# Patient Record
Sex: MALE | Race: WHITE | NOT HISPANIC OR LATINO
[De-identification: names, ages, dates, MRNs, and addresses within clinical notes are randomized per-mention and may not be internally consistent; named-entity substitution may affect disease eponyms.]

---

## 2020-05-04 PROBLEM — Z00.00 ENCOUNTER FOR PREVENTIVE HEALTH EXAMINATION: Status: ACTIVE | Noted: 2020-05-04

## 2020-05-07 ENCOUNTER — APPOINTMENT (OUTPATIENT)
Dept: PULMONOLOGY | Facility: CLINIC | Age: 60
End: 2020-05-07
Payer: MEDICAID

## 2020-05-07 VITALS — HEIGHT: 70 IN | BODY MASS INDEX: 30.06 KG/M2 | WEIGHT: 210 LBS

## 2020-05-07 VITALS — DIASTOLIC BLOOD PRESSURE: 60 MMHG | SYSTOLIC BLOOD PRESSURE: 130 MMHG | HEART RATE: 95 BPM | OXYGEN SATURATION: 94 %

## 2020-05-07 DIAGNOSIS — Z87.828 PERSONAL HISTORY OF OTHER (HEALED) PHYSICAL INJURY AND TRAUMA: ICD-10-CM

## 2020-05-07 DIAGNOSIS — Z03.89 ENCOUNTER FOR OBSERVATION FOR OTHER SUSPECTED DISEASES AND CONDITIONS RULED OUT: ICD-10-CM

## 2020-05-07 DIAGNOSIS — M51.26 OTHER INTERVERTEBRAL DISC DISPLACEMENT, LUMBAR REGION: ICD-10-CM

## 2020-05-07 DIAGNOSIS — R63.4 ABNORMAL WEIGHT LOSS: ICD-10-CM

## 2020-05-07 PROCEDURE — 99204 OFFICE O/P NEW MOD 45 MIN: CPT

## 2020-05-07 PROCEDURE — 99406 BEHAV CHNG SMOKING 3-10 MIN: CPT

## 2020-07-13 ENCOUNTER — APPOINTMENT (OUTPATIENT)
Dept: THORACIC SURGERY | Facility: CLINIC | Age: 60
End: 2020-07-13
Payer: MEDICAID

## 2020-07-13 VITALS — HEIGHT: 72 IN | BODY MASS INDEX: 27.63 KG/M2 | WEIGHT: 204 LBS

## 2020-07-13 PROCEDURE — 99443: CPT

## 2020-08-03 ENCOUNTER — APPOINTMENT (OUTPATIENT)
Dept: PULMONOLOGY | Facility: CLINIC | Age: 60
End: 2020-08-03
Payer: MEDICAID

## 2020-08-03 VITALS — OXYGEN SATURATION: 96 % | DIASTOLIC BLOOD PRESSURE: 82 MMHG | HEART RATE: 133 BPM | SYSTOLIC BLOOD PRESSURE: 124 MMHG

## 2020-08-03 VITALS — BODY MASS INDEX: 27.13 KG/M2 | WEIGHT: 200 LBS

## 2020-08-03 DIAGNOSIS — R00.0 TACHYCARDIA, UNSPECIFIED: ICD-10-CM

## 2020-08-03 PROCEDURE — 99214 OFFICE O/P EST MOD 30 MIN: CPT

## 2020-08-10 ENCOUNTER — EMERGENCY (EMERGENCY)
Facility: HOSPITAL | Age: 60
LOS: 1 days | Discharge: HOME | End: 2020-08-10
Attending: EMERGENCY MEDICINE
Payer: MEDICAID

## 2020-08-10 VITALS
TEMPERATURE: 98 F | RESPIRATION RATE: 20 BRPM | WEIGHT: 199.96 LBS | OXYGEN SATURATION: 97 % | SYSTOLIC BLOOD PRESSURE: 112 MMHG | HEART RATE: 100 BPM | HEIGHT: 72 IN | DIASTOLIC BLOOD PRESSURE: 80 MMHG

## 2020-08-10 LAB
ALBUMIN SERPL ELPH-MCNC: 4.1 G/DL — SIGNIFICANT CHANGE UP (ref 3.3–5.2)
ALP SERPL-CCNC: 92 U/L — SIGNIFICANT CHANGE UP (ref 40–120)
ALT FLD-CCNC: 7 U/L — SIGNIFICANT CHANGE UP
ANION GAP SERPL CALC-SCNC: 12 MMOL/L — SIGNIFICANT CHANGE UP (ref 5–17)
APTT BLD: 34.1 SEC — SIGNIFICANT CHANGE UP (ref 27.5–35.5)
AST SERPL-CCNC: 16 U/L — SIGNIFICANT CHANGE UP
BASE EXCESS BLDA CALC-SCNC: 4.9 MMOL/L — HIGH (ref -2–2)
BASOPHILS # BLD AUTO: 0.03 K/UL — SIGNIFICANT CHANGE UP (ref 0–0.2)
BASOPHILS NFR BLD AUTO: 0.6 % — SIGNIFICANT CHANGE UP (ref 0–2)
BILIRUB SERPL-MCNC: 0.9 MG/DL — SIGNIFICANT CHANGE UP (ref 0.4–2)
BLOOD GAS COMMENTS ARTERIAL: SIGNIFICANT CHANGE UP
BUN SERPL-MCNC: 7 MG/DL — LOW (ref 8–20)
CALCIUM SERPL-MCNC: 9 MG/DL — SIGNIFICANT CHANGE UP (ref 8.6–10.2)
CHLORIDE SERPL-SCNC: 95 MMOL/L — LOW (ref 98–107)
CO2 SERPL-SCNC: 30 MMOL/L — HIGH (ref 22–29)
CREAT SERPL-MCNC: 0.87 MG/DL — SIGNIFICANT CHANGE UP (ref 0.5–1.3)
EOSINOPHIL # BLD AUTO: 0.09 K/UL — SIGNIFICANT CHANGE UP (ref 0–0.5)
EOSINOPHIL NFR BLD AUTO: 1.7 % — SIGNIFICANT CHANGE UP (ref 0–6)
GAS PNL BLDA: SIGNIFICANT CHANGE UP
GLUCOSE SERPL-MCNC: 78 MG/DL — SIGNIFICANT CHANGE UP (ref 70–99)
HCO3 BLDA-SCNC: 29 MMOL/L — HIGH (ref 20–26)
HCT VFR BLD CALC: 46.6 % — SIGNIFICANT CHANGE UP (ref 39–50)
HGB BLD-MCNC: 15.4 G/DL — SIGNIFICANT CHANGE UP (ref 13–17)
HOROWITZ INDEX BLDA+IHG-RTO: SIGNIFICANT CHANGE UP
IMM GRANULOCYTES NFR BLD AUTO: 0.2 % — SIGNIFICANT CHANGE UP (ref 0–1.5)
INR BLD: 1.28 RATIO — HIGH (ref 0.88–1.16)
LYMPHOCYTES # BLD AUTO: 2.03 K/UL — SIGNIFICANT CHANGE UP (ref 1–3.3)
LYMPHOCYTES # BLD AUTO: 39.1 % — SIGNIFICANT CHANGE UP (ref 13–44)
MCHC RBC-ENTMCNC: 32.5 PG — SIGNIFICANT CHANGE UP (ref 27–34)
MCHC RBC-ENTMCNC: 33 GM/DL — SIGNIFICANT CHANGE UP (ref 32–36)
MCV RBC AUTO: 98.3 FL — SIGNIFICANT CHANGE UP (ref 80–100)
MONOCYTES # BLD AUTO: 0.31 K/UL — SIGNIFICANT CHANGE UP (ref 0–0.9)
MONOCYTES NFR BLD AUTO: 6 % — SIGNIFICANT CHANGE UP (ref 2–14)
NEUTROPHILS # BLD AUTO: 2.72 K/UL — SIGNIFICANT CHANGE UP (ref 1.8–7.4)
NEUTROPHILS NFR BLD AUTO: 52.4 % — SIGNIFICANT CHANGE UP (ref 43–77)
NT-PROBNP SERPL-SCNC: 122 PG/ML — SIGNIFICANT CHANGE UP (ref 0–300)
PCO2 BLDA: 51 MMHG — HIGH (ref 35–45)
PH BLDA: 7.39 — SIGNIFICANT CHANGE UP (ref 7.35–7.45)
PLATELET # BLD AUTO: 160 K/UL — SIGNIFICANT CHANGE UP (ref 150–400)
PO2 BLDA: 65 MMHG — LOW (ref 83–108)
POTASSIUM SERPL-MCNC: 4.7 MMOL/L — SIGNIFICANT CHANGE UP (ref 3.5–5.3)
POTASSIUM SERPL-SCNC: 4.7 MMOL/L — SIGNIFICANT CHANGE UP (ref 3.5–5.3)
PROT SERPL-MCNC: 6.9 G/DL — SIGNIFICANT CHANGE UP (ref 6.6–8.7)
PROTHROM AB SERPL-ACNC: 14.7 SEC — HIGH (ref 10.6–13.6)
RBC # BLD: 4.74 M/UL — SIGNIFICANT CHANGE UP (ref 4.2–5.8)
RBC # FLD: 13.2 % — SIGNIFICANT CHANGE UP (ref 10.3–14.5)
SAO2 % BLDA: 95 % — SIGNIFICANT CHANGE UP (ref 95–99)
SODIUM SERPL-SCNC: 137 MMOL/L — SIGNIFICANT CHANGE UP (ref 135–145)
TROPONIN T SERPL-MCNC: <0.01 NG/ML — SIGNIFICANT CHANGE UP (ref 0–0.06)
WBC # BLD: 5.19 K/UL — SIGNIFICANT CHANGE UP (ref 3.8–10.5)
WBC # FLD AUTO: 5.19 K/UL — SIGNIFICANT CHANGE UP (ref 3.8–10.5)

## 2020-08-10 PROCEDURE — 93970 EXTREMITY STUDY: CPT | Mod: 26

## 2020-08-10 PROCEDURE — 36415 COLL VENOUS BLD VENIPUNCTURE: CPT

## 2020-08-10 PROCEDURE — 85610 PROTHROMBIN TIME: CPT

## 2020-08-10 PROCEDURE — 93005 ELECTROCARDIOGRAM TRACING: CPT

## 2020-08-10 PROCEDURE — 99284 EMERGENCY DEPT VISIT MOD MDM: CPT | Mod: 25

## 2020-08-10 PROCEDURE — 82803 BLOOD GASES ANY COMBINATION: CPT

## 2020-08-10 PROCEDURE — 93970 EXTREMITY STUDY: CPT

## 2020-08-10 PROCEDURE — 99218: CPT

## 2020-08-10 PROCEDURE — 85027 COMPLETE CBC AUTOMATED: CPT

## 2020-08-10 PROCEDURE — 93010 ELECTROCARDIOGRAM REPORT: CPT | Mod: 76

## 2020-08-10 PROCEDURE — 83880 ASSAY OF NATRIURETIC PEPTIDE: CPT

## 2020-08-10 PROCEDURE — G0378: CPT

## 2020-08-10 PROCEDURE — 99221 1ST HOSP IP/OBS SF/LOW 40: CPT

## 2020-08-10 PROCEDURE — 80053 COMPREHEN METABOLIC PANEL: CPT

## 2020-08-10 PROCEDURE — 84484 ASSAY OF TROPONIN QUANT: CPT

## 2020-08-10 PROCEDURE — 85730 THROMBOPLASTIN TIME PARTIAL: CPT

## 2020-08-10 RX ORDER — FONDAPARINUX SODIUM 2.5 MG/.5ML
1 INJECTION, SOLUTION SUBCUTANEOUS
Qty: 42 | Refills: 0
Start: 2020-08-10 | End: 2020-08-30

## 2020-08-10 RX ORDER — ACETAMINOPHEN 500 MG
650 TABLET ORAL EVERY 6 HOURS
Refills: 0 | Status: DISCONTINUED | OUTPATIENT
Start: 2020-08-10 | End: 2020-08-15

## 2020-08-10 RX ORDER — APIXABAN 2.5 MG/1
10 TABLET, FILM COATED ORAL ONCE
Refills: 0 | Status: DISCONTINUED | OUTPATIENT
Start: 2020-08-10 | End: 2020-08-10

## 2020-08-10 RX ORDER — RIVAROXABAN 15 MG-20MG
15 KIT ORAL ONCE
Refills: 0 | Status: COMPLETED | OUTPATIENT
Start: 2020-08-10 | End: 2020-08-10

## 2020-08-10 RX ORDER — SODIUM CHLORIDE 9 MG/ML
1000 INJECTION, SOLUTION INTRAVENOUS ONCE
Refills: 0 | Status: COMPLETED | OUTPATIENT
Start: 2020-08-10 | End: 2020-08-10

## 2020-08-10 RX ADMIN — RIVAROXABAN 15 MILLIGRAM(S): KIT at 19:39

## 2020-08-10 RX ADMIN — SODIUM CHLORIDE 3000 MILLILITER(S): 9 INJECTION, SOLUTION INTRAVENOUS at 17:53

## 2020-08-10 RX ADMIN — SODIUM CHLORIDE 1000 MILLILITER(S): 9 INJECTION, SOLUTION INTRAVENOUS at 17:53

## 2020-08-10 NOTE — ED ADULT NURSE NOTE - CHPI ED NUR SYMPTOMS NEG
no congestion/no diaphoresis/no nausea/no vomiting/no dizziness/no shortness of breath/no fever/no syncope/no chest pain/no chills/no back pain

## 2020-08-10 NOTE — ED CDU PROVIDER DISPOSITION NOTE - CLINICAL COURSE
PT with no SPMHx presents to the ED with complaint of abnormal Ct scan, found to have possible new arrythmia seen by pulm placed in obs for further work up. Prior to full obs orders being placed Pt called PA to bed side requesting to AMA, PA informed pt that he has new onset of Arrythmia, possible PE, abnormal EKG, and discussed the importance of staying for further work up and the risk of death or missed Dx from leaving. Pt was persistent that he wanted to go home and differed obs admission, educated about when to return to the ED if needed. PT verbalizes that he understands all instructions and results. Pt informed that ED is open and available 24/7 365 days a yr, encouraged to return to the ED if they have any change in condition, or feel the need for revaluation

## 2020-08-10 NOTE — ED CDU PROVIDER DISPOSITION NOTE - NSFOLLOWUPCLINICS_GEN_ALL_ED_FT
[facility]  Cardiology  [address] [place] [address]  Phone: [phone]  Fax:   Follow Up Time:

## 2020-08-10 NOTE — ED CDU PROVIDER INITIAL DAY NOTE - ATTENDING CONTRIBUTION TO CARE
HPI: 59yo male presenting with chest pain. Seen by pulmonologist and had outpatient CT scan which showed small left PE. Patient also noted to be in atrial flutter in ED.     PE:  Gen: NAD  Head: NCAT  HEENT: Oral mucosa moist, normal conjunctiva  CV: RRR no murmurs, normal perfusion  Resp: CTA b/l, no wheezing, rales, rhonchi, no respiratory distress  GI: Abd Soft NTND  Neuro: No focal neuro deficits  MSK: FROM all 4 extremities, no deformity  Skin: No rash, no bruising  Psych: Normal affect    MDM: Patient with new atrial flutter, PE, placed in obs for TTE, cardiac enzymes, and telemetry monitoring. However, patient decided to leave ED AMA. The patient has the capacity to refuse further medical evaluation and care. I had a lengthy discussion with the patient in which appropriate further evaluation and treatment was offered to the patient, and they declined. The patient understands that as a consequence of this decision they may be at risk for clinical deterioration including permanent disability and death, and the patient verbalized this understanding. Clear return precautions were discussed. The patient was urged to seek follow-up care, with appropriate referrals made as needed. Radha Farias DO     I performed a history and physical exam of the patient and discussed their management with the advanced care provider. I reviewed the advanced care provider's note and agree with the documented findings and plan of care. My medical decision making and objective findings are found above.

## 2020-08-10 NOTE — ED CDU PROVIDER INITIAL DAY NOTE - MEDICAL DECISION MAKING DETAILS
PT with new arrythmia and possible PE  placed in obs for Diagnostic uncertainty. PT seen BY OBS PA found to be appropriate CDU PT care transferred to PA.

## 2020-08-10 NOTE — ED CDU PROVIDER INITIAL DAY NOTE - PROGRESS NOTE DETAILS
Prior to full obs orders being placed Pt called PA to bed side requesting to AMA, PA informed pt that he has new onset of Arrythmia, possible PE, abnormal EKG, and discussed the importance of staying for further work up and the risk of death or missed Dx from leaving. Pt was persistent that he wanted to go home and differed obs admission, educated about when to return to the ED if needed. PT verbalizes that he understands all instructions and results. Pt informed that ED is open and available 24/7 365 days a yr, encouraged to return to the ED if they have any change in condition, or feel the need for revaluation. Myself and Dr. Farias had a lengthy discussion with patient, and the patient wishes to leave at this time. The patient understands that he/she is leaving against medical advice despite the risk of missing a potential serious diagnosis which may lead to injury, disability and/or death. I discussed with the patient which tests would need to be performed and what type of monitoring would be necessary for the patient as well. I was unable to convince the patient to stay for further work-up. The patient is alert and oriented and demonstrates competence in making medical decisions.

## 2020-08-10 NOTE — ED PROVIDER NOTE - CLINICAL SUMMARY MEDICAL DECISION MAKING FREE TEXT BOX
59 yo male presents after outpatient CT three days ago revealed small left pulmonary emboli. Will order labs, venous doppler to assess for possible DVT. Plan to admit the patient to obs pending lab work results.

## 2020-08-10 NOTE — CONSULT NOTE ADULT - SUBJECTIVE AND OBJECTIVE BOX
PULMONARY CONSULT NOTE      CHIP LOPEZ  MRN-[id number]    Patient is a 60y old  Male who presents with a chief complaint of PE    HISTORY OF PRESENT ILLNESS:  60M PMH smoker (1.5ppd x 15 years) who was found with small PE in LLL. Pt reports feeling asymptomatic so did not want to come to the ED, and after speaking with his pulmonologist Dr. Prescott, decided to come to the hospital. He was not started on anticoagulation. He denies any acute chest pain or SOB. Denies recent travel, denies any sick contacts. Denies coagulopathies running in the family. Reports having a colonoscopy 2 years ago that was "fine." No N/V/D, no fevers, no chills.     In the ED he was found with aflutter HR 120s, and was started on a DOAC (Xarelto).     MEDICATIONS  (STANDING):  rivaroxaban 15 milliGRAM(s) Oral Once    MEDICATIONS  (PRN):  acetaminophen   Tablet .. 650 milliGRAM(s) Oral every 6 hours PRN Temp greater or equal to 38C (100.4F), Mild Pain (1 - 3)    Allergies    No Known Allergies    Intolerances      PAST MEDICAL & SURGICAL HISTORY:  Poor historian  No significant past surgical history    FAMILY HISTORY:  Wife passed 06/2020 from ovarian CA      SOCIAL HISTORY  Smoking History: 1.5ppd x 15 years, quit 2 weeks PTA      REVIEW OF SYSTEMS:  CONSTITUTIONAL:  No fevers, chills  HEENT:  No headache, blurry vision, epistaxis, rhinorrhea  CARDIOVASCULAR:  No chest pain, no palpitations  RESPIRATORY:  As per HPI  GASTROINTESTINAL:  No abdominal pain, N/V/D  GENITOURINARY:  No dysuria, frequency or urgency  NEUROLOGIC:  No seizures or headaches  PSYCHIATRIC:  No disorder of thought or mood      Vital Signs Last 24 Hrs  T(C): 36.8 (10 Aug 2020 14:51), Max: 36.8 (10 Aug 2020 14:51)  T(F): 98.3 (10 Aug 2020 14:51), Max: 98.3 (10 Aug 2020 14:51)  HR: 100 (10 Aug 2020 14:51) (100 - 100)  BP: 112/80 (10 Aug 2020 14:51) (112/80 - 112/80)  BP(mean): --  RR: 20 (10 Aug 2020 14:51) (20 - 20)  SpO2: 97% (10 Aug 2020 14:51) (97% - 97%)      PHYSICAL EXAMINATION:  GENERAL: In no apparent distress  HEENT: NC/AT  NECK: Supple, non-tender   LUNGS: CTA B/L, good inspiratory effort, non-labored breathing  CV: +S1, S2, RRR  ABDOMEN: Soft, NT/ND  EXTREMITIES: Chronic LE skin changes, with 1+ pedal edema B/L  NEUROLOGIC: No focal deficits, grossly intact  PSYCH: Normal affect      LABS:                        15.4   5.19  )-----------( 160      ( 10 Aug 2020 17:35 )             46.6     08-10    137  |  95<L>  |  7.0<L>  ----------------------------<  78  4.7   |  30.0<H>  |  0.87    Ca    9.0      10 Aug 2020 17:35    TPro  6.9  /  Alb  4.1  /  TBili  0.9  /  DBili  x   /  AST  16  /  ALT  7   /  AlkPhos  92  08-10    PT/INR - ( 10 Aug 2020 17:40 )   PT: 14.7 sec;   INR: 1.28 ratio         PTT - ( 10 Aug 2020 17:40 )  PTT:34.1 sec    ABG - ( 10 Aug 2020 18:38 )  pH, Arterial: 7.39  pH, Blood: x     /  pCO2: 51    /  pO2: 65    / HCO3: 29    / Base Excess: 4.9   /  SaO2: 95                CARDIAC MARKERS ( 10 Aug 2020 17:35 )  x     / <0.01 ng/mL / x     / x     / x            Serum Pro-Brain Natriuretic Peptide: 122 pg/mL (08-10-20 @ 17:35)      RADIOLOGY & ADDITIONAL STUDIES:  CT at [facility] (8/7/2020) showed small LLL PE (see Alpha tab)    ECHO:  Pending

## 2020-08-10 NOTE — CONSULT NOTE ADULT - ASSESSMENT
60M PMH smoker (1.5ppd x 15 years) who was found with small PE in LLL    Impression:  - LLL small PE  - Smoker    Plan:  - May c/w Xarelto  - F/u TTE, LE dopplers  - Of note BNP and troponin were negative  - OOBTC, ambulate if able  - Monitor heart rhythm on telemetry - likely AFlutter is 2/2 PE  - Advise to avoid straining; start stool softeners if constipated  - Monitor O2 requirements  - Continue to advise smoking cessation  - Recommend monitoring on telemetry  - DVT PPx - systemic A/C as above  - Thank you for this consult, will continue to follow    Han Garcia M.D.  Pulmonary & Critical Care Attending  [facility]  Pulmonary Medicine at [place] 60M PMH smoker (1.5ppd x 15 years) who was found with small PE in LLL    Impression:  - LLL small PE  - Smoker  - Hypoxemia    Plan:  - May c/w Xarelto  - F/u TTE, LE dopplers  - Of note BNP and troponin were negative  - OOBTC, ambulate if able  - Monitor heart rhythm on telemetry - likely AFlutter is 2/2 PE  - Advise to avoid straining; start stool softeners if constipated  - Monitor O2 requirements   - Continue to advise smoking cessation  - Recommend monitoring on telemetry  - DVT PPx - systemic A/C as above  - Thank you for this consult, will continue to follow    Han Garcia M.D.  Pulmonary & Critical Care Attending  [facility]  Pulmonary Medicine at [place]

## 2020-08-10 NOTE — ED ADULT NURSE NOTE - OBJECTIVE STATEMENT
"the doctor said my blood counts are high and I may have clots somewhere." pt offers no complaints. a and o x3. sitting calm in bed. breathing even and unlabored. sinus tach on cm. will continue to monitor.

## 2020-08-10 NOTE — ED PROVIDER NOTE - ATTENDING CONTRIBUTION TO CARE
Dr. Perkins : I have personally seen and examined this patient at the bedside. I have fully participated in the care of this patient. I have reviewed all pertinent clinical information, including history, physical exam, plan and the Resident's note and agree except as noted.     59yo M pw with PE ON OUTPATIENT CT. denies cp/sob however notes that has had worsenign sob over past  year so his pmd sent him to a pulmonalogist dr. Prescott who sent him for a ct. . CT revealed small left pulmonary emboli. The patient did not want to go to the hospital three days ago as he was asymptomatic. Today, his Pulmonologist, Dr. Prescott, convinced him to go to the ED. He was not started on any anticoagulation before coming to the ED. Currently, the patient states that he feels "fine".   Denies f/c/n/v/cp/sob/palpitations/cough/abd.pain/d/c/dysuria/hematuria. no sick contacts/recent travel. no hx of dvt/pe    PE:  head; atraumatic normocephalic  eyes: perrla  Heart: rrr s1s2  lungs: ctab  abd: soft, nt nd + bs no rebound/guarding no cva ttp  le: no swelling no calf ttp  back: no midline cervical/thoracic/lumbar ttp      -->+PE on outpatient ct; will fu labs bnp trop bl le sono; place in obs; aflutter on ekg most likely secondary to PE keep on monitor; consult pulm--obs

## 2020-08-10 NOTE — ED PROVIDER NOTE - OBJECTIVE STATEMENT
59 yo male presents after undergoing CT scan three days ago. He was advised to go for CT scan after his Pulmonologist found abnormalities in his blood work four days ago. CT revealed small left pulmonary emboli. The patient did not want to go to the hospital three days ago as he was asymptomatic. Today, his Pulmonologist, Dr. Prescott, convinced him to go to the ED. He was not started on any anticoagulation before coming to the ED. Currently, the patient states that he feels "fine". He denies chest pain, SOB, abdominal pain, n/v/d, LOC, fever, chills.

## 2020-08-10 NOTE — CONSULT NOTE ADULT - ATTENDING COMMENTS
Greater than 50% of time was spent reviewing labs, notes, orders, radiographs and coordinating care.  35 minutes spent on total encounter; more than 50% of the visit was spent counseling and/or coordinating care by the attending physician.

## 2020-08-10 NOTE — ED PROVIDER NOTE - PHYSICAL EXAMINATION
Const: Awake, alert and oriented. In no acute distress. Well appearing.  HEENT: NC/AT. Moist mucous membranes.  Eyes: No scleral icterus. EOMI.  Neck:. Soft and supple. Full ROM without pain.  Cardiac: Tachycardia and regular rhythm. +S1/S2. Peripheral pulses 2+ and symmetric. No LE edema.  Resp: Speaking in full sentences. No evidence of respiratory distress. No wheezes, rales or rhonchi.  Abd: Soft, non-tender, non-distended. Normal bowel sounds in all 4 quadrants. No guarding or rebound.  Back: Spine midline and non-tender. No CVAT.  Skin: No rashes, abrasions or lacerations.  Lymph: No cervical lymphadenopathy.  Neuro: Awake, alert & oriented x 3. Moves all extremities symmetrically.

## 2020-08-10 NOTE — ED CDU PROVIDER DISPOSITION NOTE - PATIENT PORTAL LINK FT
You can access the FollowMyHealth Patient Portal offered by [facility] by registering at the following website: [email or web address]. By joining [employer]’s FollowMyHealth portal, you will also be able to view your health information using other applications (apps) compatible with our system.

## 2020-08-18 ENCOUNTER — APPOINTMENT (OUTPATIENT)
Dept: CARDIOLOGY | Facility: CLINIC | Age: 60
End: 2020-08-18

## 2020-11-05 PROBLEM — Z78.9 OTHER SPECIFIED HEALTH STATUS: Chronic | Status: ACTIVE | Noted: 2020-08-10

## 2020-11-08 DIAGNOSIS — Z01.818 ENCOUNTER FOR OTHER PREPROCEDURAL EXAMINATION: ICD-10-CM

## 2020-11-09 ENCOUNTER — TRANSCRIPTION ENCOUNTER (OUTPATIENT)
Age: 60
End: 2020-11-09

## 2020-11-09 ENCOUNTER — APPOINTMENT (OUTPATIENT)
Dept: DISASTER EMERGENCY | Facility: CLINIC | Age: 60
End: 2020-11-09

## 2020-11-09 ENCOUNTER — APPOINTMENT (OUTPATIENT)
Dept: PULMONOLOGY | Facility: CLINIC | Age: 60
End: 2020-11-09

## 2020-11-11 LAB — SARS-COV-2 N GENE NPH QL NAA+PROBE: NOT DETECTED

## 2020-11-12 ENCOUNTER — APPOINTMENT (OUTPATIENT)
Dept: PULMONOLOGY | Facility: CLINIC | Age: 60
End: 2020-11-12
Payer: MEDICAID

## 2020-11-12 VITALS — HEART RATE: 98 BPM | SYSTOLIC BLOOD PRESSURE: 138 MMHG | DIASTOLIC BLOOD PRESSURE: 86 MMHG | OXYGEN SATURATION: 97 %

## 2020-11-12 VITALS — BODY MASS INDEX: 26.45 KG/M2 | WEIGHT: 187 LBS

## 2020-11-12 VITALS — BODY MASS INDEX: 26.9 KG/M2 | HEIGHT: 70.5 IN | WEIGHT: 190 LBS

## 2020-11-12 PROCEDURE — 99215 OFFICE O/P EST HI 40 MIN: CPT | Mod: 25

## 2020-11-12 PROCEDURE — 94729 DIFFUSING CAPACITY: CPT

## 2020-11-12 PROCEDURE — 94727 GAS DIL/WSHOT DETER LNG VOL: CPT

## 2020-11-12 PROCEDURE — 94010 BREATHING CAPACITY TEST: CPT

## 2020-11-12 PROCEDURE — 85018 HEMOGLOBIN: CPT | Mod: QW

## 2020-11-12 PROCEDURE — 99072 ADDL SUPL MATRL&STAF TM PHE: CPT

## 2020-11-16 LAB
BUN SERPL-MCNC: 8 MG/DL
CREAT SERPL-MCNC: 0.89 MG/DL
DEPRECATED D DIMER PPP IA-ACNC: 329 NG/ML DDU

## 2020-11-18 ENCOUNTER — NON-APPOINTMENT (OUTPATIENT)
Age: 60
End: 2020-11-18

## 2020-11-18 ENCOUNTER — EMERGENCY (EMERGENCY)
Facility: HOSPITAL | Age: 60
LOS: 1 days | Discharge: HOME | End: 2020-11-18
Attending: EMERGENCY MEDICINE
Payer: MEDICAID

## 2020-11-18 VITALS
SYSTOLIC BLOOD PRESSURE: 157 MMHG | OXYGEN SATURATION: 97 % | TEMPERATURE: 99 F | HEART RATE: 97 BPM | WEIGHT: 186.95 LBS | HEIGHT: 72 IN | RESPIRATION RATE: 18 BRPM | DIASTOLIC BLOOD PRESSURE: 78 MMHG

## 2020-11-18 VITALS
TEMPERATURE: 98 F | HEART RATE: 115 BPM | DIASTOLIC BLOOD PRESSURE: 76 MMHG | RESPIRATION RATE: 18 BRPM | SYSTOLIC BLOOD PRESSURE: 139 MMHG | OXYGEN SATURATION: 96 %

## 2020-11-18 LAB
ALBUMIN SERPL ELPH-MCNC: 4.1 G/DL — SIGNIFICANT CHANGE UP (ref 3.3–5.2)
ALP SERPL-CCNC: 97 U/L — SIGNIFICANT CHANGE UP (ref 40–120)
ALT FLD-CCNC: 11 U/L — SIGNIFICANT CHANGE UP
ANION GAP SERPL CALC-SCNC: 10 MMOL/L — SIGNIFICANT CHANGE UP (ref 5–17)
APTT BLD: 36.9 SEC — HIGH (ref 27.5–35.5)
AST SERPL-CCNC: 21 U/L — SIGNIFICANT CHANGE UP
BASOPHILS # BLD AUTO: 0.03 K/UL — SIGNIFICANT CHANGE UP (ref 0–0.2)
BASOPHILS NFR BLD AUTO: 0.4 % — SIGNIFICANT CHANGE UP (ref 0–2)
BILIRUB SERPL-MCNC: 0.7 MG/DL — SIGNIFICANT CHANGE UP (ref 0.4–2)
BUN SERPL-MCNC: 7 MG/DL — LOW (ref 8–20)
CALCIUM SERPL-MCNC: 8.9 MG/DL — SIGNIFICANT CHANGE UP (ref 8.6–10.2)
CHLORIDE SERPL-SCNC: 99 MMOL/L — SIGNIFICANT CHANGE UP (ref 98–107)
CO2 SERPL-SCNC: 28 MMOL/L — SIGNIFICANT CHANGE UP (ref 22–29)
CREAT SERPL-MCNC: 0.81 MG/DL — SIGNIFICANT CHANGE UP (ref 0.5–1.3)
EOSINOPHIL # BLD AUTO: 0.05 K/UL — SIGNIFICANT CHANGE UP (ref 0–0.5)
EOSINOPHIL NFR BLD AUTO: 0.7 % — SIGNIFICANT CHANGE UP (ref 0–6)
GLUCOSE SERPL-MCNC: 84 MG/DL — SIGNIFICANT CHANGE UP (ref 70–99)
HCT VFR BLD CALC: 47.2 % — SIGNIFICANT CHANGE UP (ref 39–50)
HGB BLD-MCNC: 16.2 G/DL — SIGNIFICANT CHANGE UP (ref 13–17)
IMM GRANULOCYTES NFR BLD AUTO: 0.3 % — SIGNIFICANT CHANGE UP (ref 0–1.5)
INR BLD: 1.16 RATIO — SIGNIFICANT CHANGE UP (ref 0.88–1.16)
LYMPHOCYTES # BLD AUTO: 1.72 K/UL — SIGNIFICANT CHANGE UP (ref 1–3.3)
LYMPHOCYTES # BLD AUTO: 24.1 % — SIGNIFICANT CHANGE UP (ref 13–44)
MAGNESIUM SERPL-MCNC: 2.1 MG/DL — SIGNIFICANT CHANGE UP (ref 1.6–2.6)
MCHC RBC-ENTMCNC: 33 PG — SIGNIFICANT CHANGE UP (ref 27–34)
MCHC RBC-ENTMCNC: 34.3 GM/DL — SIGNIFICANT CHANGE UP (ref 32–36)
MCV RBC AUTO: 96.1 FL — SIGNIFICANT CHANGE UP (ref 80–100)
MONOCYTES # BLD AUTO: 0.4 K/UL — SIGNIFICANT CHANGE UP (ref 0–0.9)
MONOCYTES NFR BLD AUTO: 5.6 % — SIGNIFICANT CHANGE UP (ref 2–14)
NEUTROPHILS # BLD AUTO: 4.91 K/UL — SIGNIFICANT CHANGE UP (ref 1.8–7.4)
NEUTROPHILS NFR BLD AUTO: 68.9 % — SIGNIFICANT CHANGE UP (ref 43–77)
NT-PROBNP SERPL-SCNC: 452 PG/ML — HIGH (ref 0–300)
PLATELET # BLD AUTO: 167 K/UL — SIGNIFICANT CHANGE UP (ref 150–400)
POTASSIUM SERPL-MCNC: 4.7 MMOL/L — SIGNIFICANT CHANGE UP (ref 3.5–5.3)
POTASSIUM SERPL-SCNC: 4.7 MMOL/L — SIGNIFICANT CHANGE UP (ref 3.5–5.3)
PROT SERPL-MCNC: 7.3 G/DL — SIGNIFICANT CHANGE UP (ref 6.6–8.7)
PROTHROM AB SERPL-ACNC: 13.4 SEC — SIGNIFICANT CHANGE UP (ref 10.6–13.6)
RBC # BLD: 4.91 M/UL — SIGNIFICANT CHANGE UP (ref 4.2–5.8)
RBC # FLD: 13.2 % — SIGNIFICANT CHANGE UP (ref 10.3–14.5)
SODIUM SERPL-SCNC: 137 MMOL/L — SIGNIFICANT CHANGE UP (ref 135–145)
TROPONIN T SERPL-MCNC: <0.01 NG/ML — SIGNIFICANT CHANGE UP (ref 0–0.06)
WBC # BLD: 7.13 K/UL — SIGNIFICANT CHANGE UP (ref 3.8–10.5)
WBC # FLD AUTO: 7.13 K/UL — SIGNIFICANT CHANGE UP (ref 3.8–10.5)

## 2020-11-18 PROCEDURE — 93010 ELECTROCARDIOGRAM REPORT: CPT

## 2020-11-18 PROCEDURE — 71275 CT ANGIOGRAPHY CHEST: CPT | Mod: 26

## 2020-11-18 PROCEDURE — 99285 EMERGENCY DEPT VISIT HI MDM: CPT

## 2020-11-18 NOTE — ED PROVIDER NOTE - CARE PLAN
Principal Discharge DX:	COPD (chronic obstructive pulmonary disease)  Secondary Diagnosis:	Pulmonary embolism

## 2020-11-18 NOTE — ED PROVIDER NOTE - CLINICAL SUMMARY MEDICAL DECISION MAKING FREE TEXT BOX
patient with outpt recurrent PE, spoke with Dr Arias sent in for confirmatory CT, labs, likely admit brijesh see patient in AM. patient in no distress now. mild tachy, noted a afib

## 2020-11-18 NOTE — ED PROVIDER NOTE - PATIENT PORTAL LINK FT
You can access the FollowMyHealth Patient Portal offered by [facility] by registering at the following website: [email or web address]. By joining [email or web address]’s FollowMyHealth portal, you will also be able to view your health information using other applications (apps) compatible with our system.

## 2020-11-18 NOTE — ED ADULT NURSE NOTE - OBJECTIVE STATEMENT
a&ox4, sent by MD for eval of labs/CT scan; pt states increased sob/MERRILL - hx "blood clots", pt states he is supposed to be taking eliquis but "ran out a month ago and need a new prescription". current daily smoker. denies further symptoms.

## 2020-11-18 NOTE — ED PROVIDER NOTE - OBJECTIVE STATEMENT
59yo M with COPD current smoker, dx with PE in August stopped his xarelto b/c he ran out, per patient no complaints but per pulm- worsening MERRILL, no CP. no fever/chills. no cough. with son who states appears SOB, and sill smoker. had CTA showing worsening clot and sent to ED for 'medication to break it up' no leg swelling. no h/o CA.    pulm Prescott

## 2020-11-18 NOTE — ED PROVIDER NOTE - PHYSICAL EXAMINATION
Gen: NAD, AOx3  Head: NCAT  HEENT: EOMI, oral mucosa moist, normal conjunctiva, neck supple  Lung: decreased air entry, no wheeze appreciated, no resp distress  CV: irregular, tachy, no murmur, Normal perfusion  Abd: soft, NTND  MSK: No edema, no visible deformities  Neuro: No focal neurologic deficits  Skin: No rash   Psych: normal affect

## 2020-11-18 NOTE — ED PROVIDER NOTE - NSFOLLOWUPINSTRUCTIONS_ED_ALL_ED_FT
Stop smoking !!!  Eliquis 5 mg 2x daily  Follow up with Dr. Prescott-call in the AM to schedule appt  Return sooner for any problems

## 2020-11-19 LAB — SARS-COV-2 RNA SPEC QL NAA+PROBE: SIGNIFICANT CHANGE UP

## 2020-11-19 PROCEDURE — 71275 CT ANGIOGRAPHY CHEST: CPT

## 2020-11-19 PROCEDURE — 85730 THROMBOPLASTIN TIME PARTIAL: CPT

## 2020-11-19 PROCEDURE — 85025 COMPLETE CBC W/AUTO DIFF WBC: CPT

## 2020-11-19 PROCEDURE — 36415 COLL VENOUS BLD VENIPUNCTURE: CPT

## 2020-11-19 PROCEDURE — 83735 ASSAY OF MAGNESIUM: CPT

## 2020-11-19 PROCEDURE — 84484 ASSAY OF TROPONIN QUANT: CPT

## 2020-11-19 PROCEDURE — 80053 COMPREHEN METABOLIC PANEL: CPT

## 2020-11-19 PROCEDURE — U0003: CPT

## 2020-11-19 PROCEDURE — 93005 ELECTROCARDIOGRAM TRACING: CPT

## 2020-11-19 PROCEDURE — 83880 ASSAY OF NATRIURETIC PEPTIDE: CPT

## 2020-11-19 PROCEDURE — 99284 EMERGENCY DEPT VISIT MOD MDM: CPT | Mod: 25

## 2020-11-19 PROCEDURE — 85610 PROTHROMBIN TIME: CPT

## 2020-11-19 RX ORDER — APIXABAN 2.5 MG/1
5 TABLET, FILM COATED ORAL ONCE
Refills: 0 | Status: COMPLETED | OUTPATIENT
Start: 2020-11-19 | End: 2020-11-19

## 2020-11-19 RX ORDER — APIXABAN 2.5 MG/1
1 TABLET, FILM COATED ORAL
Qty: 60 | Refills: 0
Start: 2020-11-19 | End: 2020-12-18

## 2020-11-19 RX ADMIN — APIXABAN 5 MILLIGRAM(S): 2.5 TABLET, FILM COATED ORAL at 01:05

## 2020-11-24 PROBLEM — I26.99 OTHER PULMONARY EMBOLISM WITHOUT ACUTE COR PULMONALE: Chronic | Status: ACTIVE | Noted: 2020-11-18

## 2020-11-24 PROBLEM — J44.9 CHRONIC OBSTRUCTIVE PULMONARY DISEASE, UNSPECIFIED: Chronic | Status: ACTIVE | Noted: 2020-11-18

## 2020-12-15 ENCOUNTER — APPOINTMENT (OUTPATIENT)
Dept: PULMONOLOGY | Facility: CLINIC | Age: 60
End: 2020-12-15
Payer: MEDICAID

## 2020-12-15 VITALS
WEIGHT: 188 LBS | BODY MASS INDEX: 26.62 KG/M2 | DIASTOLIC BLOOD PRESSURE: 80 MMHG | RESPIRATION RATE: 14 BRPM | OXYGEN SATURATION: 97 % | HEIGHT: 70.5 IN | HEART RATE: 81 BPM | SYSTOLIC BLOOD PRESSURE: 120 MMHG

## 2020-12-15 PROCEDURE — 99072 ADDL SUPL MATRL&STAF TM PHE: CPT

## 2020-12-15 PROCEDURE — 99204 OFFICE O/P NEW MOD 45 MIN: CPT

## 2020-12-15 NOTE — CONSULT LETTER
[Dear  ___] : Dear  [unfilled], [Consult Letter:] : I had the pleasure of evaluating your patient, [unfilled]. [Please see my note below.] : Please see my note below. [Consult Closing:] : Thank you very much for allowing me to participate in the care of this patient.  If you have any questions, please do not hesitate to contact me. [Sincerely,] : Sincerely, [FreeTextEntry3] : Aleksander Pantoja MD FCCP\par Pulmonary/Critical Care/Sleep Medicine\par Department of Internal Medicine\par \par [employer]\par

## 2020-12-15 NOTE — REASON FOR VISIT
[Initial] : an initial visit [COPD] : COPD [Shortness of Breath] : shortness of breath [Pulmonary Embolism] : pulmonary embolism [Pulmonary Nodules] : pulmonary nodules

## 2020-12-15 NOTE — DISCUSSION/SUMMARY
[FreeTextEntry1] : 52-year-old male with a history of stage III chronic obstructive lung disease seen today in followup for this as well as pulmonary embolus and pulmonary nodule. Patient's recent complaints of dyspnea did not appear to be bronchospastic in origin and may be more the basis of deconditioning versus depression versus anemia secondary to anticoagulation. Routine blood work, as well as d-dimer being ordered. The patient has been told to continue his present anticoagulation until his next visit.\par \par His pulmonary nodule, now appears to be scarlike in origin. In light of his previous negative PET CT is most likely represented an inflammatory lesion.

## 2020-12-15 NOTE — HISTORY OF PRESENT ILLNESS
[Current] : current [>= 30 pack years] : >= 30 pack years [TextBox_4] : 60 year-old male with a history of pulmonary emboli, stage III chronic obstructive lung disease, and abnormal CAT scan, seen today in the absence of Dr. Prescott. Patient's chart was reviewed. He has a history of stage III chronic obstructive lung disease, complicated by a small pulmonary embolus in August 2020. He is poorly compliant with his oral anticoagulations and was eventually restarted on Elequis in mid-November. He is currently maintained on 5 mg b.i.d. He continues to complain of mild dyspnea on exertion, but has noticed some slight worsening. He denies any significant complaints of cough, wheeze, or sputum production. Has been no change in bowel habits. He continues to smoke approximately 1/2-1 pack of cigarettes per day.\par  [ESS] : 0

## 2020-12-28 ENCOUNTER — LABORATORY RESULT (OUTPATIENT)
Age: 60
End: 2020-12-28

## 2021-01-04 ENCOUNTER — APPOINTMENT (OUTPATIENT)
Dept: THORACIC SURGERY | Facility: CLINIC | Age: 61
End: 2021-01-04
Payer: MEDICAID

## 2021-01-04 VITALS — BODY MASS INDEX: 25.33 KG/M2 | WEIGHT: 187 LBS | HEIGHT: 72 IN

## 2021-01-04 PROCEDURE — ZZZZZ: CPT

## 2021-01-14 ENCOUNTER — APPOINTMENT (OUTPATIENT)
Dept: CARDIOLOGY | Facility: CLINIC | Age: 61
End: 2021-01-14
Payer: MEDICAID

## 2021-01-14 ENCOUNTER — NON-APPOINTMENT (OUTPATIENT)
Age: 61
End: 2021-01-14

## 2021-01-14 ENCOUNTER — APPOINTMENT (OUTPATIENT)
Dept: PULMONOLOGY | Facility: CLINIC | Age: 61
End: 2021-01-14
Payer: MEDICAID

## 2021-01-14 VITALS
WEIGHT: 193 LBS | TEMPERATURE: 98.1 F | BODY MASS INDEX: 26.14 KG/M2 | HEIGHT: 72 IN | SYSTOLIC BLOOD PRESSURE: 120 MMHG | DIASTOLIC BLOOD PRESSURE: 72 MMHG | HEART RATE: 64 BPM | OXYGEN SATURATION: 91 %

## 2021-01-14 VITALS
SYSTOLIC BLOOD PRESSURE: 122 MMHG | RESPIRATION RATE: 14 BRPM | HEART RATE: 74 BPM | DIASTOLIC BLOOD PRESSURE: 74 MMHG | OXYGEN SATURATION: 97 %

## 2021-01-14 VITALS — SYSTOLIC BLOOD PRESSURE: 122 MMHG | DIASTOLIC BLOOD PRESSURE: 78 MMHG

## 2021-01-14 DIAGNOSIS — Z87.898 PERSONAL HISTORY OF OTHER SPECIFIED CONDITIONS: ICD-10-CM

## 2021-01-14 PROCEDURE — 99072 ADDL SUPL MATRL&STAF TM PHE: CPT

## 2021-01-14 PROCEDURE — 99214 OFFICE O/P EST MOD 30 MIN: CPT | Mod: 25

## 2021-01-14 PROCEDURE — 99406 BEHAV CHNG SMOKING 3-10 MIN: CPT

## 2021-01-14 PROCEDURE — 93000 ELECTROCARDIOGRAM COMPLETE: CPT

## 2021-01-14 PROCEDURE — 99203 OFFICE O/P NEW LOW 30 MIN: CPT | Mod: 25

## 2021-01-14 RX ORDER — LORAZEPAM 0.5 MG/1
0.5 TABLET ORAL DAILY
Refills: 0 | Status: DISCONTINUED | COMMUNITY
Start: 2020-08-03 | End: 2021-01-14

## 2021-01-14 NOTE — PHYSICAL EXAM
[General Appearance - Well Developed] : well developed [Normal Appearance] : normal appearance [Well Groomed] : well groomed [General Appearance - Well Nourished] : well nourished [No Deformities] : no deformities [General Appearance - In No Acute Distress] : no acute distress [Normal Conjunctiva] : the conjunctiva exhibited no abnormalities [Eyelids - No Xanthelasma] : the eyelids demonstrated no xanthelasmas [Normal Oral Mucosa] : normal oral mucosa [No Oral Pallor] : no oral pallor [No Oral Cyanosis] : no oral cyanosis [Normal Jugular Venous A Waves Present] : normal jugular venous A waves present [Normal Jugular Venous V Waves Present] : normal jugular venous V waves present [No Jugular Venous Sarah A Waves] : no jugular venous sarah A waves [Heart Rate And Rhythm] : heart rate and rhythm were normal [Heart Sounds] : normal S1 and S2 [Murmurs] : no murmurs present [Respiration, Rhythm And Depth] : normal respiratory rhythm and effort [Exaggerated Use Of Accessory Muscles For Inspiration] : no accessory muscle use [Auscultation Breath Sounds / Voice Sounds] : lungs were clear to auscultation bilaterally [Abdomen Soft] : soft [Abdomen Tenderness] : non-tender [Abdomen Mass (___ Cm)] : no abdominal mass palpated [Abnormal Walk] : normal gait [Gait - Sufficient For Exercise Testing] : the gait was sufficient for exercise testing [Nail Clubbing] : no clubbing of the fingernails [Cyanosis, Localized] : no localized cyanosis [Petechial Hemorrhages (___cm)] : no petechial hemorrhages [Skin Color & Pigmentation] : normal skin color and pigmentation [] : no rash [No Venous Stasis] : no venous stasis [Skin Lesions] : no skin lesions [No Skin Ulcers] : no skin ulcer [No Xanthoma] : no  xanthoma was observed [Oriented To Time, Place, And Person] : oriented to person, place, and time [Affect] : the affect was normal [Mood] : the mood was normal [No Anxiety] : not feeling anxious

## 2021-01-14 NOTE — HISTORY OF PRESENT ILLNESS
[FreeTextEntry1] : 60 y/o M with PMH pulmonary emboli diagnosed om August 2020 (on eliquis), stage III COPD, active smoker (states he has cut down from 2ppd to 1 cigarette daily), chronic LBP on suboxone. \par \par Patient states he has chest pain and difficulty breathing for the past 5-6 years, which has been worsening over the past few months. He describes pain as stabbing pain in the left side of his chest that comes in "waves" without any exacerbating or relieving factors. He states pain is always there. Unrelated to activity. He states exercise capacity is limited to 1 block or < 1 flight of stairs due to dyspnea. Denies any prior cardiac workup. \par \par EKG today shows atrial flutter at 64 bpm with 4:1 conduction. Patient denies history of arrhythmia.

## 2021-01-21 ENCOUNTER — APPOINTMENT (OUTPATIENT)
Dept: CARDIOLOGY | Facility: CLINIC | Age: 61
End: 2021-01-21

## 2021-02-17 ENCOUNTER — APPOINTMENT (OUTPATIENT)
Dept: CARDIOLOGY | Facility: CLINIC | Age: 61
End: 2021-02-17

## 2021-02-24 ENCOUNTER — APPOINTMENT (OUTPATIENT)
Dept: CARDIOLOGY | Facility: CLINIC | Age: 61
End: 2021-02-24
Payer: MEDICAID

## 2021-02-24 PROCEDURE — 99072 ADDL SUPL MATRL&STAF TM PHE: CPT

## 2021-02-24 PROCEDURE — 93306 TTE W/DOPPLER COMPLETE: CPT

## 2021-03-01 ENCOUNTER — APPOINTMENT (OUTPATIENT)
Dept: CARDIOLOGY | Facility: CLINIC | Age: 61
End: 2021-03-01
Payer: MEDICAID

## 2021-03-01 PROCEDURE — A9500: CPT

## 2021-03-01 PROCEDURE — 78452 HT MUSCLE IMAGE SPECT MULT: CPT

## 2021-03-01 PROCEDURE — 93015 CV STRESS TEST SUPVJ I&R: CPT

## 2021-03-01 PROCEDURE — 99072 ADDL SUPL MATRL&STAF TM PHE: CPT

## 2021-03-04 ENCOUNTER — APPOINTMENT (OUTPATIENT)
Dept: CARDIOLOGY | Facility: CLINIC | Age: 61
End: 2021-03-04
Payer: MEDICAID

## 2021-03-04 ENCOUNTER — NON-APPOINTMENT (OUTPATIENT)
Age: 61
End: 2021-03-04

## 2021-03-04 VITALS
TEMPERATURE: 98.9 F | HEART RATE: 92 BPM | DIASTOLIC BLOOD PRESSURE: 70 MMHG | SYSTOLIC BLOOD PRESSURE: 126 MMHG | WEIGHT: 197 LBS | BODY MASS INDEX: 26.11 KG/M2 | HEIGHT: 73 IN | OXYGEN SATURATION: 94 %

## 2021-03-04 DIAGNOSIS — F17.200 NICOTINE DEPENDENCE, UNSPECIFIED, UNCOMPLICATED: ICD-10-CM

## 2021-03-04 PROCEDURE — 93000 ELECTROCARDIOGRAM COMPLETE: CPT

## 2021-03-04 PROCEDURE — 99212 OFFICE O/P EST SF 10 MIN: CPT | Mod: 25

## 2021-03-04 PROCEDURE — 99072 ADDL SUPL MATRL&STAF TM PHE: CPT

## 2021-03-05 PROBLEM — F17.200 NICOTINE ADDICTION: Status: ACTIVE | Noted: 2020-05-07

## 2021-03-05 NOTE — HISTORY OF PRESENT ILLNESS
[FreeTextEntry1] : 60 y/o M with PMH PE dx'ed 8/2020, on eliquis, stage III COPD, active smoker, chronic LBP on suboxone, found to have new onset atrial flutter at previous visit. Patient continues to complain of some stabbing chest pain on the L side and exertional dyspnea limiting his effort tolerance. Also c/o occasional palpitations, although he denies any dizziness/lightheadedness, syncope or near syncope. \par \par EKG today shows a flutter at 94bpm\par \par Echo 2/24/21:\par LVEF 55-60%, no significant valvular disease\par \par NST showed no ischemia (full report not uploaded yet)\par \par

## 2021-03-05 NOTE — ASSESSMENT
[FreeTextEntry1] : #Dyspnea on exertion\par Likely related to patient's COPD\par No significant abnormality seen on TTE or NST\par \par #Atrial flutter\par Start cardizem\par Avoid beta blocker due to patient's history of severe COPD\par Refer to EP\par \par #Tobacco use\par Smoking cessation strongly advised \par

## 2021-03-05 NOTE — PHYSICAL EXAM
[General Appearance - Well Developed] : well developed [Normal Appearance] : normal appearance [Well Groomed] : well groomed [General Appearance - Well Nourished] : well nourished [No Deformities] : no deformities [General Appearance - In No Acute Distress] : no acute distress [Normal Conjunctiva] : the conjunctiva exhibited no abnormalities [Eyelids - No Xanthelasma] : the eyelids demonstrated no xanthelasmas [Normal Oral Mucosa] : normal oral mucosa [No Oral Pallor] : no oral pallor [No Oral Cyanosis] : no oral cyanosis [Normal Jugular Venous A Waves Present] : normal jugular venous A waves present [Normal Jugular Venous V Waves Present] : normal jugular venous V waves present [No Jugular Venous Sarah A Waves] : no jugular venous sarah A waves [Exaggerated Use Of Accessory Muscles For Inspiration] : no accessory muscle use [FreeTextEntry1] : Poor air movement b/l  [Heart Rate And Rhythm] : heart rate and rhythm were normal [Heart Sounds] : normal S1 and S2 [Murmurs] : no murmurs present [Abdomen Soft] : soft [Abdomen Tenderness] : non-tender [Abdomen Mass (___ Cm)] : no abdominal mass palpated [Abnormal Walk] : normal gait [Gait - Sufficient For Exercise Testing] : the gait was sufficient for exercise testing [Nail Clubbing] : no clubbing of the fingernails [Cyanosis, Localized] : no localized cyanosis [Petechial Hemorrhages (___cm)] : no petechial hemorrhages [Skin Color & Pigmentation] : normal skin color and pigmentation [] : no rash [No Venous Stasis] : no venous stasis [Skin Lesions] : no skin lesions [No Skin Ulcers] : no skin ulcer [No Xanthoma] : no  xanthoma was observed [Oriented To Time, Place, And Person] : oriented to person, place, and time [Affect] : the affect was normal [Mood] : the mood was normal [No Anxiety] : not feeling anxious

## 2021-04-06 NOTE — ED CDU PROVIDER INITIAL DAY NOTE - PROGRESS NOTE ADDITIONAL1
----- Message from BEN Schaefer sent at 4/6/2021  7:33 AM CDT -----  Please call patient and let them know results. Blood sugars remain well controlled with an A1c of 6.1  Kidney function is stable. Potassium is stable but slightly elevated. Avoid high potassium containing foods. Additional Progress Note...

## 2021-04-15 ENCOUNTER — NON-APPOINTMENT (OUTPATIENT)
Age: 61
End: 2021-04-15

## 2021-04-15 ENCOUNTER — APPOINTMENT (OUTPATIENT)
Dept: ELECTROPHYSIOLOGY | Facility: CLINIC | Age: 61
End: 2021-04-15
Payer: MEDICAID

## 2021-04-15 VITALS
RESPIRATION RATE: 16 BRPM | DIASTOLIC BLOOD PRESSURE: 70 MMHG | HEIGHT: 72 IN | OXYGEN SATURATION: 97 % | SYSTOLIC BLOOD PRESSURE: 118 MMHG | TEMPERATURE: 97.5 F | BODY MASS INDEX: 26.82 KG/M2 | WEIGHT: 198 LBS | HEART RATE: 45 BPM

## 2021-04-15 PROCEDURE — 93000 ELECTROCARDIOGRAM COMPLETE: CPT

## 2021-04-15 PROCEDURE — 99204 OFFICE O/P NEW MOD 45 MIN: CPT | Mod: 25

## 2021-04-15 PROCEDURE — 99072 ADDL SUPL MATRL&STAF TM PHE: CPT

## 2021-04-15 NOTE — DISCUSSION/SUMMARY
[FreeTextEntry1] :  61 year old gentleman with history of PE 8/2020 (on Eliquis), COPD, tobacco use (quit 6 mo ago) presenting for evaluation of atrial flutter. He has had sustained atrial flutter with variable rates, and in this setting has had progressive symptoms of palpitation, dizziness, and dyspnea. HE does have pulmonary disease and a PE last year, and his symptoms may be multifactorial, however, I suspect they are in large part related to his ongoing arrhythmia. We discussed the arrhythmia and associated risks and treatment options. We reviewed options for medical management as well as catheter ablation- given the opportunity for long-term success with ablation of atrial flutter, and his long history of arrhythmia and progressive symptoms, I do believe this is his best management option at this time. We discussed ablation and associated risks and benefits, including procedure related risks such as bleeding, vascular injury, cardiac perforation and stroke. He expressed understanding and does want to proceed. I did also explain that pts with atrial flutter are at increased risk of other arrhythmias including atrial fibrillation event after successful ablation of atrial flutter, and that further monitoring is warranted prior to stopping anticoagulation. As he has been on eliquis for a recent PE, will plan to continue anticoagulation for the near future.  \par \par -ablation of atrial futter. JADA prior to the procedure. Hold eliquis the day of the procedure.  \par \par -Will likely stop diltiazem following the procedure.  \par \par -f/u after above.
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
## 2021-04-15 NOTE — PHYSICAL EXAM
[General Appearance - Well Developed] : well developed [Normal Appearance] : normal appearance [Well Groomed] : well groomed [General Appearance - Well Nourished] : well nourished [No Deformities] : no deformities [General Appearance - In No Acute Distress] : no acute distress [Normal Conjunctiva] : the conjunctiva exhibited no abnormalities [Eyelids - No Xanthelasma] : the eyelids demonstrated no xanthelasmas [Normal Oral Mucosa] : normal oral mucosa [No Oral Pallor] : no oral pallor [No Oral Cyanosis] : no oral cyanosis [Normal Jugular Venous A Waves Present] : normal jugular venous A waves present [Normal Jugular Venous V Waves Present] : normal jugular venous V waves present [No Jugular Venous Sarah A Waves] : no jugular venous sarah A waves [Heart Sounds] : normal S1 and S2 [Murmurs] : no murmurs present [Exaggerated Use Of Accessory Muscles For Inspiration] : no accessory muscle use [FreeTextEntry1] : Poor air movement b/l  [Abdomen Soft] : soft [Abdomen Tenderness] : non-tender [Abdomen Mass (___ Cm)] : no abdominal mass palpated [Abnormal Walk] : normal gait [Gait - Sufficient For Exercise Testing] : the gait was sufficient for exercise testing [Nail Clubbing] : no clubbing of the fingernails [Cyanosis, Localized] : no localized cyanosis [Petechial Hemorrhages (___cm)] : no petechial hemorrhages [Skin Color & Pigmentation] : normal skin color and pigmentation [] : no rash [No Venous Stasis] : no venous stasis [Skin Lesions] : no skin lesions [No Skin Ulcers] : no skin ulcer [No Xanthoma] : no  xanthoma was observed [Oriented To Time, Place, And Person] : oriented to person, place, and time [Impaired Insight] : insight and judgment were intact [Affect] : the affect was normal [Mood] : the mood was normal

## 2021-05-10 ENCOUNTER — NON-APPOINTMENT (OUTPATIENT)
Age: 61
End: 2021-05-10

## 2021-05-15 ENCOUNTER — INPATIENT (INPATIENT)
Facility: HOSPITAL | Age: 61
LOS: 20 days | Discharge: HOME | DRG: 3 | End: 2021-06-05
Attending: INTERNAL MEDICINE | Admitting: HOSPITALIST
Payer: MEDICAID

## 2021-05-15 VITALS
DIASTOLIC BLOOD PRESSURE: 80 MMHG | OXYGEN SATURATION: 100 % | TEMPERATURE: 98 F | HEIGHT: 72 IN | HEART RATE: 135 BPM | RESPIRATION RATE: 20 BRPM | SYSTOLIC BLOOD PRESSURE: 124 MMHG

## 2021-05-15 DIAGNOSIS — J96.01 ACUTE RESPIRATORY FAILURE WITH HYPOXIA: ICD-10-CM

## 2021-05-15 LAB
ALBUMIN SERPL ELPH-MCNC: 3.2 G/DL — LOW (ref 3.3–5.2)
ALP SERPL-CCNC: 123 U/L — HIGH (ref 40–120)
ALT FLD-CCNC: 28 U/L — SIGNIFICANT CHANGE UP
ANION GAP SERPL CALC-SCNC: 10 MMOL/L — SIGNIFICANT CHANGE UP (ref 5–17)
ANISOCYTOSIS BLD QL: SIGNIFICANT CHANGE UP
APTT BLD: 34.5 SEC — SIGNIFICANT CHANGE UP (ref 27.5–35.5)
AST SERPL-CCNC: 44 U/L — HIGH
BASOPHILS # BLD AUTO: 0 K/UL — SIGNIFICANT CHANGE UP (ref 0–0.2)
BASOPHILS NFR BLD AUTO: 0 % — SIGNIFICANT CHANGE UP (ref 0–2)
BILIRUB SERPL-MCNC: 2.3 MG/DL — HIGH (ref 0.4–2)
BUN SERPL-MCNC: 7 MG/DL — LOW (ref 8–20)
CALCIUM SERPL-MCNC: 8.5 MG/DL — LOW (ref 8.6–10.2)
CHLORIDE SERPL-SCNC: 92 MMOL/L — LOW (ref 98–107)
CO2 SERPL-SCNC: 36 MMOL/L — HIGH (ref 22–29)
CREAT SERPL-MCNC: 0.52 MG/DL — SIGNIFICANT CHANGE UP (ref 0.5–1.3)
ELLIPTOCYTES BLD QL SMEAR: SLIGHT — SIGNIFICANT CHANGE UP
EOSINOPHIL # BLD AUTO: 0.09 K/UL — SIGNIFICANT CHANGE UP (ref 0–0.5)
EOSINOPHIL NFR BLD AUTO: 1.8 % — SIGNIFICANT CHANGE UP (ref 0–6)
FLUAV AG NPH QL: SIGNIFICANT CHANGE UP COUNTS
FLUBV AG NPH QL: SIGNIFICANT CHANGE UP COUNTS
GAS PNL BLDA: SIGNIFICANT CHANGE UP
GIANT PLATELETS BLD QL SMEAR: PRESENT — SIGNIFICANT CHANGE UP
GLUCOSE SERPL-MCNC: 117 MG/DL — HIGH (ref 70–99)
HCT VFR BLD CALC: 47.9 % — SIGNIFICANT CHANGE UP (ref 39–50)
HGB BLD-MCNC: 14.7 G/DL — SIGNIFICANT CHANGE UP (ref 13–17)
INR BLD: 1.63 RATIO — HIGH (ref 0.88–1.16)
LACTATE BLDV-MCNC: 1.6 MMOL/L — SIGNIFICANT CHANGE UP (ref 0.5–2)
LYMPHOCYTES # BLD AUTO: 0.62 K/UL — LOW (ref 1–3.3)
LYMPHOCYTES # BLD AUTO: 12.3 % — LOW (ref 13–44)
MACROCYTES BLD QL: SIGNIFICANT CHANGE UP
MANUAL SMEAR VERIFICATION: SIGNIFICANT CHANGE UP
MCHC RBC-ENTMCNC: 30.7 GM/DL — LOW (ref 32–36)
MCHC RBC-ENTMCNC: 32.4 PG — SIGNIFICANT CHANGE UP (ref 27–34)
MCV RBC AUTO: 105.5 FL — HIGH (ref 80–100)
MONOCYTES # BLD AUTO: 0.31 K/UL — SIGNIFICANT CHANGE UP (ref 0–0.9)
MONOCYTES NFR BLD AUTO: 6.1 % — SIGNIFICANT CHANGE UP (ref 2–14)
NEUTROPHILS # BLD AUTO: 3.82 K/UL — SIGNIFICANT CHANGE UP (ref 1.8–7.4)
NEUTROPHILS NFR BLD AUTO: 76.3 % — SIGNIFICANT CHANGE UP (ref 43–77)
OVALOCYTES BLD QL SMEAR: SLIGHT — SIGNIFICANT CHANGE UP
PLAT MORPH BLD: NORMAL — SIGNIFICANT CHANGE UP
PLATELET # BLD AUTO: 188 K/UL — SIGNIFICANT CHANGE UP (ref 150–400)
POIKILOCYTOSIS BLD QL AUTO: SLIGHT — SIGNIFICANT CHANGE UP
POLYCHROMASIA BLD QL SMEAR: SLIGHT — SIGNIFICANT CHANGE UP
POTASSIUM SERPL-MCNC: 4 MMOL/L — SIGNIFICANT CHANGE UP (ref 3.5–5.3)
POTASSIUM SERPL-SCNC: 4 MMOL/L — SIGNIFICANT CHANGE UP (ref 3.5–5.3)
PROT SERPL-MCNC: 7.7 G/DL — SIGNIFICANT CHANGE UP (ref 6.6–8.7)
PROTHROM AB SERPL-ACNC: 18.5 SEC — HIGH (ref 10.6–13.6)
RBC # BLD: 4.54 M/UL — SIGNIFICANT CHANGE UP (ref 4.2–5.8)
RBC # FLD: 15.7 % — HIGH (ref 10.3–14.5)
RBC BLD AUTO: ABNORMAL
RSV RNA NPH QL NAA+NON-PROBE: SIGNIFICANT CHANGE UP COUNTS
SARS-COV-2 RNA SPEC QL NAA+PROBE: SIGNIFICANT CHANGE UP COUNTS
SODIUM SERPL-SCNC: 138 MMOL/L — SIGNIFICANT CHANGE UP (ref 135–145)
STOMATOCYTES BLD QL SMEAR: SLIGHT — SIGNIFICANT CHANGE UP
VARIANT LYMPHS # BLD: 3.5 % — SIGNIFICANT CHANGE UP (ref 0–6)
WBC # BLD: 5.01 K/UL — SIGNIFICANT CHANGE UP (ref 3.8–10.5)
WBC # FLD AUTO: 5.01 K/UL — SIGNIFICANT CHANGE UP (ref 3.8–10.5)

## 2021-05-15 PROCEDURE — 31500 INSERT EMERGENCY AIRWAY: CPT

## 2021-05-15 PROCEDURE — 71045 X-RAY EXAM CHEST 1 VIEW: CPT | Mod: 26,76

## 2021-05-15 PROCEDURE — 99291 CRITICAL CARE FIRST HOUR: CPT | Mod: 25

## 2021-05-15 PROCEDURE — 93010 ELECTROCARDIOGRAM REPORT: CPT | Mod: 76

## 2021-05-15 RX ORDER — PIPERACILLIN AND TAZOBACTAM 4; .5 G/20ML; G/20ML
3.38 INJECTION, POWDER, LYOPHILIZED, FOR SOLUTION INTRAVENOUS ONCE
Refills: 0 | Status: COMPLETED | OUTPATIENT
Start: 2021-05-15 | End: 2021-05-15

## 2021-05-15 RX ORDER — CHLORHEXIDINE GLUCONATE 213 G/1000ML
1 SOLUTION TOPICAL
Refills: 0 | Status: DISCONTINUED | OUTPATIENT
Start: 2021-05-15 | End: 2021-06-05

## 2021-05-15 RX ORDER — CHLORHEXIDINE GLUCONATE 213 G/1000ML
15 SOLUTION TOPICAL EVERY 12 HOURS
Refills: 0 | Status: DISCONTINUED | OUTPATIENT
Start: 2021-05-15 | End: 2021-05-18

## 2021-05-15 RX ORDER — ETOMIDATE 2 MG/ML
20 INJECTION INTRAVENOUS ONCE
Refills: 0 | Status: COMPLETED | OUTPATIENT
Start: 2021-05-15 | End: 2021-05-15

## 2021-05-15 RX ORDER — ENOXAPARIN SODIUM 100 MG/ML
80 INJECTION SUBCUTANEOUS
Refills: 0 | Status: DISCONTINUED | OUTPATIENT
Start: 2021-05-15 | End: 2021-05-22

## 2021-05-15 RX ORDER — VANCOMYCIN HCL 1 G
1000 VIAL (EA) INTRAVENOUS ONCE
Refills: 0 | Status: COMPLETED | OUTPATIENT
Start: 2021-05-15 | End: 2021-05-15

## 2021-05-15 RX ORDER — FENTANYL CITRATE 50 UG/ML
50 INJECTION INTRAVENOUS ONCE
Refills: 0 | Status: DISCONTINUED | OUTPATIENT
Start: 2021-05-15 | End: 2021-05-15

## 2021-05-15 RX ORDER — PROPOFOL 10 MG/ML
10.42 INJECTION, EMULSION INTRAVENOUS
Qty: 1000 | Refills: 0 | Status: DISCONTINUED | OUTPATIENT
Start: 2021-05-15 | End: 2021-05-17

## 2021-05-15 RX ORDER — ACETAMINOPHEN 500 MG
650 TABLET ORAL ONCE
Refills: 0 | Status: COMPLETED | OUTPATIENT
Start: 2021-05-15 | End: 2021-05-15

## 2021-05-15 RX ORDER — NOREPINEPHRINE BITARTRATE/D5W 8 MG/250ML
0.05 PLASTIC BAG, INJECTION (ML) INTRAVENOUS
Qty: 8 | Refills: 0 | Status: DISCONTINUED | OUTPATIENT
Start: 2021-05-15 | End: 2021-05-18

## 2021-05-15 RX ORDER — SODIUM CHLORIDE 9 MG/ML
500 INJECTION INTRAMUSCULAR; INTRAVENOUS; SUBCUTANEOUS ONCE
Refills: 0 | Status: COMPLETED | OUTPATIENT
Start: 2021-05-15 | End: 2021-05-15

## 2021-05-15 RX ORDER — SODIUM CHLORIDE 9 MG/ML
3000 INJECTION, SOLUTION INTRAVENOUS ONCE
Refills: 0 | Status: COMPLETED | OUTPATIENT
Start: 2021-05-15 | End: 2021-05-15

## 2021-05-15 RX ORDER — SUCCINYLCHOLINE CHLORIDE 100 MG/5ML
100 SYRINGE (ML) INTRAVENOUS ONCE
Refills: 0 | Status: COMPLETED | OUTPATIENT
Start: 2021-05-15 | End: 2021-05-15

## 2021-05-15 RX ORDER — DILTIAZEM HCL 120 MG
10 CAPSULE, EXT RELEASE 24 HR ORAL ONCE
Refills: 0 | Status: COMPLETED | OUTPATIENT
Start: 2021-05-15 | End: 2021-05-15

## 2021-05-15 RX ORDER — KETOROLAC TROMETHAMINE 30 MG/ML
15 SYRINGE (ML) INJECTION ONCE
Refills: 0 | Status: DISCONTINUED | OUTPATIENT
Start: 2021-05-15 | End: 2021-05-15

## 2021-05-15 RX ORDER — ETOMIDATE 2 MG/ML
5600 INJECTION INTRAVENOUS ONCE
Refills: 0 | Status: DISCONTINUED | OUTPATIENT
Start: 2021-05-15 | End: 2021-05-15

## 2021-05-15 RX ORDER — IPRATROPIUM/ALBUTEROL SULFATE 18-103MCG
3 AEROSOL WITH ADAPTER (GRAM) INHALATION ONCE
Refills: 0 | Status: COMPLETED | OUTPATIENT
Start: 2021-05-15 | End: 2021-05-15

## 2021-05-15 RX ADMIN — PROPOFOL 5 MICROGRAM(S)/KG/MIN: 10 INJECTION, EMULSION INTRAVENOUS at 22:56

## 2021-05-15 RX ADMIN — Medication 3 MILLILITER(S): at 22:27

## 2021-05-15 RX ADMIN — PIPERACILLIN AND TAZOBACTAM 200 GRAM(S): 4; .5 INJECTION, POWDER, LYOPHILIZED, FOR SOLUTION INTRAVENOUS at 21:01

## 2021-05-15 RX ADMIN — Medication 10 MILLIGRAM(S): at 21:34

## 2021-05-15 RX ADMIN — SODIUM CHLORIDE 1000 MILLILITER(S): 9 INJECTION INTRAMUSCULAR; INTRAVENOUS; SUBCUTANEOUS at 22:50

## 2021-05-15 RX ADMIN — FENTANYL CITRATE 50 MICROGRAM(S): 50 INJECTION INTRAVENOUS at 23:46

## 2021-05-15 RX ADMIN — ETOMIDATE 20 MILLIGRAM(S): 2 INJECTION INTRAVENOUS at 22:08

## 2021-05-15 RX ADMIN — FENTANYL CITRATE 50 MICROGRAM(S): 50 INJECTION INTRAVENOUS at 23:25

## 2021-05-15 RX ADMIN — SODIUM CHLORIDE 3000 MILLILITER(S): 9 INJECTION, SOLUTION INTRAVENOUS at 21:01

## 2021-05-15 RX ADMIN — Medication 250 MILLIGRAM(S): at 21:00

## 2021-05-15 RX ADMIN — Medication 15 MILLIGRAM(S): at 21:34

## 2021-05-15 RX ADMIN — Medication 100 MILLIGRAM(S): at 22:08

## 2021-05-15 NOTE — ED ADULT NURSE REASSESSMENT NOTE - NS ED NURSE REASSESS COMMENT FT1
[facility]. Lovett at bedside. Patient converted into NSR, HR 78 on CM at this time.

## 2021-05-15 NOTE — ED PROVIDER NOTE - CONSTITUTIONAL, MLM
somnolent but arouses to verbal stimuli, alert, oriented to person, place, time/situation and in no apparent distress. normal...

## 2021-05-15 NOTE — H&P ADULT - NSHPPHYSICALEXAM_GEN_ALL_CORE
GENERAL: Adult frail appearing male, lying in bed, nad  HEENT: +some temporal atrophy, NC/AT, ETT and OGT in place  CV: aflutter, no murmurs appreciated  RESP: Symmetrical thorax expansion upon respiration, clear over upper aspects of lungs, diminished over left base  ABD: soft, nontender, nondistended, normoactive bowel sounds, no masses appreciated, +hernia, +healed surgical scars  : bocanegra in place   EXT: +1 pitting edema   SKIN: Warm, no rashes appreciated  NEURO: Sedated, moves all extremities

## 2021-05-15 NOTE — H&P ADULT - ASSESSMENT
62 yo m pmhx COPD, active smoker (1.5 ppd), Aflutter on diltiazem (planned for ablation), PE (2018) on Eliquis, Pulmonary Nodule, Depression on Zoloft, questionable compliance with medications admitted with     1. Aflutter  2. Shock   3. Hypoxic/Hypercapnic Respiratory Failure   4. r/o CAP   5. ?Acute Decompensated CHF    NEURO: Sedated on propofol.   CV: Shock state requiring levophed, actively titrating levophed for MAP >65, will wean as tolerated.  Patient with unstable aflutter, cardioverted with 150J x1 --> NSR.   RESP: Hypoxic respiratory failure, ac/vc 6cc/kg tv lung protective strategies, actively titrating fio2 and peep for spo2 >88%, weaning as tolerated  RENAL: Monitor lytes, replace as needed  GI: NPO  ENDO: POCT q6hrs while NPO  ID: Cap coverage with azithromycin/ceftriaxone  HEME: FD lovenox for ac  DISPO: Full code.  Patient's step son Han Lozada updated, primary contact number [phone].      Case discussed with eICU attending Dr. Edouard.    Critical Care time: 45 mins assessing presenting problems of acute illness that poses high probability of life threatening deterioration or end organ damage/dysfunction.  Medical decision making including Initiating plan of care, reviewing data, reviewing radiology, discussing with multidisciplinary team, non inclusive of procedures, discussing goals of care with patient/family

## 2021-05-15 NOTE — ED ADULT TRIAGE NOTE - CHIEF COMPLAINT QUOTE
pt biba for increased weakness, lethargy, cough and decreased appetite for 10 days. reports weakness and lethargy worse today. pt is alert, drowsy. slow to respond. is oriented x3 but poor historian. sp02 89% on room air with EMS. now on 4L nc and 100%. tachycardic on monitor. denies chest pain, sob. pt states "I feel so tired".

## 2021-05-15 NOTE — ED PROVIDER NOTE - WR ORDER STATUS 2
[Educated Patient & Family about Diagnosis] : educated the patient and family about the diagnosis Performed Resulted

## 2021-05-15 NOTE — H&P ADULT - NSHPSOCIALHISTORY_GEN_ALL_CORE
From home, - patient's wife  from ovarian cancer last year, former ETOH abuse, opiate abuse on suboxone, no other illicit drug use. Lives with step son Han

## 2021-05-15 NOTE — PROVIDER CONTACT NOTE (EICU) - RECOMMENDATIONS
Plan  1. GIven history of lung nodule, and heavy long term smoker, concern of lung ca.  Please perform therapeutic thoracocentesis and send for cytology  2. Repeat CT chest with IV contrast  3. A flutter usually do not respond to medical treatment, he will need cardioversion if hypotensive and ablation for management  4. Empiric start Ceftriazone, resp culture, blood culture  5. Lovenox Q12 for PE, A flutter

## 2021-05-15 NOTE — PROVIDER CONTACT NOTE (EICU) - BACKGROUND
61M with a history of PE on Eliquis, COPD active smoker, lung nodule ?, A flutter developed acute hypoxic resp failure s/p intubation.  Pt also developed rapid a fib, did not respond to cardizem and became hypotensive.  Rate at 110-150  CXR showed large left pleural effusion, ETT 5.5cm above earline  INR 1.63  7.32/76/78  CT on Nov 18th, 2020 showed Scattered small foci of tree-in-bud nodularity bilaterally compatible with small airways disease, but did not show the nodule.

## 2021-05-15 NOTE — ED ADULT NURSE REASSESSMENT NOTE - NS ED NURSE REASSESS COMMENT FT1
Upon arrival of patient to unit patient denies headache and nausea vomiting. MD at bed side. order of sepsis blood work received.

## 2021-05-15 NOTE — ED PROVIDER NOTE - ATTENDING CONTRIBUTION TO CARE
I, Efrem Lovett, performed a face to face bedside interview with this patient regarding history of present illness, review of symptoms and relevant past medical, social and family history.  I completed an independent physical examination. I have communicated the patient’s plan of care and disposition with the resident.  Pt arrived with SOB, in rapid aflutter requiring IV cardioactive meds, resp status declined, requiring emergent intubation

## 2021-05-15 NOTE — ED ADULT NURSE REASSESSMENT NOTE - NS ED NURSE REASSESS COMMENT FT1
Assumed care of patient from RN Tomasz at this time. Patient taken back to critical care for altered mental status and decompensation. Pt was pale and minimally responsive at that time. Pt presented to ED for increasing cough and shortness of breath over the last couple of days. Sepsis workup completed. Pt intubated by Dr. Wolfe and Dr. Lovett. CXR in progress at this time to confirm placement. Pt in atrial fibrillation on CM at this time. Pt saturating well on ventilator @ 100%. Skin color is warm, dry and color is appropriate for race.

## 2021-05-15 NOTE — ED ADULT NURSE REASSESSMENT NOTE - NS ED NURSE REASSESS COMMENT FT1
Pt became agitated, attempting to take ET tube out. Dr. Lovett at bedside and is aware. Fentanyl orders placed.

## 2021-05-15 NOTE — PROVIDER CONTACT NOTE (EICU) - ASSESSMENT
Problem List  1. Acute hypoxic resp failure s/p intubation  2. Large left pleural effusion, which was absent on CT 6 months ago  3. Rapid A flutter

## 2021-05-15 NOTE — H&P ADULT - NSICDXPASTMEDICALHX_GEN_ALL_CORE_FT
PAST MEDICAL HISTORY:  Atrial flutter     COPD (chronic obstructive pulmonary disease)     Depression     H/O solitary pulmonary nodule     Poor historian     Pulmonary embolism

## 2021-05-15 NOTE — ED ADULT NURSE REASSESSMENT NOTE - NS ED NURSE REASSESS COMMENT FT1
Around 2200 patient noted to be cyanotic and increase lethargy. O2 in 70's despite giving o2 via non rebreather mask. MD at bed side. As per MD move patient to critical for intubation. Patient move to critical care. Report given to receiving RN.

## 2021-05-15 NOTE — ED PROVIDER NOTE - OBJECTIVE STATEMENT
61 year old male with PMh PE on eliquis, COPD presents with cough and SOB. Pt reports that he has had 1 week of cough productive of thick white sputum, He reports associated SOB and generalized weakness with poor PO intake. He denies chest pain, hemoptysis, L 61 year old male with PMh PE on eliquis, COPD presents with cough and SOB. Pt reports that he has had 1 week of cough productive of thick white sputum, He reports associated SOB and generalized weakness with poor PO intake. He denies chest pain, hemoptysis, abd pain vomting, but does endorse b/l LE edema.

## 2021-05-15 NOTE — ED PROVIDER NOTE - PROGRESS NOTE DETAILS
Aiden PGY-2: Pt found in respiratory distress, unresponsive to painful stimuli, brought to critical care for emergent intubation.  Pt initial O2 sat in 70's, bradycardic on monitor, BVM to 90% prior to intubation. CT scan with IV contrast is medically necessary without the pt being able to consent

## 2021-05-15 NOTE — H&P ADULT - HISTORY OF PRESENT ILLNESS
60 yo m pmhx COPD, active smoker (1.5 ppd), Aflutter on diltiazem (planned for ablation), PE (2018) on Eliquis, Pulmonary Nodule, Depression on Zoloft, questionable compliance with medications biba from home with complaints of sob, cough, weakness and anorexia for about a week.  Patient endorses 62 yo m pmhx COPD, active smoker (1.5 ppd), Aflutter on diltiazem (planned for ablation), PE (2018) on Eliquis, Pulmonary Nodule, Depression on Zoloft, questionable compliance with medications biba from home with complaints of sob, cough, weakness and anorexia for about a week.  In ED patient lethargic but oriented, hypoxic on RA place don 4L with waxing and waning mental status.  Patient also noted to be in aflutter with HR in the 130s; patient given cardizem 10mg IVP, began to have issues with worsening hypoxia/mental status and was subsequently intubated.  Chest xray obtained, large left sided pleural effusion (not present on 11/2020 films). Patient seen at bedside, HR in the 130s Aflutter with 2:1 conduction, BP in the 80s, given 500cc NS.  POCUS exam performed terrell predominant with some scattered bline, left effusion appreciated.  Cardiac portion difficult to obtain, however RV appears WNL, LV function appears diminished from previous TTE where EF was ~58%; however windows difficult.  Initially patient responded to IVF however patient now persistently in the 80s with HR in the 130s, patient electrically cardioverted with 150J, converted to NSR in the 70s.  Additional 500cc bolus ordered, plan for imaging en route to MICU.

## 2021-05-16 LAB
ALBUMIN SERPL ELPH-MCNC: 2.2 G/DL — LOW (ref 3.3–5.2)
ALP SERPL-CCNC: 92 U/L — SIGNIFICANT CHANGE UP (ref 40–120)
ALT FLD-CCNC: 19 U/L — SIGNIFICANT CHANGE UP
AMPHET UR-MCNC: NEGATIVE — SIGNIFICANT CHANGE UP
ANION GAP SERPL CALC-SCNC: 10 MMOL/L — SIGNIFICANT CHANGE UP (ref 5–17)
APAP SERPL-MCNC: 4.4 UG/ML — LOW (ref 10–26)
APPEARANCE UR: ABNORMAL
APTT BLD: 37.4 SEC — HIGH (ref 27.5–35.5)
AST SERPL-CCNC: 30 U/L — SIGNIFICANT CHANGE UP
B PERT IGG+IGM PNL SER: ABNORMAL
BACTERIA # UR AUTO: ABNORMAL
BARBITURATES UR SCN-MCNC: NEGATIVE — SIGNIFICANT CHANGE UP
BASE EXCESS BLDA CALC-SCNC: 11.6 MMOL/L — HIGH (ref -3–3)
BENZODIAZ UR-MCNC: POSITIVE
BILIRUB SERPL-MCNC: 2 MG/DL — SIGNIFICANT CHANGE UP (ref 0.4–2)
BILIRUB UR-MCNC: ABNORMAL
BLOOD GAS COMMENTS ARTERIAL: SIGNIFICANT CHANGE UP
BUN SERPL-MCNC: 7 MG/DL — LOW (ref 8–20)
CALCIUM SERPL-MCNC: 6.8 MG/DL — LOW (ref 8.6–10.2)
CHLORIDE SERPL-SCNC: 99 MMOL/L — SIGNIFICANT CHANGE UP (ref 98–107)
CO2 SERPL-SCNC: 30 MMOL/L — HIGH (ref 22–29)
COCAINE METAB.OTHER UR-MCNC: NEGATIVE — SIGNIFICANT CHANGE UP
COLOR FLD: ABNORMAL
COLOR SPEC: YELLOW — SIGNIFICANT CHANGE UP
CREAT SERPL-MCNC: 0.48 MG/DL — LOW (ref 0.5–1.3)
DIFF PNL FLD: ABNORMAL
EOSINOPHIL # FLD: 4 % — SIGNIFICANT CHANGE UP
EPI CELLS # UR: SIGNIFICANT CHANGE UP
FLUID INTAKE SUBSTANCE CLASS: SIGNIFICANT CHANGE UP
FLUID SEGMENTED GRANULOCYTES: 3 % — SIGNIFICANT CHANGE UP
GAS PNL BLDA: SIGNIFICANT CHANGE UP
GLUCOSE SERPL-MCNC: 135 MG/DL — HIGH (ref 70–99)
GLUCOSE UR QL: NEGATIVE — SIGNIFICANT CHANGE UP
GRAM STN FLD: SIGNIFICANT CHANGE UP
GRAN CASTS # UR COMP ASSIST: ABNORMAL /LPF
HCO3 BLDA-SCNC: 35 MMOL/L — HIGH (ref 20–26)
HCT VFR BLD CALC: 38.4 % — LOW (ref 39–50)
HCV AB S/CO SERPL IA: 0.2 S/CO — SIGNIFICANT CHANGE UP (ref 0–0.99)
HCV AB SERPL-IMP: SIGNIFICANT CHANGE UP
HGB BLD-MCNC: 12.1 G/DL — LOW (ref 13–17)
HOROWITZ INDEX BLDA+IHG-RTO: 40 — SIGNIFICANT CHANGE UP
INR BLD: 1.81 RATIO — HIGH (ref 0.88–1.16)
KETONES UR-MCNC: ABNORMAL
LEUKOCYTE ESTERASE UR-ACNC: ABNORMAL
LYMPHOCYTES # FLD: 67 % — SIGNIFICANT CHANGE UP
MAGNESIUM SERPL-MCNC: 1.6 MG/DL — SIGNIFICANT CHANGE UP (ref 1.6–2.6)
MCHC RBC-ENTMCNC: 31.5 GM/DL — LOW (ref 32–36)
MCHC RBC-ENTMCNC: 32.6 PG — SIGNIFICANT CHANGE UP (ref 27–34)
MCV RBC AUTO: 103.5 FL — HIGH (ref 80–100)
MESOTHL CELL # FLD: 2 % — SIGNIFICANT CHANGE UP
METHADONE UR-MCNC: NEGATIVE — SIGNIFICANT CHANGE UP
MONOS+MACROS # FLD: 24 % — SIGNIFICANT CHANGE UP
NITRITE UR-MCNC: POSITIVE
NT-PROBNP SERPL-SCNC: 444 PG/ML — HIGH (ref 0–300)
OPIATES UR-MCNC: NEGATIVE — SIGNIFICANT CHANGE UP
PCO2 BLDA: 53 MMHG — HIGH (ref 35–45)
PCP SPEC-MCNC: SIGNIFICANT CHANGE UP
PCP UR-MCNC: NEGATIVE — SIGNIFICANT CHANGE UP
PH BLDA: 7.46 — HIGH (ref 7.35–7.45)
PH FLD: 8 — SIGNIFICANT CHANGE UP
PH UR: 5 — SIGNIFICANT CHANGE UP (ref 5–8)
PHOSPHATE SERPL-MCNC: 1.7 MG/DL — LOW (ref 2.4–4.7)
PLATELET # BLD AUTO: 183 K/UL — SIGNIFICANT CHANGE UP (ref 150–400)
PO2 BLDA: 68 MMHG — LOW (ref 83–108)
POTASSIUM SERPL-MCNC: 3.2 MMOL/L — LOW (ref 3.5–5.3)
POTASSIUM SERPL-SCNC: 3.2 MMOL/L — LOW (ref 3.5–5.3)
PROCALCITONIN SERPL-MCNC: 0.06 NG/ML — SIGNIFICANT CHANGE UP (ref 0.02–0.1)
PROT SERPL-MCNC: 5.5 G/DL — LOW (ref 6.6–8.7)
PROT UR-MCNC: 100
PROTHROM AB SERPL-ACNC: 20.4 SEC — HIGH (ref 10.6–13.6)
RAPID RVP RESULT: SIGNIFICANT CHANGE UP
RBC # BLD: 3.71 M/UL — LOW (ref 4.2–5.8)
RBC # FLD: 15.8 % — HIGH (ref 10.3–14.5)
RBC CASTS # UR COMP ASSIST: ABNORMAL /HPF (ref 0–4)
RCV VOL RI: HIGH /UL (ref 0–0)
SALICYLATES SERPL-MCNC: <0.6 MG/DL — LOW (ref 10–20)
SAO2 % BLDA: 95 % — SIGNIFICANT CHANGE UP (ref 95–99)
SARS-COV-2 RNA SPEC QL NAA+PROBE: SIGNIFICANT CHANGE UP
SODIUM SERPL-SCNC: 139 MMOL/L — SIGNIFICANT CHANGE UP (ref 135–145)
SP GR SPEC: 1.02 — SIGNIFICANT CHANGE UP (ref 1.01–1.02)
SPECIMEN SOURCE FLD: SIGNIFICANT CHANGE UP
SPECIMEN SOURCE FLD: SIGNIFICANT CHANGE UP
SPECIMEN SOURCE: SIGNIFICANT CHANGE UP
THC UR QL: NEGATIVE — SIGNIFICANT CHANGE UP
TOTAL NUCLEATED CELL COUNT, BODY FLUID: 1012 /UL — SIGNIFICANT CHANGE UP
TUBE TYPE: SIGNIFICANT CHANGE UP
UROBILINOGEN FLD QL: 12
WBC # BLD: 6.58 K/UL — SIGNIFICANT CHANGE UP (ref 3.8–10.5)
WBC # FLD AUTO: 6.58 K/UL — SIGNIFICANT CHANGE UP (ref 3.8–10.5)
WBC UR QL: SIGNIFICANT CHANGE UP

## 2021-05-16 PROCEDURE — 93970 EXTREMITY STUDY: CPT | Mod: 26

## 2021-05-16 PROCEDURE — 99233 SBSQ HOSP IP/OBS HIGH 50: CPT

## 2021-05-16 PROCEDURE — 74177 CT ABD & PELVIS W/CONTRAST: CPT | Mod: 26

## 2021-05-16 PROCEDURE — 93930 UPPER EXTREMITY STUDY: CPT | Mod: 26

## 2021-05-16 PROCEDURE — 71275 CT ANGIOGRAPHY CHEST: CPT | Mod: 26

## 2021-05-16 PROCEDURE — 71045 X-RAY EXAM CHEST 1 VIEW: CPT | Mod: 26

## 2021-05-16 PROCEDURE — 70450 CT HEAD/BRAIN W/O DYE: CPT | Mod: 26

## 2021-05-16 PROCEDURE — 93306 TTE W/DOPPLER COMPLETE: CPT | Mod: 26

## 2021-05-16 RX ORDER — FENTANYL CITRATE 50 UG/ML
100 INJECTION INTRAVENOUS ONCE
Refills: 0 | Status: DISCONTINUED | OUTPATIENT
Start: 2021-05-16 | End: 2021-05-16

## 2021-05-16 RX ORDER — AZITHROMYCIN 500 MG/1
500 TABLET, FILM COATED ORAL ONCE
Refills: 0 | Status: COMPLETED | OUTPATIENT
Start: 2021-05-16 | End: 2021-05-16

## 2021-05-16 RX ORDER — CEFTRIAXONE 500 MG/1
1000 INJECTION, POWDER, FOR SOLUTION INTRAMUSCULAR; INTRAVENOUS EVERY 24 HOURS
Refills: 0 | Status: DISCONTINUED | OUTPATIENT
Start: 2021-05-16 | End: 2021-05-21

## 2021-05-16 RX ORDER — SODIUM CHLORIDE 9 MG/ML
500 INJECTION INTRAMUSCULAR; INTRAVENOUS; SUBCUTANEOUS ONCE
Refills: 0 | Status: COMPLETED | OUTPATIENT
Start: 2021-05-16 | End: 2021-05-16

## 2021-05-16 RX ORDER — AZITHROMYCIN 500 MG/1
500 TABLET, FILM COATED ORAL EVERY 24 HOURS
Refills: 0 | Status: DISCONTINUED | OUTPATIENT
Start: 2021-05-17 | End: 2021-05-18

## 2021-05-16 RX ORDER — ACETAMINOPHEN 500 MG
650 TABLET ORAL EVERY 6 HOURS
Refills: 0 | Status: DISCONTINUED | OUTPATIENT
Start: 2021-05-16 | End: 2021-05-18

## 2021-05-16 RX ORDER — MAGNESIUM SULFATE 500 MG/ML
2 VIAL (ML) INJECTION ONCE
Refills: 0 | Status: COMPLETED | OUTPATIENT
Start: 2021-05-16 | End: 2021-05-16

## 2021-05-16 RX ORDER — FAMOTIDINE 10 MG/ML
20 INJECTION INTRAVENOUS EVERY 12 HOURS
Refills: 0 | Status: DISCONTINUED | OUTPATIENT
Start: 2021-05-16 | End: 2021-05-18

## 2021-05-16 RX ORDER — AZITHROMYCIN 500 MG/1
TABLET, FILM COATED ORAL
Refills: 0 | Status: DISCONTINUED | OUTPATIENT
Start: 2021-05-16 | End: 2021-05-18

## 2021-05-16 RX ORDER — FENTANYL CITRATE 50 UG/ML
50 INJECTION INTRAVENOUS
Refills: 0 | Status: DISCONTINUED | OUTPATIENT
Start: 2021-05-16 | End: 2021-05-17

## 2021-05-16 RX ORDER — ROCURONIUM BROMIDE 10 MG/ML
100 VIAL (ML) INTRAVENOUS ONCE
Refills: 0 | Status: COMPLETED | OUTPATIENT
Start: 2021-05-16 | End: 2021-05-16

## 2021-05-16 RX ORDER — MIDAZOLAM HYDROCHLORIDE 1 MG/ML
4 INJECTION, SOLUTION INTRAMUSCULAR; INTRAVENOUS ONCE
Refills: 0 | Status: DISCONTINUED | OUTPATIENT
Start: 2021-05-16 | End: 2021-05-16

## 2021-05-16 RX ORDER — POTASSIUM PHOSPHATE, MONOBASIC POTASSIUM PHOSPHATE, DIBASIC 236; 224 MG/ML; MG/ML
30 INJECTION, SOLUTION INTRAVENOUS ONCE
Refills: 0 | Status: COMPLETED | OUTPATIENT
Start: 2021-05-16 | End: 2021-05-16

## 2021-05-16 RX ADMIN — POTASSIUM PHOSPHATE, MONOBASIC POTASSIUM PHOSPHATE, DIBASIC 83.33 MILLIMOLE(S): 236; 224 INJECTION, SOLUTION INTRAVENOUS at 06:00

## 2021-05-16 RX ADMIN — Medication 7.87 MICROGRAM(S)/KG/MIN: at 00:02

## 2021-05-16 RX ADMIN — FAMOTIDINE 20 MILLIGRAM(S): 10 INJECTION INTRAVENOUS at 18:36

## 2021-05-16 RX ADMIN — AZITHROMYCIN 255 MILLIGRAM(S): 500 TABLET, FILM COATED ORAL at 02:26

## 2021-05-16 RX ADMIN — Medication 50 GRAM(S): at 05:32

## 2021-05-16 RX ADMIN — PROPOFOL 5 MICROGRAM(S)/KG/MIN: 10 INJECTION, EMULSION INTRAVENOUS at 12:59

## 2021-05-16 RX ADMIN — ENOXAPARIN SODIUM 80 MILLIGRAM(S): 100 INJECTION SUBCUTANEOUS at 06:00

## 2021-05-16 RX ADMIN — MIDAZOLAM HYDROCHLORIDE 4 MILLIGRAM(S): 1 INJECTION, SOLUTION INTRAMUSCULAR; INTRAVENOUS at 13:55

## 2021-05-16 RX ADMIN — CHLORHEXIDINE GLUCONATE 15 MILLILITER(S): 213 SOLUTION TOPICAL at 05:29

## 2021-05-16 RX ADMIN — CHLORHEXIDINE GLUCONATE 1 APPLICATION(S): 213 SOLUTION TOPICAL at 05:29

## 2021-05-16 RX ADMIN — FENTANYL CITRATE 100 MICROGRAM(S): 50 INJECTION INTRAVENOUS at 13:55

## 2021-05-16 RX ADMIN — Medication 650 MILLIGRAM(S): at 10:04

## 2021-05-16 RX ADMIN — CEFTRIAXONE 100 MILLIGRAM(S): 500 INJECTION, POWDER, FOR SOLUTION INTRAMUSCULAR; INTRAVENOUS at 02:26

## 2021-05-16 RX ADMIN — SODIUM CHLORIDE 500 MILLILITER(S): 9 INJECTION INTRAMUSCULAR; INTRAVENOUS; SUBCUTANEOUS at 01:20

## 2021-05-16 RX ADMIN — ENOXAPARIN SODIUM 80 MILLIGRAM(S): 100 INJECTION SUBCUTANEOUS at 18:35

## 2021-05-16 RX ADMIN — Medication 100 MILLIGRAM(S): at 13:56

## 2021-05-16 RX ADMIN — Medication 650 MILLIGRAM(S): at 10:05

## 2021-05-16 RX ADMIN — CHLORHEXIDINE GLUCONATE 15 MILLILITER(S): 213 SOLUTION TOPICAL at 18:36

## 2021-05-16 NOTE — PROCEDURE NOTE - GENERAL PROCEDURE DETAILS
Patient sedated on propofol, given additional IV boluses of fentanyl, midazolam, and rocuronium pre-procedure. Hyperoxygenated with 100% FiO2. Bronchoscope passed through ETT and revealed normal earline anatomy, frothy secretions suctioned, RLL with more frothy/mucoid secretions- suctioned, no erythema/inflammation noted, LLL with mucoid secretions- performed lavage and sent aspirate for diagnostic testing, some erythema noted, RODNEY mostly clear. Patient tolerated procedure well without hypoxemia.

## 2021-05-16 NOTE — PROGRESS NOTE ADULT - ASSESSMENT
1: Acute hypoxic respiratory failure   2: A fib flutter with RVR s/p electric cardioversion   3: Shock: distributive   4: Left sided pleural effusion: Complex complicated by   5: CAP with Left lower lobe aspirition and atelectasis   7: Seg PE  8:  Acute heart failure: unclear systolic or diastolic or combined     Patient seen and examined  Full code for now   Palliative care eval in AM for NOK and GOC      Neuro:   Pain Mx: PRN Fentanyl   Sedation/Anxiolytic: Propofol infusion for now   Daily SAT    Cardiovascular:   MAP Target: 65  Levo infusion   Midodrine   Stress dose steroid   TTE with contrast     Resp/Chest:   On Volume AC ; PLS; Vent bundle  SBT when SAT   Planning for bronch and if feasible left chest tube placement   Thoracic Sx consult for f/u       Gi/Nutrition:   Diet: NPO for now   IV pepcid BID  Bowel Regimen as needed    ID:   Microbiological studies: pending blood Cx; send RVP for atypical; if thora or bronch will send more cx; PCT trend   Abx: CTX, Azithro    Nephro/Electrolyte/Acid-Base:   Avoid nephrotoxic agents  Strict I&O with Cr monitoring   Medications adjusted for   Close Electrolyte monitoring and correction as needed     Endocrinology:   FS q6  TSH and A1C in AM      Haem/Oncology:   Lovenox BID for now for PE  US LE doppler b/l    Lines/ Tubes/ Catheters/ devices:   ETT: 5/15  OGT: 5/15  TLC: none  A-line: none  Larson:  5/15

## 2021-05-16 NOTE — PROGRESS NOTE ADULT - ASSESSMENT
62 y/o male with pmhx of COPD (not on home O2), active smoker 1.5 ppd, aflutter and PE on eliquis (? compliance), pulmonary nodule now with:    1. septic shock due to LLL CAP  2. left pleural effusion  3. aflutter with RVR  4. acute hypoxic respiratory failure  5. right segmental PE  6. LLE DVT (peroneal)      NEURO: sedated on propofol. goal RAAS 0 to -1.   CVS: actively titrating levophed for goal MAP 65-70. midodrine added. on stress dose steroids. TTE shows moderate RV enlargement. LVEF 55-60%. trop negative.     PULM: intubated on lung protective ventilatory strategy. vent bundle in place. SBT in am. monitor chest tube output, total of 1400 cc thus far. s/p BAL.    GI: start tube feeds in am if doesnt pass SBT. pepcid bed.   RENAL: no active issues  ID: on azithromycin and rocephin for CAP. cultures pending.   ENDO: goal -180  HEME: on full dose lovenox  DISPO: full code

## 2021-05-16 NOTE — PROGRESS NOTE ADULT - SUBJECTIVE AND OBJECTIVE BOX
Patient is a 61y old  Male who presents with a chief complaint of Respiratory Failure (16 May 2021 12:07)      BRIEF HOSPITAL COURSE: 60 y/o male with pmhx of COPD (not on home O2), active smoker 1.5 ppd, aflutter and PE on eliquis, pulmonary nodule who presented on 5/15 with SOB, cough, anorexia found to be in aflutter with RVR along with acute hypoxic respiratory failure due to large left pleural effusion and pulmonary edema requiring intubation. Ultimately no improvement in HR and worsening shock state after     Events last 24 hours: ***    PAST MEDICAL & SURGICAL HISTORY:  Poor historian    COPD (chronic obstructive pulmonary disease)    Pulmonary embolism    Atrial flutter    H/O solitary pulmonary nodule    Depression    No significant past surgical history        Review of Systems:  CONSTITUTIONAL: No fever, chills, or fatigue  EYES: No eye pain, visual disturbances, or discharge  ENMT:  No difficulty hearing, tinnitus, vertigo; No sinus or throat pain  NECK: No pain or stiffness  RESPIRATORY: No cough, wheezing, chills or hemoptysis; No shortness of breath  CARDIOVASCULAR: No chest pain, palpitations, dizziness, or leg swelling  GASTROINTESTINAL: No abdominal or epigastric pain. No nausea, vomiting, or hematemesis; No diarrhea or constipation. No melena or hematochezia.  GENITOURINARY: No dysuria, frequency, hematuria, or incontinence  NEUROLOGICAL: No headaches, memory loss, loss of strength, numbness, or tremors  SKIN: No itching, burning, rashes, or lesions   MUSCULOSKELETAL: No joint pain or swelling; No muscle, back, or extremity pain  PSYCHIATRIC: No depression, anxiety, mood swings, or difficulty sleeping      Medications:  azithromycin  IVPB      cefTRIAXone   IVPB 1000 milliGRAM(s) IV Intermittent every 24 hours    norepinephrine Infusion 0.05 MICROgram(s)/kG/Min IV Continuous <Continuous>      acetaminophen    Suspension .. 650 milliGRAM(s) Oral every 6 hours PRN  fentaNYL    Injectable 50 MICROGram(s) IV Push every 2 hours PRN  propofol Infusion 10.417 MICROgram(s)/kG/Min IV Continuous <Continuous>      enoxaparin Injectable 80 milliGRAM(s) SubCutaneous two times a day    famotidine Injectable 20 milliGRAM(s) IV Push every 12 hours            chlorhexidine 0.12% Liquid 15 milliLiter(s) Oral Mucosa every 12 hours  chlorhexidine 4% Liquid 1 Application(s) Topical <User Schedule>        Mode: AC/ CMV (Assist Control/ Continuous Mandatory Ventilation)  RR (machine): 22  TV (machine): 450  FiO2: 40  PEEP: 5  MAP: 13  PIP: 28      ICU Vital Signs Last 24 Hrs  T(C): 37.1 (16 May 2021 18:00), Max: 39.2 (16 May 2021 09:00)  T(F): 98.8 (16 May 2021 18:00), Max: 102.6 (16 May 2021 09:00)  HR: 82 (16 May 2021 23:00) (73 - 105)  BP: 114/66 (16 May 2021 23:00) (84/58 - 135/80)  BP(mean): 81 (16 May 2021 23:00) (66 - 102)  ABP: --  ABP(mean): --  RR: 16 (16 May 2021 23:00) (15 - 22)  SpO2: 100% (16 May 2021 23:00) (95% - 100%)      ABG - ( 16 May 2021 04:19 )  pH, Arterial: 7.46  pH, Blood: x     /  pCO2: 53    /  pO2: 68    / HCO3: 35    / Base Excess: 11.6  /  SaO2: 95                  I&O's Detail    15 May 2021 07:01  -  16 May 2021 07:00  --------------------------------------------------------  IN:    IV PiggyBack: 433 mL    Norepinephrine: 58 mL    Propofol: 94 mL    Sodium Chloride 0.9% Bolus: 500 mL  Total IN: 1085 mL    OUT:    Indwelling Catheter - Urethral (mL): 220 mL  Total OUT: 220 mL    Total NET: 865 mL      16 May 2021 07:01  -  16 May 2021 23:34  --------------------------------------------------------  IN:    IV PiggyBack: 333.2 mL    Norepinephrine: 107.4 mL    Propofol: 99.5 mL  Total IN: 540.1 mL    OUT:    Chest Tube (mL): 1280 mL    Indwelling Catheter - Urethral (mL): 595 mL    Nasogastric/Oral tube (mL): 75 mL  Total OUT: 1950 mL    Total NET: -1409.9 mL            LABS:                        12.1   6.58  )-----------( 183      ( 16 May 2021 03:41 )             38.4     05-16    139  |  99  |  7.0<L>  ----------------------------<  135<H>  3.2<L>   |  30.0<H>  |  0.48<L>    Ca    6.8<L>      16 May 2021 03:41  Phos  1.7     -  Mg     1.6     -    TPro  5.5<L>  /  Alb  2.2<L>  /  TBili  2.0  /  DBili  x   /  AST  30  /  ALT  19  /  AlkPhos  92  -      CARDIAC MARKERS ( 15 May 2021 20:43 )  x     / <0.01 ng/mL / x     / x     / x          CAPILLARY BLOOD GLUCOSE        PT/INR - ( 16 May 2021 03:41 )   PT: 20.4 sec;   INR: 1.81 ratio         PTT - ( 16 May 2021 03:41 )  PTT:37.4 sec  Urinalysis Basic - ( 16 May 2021 03:44 )    Color: Yellow / Appearance: Slightly Turbid / S.020 / pH: x  Gluc: x / Ketone: Trace  / Bili: Moderate / Urobili: 12   Blood: x / Protein: 100 / Nitrite: Positive   Leuk Esterase: Trace / RBC: 3-5 /HPF / WBC 0-2   Sq Epi: x / Non Sq Epi: Few / Bacteria: Few      CULTURES:  Rapid RVP Result: NotDetec ( @ 14:39)      Physical Examination:    General: No acute distress.      HEENT: Pupils equal, reactive to light.  Symmetric.    PULM: Clear to auscultation bilaterally, no significant sputum production    NECK: Supple, no lymphadenopathy, trachea midline    CVS: Regular rate and rhythm, no murmurs, rubs, or gallops    ABD: Soft, nondistended, nontender, normoactive bowel sounds, no masses    EXT: No edema, nontender    SKIN: Warm and well perfused, no rashes noted.    NEURO: Alert, oriented, interactive, nonfocal    DEVICES:     RADIOLOGY: ***    CRITICAL CARE TIME SPENT: ***   Patient is a 61y old  Male who presents with a chief complaint of Respiratory Failure (16 May 2021 12:07)      BRIEF HOSPITAL COURSE: 62 y/o male with pmhx of COPD (not on home O2), active smoker 1.5 ppd, aflutter and PE on eliquis (? compliance), pulmonary nodule who presented on 5/15 with SOB, cough, anorexia found to be in aflutter with RVR along with acute hypoxic respiratory failure due to large left pleural effusion and pulmonary edema requiring intubation. Ultimately no improvement in HR and worsening shock state after intubation therefore was successfully cardioverted at 150 J. Also found to have right segmental PE and left peroneal DVT.    Events last 24 hours: s/p left chest tube with 1200 cc serosanguenous fluid. remains intubated, improving fio2 requirements now on 50%/+5. remains on levophed. in NSR.     PAST MEDICAL & SURGICAL HISTORY:  Poor historian    COPD (chronic obstructive pulmonary disease)    Pulmonary embolism    Atrial flutter    H/O solitary pulmonary nodule    Depression    No significant past surgical history        Review of Systems:  unable to obtain due to current clinical condition.    Medications:  azithromycin  IVPB      cefTRIAXone   IVPB 1000 milliGRAM(s) IV Intermittent every 24 hours    norepinephrine Infusion 0.05 MICROgram(s)/kG/Min IV Continuous <Continuous>      acetaminophen    Suspension .. 650 milliGRAM(s) Oral every 6 hours PRN  fentaNYL    Injectable 50 MICROGram(s) IV Push every 2 hours PRN  propofol Infusion 10.417 MICROgram(s)/kG/Min IV Continuous <Continuous>      enoxaparin Injectable 80 milliGRAM(s) SubCutaneous two times a day    famotidine Injectable 20 milliGRAM(s) IV Push every 12 hours            chlorhexidine 0.12% Liquid 15 milliLiter(s) Oral Mucosa every 12 hours  chlorhexidine 4% Liquid 1 Application(s) Topical <User Schedule>        Mode: AC/ CMV (Assist Control/ Continuous Mandatory Ventilation)  RR (machine): 22  TV (machine): 450  FiO2: 40  PEEP: 5  MAP: 13  PIP: 28      ICU Vital Signs Last 24 Hrs  T(C): 37.1 (16 May 2021 18:00), Max: 39.2 (16 May 2021 09:00)  T(F): 98.8 (16 May 2021 18:00), Max: 102.6 (16 May 2021 09:00)  HR: 82 (16 May 2021 23:00) (73 - 105)  BP: 114/66 (16 May 2021 23:00) (84/58 - 135/80)  BP(mean): 81 (16 May 2021 23:00) (66 - 102)  ABP: --  ABP(mean): --  RR: 16 (16 May 2021 23:00) (15 - 22)  SpO2: 100% (16 May 2021 23:00) (95% - 100%)      ABG - ( 16 May 2021 04:19 )  pH, Arterial: 7.46  pH, Blood: x     /  pCO2: 53    /  pO2: 68    / HCO3: 35    / Base Excess: 11.6  /  SaO2: 95                  I&O's Detail    15 May 2021 07:01  -  16 May 2021 07:00  --------------------------------------------------------  IN:    IV PiggyBack: 433 mL    Norepinephrine: 58 mL    Propofol: 94 mL    Sodium Chloride 0.9% Bolus: 500 mL  Total IN: 1085 mL    OUT:    Indwelling Catheter - Urethral (mL): 220 mL  Total OUT: 220 mL    Total NET: 865 mL      16 May 2021 07:01  -  16 May 2021 23:34  --------------------------------------------------------  IN:    IV PiggyBack: 333.2 mL    Norepinephrine: 107.4 mL    Propofol: 99.5 mL  Total IN: 540.1 mL    OUT:    Chest Tube (mL): 1280 mL    Indwelling Catheter - Urethral (mL): 595 mL    Nasogastric/Oral tube (mL): 75 mL  Total OUT: 1950 mL    Total NET: -1409.9 mL            LABS:                        12.1   6.58  )-----------( 183      ( 16 May 2021 03:41 )             38.4     05-16    139  |  99  |  7.0<L>  ----------------------------<  135<H>  3.2<L>   |  30.0<H>  |  0.48<L>    Ca    6.8<L>      16 May 2021 03:41  Phos  1.7       Mg     1.6         TPro  5.5<L>  /  Alb  2.2<L>  /  TBili  2.0  /  DBili  x   /  AST  30  /  ALT  19  /  AlkPhos  92        CARDIAC MARKERS ( 15 May 2021 20:43 )  x     / <0.01 ng/mL / x     / x     / x          CAPILLARY BLOOD GLUCOSE        PT/INR - ( 16 May 2021 03:41 )   PT: 20.4 sec;   INR: 1.81 ratio         PTT - ( 16 May 2021 03:41 )  PTT:37.4 sec  Urinalysis Basic - ( 16 May 2021 03:44 )    Color: Yellow / Appearance: Slightly Turbid / S.020 / pH: x  Gluc: x / Ketone: Trace  / Bili: Moderate / Urobili: 12   Blood: x / Protein: 100 / Nitrite: Positive   Leuk Esterase: Trace / RBC: 3-5 /HPF / WBC 0-2   Sq Epi: x / Non Sq Epi: Few / Bacteria: Few      CULTURES:  Rapid RVP Result: NotDetec ( @ 14:39)      Physical Examination:    General: No acute distress.      HEENT: Pupils equal, reactive to light.  Symmetric.    PULM: Clear to auscultation bilaterally, no significant sputum production. chest tube secured in place.     NECK: Supple, no lymphadenopathy, trachea midline    CVS: Regular rate and rhythm, no murmurs, rubs, or gallops    ABD: Soft, nondistended, nontender, normoactive bowel sounds, no masses    EXT: No edema, nontender    SKIN: Warm and well perfused, no rashes noted.    NEURO: sedated    DEVICES:     RADIOLOGY:     IMPRESSION:  Acute DVT left peroneal vein.      Findings were discussed with AYO Wen in the MICU 2021 6:50 PM by Dr. Kaiser with read back confirmation.            ELISA KAISER MD; Attending Radiologist  This document has been electronically signed. May 16 2021  6:50PM    IMPRESSION:    1. Acute pulmonary emboli in the right lower lobe.  2. Large, partially loculated left pleural effusion with atelectasis in the left lower lobe.  3. Bubbly opacity in the left main bronchus represent inspissated secretions or aspirated fluid.  4. Multifocal tree-in-bud opacities, most prominent in the right middle lobe. This could represent an infectious or inflammatory process.  5. No acute CT finding in the abdomen and pelvis.    The critical findings of pulmonary emboli were discussed with PRAFUL Dover on the MICU at  2021 4:33 AM by Dr. Cullen with read back confirmation.            FREDI CULLEN MD; Attending Radiologist  This document has been electronically signed. May 16 2021  4:51AM      CRITICAL CARE TIME SPENT: 35 minutes of critical care time spent providing medical care for patient's acute illness/conditions that impairs at least one vital organ system and/or poses a high risk of imminent or life threatening deterioration in the patient's condition. It includes time spent evaluating and treating the patient's acute illness as well as time spent reviewing labs, radiology, discussing goals of care with patient and/or patient's family, and discussing the case with a multidisciplinary team in an effort to prevent further life threatening deterioration or end organ damage. This time is independent of any procedures performed.

## 2021-05-16 NOTE — PROGRESS NOTE ADULT - SUBJECTIVE AND OBJECTIVE BOX
Patient is a 61y old  Male who presents with a chief complaint of Respiratory Failure (15 May 2021 23:55)      HPI/BRIEF HOSPITAL COURSE: ***    Events last 24 hours: ***    PAST MEDICAL & SURGICAL HISTORY:  Poor historian    COPD (chronic obstructive pulmonary disease)    Pulmonary embolism    Atrial flutter    H/O solitary pulmonary nodule    Depression    No significant past surgical history        Review of Systems:  CONSTITUTIONAL: No fever, chills, or fatigue  EYES: No eye pain, visual disturbances, or discharge  ENMT:  No difficulty hearing, tinnitus, vertigo; No sinus or throat pain  NECK: No pain or stiffness  RESPIRATORY: No cough, wheezing, chills or hemoptysis; No shortness of breath  CARDIOVASCULAR: No chest pain, palpitations, dizziness, or leg swelling  GASTROINTESTINAL: No abdominal or epigastric pain. No nausea, vomiting, or hematemesis; No diarrhea or constipation. No melena or hematochezia.  GENITOURINARY: No dysuria, frequency, hematuria, or incontinence  NEUROLOGICAL: No headaches, memory loss, loss of strength, numbness, or tremors  SKIN: No itching, burning, rashes, or lesions   MUSCULOSKELETAL: No joint pain or swelling; No muscle, back, or extremity pain  PSYCHIATRIC: No depression, anxiety, mood swings, or difficulty sleeping        Physical Examination:    ICU Vital Signs Last 24 Hrs  T(C): 38.8 (16 May 2021 11:44), Max: 39.2 (16 May 2021 09:00)  T(F): 101.8 (16 May 2021 11:44), Max: 102.6 (16 May 2021 09:00)  HR: 95 (16 May 2021 11:49) (73 - 137)  BP: 92/64 (16 May 2021 11:00) (83/54 - 144/84)  BP(mean): 70 (16 May 2021 11:00) (63 - 102)  ABP: --  ABP(mean): --  RR: 20 (16 May 2021 07:00) (15 - 22)  SpO2: 100% (16 May 2021 11:49) (95% - 100%)      General: No acute distress.      Neuro: AAO*3, No motor, sensory, or cranial nerve deficit    HEENT: Pupils equal, reactive to light, Oral mucosa    PULM: Clear to auscultation bilaterally, no significant adventitious breath sounds     CVS: Regular rhythm and controlled rate, no murmurs, rubs, or gallops    ABD: Soft, nondistended, nontender, normoactive bowel sounds, no CVA tenderness    EXT: No b/l LE edema, nontender with pedal pulse palpable     SKIN: Warm and well perfused, no acute rashes       Medications:  azithromycin  IVPB      cefTRIAXone   IVPB 1000 milliGRAM(s) IV Intermittent every 24 hours    norepinephrine Infusion 0.05 MICROgram(s)/kG/Min IV Continuous <Continuous>      acetaminophen    Suspension .. 650 milliGRAM(s) Oral every 6 hours PRN  fentaNYL    Injectable 50 MICROGram(s) IV Push every 2 hours PRN  propofol Infusion 10.417 MICROgram(s)/kG/Min IV Continuous <Continuous>      enoxaparin Injectable 80 milliGRAM(s) SubCutaneous two times a day    famotidine Injectable 20 milliGRAM(s) IV Push every 12 hours            chlorhexidine 0.12% Liquid 15 milliLiter(s) Oral Mucosa every 12 hours  chlorhexidine 4% Liquid 1 Application(s) Topical <User Schedule>        Mode: AC/ CMV (Assist Control/ Continuous Mandatory Ventilation)  RR (machine): 22  TV (machine): 450  FiO2: 40  PEEP: 5  MAP: 12  PIP: 29      I&O's Detail    15 May 2021 07:01  -  16 May 2021 07:00  --------------------------------------------------------  IN:    IV PiggyBack: 433 mL    Norepinephrine: 58 mL    Propofol: 94 mL    Sodium Chloride 0.9% Bolus: 500 mL  Total IN: 1085 mL    OUT:    Indwelling Catheter - Urethral (mL): 220 mL  Total OUT: 220 mL    Total NET: 865 mL      16 May 2021 07:01  -  16 May 2021 12:07  --------------------------------------------------------  IN:    IV PiggyBack: 333.2 mL    Norepinephrine: 27.6 mL    Propofol: 31 mL  Total IN: 391.8 mL    OUT:    Indwelling Catheter - Urethral (mL): 115 mL    Nasogastric/Oral tube (mL): 75 mL  Total OUT: 190 mL    Total NET: 201.8 mL            RADIOLOGY/ Microbiology/ Labs: reviewed     I have personally provided  minutes of critical care time excluding time spent on separate procedures       Patient is a 61y old  Male who presents with a chief complaint of Respiratory Failure (15 May 2021 23:55)      HPI/BRIEF HOSPITAL COURSE: 62 y/o Male presented to [facility] for SOB and admitted to MICU for Acute hypoxic respiratory failure, Aflutter with RVR, left pleural effusion, CAP, hypotension, acute encephalopathy, PE on 5/15    Events last 24 hours:   5/15: Intubated and on MV, External cardioversion at 150 J for Aflut with RVR affecting BP and oxygenation, Pressor initiation   Worsening fever      PAST MEDICAL & SURGICAL HISTORY:  Poor historian    COPD (chronic obstructive pulmonary disease)    Pulmonary embolism    Atrial flutter    H/O solitary pulmonary nodule    Depression    No significant past surgical history      Could not obtain ROS due to underlying mentation    Physical Examination:    ICU Vital Signs Last 24 Hrs  T(C): 38.8 (16 May 2021 11:44), Max: 39.2 (16 May 2021 09:00)  T(F): 101.8 (16 May 2021 11:44), Max: 102.6 (16 May 2021 09:00)  HR: 95 (16 May 2021 11:49) (73 - 137)  BP: 92/64 (16 May 2021 11:00) (83/54 - 144/84)  BP(mean): 70 (16 May 2021 11:00) (63 - 102)  ABP: --  ABP(mean): --  RR: 20 (16 May 2021 07:00) (15 - 22)  SpO2: 100% (16 May 2021 11:49) (95% - 100%)      General: No acute distress.  Warm     Neuro: could not illicit     HEENT: Pupils equal, reactive to light, Oral mucosa moist ETT+ OGT+    PULM: Clear to auscultation on right, decreased on left     CVS: Regular rhythm and increased rate, no murmurs, rubs, or gallops    ABD: Soft, nondistended, nontender, normoactive bowel sounds, no CVA tenderness    EXT: No b/l LE edema, nontender with pedal pulse palpable     SKIN: Warm and well perfused, no acute rashes       Medications:  azithromycin  IVPB      cefTRIAXone   IVPB 1000 milliGRAM(s) IV Intermittent every 24 hours    norepinephrine Infusion 0.05 MICROgram(s)/kG/Min IV Continuous <Continuous>      acetaminophen    Suspension .. 650 milliGRAM(s) Oral every 6 hours PRN  fentaNYL    Injectable 50 MICROGram(s) IV Push every 2 hours PRN  propofol Infusion 10.417 MICROgram(s)/kG/Min IV Continuous <Continuous>      enoxaparin Injectable 80 milliGRAM(s) SubCutaneous two times a day    famotidine Injectable 20 milliGRAM(s) IV Push every 12 hours            chlorhexidine 0.12% Liquid 15 milliLiter(s) Oral Mucosa every 12 hours  chlorhexidine 4% Liquid 1 Application(s) Topical <User Schedule>        Mode: AC/ CMV (Assist Control/ Continuous Mandatory Ventilation)  RR (machine): 22  TV (machine): 450  FiO2: 40  PEEP: 5  MAP: 12  PIP: 29      I&O's Detail    15 May 2021 07:01  -  16 May 2021 07:00  --------------------------------------------------------  IN:    IV PiggyBack: 433 mL    Norepinephrine: 58 mL    Propofol: 94 mL    Sodium Chloride 0.9% Bolus: 500 mL  Total IN: 1085 mL    OUT:    Indwelling Catheter - Urethral (mL): 220 mL  Total OUT: 220 mL    Total NET: 865 mL      16 May 2021 07:01  -  16 May 2021 12:07  --------------------------------------------------------  IN:    IV PiggyBack: 333.2 mL    Norepinephrine: 27.6 mL    Propofol: 31 mL  Total IN: 391.8 mL    OUT:    Indwelling Catheter - Urethral (mL): 115 mL    Nasogastric/Oral tube (mL): 75 mL  Total OUT: 190 mL    Total NET: 201.8 mL            RADIOLOGY/ Microbiology/ Labs: reviewed     I have personally provided 45 minutes of critical care time excluding time spent on separate procedures

## 2021-05-17 DIAGNOSIS — J90 PLEURAL EFFUSION, NOT ELSEWHERE CLASSIFIED: ICD-10-CM

## 2021-05-17 DIAGNOSIS — I48.92 UNSPECIFIED ATRIAL FLUTTER: ICD-10-CM

## 2021-05-17 DIAGNOSIS — Z51.5 ENCOUNTER FOR PALLIATIVE CARE: ICD-10-CM

## 2021-05-17 DIAGNOSIS — J44.9 CHRONIC OBSTRUCTIVE PULMONARY DISEASE, UNSPECIFIED: ICD-10-CM

## 2021-05-17 DIAGNOSIS — J96.01 ACUTE RESPIRATORY FAILURE WITH HYPOXIA: ICD-10-CM

## 2021-05-17 LAB
A1C WITH ESTIMATED AVERAGE GLUCOSE RESULT: 5.7 % — HIGH (ref 4–5.6)
ALBUMIN FLD-MCNC: 2 G/DL — SIGNIFICANT CHANGE UP
ANION GAP SERPL CALC-SCNC: 9 MMOL/L — SIGNIFICANT CHANGE UP (ref 5–17)
BUN SERPL-MCNC: 7 MG/DL — LOW (ref 8–20)
CALCIUM SERPL-MCNC: 7.5 MG/DL — LOW (ref 8.6–10.2)
CHLORIDE SERPL-SCNC: 95 MMOL/L — LOW (ref 98–107)
CO2 SERPL-SCNC: 31 MMOL/L — HIGH (ref 22–29)
COVID-19 SPIKE DOMAIN AB INTERP: NEGATIVE — SIGNIFICANT CHANGE UP
COVID-19 SPIKE DOMAIN ANTIBODY RESULT: 0.4 U/ML — SIGNIFICANT CHANGE UP
CREAT SERPL-MCNC: 0.43 MG/DL — LOW (ref 0.5–1.3)
CULTURE RESULTS: SIGNIFICANT CHANGE UP
ESTIMATED AVERAGE GLUCOSE: 117 MG/DL — HIGH (ref 68–114)
GLUCOSE FLD-MCNC: 76 MG/DL — SIGNIFICANT CHANGE UP
GLUCOSE SERPL-MCNC: 87 MG/DL — SIGNIFICANT CHANGE UP (ref 70–99)
GRAM STN FLD: SIGNIFICANT CHANGE UP
GRAM STN FLD: SIGNIFICANT CHANGE UP
HCT VFR BLD CALC: 38.5 % — LOW (ref 39–50)
HGB BLD-MCNC: 12.6 G/DL — LOW (ref 13–17)
INR BLD: 2.19 RATIO — HIGH (ref 0.88–1.16)
LDH SERPL L TO P-CCNC: 264 U/L — SIGNIFICANT CHANGE UP
LDH SERPL L TO P-CCNC: 346 U/L — HIGH (ref 98–192)
LEGIONELLA AG UR QL: NEGATIVE — SIGNIFICANT CHANGE UP
MAGNESIUM SERPL-MCNC: 1.9 MG/DL — SIGNIFICANT CHANGE UP (ref 1.6–2.6)
MCHC RBC-ENTMCNC: 32.7 GM/DL — SIGNIFICANT CHANGE UP (ref 32–36)
MCHC RBC-ENTMCNC: 33.1 PG — SIGNIFICANT CHANGE UP (ref 27–34)
MCV RBC AUTO: 101 FL — HIGH (ref 80–100)
PHOSPHATE SERPL-MCNC: 2.7 MG/DL — SIGNIFICANT CHANGE UP (ref 2.4–4.7)
PLATELET # BLD AUTO: 169 K/UL — SIGNIFICANT CHANGE UP (ref 150–400)
POTASSIUM SERPL-MCNC: 3.7 MMOL/L — SIGNIFICANT CHANGE UP (ref 3.5–5.3)
POTASSIUM SERPL-SCNC: 3.7 MMOL/L — SIGNIFICANT CHANGE UP (ref 3.5–5.3)
PROCALCITONIN SERPL-MCNC: 0.62 NG/ML — HIGH (ref 0.02–0.1)
PROT FLD-MCNC: 3.9 G/DL — SIGNIFICANT CHANGE UP
PROTHROM AB SERPL-ACNC: 24.5 SEC — HIGH (ref 10.6–13.6)
RBC # BLD: 3.81 M/UL — LOW (ref 4.2–5.8)
RBC # FLD: 15.9 % — HIGH (ref 10.3–14.5)
SARS-COV-2 IGG+IGM SERPL QL IA: 0.4 U/ML — SIGNIFICANT CHANGE UP
SARS-COV-2 IGG+IGM SERPL QL IA: NEGATIVE — SIGNIFICANT CHANGE UP
SODIUM SERPL-SCNC: 135 MMOL/L — SIGNIFICANT CHANGE UP (ref 135–145)
SPECIMEN SOURCE: SIGNIFICANT CHANGE UP
TSH SERPL-MCNC: 1.17 UIU/ML — SIGNIFICANT CHANGE UP (ref 0.27–4.2)
WBC # BLD: 11.35 K/UL — HIGH (ref 3.8–10.5)
WBC # FLD AUTO: 11.35 K/UL — HIGH (ref 3.8–10.5)

## 2021-05-17 PROCEDURE — 99233 SBSQ HOSP IP/OBS HIGH 50: CPT

## 2021-05-17 PROCEDURE — 71045 X-RAY EXAM CHEST 1 VIEW: CPT | Mod: 26

## 2021-05-17 PROCEDURE — 99223 1ST HOSP IP/OBS HIGH 75: CPT

## 2021-05-17 RX ORDER — NICOTINE POLACRILEX 2 MG
1 GUM BUCCAL DAILY
Refills: 0 | Status: DISCONTINUED | OUTPATIENT
Start: 2021-05-17 | End: 2021-06-05

## 2021-05-17 RX ORDER — DEXMEDETOMIDINE HYDROCHLORIDE IN 0.9% SODIUM CHLORIDE 4 UG/ML
0.05 INJECTION INTRAVENOUS
Qty: 200 | Refills: 0 | Status: DISCONTINUED | OUTPATIENT
Start: 2021-05-17 | End: 2021-05-18

## 2021-05-17 RX ORDER — MAGNESIUM SULFATE 500 MG/ML
2 VIAL (ML) INJECTION ONCE
Refills: 0 | Status: COMPLETED | OUTPATIENT
Start: 2021-05-17 | End: 2021-05-17

## 2021-05-17 RX ORDER — BUDESONIDE, MICRONIZED 100 %
0.25 POWDER (GRAM) MISCELLANEOUS EVERY 12 HOURS
Refills: 0 | Status: DISCONTINUED | OUTPATIENT
Start: 2021-05-17 | End: 2021-05-26

## 2021-05-17 RX ORDER — BUPRENORPHINE AND NALOXONE 2; .5 MG/1; MG/1
1 TABLET SUBLINGUAL
Qty: 0 | Refills: 0 | DISCHARGE

## 2021-05-17 RX ORDER — OXYCODONE HYDROCHLORIDE 5 MG/1
5 TABLET ORAL EVERY 6 HOURS
Refills: 0 | Status: DISCONTINUED | OUTPATIENT
Start: 2021-05-17 | End: 2021-05-18

## 2021-05-17 RX ORDER — DIAZEPAM 5 MG
5 TABLET ORAL EVERY 8 HOURS
Refills: 0 | Status: DISCONTINUED | OUTPATIENT
Start: 2021-05-17 | End: 2021-05-18

## 2021-05-17 RX ORDER — HYDROMORPHONE HYDROCHLORIDE 2 MG/ML
1 INJECTION INTRAMUSCULAR; INTRAVENOUS; SUBCUTANEOUS EVERY 4 HOURS
Refills: 0 | Status: DISCONTINUED | OUTPATIENT
Start: 2021-05-17 | End: 2021-05-18

## 2021-05-17 RX ORDER — FUROSEMIDE 40 MG
80 TABLET ORAL ONCE
Refills: 0 | Status: COMPLETED | OUTPATIENT
Start: 2021-05-17 | End: 2021-05-17

## 2021-05-17 RX ORDER — DILTIAZEM HCL 120 MG
1 CAPSULE, EXT RELEASE 24 HR ORAL
Qty: 0 | Refills: 0 | DISCHARGE

## 2021-05-17 RX ORDER — ALBUMIN HUMAN 25 %
100 VIAL (ML) INTRAVENOUS ONCE
Refills: 0 | Status: COMPLETED | OUTPATIENT
Start: 2021-05-17 | End: 2021-05-17

## 2021-05-17 RX ORDER — HYDROMORPHONE HYDROCHLORIDE 2 MG/ML
1 INJECTION INTRAMUSCULAR; INTRAVENOUS; SUBCUTANEOUS EVERY 6 HOURS
Refills: 0 | Status: DISCONTINUED | OUTPATIENT
Start: 2021-05-17 | End: 2021-05-18

## 2021-05-17 RX ORDER — POTASSIUM PHOSPHATE, MONOBASIC POTASSIUM PHOSPHATE, DIBASIC 236; 224 MG/ML; MG/ML
15 INJECTION, SOLUTION INTRAVENOUS ONCE
Refills: 0 | Status: COMPLETED | OUTPATIENT
Start: 2021-05-17 | End: 2021-05-17

## 2021-05-17 RX ORDER — IPRATROPIUM/ALBUTEROL SULFATE 18-103MCG
3 AEROSOL WITH ADAPTER (GRAM) INHALATION EVERY 6 HOURS
Refills: 0 | Status: DISCONTINUED | OUTPATIENT
Start: 2021-05-17 | End: 2021-06-05

## 2021-05-17 RX ADMIN — FAMOTIDINE 20 MILLIGRAM(S): 10 INJECTION INTRAVENOUS at 18:10

## 2021-05-17 RX ADMIN — OXYCODONE HYDROCHLORIDE 5 MILLIGRAM(S): 5 TABLET ORAL at 18:11

## 2021-05-17 RX ADMIN — CEFTRIAXONE 100 MILLIGRAM(S): 500 INJECTION, POWDER, FOR SOLUTION INTRAMUSCULAR; INTRAVENOUS at 01:40

## 2021-05-17 RX ADMIN — Medication 3 MILLILITER(S): at 16:09

## 2021-05-17 RX ADMIN — DEXMEDETOMIDINE HYDROCHLORIDE IN 0.9% SODIUM CHLORIDE 1.05 MICROGRAM(S)/KG/HR: 4 INJECTION INTRAVENOUS at 10:33

## 2021-05-17 RX ADMIN — AZITHROMYCIN 255 MILLIGRAM(S): 500 TABLET, FILM COATED ORAL at 01:40

## 2021-05-17 RX ADMIN — FAMOTIDINE 20 MILLIGRAM(S): 10 INJECTION INTRAVENOUS at 05:51

## 2021-05-17 RX ADMIN — CHLORHEXIDINE GLUCONATE 15 MILLILITER(S): 213 SOLUTION TOPICAL at 05:51

## 2021-05-17 RX ADMIN — OXYCODONE HYDROCHLORIDE 5 MILLIGRAM(S): 5 TABLET ORAL at 23:19

## 2021-05-17 RX ADMIN — Medication 1 PATCH: at 19:13

## 2021-05-17 RX ADMIN — CHLORHEXIDINE GLUCONATE 1 APPLICATION(S): 213 SOLUTION TOPICAL at 05:51

## 2021-05-17 RX ADMIN — PROPOFOL 5 MICROGRAM(S)/KG/MIN: 10 INJECTION, EMULSION INTRAVENOUS at 01:40

## 2021-05-17 RX ADMIN — Medication 5 MILLIGRAM(S): at 13:58

## 2021-05-17 RX ADMIN — Medication 0.25 MILLIGRAM(S): at 21:09

## 2021-05-17 RX ADMIN — Medication 1 PATCH: at 11:25

## 2021-05-17 RX ADMIN — Medication 50 GRAM(S): at 09:08

## 2021-05-17 RX ADMIN — Medication 80 MILLIGRAM(S): at 11:39

## 2021-05-17 RX ADMIN — OXYCODONE HYDROCHLORIDE 5 MILLIGRAM(S): 5 TABLET ORAL at 23:26

## 2021-05-17 RX ADMIN — ENOXAPARIN SODIUM 80 MILLIGRAM(S): 100 INJECTION SUBCUTANEOUS at 05:51

## 2021-05-17 RX ADMIN — ENOXAPARIN SODIUM 80 MILLIGRAM(S): 100 INJECTION SUBCUTANEOUS at 18:10

## 2021-05-17 RX ADMIN — Medication 50 MILLILITER(S): at 11:02

## 2021-05-17 RX ADMIN — CHLORHEXIDINE GLUCONATE 15 MILLILITER(S): 213 SOLUTION TOPICAL at 18:10

## 2021-05-17 RX ADMIN — Medication 3 MILLILITER(S): at 21:09

## 2021-05-17 RX ADMIN — Medication 7.87 MICROGRAM(S)/KG/MIN: at 01:40

## 2021-05-17 RX ADMIN — POTASSIUM PHOSPHATE, MONOBASIC POTASSIUM PHOSPHATE, DIBASIC 62.5 MILLIMOLE(S): 236; 224 INJECTION, SOLUTION INTRAVENOUS at 10:19

## 2021-05-17 NOTE — DIETITIAN INITIAL EVALUATION ADULT. - OTHER INFO
Pt is a 61 year old male pmhx COPD, active smoker (1.5 ppd), Aflutter on diltiazem (planned for ablation), PE (2018) on Eliquis, Pulmonary Nodule, Depression on Zoloft, questionable compliance with medications biba from home with complaints of sob, cough, weakness and anorexia for about a week.  In ED patient lethargic but oriented, hypoxic on RA place don 4L with waxing and waning mental status.  Patient also noted to be in aflutter with HR in the 130s; patient given cardizem 10mg IVP, began to have issues with worsening hypoxia/mental status and was subsequently intubated.  Chest xray obtained, large left sided pleural effusion (not present on 11/2020 films). Patient seen at bedside, HR in the 130s Aflutter with 2:1 conduction, BP in the 80s, given 500cc NS.  POCUS exam performed terrell predominant with some scattered bline, left effusion appreciated.  Cardiac portion difficult to obtain, however RV appears WNL, LV function appears diminished from previous TTE where EF was ~58%; however windows difficult.  Initially patient responded to IVF however patient now persistently in the 80s with HR in the 130s, patient electrically cardioverted with 150J, converted to NSR in the 70s.  Additional 500cc bolus ordered, plan for imaging en route to MICU.   Pt admitted with septic shock due to LLL CAP, left pleural effusion, aflutter with RVR, acute hypoxic respiratory failure, right segmental PE, LLE DVT (peroneal). Pt remains intubated/sedated at this time. NPO status maintained. Brief NFPE conducted.

## 2021-05-17 NOTE — PROGRESS NOTE ADULT - SUBJECTIVE AND OBJECTIVE BOX
Patient is a 61y old  Male who presents with a chief complaint of Respiratory Failure (16 May 2021 12:07)      BRIEF HOSPITAL COURSE: 62 y/o male with pmhx of COPD (not on home O2), active smoker 1.5 ppd, aflutter and PE on eliquis (? compliance), pulmonary nodule who presented on 5/15 with SOB, cough, anorexia found to be in aflutter with RVR along with acute hypoxic respiratory failure due to large left pleural effusion and pulmonary edema requiring intubation. Ultimately no improvement in HR and worsening shock state after intubation therefore was successfully cardioverted at 150 J. Also found to have right segmental PE and left peroneal DVT.    Events last 24 hours: s/p left chest tube with 1400 cc serosanguineous fluid. remains intubated, improving fio2 requirements now on 50%/+5. remains on levophed. in NSR. Failed SBt his AM due to resp distress     PAST MEDICAL & SURGICAL HISTORY:  Poor historian    COPD (chronic obstructive pulmonary disease)    Pulmonary embolism    Atrial flutter    H/O solitary pulmonary nodule    Depression    No significant past surgical history        Review of Systems:  unable to obtain due to current clinical condition.      ICU Vital Signs Last 24 Hrs  T(C): 37.1 (16 May 2021 18:00), Max: 39.2 (16 May 2021 09:00)  T(F): 98.8 (16 May 2021 18:00), Max: 102.6 (16 May 2021 09:00)  HR: 82 (16 May 2021 23:00) (73 - 105)  BP: 114/66 (16 May 2021 23:00) (84/58 - 135/80)  BP(mean): 81 (16 May 2021 23:00) (66 - 102)  ABP: --  ABP(mean): --  RR: 16 (16 May 2021 23:00) (15 - 22)  SpO2: 100% (16 May 2021 23:00) (95% - 100%)        General: acute acute distress while SAT, following simple commands fine with no obvious ac neuro deficit     HEENT: Pupils equal, reactive to light.  Symmetric.    PULM: Clear to auscultation bilaterally, no significant sputum production. chest tube secured in place.     NECK: Supple, no lymphadenopathy, trachea midline    CVS: Regular rate and rhythm, no murmurs, rubs, or gallops    ABD: Soft, nondistended, nontender, normoactive bowel sounds, no masses    EXT: No edema, nontender    SKIN: Warm and well perfused, no rashes noted.    MEDICATIONS  (STANDING):  albuterol/ipratropium for Nebulization 3 milliLiter(s) Nebulizer every 6 hours  azithromycin  IVPB      azithromycin  IVPB 500 milliGRAM(s) IV Intermittent every 24 hours  buDESOnide    Inhalation Suspension 0.25 milliGRAM(s) Inhalation every 12 hours  cefTRIAXone   IVPB 1000 milliGRAM(s) IV Intermittent every 24 hours  chlorhexidine 0.12% Liquid 15 milliLiter(s) Oral Mucosa every 12 hours  chlorhexidine 4% Liquid 1 Application(s) Topical <User Schedule>  dexMEDEtomidine Infusion 0.05 MICROgram(s)/kG/Hr (1.05 mL/Hr) IV Continuous <Continuous>  diazepam    Tablet 5 milliGRAM(s) Oral every 8 hours  enoxaparin Injectable 80 milliGRAM(s) SubCutaneous two times a day  famotidine Injectable 20 milliGRAM(s) IV Push every 12 hours  nicotine - 21 mG/24Hr(s) Patch 1 patch Transdermal daily  norepinephrine Infusion 0.05 MICROgram(s)/kG/Min (7.87 mL/Hr) IV Continuous <Continuous>    MEDICATIONS  (PRN):  acetaminophen    Suspension .. 650 milliGRAM(s) Oral every 6 hours PRN Temp greater or equal to 38C (100.4F)  HYDROmorphone  Injectable 1 milliGRAM(s) IV Push every 6 hours PRN Severe Pain (7 - 10)  oxyCODONE    IR 5 milliGRAM(s) Oral every 6 hours PRN Moderate Pain (4 - 6)    I&O's Summary    16 May 2021 07:01  -  17 May 2021 07:00  --------------------------------------------------------  IN: 628.1 mL / OUT: 2450 mL / NET: -1821.9 mL    17 May 2021 07:01  -  17 May 2021 14:05  --------------------------------------------------------  IN: 337.6 mL / OUT: 1060 mL / NET: -722.4 mL        I have personally provided 35  minutes of critical care time excluding time spent on seperate procedures

## 2021-05-17 NOTE — CONSULT NOTE ADULT - PROBLEM SELECTOR RECOMMENDATION 4
Palliative Care consulted to assist with GOC and establishment of appropriate surrogate.   Discussed case with MICU Tabitha Lezama, Nurse Katherin  Per history, wife  1 year ago. Patient reported has  no biologic children, has 5 step children.   Patient reportedly lives with charmaine Short.   Per nurse, in her discussion with stepdasisi Bocanegra [phone], patient was reported  in the midst of putting together advance directive paperwork, naming her as the HCP.  However,  NO formal health care proxy form  was made.   Thus, according to the  [facility] Family Health care Decision Act, assuming parents are , Siblings would be the NEXT surrogate.   There is reported a sister though no number in chart. Sister is to be visiting today. If she is the only sibling, she can transfer surrogacy if she wishes.

## 2021-05-17 NOTE — PROGRESS NOTE ADULT - ASSESSMENT
1: Acute hypoxic respiratory failure   2: A fib flutter with RVR s/p electric cardioversion   3: Shock: distributive   4: Left sided pleural effusion: Complex complicated by   5: CAP with Left lower lobe aspirition and atelectasis   7: Seg PE  8:  Acute heart failure: unclear systolic or diastolic or combined     Patient seen and examined  Full code for now   Palliative care eval in AM for NOK and GOC      Neuro:   Pain Mx: PRN Fentanyl ; adding scheduled NGT Narcotic, would eventually get Confirmation from patient if using Valium and Suboxone and Psych consult as needed at that point    Sedation/Anxiolytic: Propofol infusion for now transitioning to Precedex and Scheduled Valium   Daily SAT    Cardiovascular:   MAP Target: 65  Levo infusion   Midodrine   Stress dose steroid   TTE with contrast pending results     Resp/Chest:   On Volume AC ; PLS; Vent bundle  SBT wfailed earlier, would try later on Precedex later today with addition of Narcotics and BZD as above   s/p Bronch showing LLL pneumonia s/p suction, abx   S/p Left sided CT placement with sangionuous fluid drainage, c/w 20 neg suction until extubated  Thoracic Sx consult for f/u       Gi/Nutrition:   Diet: NPO for now   IV pepcid BID  Bowel Regimen as needed    ID:   Microbiological studies: pending blood Cx; Pending Cx from Left pleural fluid and Bronch   Abx: CTX, Azithro    Nephro/Electrolyte/Acid-Base:   Avoid nephrotoxic agents  Strict I&O with Cr monitoring   Medications adjusted for   Close Electrolyte monitoring and correction as needed   Poor UO this morning, improved with IVF    Endocrinology:   TSH normal and A1C minimally elavted  ISS      Haem/Oncology:   Lovenox BID for now for PE  US LE doppler b/l    Lines/ Tubes/ Catheters/ devices:   ETT: 5/15  OGT: 5/15  TLC: none  A-line: none  Larson:  5/15

## 2021-05-17 NOTE — DIETITIAN INITIAL EVALUATION ADULT. - ETIOLOGY
Related to inadequate protein energy intake with increased needs in setting of hx of COPD now admitted with resp failure, septic shock due to LLL CAP, left pleural effusion

## 2021-05-17 NOTE — CONSULT NOTE ADULT - SUBJECTIVE AND OBJECTIVE BOX
Palliative Medicine Initial Consultation Note    HPI:  History from chart patient intubated  PERTINENT PMH REVIEWED:  [ ] YES [ ] NO           SOCIAL HISTORY:  EtOH [ ] Yes  [ ] No                                    Drugs [ ] Yes [ ] No                                   [ ] smoker [ ] nonsmoker                                    Admitted from: [ ] home [ ] SNF _________ [ ] [facility] ________    Surrogate/HCP/Guardian: [ ] YES [ ] NO                                Phone#:    FAMILY HISTORY:      Baseline ADLs (prior to admission):  Independent [ ] moderately [ ] fully   Dependent   [ ] moderately [ ]fully    MEDICATIONS  (STANDING):  albumin human 25% IVPB 100 milliLiter(s) IV Intermittent once  albuterol/ipratropium for Nebulization 3 milliLiter(s) Nebulizer every 6 hours  azithromycin  IVPB      azithromycin  IVPB 500 milliGRAM(s) IV Intermittent every 24 hours  buDESOnide    Inhalation Suspension 0.25 milliGRAM(s) Inhalation every 12 hours  cefTRIAXone   IVPB 1000 milliGRAM(s) IV Intermittent every 24 hours  chlorhexidine 0.12% Liquid 15 milliLiter(s) Oral Mucosa every 12 hours  chlorhexidine 4% Liquid 1 Application(s) Topical <User Schedule>  dexMEDEtomidine Infusion 0.05 MICROgram(s)/kG/Hr (1.05 mL/Hr) IV Continuous <Continuous>  enoxaparin Injectable 80 milliGRAM(s) SubCutaneous two times a day  famotidine Injectable 20 milliGRAM(s) IV Push every 12 hours  furosemide   Injectable 80 milliGRAM(s) IV Push once  nicotine - 21 mG/24Hr(s) Patch 1 patch Transdermal daily  norepinephrine Infusion 0.05 MICROgram(s)/kG/Min (7.87 mL/Hr) IV Continuous <Continuous>    MEDICATIONS  (PRN):  acetaminophen    Suspension .. 650 milliGRAM(s) Oral every 6 hours PRN Temp greater or equal to 38C (100.4F)  fentaNYL    Injectable 50 MICROGram(s) IV Push every 2 hours PRN resp distress      Allergies    No Known Allergies    Intolerances        REVIEW OF SYSTEMS       [ x] Unable to obtain due to poor mentation       Karnofsky Performance Score/Palliative Performance Status Version 2: 10  %    Vital Signs Last 24 Hrs  T(C): 37.9 (17 May 2021 07:34), Max: 38.8 (16 May 2021 11:00)  T(F): 100.2 (17 May 2021 07:34), Max: 101.8 (16 May 2021 11:00)  HR: 95 (17 May 2021 10:15) (82 - 99)  BP: 105/61 (17 May 2021 10:15) (92/57 - 135/80)  BP(mean): 75 (17 May 2021 10:15) (67 - 94)  RR: 37 (17 May 2021 10:00) (16 - 37)  SpO2: 97% (17 May 2021 10:00) (95% - 100%)    PHYSICAL EXAM:    General: M intubated NAD     HEENT: NCAT  + ET  OG tube    Lungs: [ x] comfortable  + left CT    CV: [ x] normal  [ ] tachycardia    GI: [x ] normal  [ ] distended  [ ] tender  [ ] no BS               [ ] PEG/NG/OG tube    : [x ] normal  [ ] incontinent  [ ] oliguria/anuria  [ ] bocanegra    MSK: [ ] normal  [ x] weakness  [ ] edema             [ ] ambulatory  [ ] bedbound/wheelchair bound    Skin: [ ] normal  [ ] pressure ulcers- Stage_____  [ ] no rash    LABS:                        12.6   11.35 )-----------( 169      ( 17 May 2021 04:33 )             38.5     05-17    135  |  95<L>  |  7.0<L>  ----------------------------<  87  3.7   |  31.0<H>  |  0.43<L>    Ca    7.5<L>      17 May 2021 04:33  Phos  2.7     05-17  Mg     1.9     -17    TPro  5.5<L>  /  Alb  2.2<L>  /  TBili  2.0  /  DBili  x   /  AST  30  /  ALT  19  /  AlkPhos  92  05-16    PT/INR - ( 17 May 2021 04:33 )   PT: 24.5 sec;   INR: 2.19 ratio         PTT - ( 16 May 2021 03:41 )  PTT:37.4 sec  Urinalysis Basic - ( 16 May 2021 03:44 )    Color: Yellow / Appearance: Slightly Turbid / S.020 / pH: x  Gluc: x / Ketone: Trace  / Bili: Moderate / Urobili: 12   Blood: x / Protein: 100 / Nitrite: Positive   Leuk Esterase: Trace / RBC: 3-5 /HPF / WBC 0-2   Sq Epi: x / Non Sq Epi: Few / Bacteria: Few      I&O's Summary    16 May 2021 07:01  -  17 May 2021 07:00  --------------------------------------------------------  IN: 628.1 mL / OUT: 2450 mL / NET: -1821.9 mL    17 May 2021 07:01  -  17 May 2021 10:54  --------------------------------------------------------  IN: 76.5 mL / OUT: 225 mL / NET: -148.5 mL        RADIOLOGY & ADDITIONAL STUDIES:  < from: Xray Chest 1 View- PORTABLE-Urgent (Xray Chest 1 View- PORTABLE-Urgent .) (21 @ 17:15) >     EXAM:  XR CHEST PORTABLE URGENT 1V                          PROCEDURE DATE:  2021          INTERPRETATION:  Chest one view    HISTORY: Post bronchoscopy    COMPARISON STUDY: 5/15/2021    Frontal expiratory view of the chest shows the heart to be normal in size. Endotracheal tube, left pigtail catheter and nasogastric tube are present although the NG tube tip is not included.    The lungs show progression of right base infiltrate with reduced left effusion. Small left base pneumothorax ispresent and there is no evidence of right pleural effusion.    IMPRESSION:  Right base infiltrate. Small left base pneumothorax.      Thank you for the courtesy of this referral.    < end of copied text >    < from: CT Abdomen and Pelvis w/ IV Cont (21 @ 00:30) >    IMPRESSION:    1. Acute pulmonary emboli in the right lower lobe.  2. Large, partially loculated left pleural effusion with atelectasis in the left lower lobe.  3. Bubbly opacity in the left main bronchus represent inspissated secretions or aspirated fluid.  4. Multifocal tree-in-bud opacities, most prominent in the right middle lobe. This could represent an infectious or inflammatory process.  5. No acute CT finding in the abdomen and pelvis.    The critical findings of pulmonary emboli were discussed with PRAFUL Dover on the MICU at  2021 4:33 AM by Dr. Cullen with read back confirmation.          < end of copied text >            ADVANCE DIRECTIVES:  [ ] YES [x ] NO   DNR [ ] YES [x ] NO  Completed on:                     MOLST  [ ] YES [x ] NO   Completed on:  Living Will  [ ] YES [x ] NO   Completed on:     Palliative Medicine Initial Consultation Note    HPI:  History from chart patient intubated  60 yo m pmhx COPD, active smoker (1.5 ppd), Aflutter on diltiazem (planned for ablation), PE (2018) on Eliquis, Pulmonary Nodule, Depression on Zoloft, questionable compliance with medications biba from home with complaints of sob, cough, weakness and anorexia for about a week.     PERTINENT PMH REVIEWED:  [ x] YES [ ] NO         Atrial flutter     COPD (chronic obstructive pulmonary disease)     Depression     H/O solitary pulmonary nodule     Poor historian     Pulmonary embolism.     PAST SURGICAL HISTORY:  No significant past surgical history    SOCIAL HISTORY:   Social History (marital status, living situation, occupation, tobacco use, alcohol and drug use, and sexual history): From home, - patient's wife  from ovarian cancer last year, former ETOH abuse, opiate abuse on suboxone, no other illicit drug use. Lives with step ilan Short    Surrogate/HCP/Guardian: unclear - reported stepchildren and sister     FAMILY HISTORY: unable to obtain poor ms/intubated      Baseline ADLs (prior to admission):  unable to obtain poor ms/intubated    Independent [ ] moderately [ ] fully   Dependent   [ ] moderately [ ]fully    MEDICATIONS  (STANDING):  albumin human 25% IVPB 100 milliLiter(s) IV Intermittent once  albuterol/ipratropium for Nebulization 3 milliLiter(s) Nebulizer every 6 hours  azithromycin  IVPB      azithromycin  IVPB 500 milliGRAM(s) IV Intermittent every 24 hours  buDESOnide    Inhalation Suspension 0.25 milliGRAM(s) Inhalation every 12 hours  cefTRIAXone   IVPB 1000 milliGRAM(s) IV Intermittent every 24 hours  chlorhexidine 0.12% Liquid 15 milliLiter(s) Oral Mucosa every 12 hours  chlorhexidine 4% Liquid 1 Application(s) Topical <User Schedule>  dexMEDEtomidine Infusion 0.05 MICROgram(s)/kG/Hr (1.05 mL/Hr) IV Continuous <Continuous>  enoxaparin Injectable 80 milliGRAM(s) SubCutaneous two times a day  famotidine Injectable 20 milliGRAM(s) IV Push every 12 hours  furosemide   Injectable 80 milliGRAM(s) IV Push once  nicotine - 21 mG/24Hr(s) Patch 1 patch Transdermal daily  norepinephrine Infusion 0.05 MICROgram(s)/kG/Min (7.87 mL/Hr) IV Continuous <Continuous>    MEDICATIONS  (PRN):  acetaminophen    Suspension .. 650 milliGRAM(s) Oral every 6 hours PRN Temp greater or equal to 38C (100.4F)  fentaNYL    Injectable 50 MICROGram(s) IV Push every 2 hours PRN resp distress      Allergies    No Known Allergies    Intolerances        REVIEW OF SYSTEMS       [ x] Unable to obtain due to poor mentation       Karnofsky Performance Score/Palliative Performance Status Version 2: 10  %    Vital Signs Last 24 Hrs  T(C): 37.9 (17 May 2021 07:34), Max: 38.8 (16 May 2021 11:00)  T(F): 100.2 (17 May 2021 07:34), Max: 101.8 (16 May 2021 11:00)  HR: 95 (17 May 2021 10:15) (82 - 99)  BP: 105/61 (17 May 2021 10:15) (92/57 - 135/80)  BP(mean): 75 (17 May 2021 10:15) (67 - 94)  RR: 37 (17 May 2021 10:00) (16 - 37)  SpO2: 97% (17 May 2021 10:00) (95% - 100%)    PHYSICAL EXAM:    General: M intubated NAD     HEENT: NCAT  + ET  OG tube    Lungs: [ x] comfortable  + left CT    CV: [ x] normal  [ ] tachycardia    GI: [x ] normal  [ ] distended  [ ] tender  [ ] no BS               [ ] PEG/NG/OG tube    : [x ] normal  [ ] incontinent  [ ] oliguria/anuria  [ ] bocanegra    MSK: [ ] normal  [ x] weakness  [ ] edema             [ ] ambulatory  [ ] bedbound/wheelchair bound    Skin: [ ] normal  [ ] pressure ulcers- Stage_____  [ ] no rash    LABS:                        12.6   11.35 )-----------( 169      ( 17 May 2021 04:33 )             38.5     05-17    135  |  95<L>  |  7.0<L>  ----------------------------<  87  3.7   |  31.0<H>  |  0.43<L>    Ca    7.5<L>      17 May 2021 04:33  Phos  2.7     05-17  Mg     1.9     05-17    TPro  5.5<L>  /  Alb  2.2<L>  /  TBili  2.0  /  DBili  x   /  AST  30  /  ALT  19  /  AlkPhos  92  05-16    PT/INR - ( 17 May 2021 04:33 )   PT: 24.5 sec;   INR: 2.19 ratio         PTT - ( 16 May 2021 03:41 )  PTT:37.4 sec  Urinalysis Basic - ( 16 May 2021 03:44 )    Color: Yellow / Appearance: Slightly Turbid / S.020 / pH: x  Gluc: x / Ketone: Trace  / Bili: Moderate / Urobili: 12   Blood: x / Protein: 100 / Nitrite: Positive   Leuk Esterase: Trace / RBC: 3-5 /HPF / WBC 0-2   Sq Epi: x / Non Sq Epi: Few / Bacteria: Few      I&O's Summary    16 May 2021 07:01  -  17 May 2021 07:00  --------------------------------------------------------  IN: 628.1 mL / OUT: 2450 mL / NET: -1821.9 mL    17 May 2021 07:01  -  17 May 2021 10:54  --------------------------------------------------------  IN: 76.5 mL / OUT: 225 mL / NET: -148.5 mL        RADIOLOGY & ADDITIONAL STUDIES:  < from: Xray Chest 1 View- PORTABLE-Urgent (Xray Chest 1 View- PORTABLE-Urgent .) (21 @ 17:15) >     EXAM:  XR CHEST PORTABLE URGENT 1V                          PROCEDURE DATE:  2021          INTERPRETATION:  Chest one view    HISTORY: Post bronchoscopy    COMPARISON STUDY: 5/15/2021    Frontal expiratory view of the chest shows the heart to be normal in size. Endotracheal tube, left pigtail catheter and nasogastric tube are present although the NG tube tip is not included.    The lungs show progression of right base infiltrate with reduced left effusion. Small left base pneumothorax ispresent and there is no evidence of right pleural effusion.    IMPRESSION:  Right base infiltrate. Small left base pneumothorax.      Thank you for the courtesy of this referral.    < end of copied text >    < from: CT Abdomen and Pelvis w/ IV Cont (21 @ 00:30) >    IMPRESSION:    1. Acute pulmonary emboli in the right lower lobe.  2. Large, partially loculated left pleural effusion with atelectasis in the left lower lobe.  3. Bubbly opacity in the left main bronchus represent inspissated secretions or aspirated fluid.  4. Multifocal tree-in-bud opacities, most prominent in the right middle lobe. This could represent an infectious or inflammatory process.  5. No acute CT finding in the abdomen and pelvis.    The critical findings of pulmonary emboli were discussed with PRAFUL Dover on the MICU at  2021 4:33 AM by Dr. Cullen with read back confirmation.          < end of copied text >      ADVANCE DIRECTIVES:  [ ] YES [x ] NO   DNR [ ] YES [x ] NO  Completed on:                     MOLST  [ ] YES [x ] NO   Completed on:  Living Will  [ ] YES [x ] NO   Completed on:

## 2021-05-17 NOTE — DIETITIAN INITIAL EVALUATION ADULT. - ALTERNATE MEANS OF NUTRITION
If unable to extubate (Jevity @ 55ml/hr x 20 hours 1100ml/day; 1650 kcal, 70gm protein/initiate tube feeding

## 2021-05-17 NOTE — CONSULT NOTE ADULT - ASSESSMENT
61yr man hx of COPD, PE, Aflutter, opiate abuse,  depression admitted with acute respirator failure in the setting of   HF, Aflutter, PNA , pleural effusion

## 2021-05-17 NOTE — DIETITIAN INITIAL EVALUATION ADULT. - PERTINENT LABORATORY DATA
05-17 Na135 mmol/L Glu 87 mg/dL K+ 3.7 mmol/L Cr  0.43 mg/dL<L> BUN 7.0 mg/dL<L> Phos 2.7 mg/dL Alb n/a   PAB n/a

## 2021-05-18 ENCOUNTER — APPOINTMENT (OUTPATIENT)
Dept: CT IMAGING | Facility: CLINIC | Age: 61
End: 2021-05-18

## 2021-05-18 PROBLEM — I48.92 UNSPECIFIED ATRIAL FLUTTER: Chronic | Status: ACTIVE | Noted: 2021-05-16

## 2021-05-18 PROBLEM — F32.9 MAJOR DEPRESSIVE DISORDER, SINGLE EPISODE, UNSPECIFIED: Chronic | Status: ACTIVE | Noted: 2021-05-16

## 2021-05-18 PROBLEM — Z87.898 PERSONAL HISTORY OF OTHER SPECIFIED CONDITIONS: Chronic | Status: ACTIVE | Noted: 2021-05-16

## 2021-05-18 LAB
-  AMIKACIN: SIGNIFICANT CHANGE UP
-  AMOXICILLIN/CLAVULANIC ACID: SIGNIFICANT CHANGE UP
-  AMPICILLIN/SULBACTAM: SIGNIFICANT CHANGE UP
-  AMPICILLIN: SIGNIFICANT CHANGE UP
-  AZTREONAM: SIGNIFICANT CHANGE UP
-  CEFAZOLIN: SIGNIFICANT CHANGE UP
-  CEFEPIME: SIGNIFICANT CHANGE UP
-  CEFOXITIN: SIGNIFICANT CHANGE UP
-  CEFTRIAXONE: SIGNIFICANT CHANGE UP
-  CIPROFLOXACIN: SIGNIFICANT CHANGE UP
-  ERTAPENEM: SIGNIFICANT CHANGE UP
-  GENTAMICIN: SIGNIFICANT CHANGE UP
-  LEVOFLOXACIN: SIGNIFICANT CHANGE UP
-  MEROPENEM: SIGNIFICANT CHANGE UP
-  PIPERACILLIN/TAZOBACTAM: SIGNIFICANT CHANGE UP
-  TOBRAMYCIN: SIGNIFICANT CHANGE UP
-  TRIMETHOPRIM/SULFAMETHOXAZOLE: SIGNIFICANT CHANGE UP
ANION GAP SERPL CALC-SCNC: 12 MMOL/L — SIGNIFICANT CHANGE UP (ref 5–17)
BUN SERPL-MCNC: 10 MG/DL — SIGNIFICANT CHANGE UP (ref 8–20)
CALCIUM SERPL-MCNC: 8.1 MG/DL — LOW (ref 8.6–10.2)
CHLORIDE SERPL-SCNC: 90 MMOL/L — LOW (ref 98–107)
CO2 SERPL-SCNC: 34 MMOL/L — HIGH (ref 22–29)
CREAT SERPL-MCNC: 0.65 MG/DL — SIGNIFICANT CHANGE UP (ref 0.5–1.3)
CULTURE RESULTS: SIGNIFICANT CHANGE UP
CULTURE RESULTS: SIGNIFICANT CHANGE UP
GLUCOSE SERPL-MCNC: 127 MG/DL — HIGH (ref 70–99)
HCT VFR BLD CALC: 40.3 % — SIGNIFICANT CHANGE UP (ref 39–50)
HGB BLD-MCNC: 12.4 G/DL — LOW (ref 13–17)
INR BLD: 1.52 RATIO — HIGH (ref 0.88–1.16)
MAGNESIUM SERPL-MCNC: 2.3 MG/DL — SIGNIFICANT CHANGE UP (ref 1.6–2.6)
MCHC RBC-ENTMCNC: 30.8 GM/DL — LOW (ref 32–36)
MCHC RBC-ENTMCNC: 32.3 PG — SIGNIFICANT CHANGE UP (ref 27–34)
MCV RBC AUTO: 104.9 FL — HIGH (ref 80–100)
METHOD TYPE: SIGNIFICANT CHANGE UP
ORGANISM # SPEC MICROSCOPIC CNT: SIGNIFICANT CHANGE UP
ORGANISM # SPEC MICROSCOPIC CNT: SIGNIFICANT CHANGE UP
PHOSPHATE SERPL-MCNC: 3.7 MG/DL — SIGNIFICANT CHANGE UP (ref 2.4–4.7)
PLATELET # BLD AUTO: 170 K/UL — SIGNIFICANT CHANGE UP (ref 150–400)
POTASSIUM SERPL-MCNC: 3.4 MMOL/L — LOW (ref 3.5–5.3)
POTASSIUM SERPL-SCNC: 3.4 MMOL/L — LOW (ref 3.5–5.3)
PROTHROM AB SERPL-ACNC: 17.3 SEC — HIGH (ref 10.6–13.6)
RBC # BLD: 3.84 M/UL — LOW (ref 4.2–5.8)
RBC # FLD: 16 % — HIGH (ref 10.3–14.5)
S PNEUM AG UR QL: NEGATIVE — SIGNIFICANT CHANGE UP
SODIUM SERPL-SCNC: 136 MMOL/L — SIGNIFICANT CHANGE UP (ref 135–145)
SPECIMEN SOURCE: SIGNIFICANT CHANGE UP
SPECIMEN SOURCE: SIGNIFICANT CHANGE UP
WBC # BLD: 10.24 K/UL — SIGNIFICANT CHANGE UP (ref 3.8–10.5)
WBC # FLD AUTO: 10.24 K/UL — SIGNIFICANT CHANGE UP (ref 3.8–10.5)

## 2021-05-18 PROCEDURE — 71045 X-RAY EXAM CHEST 1 VIEW: CPT | Mod: 26

## 2021-05-18 PROCEDURE — 99222 1ST HOSP IP/OBS MODERATE 55: CPT

## 2021-05-18 PROCEDURE — 99233 SBSQ HOSP IP/OBS HIGH 50: CPT

## 2021-05-18 PROCEDURE — 71045 X-RAY EXAM CHEST 1 VIEW: CPT | Mod: 26,77

## 2021-05-18 RX ORDER — DILTIAZEM HCL 120 MG
30 CAPSULE, EXT RELEASE 24 HR ORAL EVERY 6 HOURS
Refills: 0 | Status: DISCONTINUED | OUTPATIENT
Start: 2021-05-18 | End: 2021-05-29

## 2021-05-18 RX ORDER — DIAZEPAM 5 MG
5 TABLET ORAL EVERY 8 HOURS
Refills: 0 | Status: DISCONTINUED | OUTPATIENT
Start: 2021-05-18 | End: 2021-05-18

## 2021-05-18 RX ORDER — BUPRENORPHINE AND NALOXONE 2; .5 MG/1; MG/1
1 TABLET SUBLINGUAL
Refills: 0 | Status: DISCONTINUED | OUTPATIENT
Start: 2021-05-18 | End: 2021-05-18

## 2021-05-18 RX ORDER — DILTIAZEM HCL 120 MG
30 CAPSULE, EXT RELEASE 24 HR ORAL EVERY 6 HOURS
Refills: 0 | Status: DISCONTINUED | OUTPATIENT
Start: 2021-05-18 | End: 2021-05-18

## 2021-05-18 RX ORDER — SODIUM CHLORIDE 9 MG/ML
1000 INJECTION, SOLUTION INTRAVENOUS ONCE
Refills: 0 | Status: COMPLETED | OUTPATIENT
Start: 2021-05-18 | End: 2021-05-18

## 2021-05-18 RX ORDER — SERTRALINE 25 MG/1
100 TABLET, FILM COATED ORAL DAILY
Refills: 0 | Status: DISCONTINUED | OUTPATIENT
Start: 2021-05-18 | End: 2021-05-18

## 2021-05-18 RX ORDER — SERTRALINE 25 MG/1
100 TABLET, FILM COATED ORAL DAILY
Refills: 0 | Status: DISCONTINUED | OUTPATIENT
Start: 2021-05-18 | End: 2021-06-05

## 2021-05-18 RX ORDER — ACETAMINOPHEN 500 MG
650 TABLET ORAL EVERY 6 HOURS
Refills: 0 | Status: DISCONTINUED | OUTPATIENT
Start: 2021-05-18 | End: 2021-05-21

## 2021-05-18 RX ADMIN — ENOXAPARIN SODIUM 80 MILLIGRAM(S): 100 INJECTION SUBCUTANEOUS at 06:04

## 2021-05-18 RX ADMIN — Medication 30 MILLIGRAM(S): at 18:17

## 2021-05-18 RX ADMIN — CEFTRIAXONE 100 MILLIGRAM(S): 500 INJECTION, POWDER, FOR SOLUTION INTRAMUSCULAR; INTRAVENOUS at 01:58

## 2021-05-18 RX ADMIN — Medication 650 MILLIGRAM(S): at 22:41

## 2021-05-18 RX ADMIN — Medication 5 MILLIGRAM(S): at 13:20

## 2021-05-18 RX ADMIN — Medication 30 MILLIGRAM(S): at 13:20

## 2021-05-18 RX ADMIN — FAMOTIDINE 20 MILLIGRAM(S): 10 INJECTION INTRAVENOUS at 06:05

## 2021-05-18 RX ADMIN — AZITHROMYCIN 255 MILLIGRAM(S): 500 TABLET, FILM COATED ORAL at 01:58

## 2021-05-18 RX ADMIN — SERTRALINE 100 MILLIGRAM(S): 25 TABLET, FILM COATED ORAL at 13:20

## 2021-05-18 RX ADMIN — OXYCODONE HYDROCHLORIDE 5 MILLIGRAM(S): 5 TABLET ORAL at 06:05

## 2021-05-18 RX ADMIN — Medication 3 MILLILITER(S): at 08:30

## 2021-05-18 RX ADMIN — Medication 0.25 MILLIGRAM(S): at 08:30

## 2021-05-18 RX ADMIN — SODIUM CHLORIDE 1000 MILLILITER(S): 9 INJECTION, SOLUTION INTRAVENOUS at 09:30

## 2021-05-18 RX ADMIN — Medication 30 MILLIGRAM(S): at 23:01

## 2021-05-18 RX ADMIN — CHLORHEXIDINE GLUCONATE 1 APPLICATION(S): 213 SOLUTION TOPICAL at 06:04

## 2021-05-18 RX ADMIN — Medication 5 MILLIGRAM(S): at 06:05

## 2021-05-18 RX ADMIN — Medication 3 MILLILITER(S): at 03:19

## 2021-05-18 RX ADMIN — Medication 0.25 MILLIGRAM(S): at 21:23

## 2021-05-18 RX ADMIN — ENOXAPARIN SODIUM 80 MILLIGRAM(S): 100 INJECTION SUBCUTANEOUS at 18:32

## 2021-05-18 RX ADMIN — SERTRALINE 100 MILLIGRAM(S): 25 TABLET, FILM COATED ORAL at 21:09

## 2021-05-18 RX ADMIN — Medication 1 PATCH: at 07:37

## 2021-05-18 RX ADMIN — Medication 5 MILLIGRAM(S): at 21:09

## 2021-05-18 RX ADMIN — Medication 650 MILLIGRAM(S): at 22:18

## 2021-05-18 RX ADMIN — BUPRENORPHINE AND NALOXONE 1 TABLET(S): 2; .5 TABLET SUBLINGUAL at 21:09

## 2021-05-18 RX ADMIN — Medication 1 PATCH: at 12:00

## 2021-05-18 RX ADMIN — Medication 1 PATCH: at 13:19

## 2021-05-18 NOTE — PROGRESS NOTE ADULT - SUBJECTIVE AND OBJECTIVE BOX
CC:  follow up GOC  INTERVAL HPI/OVERNIGHT EVENTS:  extubated last night  PRESENT SYMPTOMS: SOURCE:  Patient/Family/Team    PAIN SCALE:  0 = none  1 = mild   2 = moderate  3 = severe    Pain: 0    Dyspnea:  [ ] YES [x ] NO  Anxiety:  [ ] YES [ x] NO  Fatigue: [xES [ ] NO  Nausea: [ ] YES [x ] NO  Loss of Appetite: x[ ] YES [ ] NO  Other symptoms: __________    MEDICATIONS  (STANDING):  buDESOnide    Inhalation Suspension 0.25 milliGRAM(s) Inhalation every 12 hours  buprenorphine 8 mG/naloxone 2 mG SL  Tablet 1 Tablet(s) SubLingual <User Schedule>  cefTRIAXone   IVPB 1000 milliGRAM(s) IV Intermittent every 24 hours  chlorhexidine 4% Liquid 1 Application(s) Topical <User Schedule>  diazepam    Tablet 5 milliGRAM(s) Oral every 8 hours  diltiazem    Tablet 30 milliGRAM(s) Oral every 6 hours  enoxaparin Injectable 80 milliGRAM(s) SubCutaneous two times a day  multiple electrolytes Injection Type 1 Bolus 1000 milliLiter(s) IV Bolus once  nicotine - 21 mG/24Hr(s) Patch 1 patch Transdermal daily  sertraline 100 milliGRAM(s) Oral daily    MEDICATIONS  (PRN):  acetaminophen    Suspension .. 650 milliGRAM(s) Oral every 6 hours PRN Temp greater or equal to 38C (100.4F)  albuterol/ipratropium for Nebulization 3 milliLiter(s) Nebulizer every 6 hours PRN Shortness of Breath and/or Wheezing      Allergies    No Known Allergies    Intolerances    Karnofsky Performance Score/Palliative Performance Status Version 2:   %    Vital Signs Last 24 Hrs  T(C): 37 (18 May 2021 08:00), Max: 37.4 (17 May 2021 16:01)  T(F): 98.6 (18 May 2021 08:00), Max: 99.3 (17 May 2021 16:01)  HR: 95 (18 May 2021 11:00) (78 - 96)  BP: 89/73 (18 May 2021 11:00) (81/51 - 114/68)  BP(mean): 78 (18 May 2021 11:00) (62 - 83)  RR: 37 (18 May 2021 11:00) (0 - 38)  SpO2: 95% (18 May 2021 11:00) (90% - 100%)    PHYSICAL EXAM:    General: M awake alert NAD  + secretions    HEENT: [ x] normal  [ ] dry mouth  [ ] ET tube/trach    Lungs: [ x] comfortable [ ] tachypnea/labored breathing  [ ] excessive secretions    CV: [x ] normal  [ ] tachycardia    GI: x] normal  [ ] distended  [ ] tender  [ ] no BS               [ ] PEG/NG/OG tube    : [ x normal  [ ] incontinent  [ ] oliguria/anuria  [ ] bocanegra    MSK: [ x] normal  [ ] weakness  [ ] edema             [ ] ambulatory  [ ] bedbound/wheelchair bound    Skin: [ x] normal  [ ] pressure ulcers- Stage_____  [ ] no rash    LABS:                        12.4   10.24 )-----------( 170      ( 18 May 2021 05:20 )             40.3     05-18    136  |  90<L>  |  10.0  ----------------------------<  127<H>  3.4<L>   |  34.0<H>  |  0.65    Ca    8.1<L>      18 May 2021 05:20  Phos  3.7     05-18  Mg     2.3     05-18      PT/INR - ( 18 May 2021 05:20 )   PT: 17.3 sec;   INR: 1.52 ratio         I&O's Summary    17 May 2021 07:01  -  18 May 2021 07:00  --------------------------------------------------------  IN: 747.6 mL / OUT: 2620 mL / NET: -1872.4 mL    18 May 2021 07:01  -  18 May 2021 12:01  --------------------------------------------------------  IN: 0 mL / OUT: 40 mL / NET: -40 mL        RADIOLOGY & ADDITIONAL STUDIES:    < from: Xray Chest 1 View- PORTABLE-Routine (Xray Chest 1 View- PORTABLE-Routine .) (05.17.21 @ 17:58) >  INTERPRETATION:  Portable chest radiograph    CLINICAL INFORMATION: Dyspnea, shortness of breath.    TECHNIQUE:  Portable  AP view of the chest was obtained.    COMPARISON: 5/16/2021 chest available for review.    FINDINGS:  NG tube tip not visualized. Images do not include the stomach.  ET tube tip above tracheal bifurcation.    LEFT multi-sidehole pigtail catheter overlies LEFT lower hemithorax.  There is a residual small LEFT CP angle a basilar pneumothorax. A LEFT upper lobe and RIGHT lung parenchyma remain clear.      The heart and mediastinum are within normal limits.    Visualized osseous structures are intact.      IMPRESSION:   Tip ofNG tube not visualized..  LEFT chest tube place. Residual LEFT CP angle/basilar pneumothorax.      < end of copied text >

## 2021-05-18 NOTE — PROGRESS NOTE ADULT - ASSESSMENT
1: Acute hypoxic respiratory failure   2: A fib flutter with RVR s/p electric cardioversion   3: Shock: distributive   4: Left sided pleural effusion: Complex complicated by loculation   5: CAP with Left lower lobe aspirition and atelectasis : Serratia   7: Seg PE, right   8:  Acute heart failure: combined diastolic with RV systolic failure with underlying Pulmonary HTN- moderate     Patient seen and examined  Full code for now   Palliative care eval in AM for NOK and GOC      Neuro:   Pain Mx: Continuation of Suboxone that patient has been taking for over a decade with remote hx of opoid abuse   Sedation/Anxiolytic:  Scheduled Valium restarted as per home dosing   Zoloft  Speech, PT eval; Incentive spirometry   Fall precautions     Cardiovascular:   MAP Target: 65  S/p Levo infusion, Midodrine, Stress dose steroid   TTE done through this hopitalization: PAH mx with pulmonary eval and follow up     Resp/Chest:   S/p Extubation on 5/17 with On supplemental oxygen with NC-> oxygenating and ventilating fine for now \  IS, PT  C/w Pulmicort and changed to PRN Duoneb   Pulmonary consultation for PAH Mx as well   CT on waterseal this AM but worsening pleural effusion this afternoon on CXR with plan to resume Suction -20 ; plan discussed Thoracic Sx for f/u       Gi/Nutrition:   Diet: NPO for now , swallow eval, NGT if failed Swallow eval   IV pepcid BID stopped today   Bowel Regimen as needed    ID:   Microbiological studies: neg blood Cx; neg Cx from Left pleural fluid and Bronch cx + Serratia   Abx: CTX continued and d/c'd Azithromycin     Nephro/Electrolyte/Acid-Base:   Avoid nephrotoxic agents  Strict I&O with Cr monitoring   Medications adjusted for   Close Electrolyte monitoring and correction as needed   Removing indwelling urinary catheter     Endocrinology:   TSH normal and A1C minimally elevated  ISS      Haem/Oncology:   Lovenox BID for now for PE  US LE doppler b/l    Lines/ Tubes/ Catheters/ devices:   ETT: 5/15 removed 5/17 as planned   OGT: 5/15, removed on 5.17  TLC: none  A-line: none  [place]arson:  5/15

## 2021-05-18 NOTE — PROGRESS NOTE ADULT - SUBJECTIVE AND OBJECTIVE BOX
Interval Events: Patient extubated last night, downgraded from MICU service earlier this evening but never physically left the MICU.  Tonight nurse notified us that his oxygen requirements have increased, patient is now on a nonrebreather and is less alert than before. Patient recently received PO valium and suboxone via his NG tube.     On Admission  05-15-21 (3d)  HPI:  60 yo m pmhx COPD, active smoker (1.5 ppd), Aflutter on diltiazem (planned for ablation), PE (2018) on Eliquis, Pulmonary Nodule, Depression on Zoloft, questionable compliance with medications biba from home with complaints of sob, cough, weakness and anorexia for about a week.  In ED patient lethargic but oriented, hypoxic on RA place don 4L with waxing and waning mental status.  Patient also noted to be in aflutter with HR in the 130s; patient given cardizem 10mg IVP, began to have issues with worsening hypoxia/mental status and was subsequently intubated.  Chest xray obtained, large left sided pleural effusion (not present on 11/2020 films). Patient seen at bedside, HR in the 130s Aflutter with 2:1 conduction, BP in the 80s, given 500cc NS.  POCUS exam performed terrell predominant with some scattered bline, left effusion appreciated.  Cardiac portion difficult to obtain, however RV appears WNL, LV function appears diminished from previous TTE where EF was ~58%; however windows difficult.  Initially patient responded to IVF however patient now persistently in the 80s with HR in the 130s, patient electrically cardioverted with 150J, converted to NSR in the 70s.  Additional 500cc bolus ordered, plan for imaging en route to MICU.  (15 May 2021 23:55)    PAST MEDICAL & SURGICAL HISTORY:  Poor historian    COPD (chronic obstructive pulmonary disease)    Pulmonary embolism    Atrial flutter    H/O solitary pulmonary nodule    Depression    No significant past surgical history        Antimicrobial:  cefTRIAXone   IVPB 1000 milliGRAM(s) IV Intermittent every 24 hours    Cardiovascular:  diltiazem    Tablet 30 milliGRAM(s) Oral every 6 hours    Pulmonary:  albuterol/ipratropium for Nebulization 3 milliLiter(s) Nebulizer every 6 hours PRN  buDESOnide    Inhalation Suspension 0.25 milliGRAM(s) Inhalation every 12 hours    Hematalogic:  enoxaparin Injectable 80 milliGRAM(s) SubCutaneous two times a day    Other:  acetaminophen   Tablet .. 650 milliGRAM(s) Oral every 6 hours PRN  chlorhexidine 4% Liquid 1 Application(s) Topical <User Schedule>  nicotine - 21 mG/24Hr(s) Patch 1 patch Transdermal daily  sertraline 100 milliGRAM(s) Oral daily      Drug Dosing Weight  Height (cm): 182.9 (15 May 2021 19:55)  Weight (kg): 83.915 (15 May 2021 22:18)  BMI (kg/m2): 25.1 (15 May 2021 22:18)  BSA (m2): 2.06 (15 May 2021 22:18)    T(C): 36.6 (05-18-21 @ 21:00), Max: 37.2 (05-18-21 @ 19:00)  HR: 84 (05-18-21 @ 22:00)  BP: 125/62 (05-18-21 @ 22:00)  BP(mean): 80 (05-18-21 @ 22:00)  ABP: --  ABP(mean): --  RR: 16 (05-18-21 @ 22:00)  SpO2: 92% (05-18-21 @ 22:00)          05-17 @ 07:01  -  05-18 @ 07:00  --------------------------------------------------------  IN: 747.6 mL / OUT: 2620 mL / NET: -1872.4 mL              LABS:  CBC Full  -  ( 18 May 2021 05:20 )  WBC Count : 10.24 K/uL  RBC Count : 3.84 M/uL  Hemoglobin : 12.4 g/dL  Hematocrit : 40.3 %  Platelet Count - Automated : 170 K/uL  Mean Cell Volume : 104.9 fl  Mean Cell Hemoglobin : 32.3 pg  Mean Cell Hemoglobin Concentration : 30.8 gm/dL  Auto Neutrophil # : x  Auto Lymphocyte # : x  Auto Monocyte # : x  Auto Eosinophil # : x  Auto Basophil # : x  Auto Neutrophil % : x  Auto Lymphocyte % : x  Auto Monocyte % : x  Auto Eosinophil % : x  Auto Basophil % : x    05-18    136  |  90<L>  |  10.0  ----------------------------<  127<H>  3.4<L>   |  34.0<H>  |  0.65    Ca    8.1<L>      18 May 2021 05:20  Phos  3.7     05-18  Mg     2.3     05-18      PT/INR - ( 18 May 2021 05:20 )   PT: 17.3 sec;   INR: 1.52 ratio            Culture Results:   Testing in progress (05-17 @ 02:28)  Culture Results:   Normal Respiratory Payton present (05-17 @ 02:04)  Culture Results:   No growth (05-17 @ 01:57)  Culture Results:   Testing in progress (05-17 @ 01:57)  Culture Results:   Moderate Serratia marcescens  Normal Respiratory Payton present (05-16 @ 20:46)  Culture Results:   <10,000 CFU/mL Normal Urogenital Payton (05-16 @ 11:12)    ____________________________________________________________________________________________________  Exam:  Patient is arousable but slightly more lethargic  than earlier this evening.  Follows commands.  on nonrebreather, spo2 in low 90s.  reg rhythm  decreased breath sounds on left  abdomen soft nontender  trace edema  good capillary refill  left sided chest tube without airleak     Impression:  60 yo male with hx of COPD, active smoker, aflutter on eliquis, PE, depression, opioid dependence on suboxone (reportedly may not be taking his pills as per his son), admitted with LLL pneumonia with parapneumonic effusion, aflutter with RVR requiring DCCV, pulmonary embolism, acute respiratory failure requiring intubation.  Patient successfully extubated on 5/29.    Now with lethargy and increasing oxygen requirements.    Plan:  - ABG, CXR ordered   - will keep in MICU overnight  - continue abx, aggressive chest PT, incentive spirometry, bronchodilators   - may need bipap or possibly re-intubation       Interval Events: Patient extubated last night, downgraded from MICU service earlier this evening but never physically left the MICU.  Tonight nurse notified us that his oxygen requirements have increased, patient is now on a nonrebreather and is less alert than before. Patient recently received PO valium and suboxone via his NG tube.     On Admission  05-15-21 (3d)  HPI:  60 yo m pmhx COPD, active smoker (1.5 ppd), Aflutter on diltiazem (planned for ablation), PE (2018) on Eliquis, Pulmonary Nodule, Depression on Zoloft, questionable compliance with medications biba from home with complaints of sob, cough, weakness and anorexia for about a week.  In ED patient lethargic but oriented, hypoxic on RA place don 4L with waxing and waning mental status.  Patient also noted to be in aflutter with HR in the 130s; patient given cardizem 10mg IVP, began to have issues with worsening hypoxia/mental status and was subsequently intubated.  Chest xray obtained, large left sided pleural effusion (not present on 11/2020 films). Patient seen at bedside, HR in the 130s Aflutter with 2:1 conduction, BP in the 80s, given 500cc NS.  POCUS exam performed terrell predominant with some scattered bline, left effusion appreciated.  Cardiac portion difficult to obtain, however RV appears WNL, LV function appears diminished from previous TTE where EF was ~58%; however windows difficult.  Initially patient responded to IVF however patient now persistently in the 80s with HR in the 130s, patient electrically cardioverted with 150J, converted to NSR in the 70s.  Additional 500cc bolus ordered, plan for imaging en route to MICU.  (15 May 2021 23:55)    PAST MEDICAL & SURGICAL HISTORY:  Poor historian    COPD (chronic obstructive pulmonary disease)    Pulmonary embolism    Atrial flutter    H/O solitary pulmonary nodule    Depression    No significant past surgical history        Antimicrobial:  cefTRIAXone   IVPB 1000 milliGRAM(s) IV Intermittent every 24 hours    Cardiovascular:  diltiazem    Tablet 30 milliGRAM(s) Oral every 6 hours    Pulmonary:  albuterol/ipratropium for Nebulization 3 milliLiter(s) Nebulizer every 6 hours PRN  buDESOnide    Inhalation Suspension 0.25 milliGRAM(s) Inhalation every 12 hours    Hematalogic:  enoxaparin Injectable 80 milliGRAM(s) SubCutaneous two times a day    Other:  acetaminophen   Tablet .. 650 milliGRAM(s) Oral every 6 hours PRN  chlorhexidine 4% Liquid 1 Application(s) Topical <User Schedule>  nicotine - 21 mG/24Hr(s) Patch 1 patch Transdermal daily  sertraline 100 milliGRAM(s) Oral daily      Drug Dosing Weight  Height (cm): 182.9 (15 May 2021 19:55)  Weight (kg): 83.915 (15 May 2021 22:18)  BMI (kg/m2): 25.1 (15 May 2021 22:18)  BSA (m2): 2.06 (15 May 2021 22:18)    T(C): 36.6 (05-18-21 @ 21:00), Max: 37.2 (05-18-21 @ 19:00)  HR: 84 (05-18-21 @ 22:00)  BP: 125/62 (05-18-21 @ 22:00)  BP(mean): 80 (05-18-21 @ 22:00)  ABP: --  ABP(mean): --  RR: 16 (05-18-21 @ 22:00)  SpO2: 92% (05-18-21 @ 22:00)          05-17 @ 07:01  -  05-18 @ 07:00  --------------------------------------------------------  IN: 747.6 mL / OUT: 2620 mL / NET: -1872.4 mL              LABS:  CBC Full  -  ( 18 May 2021 05:20 )  WBC Count : 10.24 K/uL  RBC Count : 3.84 M/uL  Hemoglobin : 12.4 g/dL  Hematocrit : 40.3 %  Platelet Count - Automated : 170 K/uL  Mean Cell Volume : 104.9 fl  Mean Cell Hemoglobin : 32.3 pg  Mean Cell Hemoglobin Concentration : 30.8 gm/dL  Auto Neutrophil # : x  Auto Lymphocyte # : x  Auto Monocyte # : x  Auto Eosinophil # : x  Auto Basophil # : x  Auto Neutrophil % : x  Auto Lymphocyte % : x  Auto Monocyte % : x  Auto Eosinophil % : x  Auto Basophil % : x    05-18    136  |  90<L>  |  10.0  ----------------------------<  127<H>  3.4<L>   |  34.0<H>  |  0.65    Ca    8.1<L>      18 May 2021 05:20  Phos  3.7     05-18  Mg     2.3     05-18      PT/INR - ( 18 May 2021 05:20 )   PT: 17.3 sec;   INR: 1.52 ratio            Culture Results:   Testing in progress (05-17 @ 02:28)  Culture Results:   Normal Respiratory Payt[place] present (05-17 @ 02:04)  Culture Results:   No growth (05-17 @ 01:57)  Culture Results:   Testing in progress (05-17 @ 01:57)  Culture Results:   Moderate Serratia marcescens  Normal Respiratory Payton present (05-16 @ 20:46)  Culture Results:   <10,000 CFU/mL Normal Urogenital Payton (05-16 @ 11:12)    ____________________________________________________________________________________________________  Exam:  Patient is arousable but slightly more lethargic  than earlier this evening.  Follows commands.  on nonrebreather, spo2 in low 90s.  reg rhythm  decreased breath sounds on left  abdomen soft nontender  trace edema  good capillary refill  left sided chest tube without airleak     Impression:  60 yo male with hx of COPD, active smoker, aflutter on eliquis, PE, depression, opioid dependence on suboxone (reportedly may not be taking his pills as per his son), admitted with LLL pneumonia with parapneumonic effusion, aflutter with RVR requiring DCCV, pulmonary embolism, acute respiratory failure requiring intubation.  Patient successfully extubated on 5/29.    Now with lethargy and increasing oxygen requirements.    Plan:  - ABG, CXR ordered   - will keep in MICU overnight  - continue abx, aggressive chest PT, incentive spirometry, bronchodilators   - hold suboxone and valium for now  - may need bipap or possibly re-intubation

## 2021-05-18 NOTE — CHART NOTE - NSCHARTNOTEFT_GEN_A_CORE
Procedure Name: Bronchoscopy  Procedure Date/Time: 5/18/21, ~23:45  Consent: Emergent Procedure  Performed by: Myself  Assisted By: Dr. Leal  Attending Provider: Dr. Leal  Procedural Sedation: Etomidate/Rocuronium  Procedure Details: Patient supine, found moderate amount of thick clear/blood tinged secretions throughout left upper and lower lobes. Minimal thick secretions appreciated via right lobes. Patient lavaged with NS and suctioned.    Post Procedure Imaging: Left lung open, no longer white out, no ptx appreciated  Complications: None  Estimated Blood Loss: None  Additional Procedure Details: Bronchoscopy performed in setting of acute hypoxic respiratory failure and pneumonia. Not included in cc time.

## 2021-05-18 NOTE — PROCEDURE NOTE - NSPROCDETAILS_GEN_ALL_CORE
patient pre-oxygenated, tube inserted, placement confirmed
Seldinger technique/dressing applied/secured in place/percutaneous/ultrasound assessment of fluid (location)

## 2021-05-18 NOTE — CONSULT NOTE ADULT - PROBLEM SELECTOR RECOMMENDATION 9
Maintain chest tube overnight  CXR after chest tube disconnected: no obvious ptx  POCUS L chest
Intubated  Vent per MICU

## 2021-05-18 NOTE — CONSULT NOTE ADULT - SUBJECTIVE AND OBJECTIVE BOX
Presentation:  HPI:  62 yo m pmhx COPD, active smoker (1.5 ppd), Aflutter on diltiazem (planned for ablation), PE (2018) on Eliquis, Pulmonary Nodule, Depression on Zoloft, questionable compliance with medications biba from home with complaints of sob, cough, weakness and anorexia for about a week.  In ED patient lethargic but oriented, hypoxic on RA place don 4L with waxing and waning mental status.  Patient also noted to be in aflutter with HR in the 130s; patient given cardizem 10mg IVP, began to have issues with worsening hypoxia/mental status and was subsequently intubated.  Chest xray obtained, large left sided pleural effusion (not present on 11/2020 films). Patient seen at bedside, HR in the 130s Aflutter with 2:1 conduction, BP in the 80s, given 500cc NS.  POCUS exam performed terrell predominant with some scattered bline, left effusion appreciated.  Cardiac portion difficult to obtain, however RV appears WNL, LV function appears diminished from previous TTE where EF was ~58%; however windows difficult.  Initially patient responded to IVF however patient now persistently in the 80s with HR in the 130s, patient electrically cardioverted with 150J, converted to NSR in the 70s.  Additional 500cc bolus ordered, plan for imaging en route to MICU.  (15 May 2021 23:55)      Current Subjective Assessment and Hospital Course:    Past Medical History  Poor historian    COPD (chronic obstructive pulmonary disease)    Pulmonary embolism    Atrial flutter    H/O solitary pulmonary nodule    Depression        Past Surgical History  No significant past surgical history        MEDICATIONS  (STANDING):  buDESOnide    Inhalation Suspension 0.25 milliGRAM(s) Inhalation every 12 hours  buprenorphine 8 mG/naloxone 2 mG SL  Tablet 1 Tablet(s) SubLingual <User Schedule>  cefTRIAXone   IVPB 1000 milliGRAM(s) IV Intermittent every 24 hours  chlorhexidine 4% Liquid 1 Application(s) Topical <User Schedule>  diazepam    Tablet 5 milliGRAM(s) Oral every 8 hours  diltiazem    Tablet 30 milliGRAM(s) Oral every 6 hours  enoxaparin Injectable 80 milliGRAM(s) SubCutaneous two times a day  nicotine - 21 mG/24Hr(s) Patch 1 patch Transdermal daily  sertraline 100 milliGRAM(s) Oral daily    MEDICATIONS  (PRN):  albuterol/ipratropium for Nebulization 3 milliLiter(s) Nebulizer every 6 hours PRN Shortness of Breath and/or Wheezing    Antiplatelet therapy:                           Last dose/amt:    Allergies: No Known Allergies      SOCIAL HISTORY:  Smoker: [ ] Yes  [ ] No        PACK YEARS:                         WHEN QUIT?  ETOH use: [ ] Yes  [ ] No              FREQUENCY / QUANTITY:  Ilicit Drug use:  [ ] Yes  [ ] No  Occupation:  Live with:  Assist device use:    COVID Vaccination:    PMD:  Referring Cardiologist:    Relevant Family History  FAMILY HISTORY:      Review of Systems  GENERAL:  Fevers[] chills[] sweats[] fatigue[] weight loss[] weight gain []                                      [ ] NEGATIVE  NEURO:  parathesias[] seizures []  syncope []  confusion []                                                                [ ] NEGATIVE                 EYES: glasses[]  blurry vision[]  discharge[] pain[] glaucoma []                                                           [ ] NEGATIVE                 ENMT:  difficulty hearing []  vertigo[]  dysphagia[] epistaxis[] recent dental work []                     [ ] NEGATIVE                 CV:  chest pain[] palpitations[] MERRILL [] diaphoresis [] edema[]                                                           [ ] NEGATIVE                                 RESPIRATORY:  wheezing[] SOB[] cough [] sputum[] hemoptysis[]                                                   [ ] NEGATIVE               GI:  nausea[]  vomiting []  diarrhea[] constipation [] melena []                                                          [ ] NEGATIVE            : hematuria[ ]  dysuria[ ] urgency[] incontinence[]                                                                          [ ] NEGATIVE                    MUSKULOSKELETAL:  arthritis[ ]  joint swelling [ ] muscle weakness [ ]                                            [ ] NEGATIVE                     SKIN/BREAST:  rash[ ] itching [ ]  hair loss[ ] masses[ ]                                                                          [ ] NEGATIVE                     PSYCH:  dementia [ ] depression [ ] anxiety[ ]                                                                                          [ ] NEGATIVE                        HEME/LYMPH:  bruises easily[ ] enlarged lymph nodes[ ] tender lymph nodes[ ]                           [ ] NEGATIVE                      ENDOCRINE:  cold intolerance[ ] heat intolerance[ ] polydipsia[ ]                                                      [ ] NEGATIVE                        Telemetry:    PHYSICAL EXAM  Vital Signs Last 24 Hrs  T(C): 37.1 (18 May 2021 12:00), Max: 37.4 (17 May 2021 16:01)  T(F): 98.8 (18 May 2021 12:00), Max: 99.3 (17 May 2021 16:01)  HR: 96 (18 May 2021 15:00) (79 - 96)  BP: 112/60 (18 May 2021 15:00) (81/51 - 114/68)  BP(mean): 72 (18 May 2021 15:00) (62 - 82)  RR: 36 (18 May 2021 15:00) (0 - 38)  SpO2: 94% (18 May 2021 15:00) (90% - 100%)    General: *Well nourished, well developed, no acute distress.                                                         Neuro: Normal exam oriented to person/place & time with no focal motor or sensory  deficits.                    Eyes: Normal exam of conjunctiva & lids, pupils equally reactive.   ENT: Normal exam of nasal/oral mucosa with absence of cyanosis.   Neck: Normal exam of jugular veins, trachea & thyroid.   Chest: Normal lung exam with good air movement absence of wheezes, rales, or rhonchi:                                                                          CV:  Auscultation: normal [ ] S3[ ] S4[ ] Irregular [ ] Rub[ ] Clicks[ ]  Murmurs none:[ ]systolic [ ]  diastolic [ ] holosystolic [ ]  Carotids: No Bruits[ ] Other____________ Abdominal Aorta: normal [ ] nonpalpable[ ]                                                                         GI: Normal exam of abdomen, liver & spleen with no noted masses or tenderness.                                                                                              Extremities: Normal no evidence of cyanosis or deformity Edema: none[ ]trace[ ]1+[ ]2+[ ]3+[ ]4+[ ]  Lower Extremity Pulses: Right[ ] Left[ ]Varicosities[ ]  SKIN : Normal exam to inspection & palpation.                                                           LABS:                        12.4   10.24 )-----------( 170      ( 18 May 2021 05:20 )             40.3     05-18    136  |  90<L>  |  10.0  ----------------------------<  127<H>  3.4<L>   |  34.0<H>  |  0.65    Ca    8.1<L>      18 May 2021 05:20  Phos  3.7     05-18  Mg     2.3     05-18      PT/INR - ( 18 May 2021 05:20 )   PT: 17.3 sec;   INR: 1.52 ratio                     Cardiac Cath:    TTE / JADA:    Assessment:  61y Male presents with Acute respiratory failure with hypoxia        Plan:             Presentation:  HPI:  60 yo m pmhx COPD, active smoker (1.5 ppd), Aflutter on diltiazem (planned for ablation), PE (2018) on Eliquis, Pulmonary Nodule, Depression on Zoloft, questionable compliance with medicationsBIBA from home with complaints of sob, cough, weakness and anorexia for about a week.  In ED patient lethargic but oriented, hypoxic on RA place don 4L with waxing and waning mental status.  Patient also noted to be in aflutter with HR in the 130s; patient given cardizem 10mg IVP, began to have issues with worsening hypoxia/mental status and was subsequently intubated.  Chest xray obtained, large left sided pleural effusion (not present on 11/2020 films). Patient seen at bedside, HR in the 130s Aflutter with 2:1 conduction, BP in the 80s, given 500cc NS.  POCUS exam performed terrell predominant with some scattered bline, left effusion appreciated.  Cardiac portion difficult to obtain, however RV appears WNL, LV function appears diminished from previous TTE where EF was ~58%; however windows difficult.  Initially patient responded to IVF however patient now persistently in the 80s with HR in the 130s, patient electrically cardioverted with 150J, converted to NSR in the 70s.  Additional 500cc bolus ordered, plan for imaging en route to MICU.  (15 May 2021 23:55)      Current Subjective Assessment and Hospital Course: Patient deteriorated into acute respiratory failure due to pulmonary edema requiring intubation. He was successfully cardioverted for cardiogenic shock in the setting of atrial flutter and was then found to have a segmental PE and L peroneal DVT. 5/16 L chest tube placed with 1400 cc serosanguinous fluid. 5/17 extubated. 5/18 failed swallow eval    TODAY patient appears moderately somnolent.. He answers some questions when asked. No acute distress.    Past Medical History  Poor historian    COPD (chronic obstructive pulmonary disease)    Pulmonary embolism    Atrial flutter    H/O solitary pulmonary nodule    Depression        Past Surgical History  No significant past surgical history        MEDICATIONS  (STANDING):  buDESOnide    Inhalation Suspension 0.25 milliGRAM(s) Inhalation every 12 hours  buprenorphine 8 mG/naloxone 2 mG SL  Tablet 1 Tablet(s) SubLingual <User Schedule>  cefTRIAXone   IVPB 1000 milliGRAM(s) IV Intermittent every 24 hours  chlorhexidine 4% Liquid 1 Application(s) Topical <User Schedule>  diazepam    Tablet 5 milliGRAM(s) Oral every 8 hours  diltiazem    Tablet 30 milliGRAM(s) Oral every 6 hours  enoxaparin Injectable 80 milliGRAM(s) SubCutaneous two times a day  nicotine - 21 mG/24Hr(s) Patch 1 patch Transdermal daily  sertraline 100 milliGRAM(s) Oral daily    MEDICATIONS  (PRN):  albuterol/ipratropium for Nebulization 3 milliLiter(s) Nebulizer every 6 hours PRN Shortness of Breath and/or Wheezing    Antiplatelet therapy:                           Last dose/amt:    Allergies: No Known Allergies      SOCIAL HISTORY:  Smoker: [ ] Yes  [ ] No        PACK YEARS:              unk           WHEN QUIT?  ETOH use: [ ] Yes  [x ] No              FREQUENCY / QUANTITY:  Ilicit Drug use:  [x] Yes  [ ] No +opiate use history, on suboxone  Occupation: unk  Live with: son Han  Assist device use:unk    COVID Vaccination: no    PMD: Raman  Referring Cardiologist: none    Relevant Family History  FAMILY HISTORY:  unknown    Review of Systems Unable to obtain due to patient somnolence  GENERAL:  Fevers[] chills[] sweats[] fatigue[] weight loss[] weight gain []                                      [ ] NEGATIVE  NEURO:  parathesias[] seizures []  syncope []  confusion []                                                                [ ] NEGATIVE                 EYES: glasses[]  blurry vision[]  discharge[] pain[] glaucoma []                                                           [ ] NEGATIVE                 ENMT:  difficulty hearing []  vertigo[]  dysphagia[] epistaxis[] recent dental work []                     [ ] NEGATIVE                 CV:  chest pain[] palpitations[] MERRILL [] diaphoresis [] edema[]                                                           [ ] NEGATIVE                                 RESPIRATORY:  wheezing[] SOB[] cough [] sputum[] hemoptysis[]                                                   [ ] NEGATIVE               GI:  nausea[]  vomiting []  diarrhea[] constipation [] melena []                                                          [ ] NEGATIVE            : hematuria[ ]  dysuria[ ] urgency[] incontinence[]                                                                          [ ] NEGATIVE                    MUSKULOSKELETAL:  arthritis[ ]  joint swelling [ ] muscle weakness [ ]                                            [ ] NEGATIVE                     SKIN/BREAST:  rash[ ] itching [ ]  hair loss[ ] masses[ ]                                                                          [ ] NEGATIVE                     PSYCH:  dementia [ ] depression [ ] anxiety[ ]                                                                                          [ ] NEGATIVE                        HEME/LYMPH:  bruises easily[ ] enlarged lymph nodes[ ] tender lymph nodes[ ]                           [ ] NEGATIVE                      ENDOCRINE:  cold intolerance[ ] heat intolerance[ ] polydipsia[ ]                                                      [ ] NEGATIVE                            PHYSICAL EXAM  Vital Signs Last 24 Hrs  T(C): 37.1 (18 May 2021 12:00), Max: 37.4 (17 May 2021 16:01)  T(F): 98.8 (18 May 2021 12:00), Max: 99.3 (17 May 2021 16:01)  HR: 96 (18 May 2021 15:00) (79 - 96)  BP: 112/60 (18 May 2021 15:00) (81/51 - 114/68)  BP(mean): 72 (18 May 2021 15:00) (62 - 82)  RR: 36 (18 May 2021 15:00) (0 - 38)  SpO2: 94% (18 May 2021 15:00) (90% - 100%)    General: thin, temporal wasting, well developed, no acute distress.                                                         Neuro: Does not know time/day, unclear on when he is                   Eyes: Normal exam of conjunctiva & lids, pupils equally reactive.   ENT: Normal exam of nasal/oral mucosa with absence of cyanosis.   Neck: Normal exam of jugular veins, trachea & thyroid.   Chest: Normal lung exam with good air movement absence of wheezes, rales, or rhonchi:                                                                          CV:  Auscultation: normal [ x] S3[ ] S4[ ] Irregular [ ] Rub[ ] Clicks[ ]  Murmurs none:[x ]systolic [ ]  diastolic [ ] holosystolic [ ]  Carotids: No Bruits[x ] Other____________ Abdominal Aorta: normal [x ] nonpalpable[ ]                                                                         GI: Normal exam of abdomen, liver & spleen with no noted masses or tenderness.                                                                                              Extremities: Normal no evidence of cyanosis or deformity Edema: none[x ]trace[ ]1+[ ]2+[ ]3+[ ]4+[ ]  Lower Extremity Pulses: Right[+ ] Left[+ ]Varicosities[ ]  SKIN : Normal exam to inspection & palpation.                                                           LABS:                        12.4   10.24 )-----------( 1f     ( 18 May 2021 05:20 )             40.3     05-18    136  |  90<L>  |  10.0  ----------------------------<  127<H>  3.4<L>   |  34.0<H>  |  0.65    Ca    8.1<L>      18 May 2021 05:20  Phos  3.7     05-18  Mg     2.3     05-18      PT/INR - ( 18 May 2021 05:20 )   PT: 17.3 sec;   INR: 1.52 ratio                  Presentation:  HPI:  60 yo m pmhx COPD, active smoker (1.5 ppd), Aflutter on diltiazem (planned for ablation), PE (2018) on Eliquis, Pulmonary Nodule, Depression on Zoloft, questionable compliance with medicationsBIBA from home with complaints of sob, cough, weakness and anorexia for about a week.  In ED patient lethargic but oriented, hypoxic on RA place don 4L with waxing and waning mental status.  Patient also noted to be in aflutter with HR in the 130s; patient given cardizem 10mg IVP, began to have issues with worsening hypoxia/mental status and was subsequently intubated.  Chest xray obtained, large left sided pleural effusion (not present on 11/2020 films). Patient seen at bedside, HR in the 130s Aflutter with 2:1 conduction, BP in the 80s, given 500cc NS.  POCUS exam performed terrell predominant with some scattered bline, left effusion appreciated.  Cardiac portion difficult to obtain, however RV appears WNL, LV function appears diminished from previous TTE where EF was ~58%; however windows difficult.  Initially patient responded to IVF however patient now persistently in the 80s with HR in the 130s, patient electrically cardioverted with 150J, converted to NSR in the 70s.  Additional 500cc bolus ordered, plan for imaging en route to MICU.  (15 May 2021 23:55)      Current Subjective Assessment and Hospital Course: Patient deteriorated into acute respiratory failure due to pulmonary edema requiring intubation. He was successfully cardioverted for cardiogenic shock in the setting of atrial flutter and was then found to have a segmental PE and L peroneal DVT. 5/16 L chest tube placed with 1400 cc serosanguinous fluid. 5/17 extubated. 5/18 failed swallow eval    TODAY patient appears moderately somnolent.. He answers some questions when asked. No acute distress.    Past Medical History  Poor historian    COPD (chronic obstructive pulmonary disease)    Pulmonary embolism    Atrial flutter    H/O solitary pulmonary nodule    Depression        Past Surgical History  No significant past surgical history        MEDICATIONS  (STANDING):  buDESOnide    Inhalation Suspension 0.25 milliGRAM(s) Inhalation every 12 hours  buprenorphine 8 mG/naloxone 2 mG SL  Tablet 1 Tablet(s) SubLingual <User Schedule>  cefTRIAXone   IVPB 1000 milliGRAM(s) IV Intermittent every 24 hours  chlorhexidine 4% Liquid 1 Application(s) Topical <User Schedule>  diazepam    Tablet 5 milliGRAM(s) Oral every 8 hours  diltiazem    Tablet 30 milliGRAM(s) Oral every 6 hours  enoxaparin Injectable 80 milliGRAM(s) SubCutaneous two times a day  nicotine - 21 mG/24Hr(s) Patch 1 patch Transdermal daily  sertraline 100 milliGRAM(s) Oral daily    MEDICATIONS  (PRN):  albuterol/ipratropium for Nebulization 3 milliLiter(s) Nebulizer every 6 hours PRN Shortness of Breath and/or Wheezing    Antiplatelet therapy:                           Last dose/amt:    Allergies: No Known Allergies      SOCIAL HISTORY:  Smoker: [ ] Yes  [ ] No        PACK YEARS:              unk           WHEN QUIT?  ETOH use: [ ] Yes  [x ] No              FREQUENCY / QUANTITY:  Ilicit Drug use:  [x] Yes  [ ] No +opiate use history, on suboxone  Occupation: unk  Live with: son Han  Assist device use:unk    COVID Vaccination: no    PMD: Raman  Referring Cardiologist: none    Relevant Family History  FAMILY HISTORY:  unknown    Review of Systems Unable to obtain due to patient somnolence  GENERAL:  Fevers[] chills[] sweats[] fatigue[] weight loss[] weight gain []                                      [ ] NEGATIVE  NEURO:  parathesias[] seizures []  syncope []  confusion []                                                                [ ] NEGATIVE                 EYES: glasses[]  blurry vision[]  discharge[] pain[] glaucoma []                                                           [ ] NEGATIVE                 ENMT:  difficulty hearing []  vertigo[]  dysphagia[] epistaxis[] recent dental work []                     [ ] NEGATIVE                 CV:  chest pain[] palpitations[] MERRILL [] diaphoresis [] edema[]                                                           [ ] NEGATIVE                                 RESPIRATORY:  wheezing[] SOB[] cough [] sputum[] hemoptysis[]                                                   [ ] NEGATIVE               GI:  nausea[]  vomiting []  diarrhea[] constipation [] melena []                                                          [ ] NEGATIVE            : hematuria[ ]  dysuria[ ] urgency[] incontinence[]                                                                          [ ] NEGATIVE                    MUSKULOSKELETAL:  arthritis[ ]  joint swelling [ ] muscle weakness [ ]                                            [ ] NEGATIVE                     SKIN/BREAST:  rash[ ] itching [ ]  hair loss[ ] masses[ ]                                                                          [ ] NEGATIVE                     PSYCH:  dementia [ ] depression [ ] anxiety[ ]                                                                                          [ ] NEGATIVE                        HEME/LYMPH:  bruises easily[ ] enlarged lymph nodes[ ] tender lymph nodes[ ]                           [ ] NEGATIVE                      ENDOCRINE:  cold intolerance[ ] heat intolerance[ ] polydipsia[ ]                                                      [ ] NEGATIVE                            PHYSICAL EXAM  Vital Signs Last 24 Hrs  T(C): 37.1 (18 May 2021 12:00), Max: 37.4 (17 May 2021 16:01)  T(F): 98.8 (18 May 2021 12:00), Max: 99.3 (17 May 2021 16:01)  HR: 96 (18 May 2021 15:00) (79 - 96)  BP: 112/60 (18 May 2021 15:00) (81/51 - 114/68)  BP(mean): 72 (18 May 2021 15:00) (62 - 82)  RR: 36 (18 May 2021 15:00) (0 - 38)  SpO2: 94% (18 May 2021 15:00) (90% - 100%)    General: thin, temporal wasting, well developed, no acute distress.                                                         Neuro: Does not know time/day, unclear on when he is         + [place]          Eyes: Normal exam of conjunctiva & lids, pupils equally reactive.   ENT:  absence of cyanosis. Dry mucosa  Neck: Normal exam of jugular veins, trachea & thyroid.   Chest: diminished left base                                                               CV:  Auscultation: normal [ x] S3[ ] S4[ ] Irregular [ ] Rub[ ] Clicks[ ]  Murmurs none:[x ]systolic [ ]  diastolic [ ] holosystolic [ ]  Carotids: No Bruits[x ] Other____________ Abdominal Aorta: normal [x ] nonpalpable[ ]                                                                         GI: Normal exam of abdomen, liver & spleen with no noted masses or tenderness.                                                                                              Extremities: Normal no evidence of cyanosis or deformity Edema: none[x ]trace[ ]1+[ ]2+[ ]3+[ ]4+[ ]  Lower Extremity Pulses: Right[+ ] Left[+ ]Varicosities[ ]  SKIN : Normal exam to inspection & palpation.                                                           LABS:                        12.4   10.24 )-----------( 1f     ( 18 May 2021 05:20 )             40.3     05-18    136  |  90<L>  |  10.0  ----------------------------<  127<H>  3.4<L>   |  34.0<H>  |  0.65    Ca    8.1<L>      18 May 2021 05:20  Phos  3.7     05-18  Mg     2.3     05-18      PT/INR - ( 18 May 2021 05:20 )   PT: 17.3 sec;   INR: 1.52 ratio

## 2021-05-18 NOTE — CONSULT NOTE ADULT - ASSESSMENT
61M h/o smoking p/w respiratory failure, PNA, A flutter, CHF and right pleural effusion. s/p R pigtail.     - POCUS revealed small amount of fluid but mostly atelactatic lung  - Maintain tube overnight  - Downgrade  - D/C tube tomorrow  - Incentive Spirometry   - D/W Dr Ty 61M h/o smoking p/w respiratory failure, PNA, A flutter, CHF and right pleural effusion. s/p R pigtail.     - POCUS revealed small amount of fluid but mostly atelactatic lung  - Obtain sample for cytopathology. Light's + exudative  - Maintain tube overnight  - Downgrade  - D/C tube tomorrow  - Incentive Spirometry   - D/W Dr Ty

## 2021-05-18 NOTE — PROGRESS NOTE ADULT - ASSESSMENT
61yr man hx of COPD, PE, Aflutter, opiate abuse,  depression admitted with acute respirator failure in the setting of   HF, Aflutter, PNA , pleural effusion      Problem/Recommendation - 1:  Acute Respiratory Failure  patient extubated  on O2 nasal canula - cont monitor O2 sat     Problem/Recommendation - 2:  ·  Problem: COPD (chronic obstructive pulmonary disease). Recommendation: on nebs.      Problem/Recommendation - 3:  ·  Problem: Pleural effusion. Recommendation: s/p CT  Management per ICU  IV abx.      Problem/Recommendation - 4:  ·  Problem: Encounter for palliative care. Recommendation: Palliative Care consulted to assist with GOC and establishment of appropriate surrogate.     See GOC note for details. In summary  Patient named his stepdaughter Daljit Kierra as primary health care proxy and charmaine Short as alternate proxy  Form completed - placed in chart.

## 2021-05-18 NOTE — PROGRESS NOTE ADULT - ASSESSMENT
61 year old male with history of COPD, active smoker (1.5 ppd), Aflutter on diltiazem (planned for ablation), PE (2018) on Eliquis, Pulmonary Nodule (left lingular lobe), Depression on Zoloft, who was originally BIBA with complaints of SOB, cough, weakness and anorexia for the past 2 months. Now being treated for acute hypoxic respiratory failure likely from left pleural effusion and COPD exacerbation    #acute hypoxic resp failure 2/2 left pleural effusion - initially with COPD exacerbation which is mostly improved - patient had better CXR when positive pressure from ventilation which is worsened today - encouraging deep breathing and ICS as can tolerate  - transfer to medicine  - steroid taper per NGT  - pulmonary toileting  - left sided chest tube as per CT surgery  - discussed with CT surgery regarding possible cytology sample if feel will be of high yield given exudative fluid and previously known lingular nodule on same side - malignancy correlates with recent weight loss - full recs to follow  - CT from admission showed no obvious lesion    #unstable a-fib/flutter with RVR - s/p cardioversion - no rate controlled and in NSR    #left pleural effusion with hx of left pulmonary nodule    #history of PE on eliquis    #dysphagia    #DVT ppx 61 year old male with history of COPD, active smoker (1.5 ppd), Aflutter on diltiazem (planned for ablation), PE (2018) on Eliquis, Pulmonary Nodule (left lingular lobe), Depression on Zoloft, who was originally BIBA with complaints of SOB, cough, weakness and anorexia for the past 2 months. Now being treated for acute hypoxic respiratory failure likely from left pleural effusion and COPD exacerbation    #acute hypoxic resp failure 2/2 left pleural effusion - initially with COPD exacerbation which is mostly improved - patient had better CXR when positive pressure from ventilation which is worsened today - encouraging deep breathing and ICS as can tolerate  - transfer to medicine  - steroid taper per NGT  - pulmonary toileting  - left sided chest tube as per CT surgery  - discussed with CT surgery regarding possible cytology sample if feel will be of high yield given exudative fluid and previously known lingular nodule on same side - malignancy correlates with recent weight loss - full recs to follow  - CT from admission showed no obvious lesion    #unstable a-fib/flutter with RVR - s/p cardioversion - no rate controlled and in NSR  - currently rate controlled  - continue diltiazem  - continue enoxaparin 80mg sq q12 for now  - cardiology consult deferred since now stable    #left pleural effusion with hx of left pulmonary nodule  - management as above    #history of PE on eliquis  - enoxaparin as above q12    #dysphagia  - with NGT  - speech therapy to see  - will start tube feeds for now    #malnourished with recent weight loss  - Tube feeds as above    #DVT ppx

## 2021-05-18 NOTE — PROGRESS NOTE ADULT - SUBJECTIVE AND OBJECTIVE BOX
PMD :  Cardio :     61 year old male with history of COPD, active smoker (1.5 ppd), Aflutter on diltiazem (planned for ablation), PE (2018) on Eliquis, Pulmonary Nodule (left lingular lobe), Depression on Zoloft, who was originally BIBA with complaints of SOB, cough, weakness and anorexia for the past 2 months.    In ED patient lethargic but oriented, hypoxic on RA place don 4L with waxing and waning mental status. Patient also noted to be in aflutter with HR in the 130s; patient given cardizem 10mg IVP, began to have issues with worsening hypoxia/mental status and was subsequently intubated.  Chest xray obtained, large left sided pleural effusion (not present on 11/2020 films). Patient seen at bedside, HR in the 130s Aflutter with 2:1 conduction, BP in the 80s, given 500cc NS.  Initially patient responded to IVF however patient was persistently in the 80's systolic with HR in the 130s. He was electrically cardioverted with 150J, converted to NSR in the 70s.    Originally had chest tube in place which drained 1400cc of fluid and subsequently removed. Patient extubated on 5/17 and now being downgraded to the medical floor. Failed initial swallow eval - speech therapy to see    INTERVAL EVENTS:  patient without acute events  denies any complaints aside from cough and dysphagia  oriented to person, place, time    PAST MEDICAL & SURGICAL HISTORY:  Poor historian    COPD (chronic obstructive pulmonary disease)    Pulmonary embolism    Atrial flutter    H/O solitary pulmonary nodule    Depression    No significant past surgical history    Social History:  Tabacco - active smoker 1.5 PPD  ETOH - denies  Illicit drug abuse - denies    FAMILY HISTORY:  denies family history of sudden cardiac death    Allergies  No Known Allergies  Intolerances    Home Medications:  diazePAM 5 mg oral tablet: 1 tab(s) orally 3 times a day, As Needed (17 May 2021 12:15)  dilTIAZem 120 mg/24 hours oral capsule, extended release: 1 cap(s) orally once a day (17 May 2021 12:15)  Spiriva Respimat 10 ACT 2.5 mcg/inh inhalation aerosol: 2 puff(s) inhaled once a day (17 May 2021 12:15)  Suboxone 8 mg-2 mg sublingual film: 3 film(s) sublingual once a day (17 May 2021 12:15)  Zoloft 100 mg oral tablet: 1 tab(s) orally once a day (17 May 2021 12:15)    REVIEW OF SYSTEMS:  CONSTITUTIONAL: No fever, +weight loss, no fatigue  EYES: No eye pain, visual disturbances, or discharge  NECK: No pain or stiffness  RESPIRATORY: +cough, +wheezing, no chills or hemoptysis; +shortness of breath  CARDIOVASCULAR: No chest pain, palpitations, dizziness, or leg swelling  GASTROINTESTINAL: No abdominal or epigastric pain. No nausea, vomiting, or hematemesis; No diarrhea or constipation. No melena or hematochezia. +dysphagia; +anorexia  GENITOURINARY: No dysuria, frequency, hematuria, or incontinence  NEUROLOGICAL: No headaches, memory loss, loss of strength, numbness, or tremors  SKIN: No itching, burning, rashes, or lesions   LYMPH NODES: No enlarged glands  ENDOCRINE: No heat or cold intolerance; No hair loss  MUSCULOSKELETAL:   PSYCHIATRIC: No depression, anxiety, mood swings, or difficulty sleeping  HEME/LYMPH: No easy bruising, or bleeding gums  ALLERGY AND IMMUNOLOGIC: No hives or eczema    MEDICATIONS  (STANDING):  buDESOnide    Inhalation Suspension 0.25 milliGRAM(s) Inhalation every 12 hours  buprenorphine 8 mG/naloxone 2 mG SL  Tablet 1 Tablet(s) SubLingual <User Schedule>  cefTRIAXone   IVPB 1000 milliGRAM(s) IV Intermittent every 24 hours  chlorhexidine 4% Liquid 1 Application(s) Topical <User Schedule>  diazepam    Tablet 5 milliGRAM(s) Oral every 8 hours  diltiazem    Tablet 30 milliGRAM(s) Oral every 6 hours  enoxaparin Injectable 80 milliGRAM(s) SubCutaneous two times a day  nicotine - 21 mG/24Hr(s) Patch 1 patch Transdermal daily  sertraline 100 milliGRAM(s) Oral daily    MEDICATIONS  (PRN):  albuterol/ipratropium for Nebulization 3 milliLiter(s) Nebulizer every 6 hours PRN Shortness of Breath and/or Wheezing    Vital Signs Last 24 Hrs  T(C): 37.1 (18 May 2021 16:09), Max: 37.1 (18 May 2021 12:00)  T(F): 98.7 (18 May 2021 16:09), Max: 98.8 (18 May 2021 12:00)  HR: 96 (18 May 2021 18:00) (79 - 99)  BP: 126/59 (18 May 2021 18:00) (81/51 - 126/59)  BP(mean): 79 (18 May 2021 18:00) (62 - 79)  RR: 27 (18 May 2021 18:00) (0 - 42)  SpO2: 92% (18 May 2021 18:00) (90% - 100%)    PHYSICAL EXAM:    GENERAL: NAD, well-groomed, well-developed  HEAD:  Atraumatic, Normocephalic  EYES: EOMI, PERRLA, conjunctiva and sclera clear  NECK: Supple, No JVD, Normal thyroid  NERVOUS SYSTEM:  Alert & Oriented X3, Good concentration; Motor Strength 5/5 B/L upper and lower extremities; DTRs 2+ intact and symmetric  CHEST/LUNG: CTA  b/l,  no rales, rhonchi, wheezing, or rubs  HEART: Regular rate and rhythm; No murmurs, rubs, or gallops  ABDOMEN: Soft, Nontender, Nondistended; Bowel sounds present  EXTREMITIES:  2+ Peripheral Pulses, No clubbing, cyanosis, or edema ,   LYMPH: No lymphadenopathy noted  SKIN: No rashes or lesions    LABS:                        12.4   10.24 )-----------( 170      ( 18 May 2021 05:20 )             40.3     05-18    136  |  90<L>  |  10.0  ----------------------------<  127<H>  3.4<L>   |  34.0<H>  |  0.65    Ca    8.1<L>      18 May 2021 05:20  Phos  3.7     05-18  Mg     2.3     05-18      PT/INR - ( 18 May 2021 05:20 )   PT: 17.3 sec;   INR: 1.52 ratio                 RADIOLOGY & ADDITIONAL STUDIES:     Cardio : [facility]    61 year old male with history of COPD, active smoker (1.5 ppd), Aflutter on diltiazem (planned for ablation), PE (2018) on Eliquis, Pulmonary Nodule (left lingular lobe), Depression on Zoloft, who was originally BIBA with complaints of SOB, cough, weakness and anorexia for the past 2 months.    In ED patient lethargic but oriented, hypoxic on RA place don 4L with waxing and waning mental status. Patient also noted to be in aflutter with HR in the 130s; patient given cardizem 10mg IVP, began to have issues with worsening hypoxia/mental status and was subsequently intubated.  Chest xray obtained, large left sided pleural effusion (not present on 11/2020 films). Patient seen at bedside, HR in the 130s Aflutter with 2:1 conduction, BP in the 80s, given 500cc NS.  Initially patient responded to IVF however patient was persistently in the 80's systolic with HR in the 130s. He was electrically cardioverted with 150J, converted to NSR in the 70s.    Originally had chest tube in place which drained 1400cc of fluid and subsequently removed. Patient extubated on 5/17 and now being downgraded to the medical floor. Failed initial swallow eval - speech therapy to see    INTERVAL EVENTS:  patient without acute events  denies any complaints aside from cough and dysphagia  oriented to person, place, time    PAST MEDICAL & SURGICAL HISTORY:  Poor historian    COPD (chronic obstructive pulmonary disease)    Pulmonary embolism    Atrial flutter    H/O solitary pulmonary nodule    Depression    No significant past surgical history    Social History:  Tabacco - active smoker 1.5 PPD  ETOH - denies  Illicit drug abuse - denies    FAMILY HISTORY:  denies family history of sudden cardiac death    Allergies  No Known Allergies  Intolerances    Home Medications:  diazePAM 5 mg oral tablet: 1 tab(s) orally 3 times a day, As Needed (17 May 2021 12:15)  dilTIAZem 120 mg/24 hours oral capsule, extended release: 1 cap(s) orally once a day (17 May 2021 12:15)  Spiriva Respimat 10 ACT 2.5 mcg/inh inhalation aerosol: 2 puff(s) inhaled once a day (17 May 2021 12:15)  Suboxone 8 mg-2 mg sublingual film: 3 film(s) sublingual once a day (17 May 2021 12:15)  Zoloft 100 mg oral tablet: 1 tab(s) orally once a day (17 May 2021 12:15)    REVIEW OF SYSTEMS:  CONSTITUTIONAL: No fever, +weight loss, no fatigue  EYES: No eye pain, visual disturbances, or discharge  NECK: No pain or stiffness  RESPIRATORY: +cough, +wheezing, no chills or hemoptysis; +shortness of breath  CARDIOVASCULAR: No chest pain, palpitations, dizziness, or leg swelling  GASTROINTESTINAL: No abdominal or epigastric pain. No nausea, vomiting, or hematemesis; No diarrhea or constipation. No melena or hematochezia. +dysphagia; +anorexia  GENITOURINARY: No dysuria, frequency, hematuria, or incontinence  NEUROLOGICAL: No headaches, memory loss, loss of strength, numbness, or tremors  SKIN: No itching, burning, rashes, or lesions   LYMPH NODES: No enlarged glands  ENDOCRINE: No heat or cold intolerance; No hair loss  MUSCULOSKELETAL:   PSYCHIATRIC: No depression, anxiety, mood swings, or difficulty sleeping  HEME/LYMPH: No easy bruising, or bleeding gums  ALLERGY AND IMMUNOLOGIC: No hives or eczema    MEDICATIONS  (STANDING):  buDESOnide    Inhalation Suspension 0.25 milliGRAM(s) Inhalation every 12 hours  buprenorphine 8 mG/naloxone 2 mG SL  Tablet 1 Tablet(s) SubLingual <User Schedule>  cefTRIAXone   IVPB 1000 milliGRAM(s) IV Intermittent every 24 hours  chlorhexidine 4% Liquid 1 Application(s) Topical <User Schedule>  diazepam    Tablet 5 milliGRAM(s) Oral every 8 hours  diltiazem    Tablet 30 milliGRAM(s) Oral every 6 hours  enoxaparin Injectable 80 milliGRAM(s) SubCutaneous two times a day  nicotine - 21 mG/24Hr(s) Patch 1 patch Transdermal daily  sertraline 100 milliGRAM(s) Oral daily    MEDICATIONS  (PRN):  albuterol/ipratropium for Nebulization 3 milliLiter(s) Nebulizer every 6 hours PRN Shortness of Breath and/or Wheezing    Vital Signs Last 24 Hrs  T(C): 37.1 (18 May 2021 16:09), Max: 37.1 (18 May 2021 12:00)  T(F): 98.7 (18 May 2021 16:09), Max: 98.8 (18 May 2021 12:00)  HR: 96 (18 May 2021 18:00) (79 - 99)  BP: 126/59 (18 May 2021 18:00) (81/51 - 126/59)  BP(mean): 79 (18 May 2021 18:00) (62 - 79)  RR: 27 (18 May 2021 18:00) (0 - 42)  SpO2: 92% (18 May 2021 18:00) (90% - 100%)    PHYSICAL EXAM:  GENERAL: NAD, tired appearing, frail appearing  HEAD:  Atraumatic, Normocephalic  EYES: EOMI, PERRLA, conjunctiva and sclera clear  NECK: Supple, No JVD, Normal thyroid  NERVOUS SYSTEM:  Alert & Oriented X3, Good concentration;  CHEST/LUNG: +rhonchi bilaterally; no wheezing  HEART: Regular rate and rhythm; No murmurs, rubs, or gallops  ABDOMEN: Soft, Nontender, Nondistended; Bowel sounds present  EXTREMITIES:  2+ Peripheral Pulses, No clubbing, cyanosis, or edema , dry lower ext  SKIN: No rashes or lesions    LABS:                        12.4   10.24 )-----------( 170      ( 18 May 2021 05:20 )             40.3     05-18    136  |  90<L>  |  10.0  ----------------------------<  127<H>  3.4<L>   |  34.0<H>  |  0.65    Ca    8.1<L>      18 May 2021 05:20  Phos  3.7     05-18  Mg     2.3     05-18      PT/INR - ( 18 May 2021 05:20 )   PT: 17.3 sec;   INR: 1.52 ratio                 RADIOLOGY & ADDITIONAL STUDIES:

## 2021-05-18 NOTE — PROGRESS NOTE ADULT - SUBJECTIVE AND OBJECTIVE BOX
Patient is a 61y old  Male who presents with a chief complaint of Respiratory Failure (16 May 2021 12:07)      BRIEF HOSPITAL COURSE: 62 y/o male with pmhx of COPD (not on home O2), active smoker 1.5 ppd, aflutter and PE on eliquis (? compliance), pulmonary nodule who presented on 5/15 with SOB, cough, anorexia found to be in aflutter with RVR along with acute hypoxic respiratory failure due to large left pleural effusion and pulmonary edema requiring intubation. Ultimately no improvement in HR and worsening shock state after intubation therefore was successfully cardioverted at 150 J. Also found to have right segmental PE and left peroneal DVT.    5.16: s/p left chest tube with 1400 cc serosanguineous fluid. remains intubated, improving fio2 requirements now on 50%/+5. remains on levophed. in NSR. Failed SBt his AM due to resp distress   5/17: Failed SAT and SBT in AM, restrated opioid and BZD and later in evening cleared SAT and SBT with subsequent extubation   5.18: Failed Swallow eval    PAST MEDICAL & SURGICAL HISTORY:  Poor historian    COPD (chronic obstructive pulmonary disease)    Pulmonary embolism    Atrial flutter    H/O solitary pulmonary nodule    Depression    No significant past surgical history        Review of Systems:  unable to obtain due to current clinical condition.      ICU Vital Signs Last 24 Hrs  T(C): 37.1 (18 May 2021 12:00), Max: 37.4 (17 May 2021 16:01)  T(F): 98.8 (18 May 2021 12:00), Max: 99.3 (17 May 2021 16:01)  HR: 95 (18 May 2021 11:00) (78 - 96)  BP: 89/73 (18 May 2021 11:00) (81/51 - 114/68)  BP(mean): 78 (18 May 2021 11:00) (62 - 82)  ABP: --  ABP(mean): --  RR: 37 (18 May 2021 11:00) (0 - 38)  SpO2: 95% (18 May 2021 11:00) (90% - 100%)      General: Mild distress in bed, no acute neuro deficit, awake and alert with orientation 1-2 and slow with speech but no ac neurological deficit     HEENT: Pupils equal, reactive to light.  Symmetric.    PULM: Clear to auscultation bilaterally, no significant sputum production. chest tube secured in place.     NECK: Supple, no lymphadenopathy, trachea midline    CVS: Regular rate and rhythm, no murmurs, rubs, or gallops    ABD: Soft, nondistended, nontender, normoactive bowel sounds, no masses    EXT: No edema, nontender    SKIN: Warm and well perfused, no rashes noted.    MEDICATIONS  (STANDING):  buDESOnide    Inhalation Suspension 0.25 milliGRAM(s) Inhalation every 12 hours  buprenorphine 8 mG/naloxone 2 mG SL  Tablet 1 Tablet(s) SubLingual <User Schedule>  cefTRIAXone   IVPB 1000 milliGRAM(s) IV Intermittent every 24 hours  chlorhexidine 4% Liquid 1 Application(s) Topical <User Schedule>  diazepam    Tablet 5 milliGRAM(s) Oral every 8 hours  diltiazem    Tablet 30 milliGRAM(s) Oral every 6 hours  enoxaparin Injectable 80 milliGRAM(s) SubCutaneous two times a day  nicotine - 21 mG/24Hr(s) Patch 1 patch Transdermal daily  sertraline 100 milliGRAM(s) Oral daily    MEDICATIONS  (PRN):  albuterol/ipratropium for Nebulization 3 milliLiter(s) Nebulizer every 6 hours PRN Shortness of Breath and/or Wheezing    I&O's Summary    17 May 2021 07:01  -  18 May 2021 07:00  --------------------------------------------------------  IN: 747.6 mL / OUT: 2620 mL / NET: -1872.4 mL    18 May 2021 07:01  -  18 May 2021 13:48  --------------------------------------------------------  IN: 0 mL / OUT: 40 mL / NET: -40 mL        I have personally provided 35  minutes of critical care time excluding time spent on separate procedures

## 2021-05-18 NOTE — GOALS OF CARE CONVERSATION - ADVANCED CARE PLANNING - CONVERSATION DETAILS
Met with patient.  Educated importance of designating a health care proxy.  He named his stepdaughter Daljit Lozada as primary proxy.  He asked if he could have another.   He named his stepson Han as the alternate  Form completed and signed by patient.

## 2021-05-19 ENCOUNTER — RESULT REVIEW (OUTPATIENT)
Age: 61
End: 2021-05-19

## 2021-05-19 LAB
ALBUMIN SERPL ELPH-MCNC: 2.6 G/DL — LOW (ref 3.3–5.2)
ALP SERPL-CCNC: 104 U/L — SIGNIFICANT CHANGE UP (ref 40–120)
ALT FLD-CCNC: 13 U/L — SIGNIFICANT CHANGE UP
ANION GAP SERPL CALC-SCNC: 8 MMOL/L — SIGNIFICANT CHANGE UP (ref 5–17)
AST SERPL-CCNC: 25 U/L — SIGNIFICANT CHANGE UP
BILIRUB SERPL-MCNC: 1.5 MG/DL — SIGNIFICANT CHANGE UP (ref 0.4–2)
BUN SERPL-MCNC: 11 MG/DL — SIGNIFICANT CHANGE UP (ref 8–20)
CALCIUM SERPL-MCNC: 8 MG/DL — LOW (ref 8.6–10.2)
CHLORIDE SERPL-SCNC: 94 MMOL/L — LOW (ref 98–107)
CO2 SERPL-SCNC: 33 MMOL/L — HIGH (ref 22–29)
CREAT SERPL-MCNC: 0.49 MG/DL — LOW (ref 0.5–1.3)
GAS PNL BLDA: SIGNIFICANT CHANGE UP
GAS PNL BLDA: SIGNIFICANT CHANGE UP
GLUCOSE BLDC GLUCOMTR-MCNC: 108 MG/DL — HIGH (ref 70–99)
GLUCOSE SERPL-MCNC: 103 MG/DL — HIGH (ref 70–99)
HCT VFR BLD CALC: 36.6 % — LOW (ref 39–50)
HGB BLD-MCNC: 11.4 G/DL — LOW (ref 13–17)
MAGNESIUM SERPL-MCNC: 2 MG/DL — SIGNIFICANT CHANGE UP (ref 1.6–2.6)
MCHC RBC-ENTMCNC: 31.1 GM/DL — LOW (ref 32–36)
MCHC RBC-ENTMCNC: 32.2 PG — SIGNIFICANT CHANGE UP (ref 27–34)
MCV RBC AUTO: 103.4 FL — HIGH (ref 80–100)
PHOSPHATE SERPL-MCNC: 2 MG/DL — LOW (ref 2.4–4.7)
PLATELET # BLD AUTO: 158 K/UL — SIGNIFICANT CHANGE UP (ref 150–400)
POTASSIUM SERPL-MCNC: 3.5 MMOL/L — SIGNIFICANT CHANGE UP (ref 3.5–5.3)
POTASSIUM SERPL-SCNC: 3.5 MMOL/L — SIGNIFICANT CHANGE UP (ref 3.5–5.3)
PROT SERPL-MCNC: 6.3 G/DL — LOW (ref 6.6–8.7)
RBC # BLD: 3.54 M/UL — LOW (ref 4.2–5.8)
RBC # FLD: 15.5 % — HIGH (ref 10.3–14.5)
SODIUM SERPL-SCNC: 135 MMOL/L — SIGNIFICANT CHANGE UP (ref 135–145)
WBC # BLD: 7.21 K/UL — SIGNIFICANT CHANGE UP (ref 3.8–10.5)
WBC # FLD AUTO: 7.21 K/UL — SIGNIFICANT CHANGE UP (ref 3.8–10.5)

## 2021-05-19 PROCEDURE — 99291 CRITICAL CARE FIRST HOUR: CPT

## 2021-05-19 PROCEDURE — 99232 SBSQ HOSP IP/OBS MODERATE 35: CPT

## 2021-05-19 PROCEDURE — 99233 SBSQ HOSP IP/OBS HIGH 50: CPT

## 2021-05-19 PROCEDURE — 71045 X-RAY EXAM CHEST 1 VIEW: CPT | Mod: 26

## 2021-05-19 PROCEDURE — 71045 X-RAY EXAM CHEST 1 VIEW: CPT | Mod: 26,77

## 2021-05-19 PROCEDURE — ZZZZZ: CPT

## 2021-05-19 RX ORDER — OXYCODONE HYDROCHLORIDE 5 MG/1
5 TABLET ORAL EVERY 6 HOURS
Refills: 0 | Status: DISCONTINUED | OUTPATIENT
Start: 2021-05-19 | End: 2021-05-19

## 2021-05-19 RX ORDER — PANTOPRAZOLE SODIUM 20 MG/1
40 TABLET, DELAYED RELEASE ORAL DAILY
Refills: 0 | Status: DISCONTINUED | OUTPATIENT
Start: 2021-05-19 | End: 2021-05-20

## 2021-05-19 RX ORDER — DIAZEPAM 5 MG
5 TABLET ORAL EVERY 8 HOURS
Refills: 0 | Status: DISCONTINUED | OUTPATIENT
Start: 2021-05-19 | End: 2021-05-24

## 2021-05-19 RX ORDER — CHLORHEXIDINE GLUCONATE 213 G/1000ML
15 SOLUTION TOPICAL EVERY 12 HOURS
Refills: 0 | Status: DISCONTINUED | OUTPATIENT
Start: 2021-05-19 | End: 2021-05-30

## 2021-05-19 RX ORDER — DEXMEDETOMIDINE HYDROCHLORIDE IN 0.9% SODIUM CHLORIDE 4 UG/ML
0.4 INJECTION INTRAVENOUS
Qty: 200 | Refills: 0 | Status: DISCONTINUED | OUTPATIENT
Start: 2021-05-19 | End: 2021-05-21

## 2021-05-19 RX ORDER — NOREPINEPHRINE BITARTRATE/D5W 8 MG/250ML
0.05 PLASTIC BAG, INJECTION (ML) INTRAVENOUS
Qty: 8 | Refills: 0 | Status: DISCONTINUED | OUTPATIENT
Start: 2021-05-19 | End: 2021-05-20

## 2021-05-19 RX ORDER — OXYCODONE HYDROCHLORIDE 5 MG/1
5 TABLET ORAL EVERY 6 HOURS
Refills: 0 | Status: DISCONTINUED | OUTPATIENT
Start: 2021-05-19 | End: 2021-05-26

## 2021-05-19 RX ORDER — FENTANYL CITRATE 50 UG/ML
50 INJECTION INTRAVENOUS
Refills: 0 | Status: DISCONTINUED | OUTPATIENT
Start: 2021-05-19 | End: 2021-05-24

## 2021-05-19 RX ORDER — POTASSIUM PHOSPHATE, MONOBASIC POTASSIUM PHOSPHATE, DIBASIC 236; 224 MG/ML; MG/ML
15 INJECTION, SOLUTION INTRAVENOUS ONCE
Refills: 0 | Status: COMPLETED | OUTPATIENT
Start: 2021-05-19 | End: 2021-05-19

## 2021-05-19 RX ADMIN — OXYCODONE HYDROCHLORIDE 5 MILLIGRAM(S): 5 TABLET ORAL at 12:47

## 2021-05-19 RX ADMIN — PANTOPRAZOLE SODIUM 40 MILLIGRAM(S): 20 TABLET, DELAYED RELEASE ORAL at 09:50

## 2021-05-19 RX ADMIN — CHLORHEXIDINE GLUCONATE 1 APPLICATION(S): 213 SOLUTION TOPICAL at 05:23

## 2021-05-19 RX ADMIN — CEFTRIAXONE 100 MILLIGRAM(S): 500 INJECTION, POWDER, FOR SOLUTION INTRAMUSCULAR; INTRAVENOUS at 01:01

## 2021-05-19 RX ADMIN — Medication 1 PATCH: at 07:49

## 2021-05-19 RX ADMIN — Medication 40 MILLIGRAM(S): at 05:23

## 2021-05-19 RX ADMIN — CHLORHEXIDINE GLUCONATE 15 MILLILITER(S): 213 SOLUTION TOPICAL at 20:39

## 2021-05-19 RX ADMIN — ENOXAPARIN SODIUM 80 MILLIGRAM(S): 100 INJECTION SUBCUTANEOUS at 05:23

## 2021-05-19 RX ADMIN — Medication 1 PATCH: at 09:49

## 2021-05-19 RX ADMIN — ENOXAPARIN SODIUM 80 MILLIGRAM(S): 100 INJECTION SUBCUTANEOUS at 17:01

## 2021-05-19 RX ADMIN — Medication 5 MILLIGRAM(S): at 15:13

## 2021-05-19 RX ADMIN — Medication 40 MILLIGRAM(S): at 21:13

## 2021-05-19 RX ADMIN — SERTRALINE 100 MILLIGRAM(S): 25 TABLET, FILM COATED ORAL at 09:50

## 2021-05-19 RX ADMIN — Medication 5 MILLIGRAM(S): at 21:13

## 2021-05-19 RX ADMIN — Medication 7.87 MICROGRAM(S)/KG/MIN: at 02:59

## 2021-05-19 RX ADMIN — OXYCODONE HYDROCHLORIDE 5 MILLIGRAM(S): 5 TABLET ORAL at 13:45

## 2021-05-19 RX ADMIN — Medication 1 PATCH: at 05:27

## 2021-05-19 RX ADMIN — Medication 0.25 MILLIGRAM(S): at 20:23

## 2021-05-19 RX ADMIN — DEXMEDETOMIDINE HYDROCHLORIDE IN 0.9% SODIUM CHLORIDE 8.39 MICROGRAM(S)/KG/HR: 4 INJECTION INTRAVENOUS at 00:18

## 2021-05-19 RX ADMIN — POTASSIUM PHOSPHATE, MONOBASIC POTASSIUM PHOSPHATE, DIBASIC 62.5 MILLIMOLE(S): 236; 224 INJECTION, SOLUTION INTRAVENOUS at 09:50

## 2021-05-19 RX ADMIN — Medication 0.25 MILLIGRAM(S): at 09:11

## 2021-05-19 RX ADMIN — OXYCODONE HYDROCHLORIDE 5 MILLIGRAM(S): 5 TABLET ORAL at 17:53

## 2021-05-19 RX ADMIN — Medication 1 PATCH: at 09:50

## 2021-05-19 RX ADMIN — DEXMEDETOMIDINE HYDROCHLORIDE IN 0.9% SODIUM CHLORIDE 8.39 MICROGRAM(S)/KG/HR: 4 INJECTION INTRAVENOUS at 09:50

## 2021-05-19 RX ADMIN — Medication 3 MILLILITER(S): at 20:27

## 2021-05-19 RX ADMIN — Medication 40 MILLIGRAM(S): at 15:23

## 2021-05-19 RX ADMIN — CHLORHEXIDINE GLUCONATE 15 MILLILITER(S): 213 SOLUTION TOPICAL at 05:23

## 2021-05-19 RX ADMIN — OXYCODONE HYDROCHLORIDE 5 MILLIGRAM(S): 5 TABLET ORAL at 17:01

## 2021-05-19 NOTE — PROGRESS NOTE ADULT - PROBLEM SELECTOR PLAN 1
Left pigtail D/C'd, post removal chest xray ordered  Sample for cytopathology obtained.    Continue care as per primary team  Discussed plan with Dr. Ty & thoracic surgery Left pigtail D/C'd, post removal chest xray ordered  Sample for cytopathology obtained.    Continue care as per primary team  Thoracic surgery to follow patient  Discussed plan with Dr. Ty & thoracic surgery

## 2021-05-19 NOTE — PROGRESS NOTE ADULT - ASSESSMENT
61M h/o smoking p/w respiratory failure, PNA, A flutter, CHF and right pleural effusion. s/p  Left pigtail.      61 year old male with a PMH of  smoking p/w respiratory failure, PNA, A flutter, CHF and left pleural effusion.  Left pigtail inserted by MICU on 5/15/21.  On 5/18 Thoracic surgery notified to monitor left chest tube,, chest tube clamped.  5/19 cytology obtained from left pigtail & removed, awaiting post removal chest xray.

## 2021-05-19 NOTE — PROGRESS NOTE ADULT - SUBJECTIVE AND OBJECTIVE BOX
Interval events:  CXR showed complete atelectasis/white out of left lung.  Patient re-intubated and underwent emergent bronchoscopy, copious thick mucoid secretions suctioned from left lung.  No endobronchial mass seen.     On Admission  05-15-21 (4d)  HPI:  60 yo m pmhx COPD, active smoker (1.5 ppd), Aflutter on diltiazem (planned for ablation), PE (2018) on Eliquis, Pulmonary Nodule, Depression on Zoloft, questionable compliance with medications biba from home with complaints of sob, cough, weakness and anorexia for about a week.  In ED patient lethargic but oriented, hypoxic on RA place don 4L with waxing and waning mental status.  Patient also noted to be in aflutter with HR in the 130s; patient given cardizem 10mg IVP, began to have issues with worsening hypoxia/mental status and was subsequently intubated.  Chest xray obtained, large left sided pleural effusion (not present on 11/2020 films). Patient seen at bedside, HR in the 130s Aflutter with 2:1 conduction, BP in the 80s, given 500cc NS.  POCUS exam performed terrell predominant with some scattered bline, left effusion appreciated.  Cardiac portion difficult to obtain, however RV appears WNL, LV function appears diminished from previous TTE where EF was ~58%; however windows difficult.  Initially patient responded to IVF however patient now persistently in the 80s with HR in the 130s, patient electrically cardioverted with 150J, converted to NSR in the 70s.  Additional 500cc bolus ordered, plan for imaging en route to MICU.  (15 May 2021 23:55)    PAST MEDICAL & SURGICAL HISTORY:  Poor historian    COPD (chronic obstructive pulmonary disease)    Pulmonary embolism    Atrial flutter    H/O solitary pulmonary nodule    Depression    No significant past surgical history        Antimicrobial:  cefTRIAXone   IVPB 1000 milliGRAM(s) IV Intermittent every 24 hours    Cardiovascular:  diltiazem    Tablet 30 milliGRAM(s) Oral every 6 hours    Pulmonary:  albuterol/ipratropium for Nebulization 3 milliLiter(s) Nebulizer every 6 hours PRN  buDESOnide    Inhalation Suspension 0.25 milliGRAM(s) Inhalation every 12 hours    Hematalogic:  enoxaparin Injectable 80 milliGRAM(s) SubCutaneous two times a day    Other:  acetaminophen   Tablet .. 650 milliGRAM(s) Oral every 6 hours PRN  chlorhexidine 4% Liquid 1 Application(s) Topical <User Schedule>  nicotine - 21 mG/24Hr(s) Patch 1 patch Transdermal daily  sertraline 100 milliGRAM(s) Oral daily      Drug Dosing Weight  Height (cm): 182.9 (15 May 2021 19:55)  Weight (kg): 83.915 (15 May 2021 22:18)  BMI (kg/m2): 25.1 (15 May 2021 22:18)  BSA (m2): 2.06 (15 May 2021 22:18)    T(C): 36.6 (05-18-21 @ 21:00), Max: 37.2 (05-18-21 @ 19:00)  HR: 84 (05-18-21 @ 22:00)  BP: 125/62 (05-18-21 @ 22:00)  BP(mean): 80 (05-18-21 @ 22:00)  ABP: --  ABP(mean): --  RR: 16 (05-18-21 @ 22:00)  SpO2: 92% (05-18-21 @ 22:00)          05-17 @ 07:01  -  05-18 @ 07:00  --------------------------------------------------------  IN: 747.6 mL / OUT: 2620 mL / NET: -1872.4 mL              LABS:  CBC Full  -  ( 18 May 2021 05:20 )  WBC Count : 10.24 K/uL  RBC Count : 3.84 M/uL  Hemoglobin : 12.4 g/dL  Hematocrit : 40.3 %  Platelet Count - Automated : 170 K/uL  Mean Cell Volume : 104.9 fl  Mean Cell Hemoglobin : 32.3 pg  Mean Cell Hemoglobin Concentration : 30.8 gm/dL  Auto Neutrophil # : x  Auto Lymphocyte # : x  Auto Monocyte # : x  Auto Eosinophil # : x  Auto Basophil # : x  Auto Neutrophil % : x  Auto Lymphocyte % : x  Auto Monocyte % : x  Auto Eosinophil % : x  Auto Basophil % : x    05-18    136  |  90<L>  |  10.0  ----------------------------<  127<H>  3.4<L>   |  34.0<H>  |  0.65    Ca    8.1<L>      18 May 2021 05:20  Phos  3.7     05-18  Mg     2.3     05-18      PT/INR - ( 18 May 2021 05:20 )   PT: 17.3 sec;   INR: 1.52 ratio             Culture Results:   Testing in progress (05-17 @ 02:28)  Culture Results:   Normal Respiratory Payton present (05-17 @ 02:04)  Culture Results:   No growth (05-17 @ 01:57)  Culture Results:   Testing in progress (05-17 @ 01:57)  Culture Results:   Moderate Serratia marcescens  Normal Respiratory Payton present (05-16 @ 20:46)  Culture Results:   <10,000 CFU/mL Normal Urogenital Payton (05-16 @ 11:12)    ____________________________________________________________________________________________________    Exam  intubated, sedated  bilat air entry  reg rate  abdomen soft  trace edema    Assessment and plan:  60 yo male with hx of COPD, active smoker, aflutter on eliquis, PE, depression, opioid dependence on suboxone (reportedly may not be taking his pills as per his son), admitted with LLL pneumonia with parapneumonic effusion, aflutter with RVR requiring DCCV, pulmonary embolism, acute respiratory failure requiring intubation.  Patient successfully extubated on 5/17, reintubated on 5/19 due to atelectasis.    Pneumonia with parapneumonic effusion/ acute respiratory failure   - growing serratia in the sputum, will continue ceftriaxone  - underwent bronchoscopy due to atelectasis on 5/19  - pulmonary toilet  - still with left sided pigtail chest tube    PE  - continue full dose lovenox    Afib  - continue anticoagulation with lovenox  - diltiazem PO    Opioid dependence on suboxone  - holding suboxone as patient seemed over sedated  - unclear if patient was actually taking suboxone at home, stepson reports he was probably not.     COPD   - short course of steroids  - bronchodilators     Family contacted by MICU PA and updated  PT consult placed, patient needs to be mobilized.

## 2021-05-19 NOTE — AIRWAY PLACEMENT NOTE ADULT - AIRWAY COMMENTS:
Pt. was intubated and placed on ventilator without incident.  Pt. was bronch'd immediately following intubation.  Vitals: SpO2 99%, HR 85

## 2021-05-19 NOTE — CHART NOTE - NSCHARTNOTEFT_GEN_A_CORE
Source: Patient [ ]  Family [ ]   other [ x]    Current Diet: NPO    Current Weight:   (5/19) 173.9 lbs  (5/18) 172.4 lbs  (5/15) 185 lbs  ? accuracy of weights, noted with 1+ dependent/generalized edema, continue to trend and maintain strict Is&Os     Pertinent Medications: MEDICATIONS  (STANDING):  buDESOnide    Inhalation Suspension 0.25 milliGRAM(s) Inhalation every 12 hours  cefTRIAXone   IVPB 1000 milliGRAM(s) IV Intermittent every 24 hours  chlorhexidine 0.12% Liquid 15 milliLiter(s) Oral Mucosa every 12 hours  chlorhexidine 4% Liquid 1 Application(s) Topical <User Schedule>  dexMEDEtomidine Infusion 0.4 MICROgram(s)/kG/Hr (8.39 mL/Hr) IV Continuous <Continuous>  diazepam    Tablet 5 milliGRAM(s) Oral every 8 hours  diltiazem    Tablet 30 milliGRAM(s) Oral every 6 hours  enoxaparin Injectable 80 milliGRAM(s) SubCutaneous two times a day  methylPREDNISolone sodium succinate Injectable 40 milliGRAM(s) IV Push every 8 hours  nicotine - 21 mG/24Hr(s) Patch 1 patch Transdermal daily  norepinephrine Infusion 0.05 MICROgram(s)/kG/Min (7.87 mL/Hr) IV Continuous <Continuous>  oxyCODONE    IR 5 milliGRAM(s) Oral every 6 hours  pantoprazole  Injectable 40 milliGRAM(s) IV Push daily  sertraline 100 milliGRAM(s) Oral daily    MEDICATIONS  (PRN):  acetaminophen   Tablet .. 650 milliGRAM(s) Oral every 6 hours PRN Temp greater or equal to 38C (100.4F), Mild Pain (1 - 3), Moderate Pain (4 - 6)  albuterol/ipratropium for Nebulization 3 milliLiter(s) Nebulizer every 6 hours PRN Shortness of Breath and/or Wheezing  fentaNYL    Injectable 50 MICROGram(s) IV Push every 1 hour PRN breakthrough    Pertinent Labs: CBC Full  -  ( 19 May 2021 04:41 )  WBC Count : 7.21 K/uL  RBC Count : 3.54 M/uL  Hemoglobin : 11.4 g/dL  Hematocrit : 36.6 %  Platelet Count - Automated : 158 K/uL  Mean Cell Volume : 103.4 fl  Mean Cell Hemoglobin : 32.2 pg  Mean Cell Hemoglobin Concentration : 31.1 gm/dL  Auto Neutrophil # : x  Auto Lymphocyte # : x  Auto Monocyte # : x  Auto Eosinophil # : x  Auto Basophil # : x  Auto Neutrophil % : x  Auto Lymphocyte % : x  Auto Monocyte % : x  Auto Eosinophil % : x  Auto Basophil % : x    05-19 Na135 mmol/L Glu 103 mg/dL<H> K+ 3.5 mmol/L Cr  0.49 mg/dL<L> BUN 11.0 mg/dL Phos 2.0 mg/dL<L> Alb 2.6 g/dL<L> PAB n/a       Skin: Intact per documentation    Nutrition focused physical exam previously conducted - found signs of malnutrition [ ]absent [ x]present    Subcutaneous fat loss: [ ] Orbital fat pads region, [ ]Buccal fat region, [ ]Triceps region,  [ ]Ribs region    Muscle wasting: [ ]Temples region, [ ]Clavicle region, [ ]Shoulder region, [ ]Scapula region, [ ]Interosseous region,  [ ]thigh region, [ ]Calf region    Estimated Needs:   [ x] no change since previous assessment  [ ] recalculated:     Current Nutrition Diagnosis: Pt remains at high nutrition risk secondary to malnutrition (severe, acute) related to inability to meet sufficient protein-energy in setting of COPD, depression, now with LLL pneumonia with parapneumonic effusion, pulmonary embolism, acute respiratory failure requiring intubation as evidenced by meeting <50% nutrient needs >5 days, mild muscle loss of temples, moderate muscle loss of clavicles and shoulders, moderate fat loss of buccal pads and rib region, and 1+ edema. Patient successfully extubated on 5/17, reintubated on 5/19 due to atelectasis. NPO at this time. Palliative following for GOC. RD to follow up.     Recommendations:   1) As medically feasible, initiate Jevity 1.5 @ 20 ml/hr and advance 10 ml/hr q4 hrs until goal rate of 70 ml/hr (x20 hrs) to provide 1400 ml, 2100 kcal, 89g protein, 1064 ml free water, and >100% of RDIs for vitamins/minerals; additional free water per MD discretion.  2) Rx: liquid MVI daily as feasible.  3) Obtain daily weights to monitor trends.     Monitoring and Evaluation:   [ ] PO intake [ ] Tolerance to diet prescription [X] Weights  [X] Follow up per protocol [X] Labs Source: Patient [ ]  Family [ ]   other [ x]    Current Diet: NPO    Current Weight:   (5/19) 173.9 lbs  (5/18) 172.4 lbs  (5/15) 185 lbs  ? accuracy of weights, noted with 1+ dependent/generalized edema, continue to trend and maintain strict Is&Os     Pertinent Medications: MEDICATIONS  (STANDING):  buDESOnide    Inhalation Suspension 0.25 milliGRAM(s) Inhalation every 12 hours  cefTRIAXone   IVPB 1000 milliGRAM(s) IV Intermittent every 24 hours  chlorhexidine 0.12% Liquid 15 milliLiter(s) Oral Mucosa every 12 hours  chlorhexidine 4% Liquid 1 Application(s) Topical <User Schedule>  dexMEDEtomidine Infusion 0.4 MICROgram(s)/kG/Hr (8.39 mL/Hr) IV Continuous <Continuous>  diazepam    Tablet 5 milliGRAM(s) Oral every 8 hours  diltiazem    Tablet 30 milliGRAM(s) Oral every 6 hours  enoxaparin Injectable 80 milliGRAM(s) SubCutaneous two times a day  methylPREDNISolone sodium succinate Injectable 40 milliGRAM(s) IV Push every 8 hours  nicotine - 21 mG/24Hr(s) Patch 1 patch Transdermal daily  norepinephrine Infusion 0.05 MICROgram(s)/kG/Min (7.87 mL/Hr) IV Continuous <Continuous>  oxyCODONE    IR 5 milliGRAM(s) Oral every 6 hours  pantoprazole  Injectable 40 milliGRAM(s) IV Push daily  sertraline 100 milliGRAM(s) Oral daily    MEDICATIONS  (PRN):  acetaminophen   Tablet .. 650 milliGRAM(s) Oral every 6 hours PRN Temp greater or equal to 38C (100.4F), Mild Pain (1 - 3), Moderate Pain (4 - 6)  albuterol/ipratropium for Nebulization 3 milliLiter(s) Nebulizer every 6 hours PRN Shortness of Breath and/or Wheezing  fentaNYL    Injectable 50 MICROGram(s) IV Push every 1 hour PRN breakthrough    Pertinent Labs: CBC Full  -  ( 19 May 2021 04:41 )  WBC Count : 7.21 K/uL  RBC Count : 3.54 M/uL  Hemoglobin : 11.4 g/dL  Hematocrit : 36.6 %  Platelet Count - Automated : 158 K/uL  Mean Cell Volume : 103.4 fl  Mean Cell Hemoglobin : 32.2 pg  Mean Cell Hemoglobin Concentration : 31.1 gm/dL  Auto Neutrophil # : x  Auto Lymphocyte # : x  Auto Monocyte # : x  Auto Eosinophil # : x  Auto Basophil # : x  Auto Neutrophil % : x  Auto Lymphocyte % : x  Auto Monocyte % : x  Auto Eosinophil % : x  Auto Basophil % : x    05-19 Na135 mmol/L Glu 103 mg/dL<H> K+ 3.5 mmol/L Cr  0.49 mg/dL<L> BUN 11.0 mg/dL Phos 2.0 mg/dL<L> Alb 2.6 g/dL<L> PAB n/a       Skin: Intact per documentation    Nutrition focused physical exam previously conducted - found signs of malnutrition [ ]absent [ x]present    Subcutaneous fat loss: [ ] Orbital fat pads region, x[ ]Buccal fat region, [ ]Triceps region,  [ x]Ribs region    Muscle wasting: [ x]Temples region, [ x]Clavicle region, [x ]Shoulder region, [ ]Scapula region, [ ]Interosseous region,  [ ]thigh region, [ ]Calf region    Estimated Needs:   [ x] no change since previous assessment  [ ] recalculated:     Current Nutrition Diagnosis: Pt remains at high nutrition risk secondary to malnutrition (severe, acute) related to inability to meet sufficient protein-energy in setting of COPD, depression, now with LLL pneumonia with parapneumonic effusion, pulmonary embolism, acute respiratory failure requiring intubation as evidenced by meeting <50% nutrient needs >5 days, mild muscle loss of temples, moderate muscle loss of clavicles and shoulders, moderate fat loss of buccal pads and rib region, and 1+ edema. Patient successfully extubated on 5/17, reintubated on 5/19 due to atelectasis. NPO at this time. Palliative following for GOC. RD to follow up.     Recommendations:   1) As medically feasible, initiate Jevity 1.5 @ 20 ml/hr and advance 10 ml/hr q4 hrs until goal rate of 70 ml/hr (x20 hrs) to provide 1400 ml, 2100 kcal, 89g protein, 1064 ml free water, and >100% of RDIs for vitamins/minerals; additional free water per MD discretion.  2) Rx: liquid MVI daily as feasible.  3) Obtain daily weights to monitor trends.     Monitoring and Evaluation:   [ ] PO intake [ ] Tolerance to diet prescription [X] Weights  [X] Follow up per protocol [X] Labs

## 2021-05-19 NOTE — CHART NOTE - NSCHARTNOTEFT_GEN_A_CORE
On call nocturnist    Around midnight, was notified by ICU PA that pt re-intubated for respiratory distress with increasing O2 requirements, found to have complete white out of left lung on chest imaging, likely occurring in setting of mucus plugging/atelectasis.  Pt now back on ICU service.     Prior to this had been called by RN around 8PM about pt being unable to receive SL suboxone as pt had been NPO with NGT in place. Asked her to clarify with pharmacy team who deferred decision back to hospitalist.  Spoke with daytime attending, Dr. Winn who was okay with order change and for SL to be administered though NGT.

## 2021-05-19 NOTE — PROGRESS NOTE ADULT - SUBJECTIVE AND OBJECTIVE BOX
CC:  follow up GOC  INTERVAL HPI/OVERNIGHT EVENTS:  patient reintubated  PRESENT SYMPTOMS: SOURCE:  Patient/Family/Team    PAIN SCALE:  0 = none  1 = mild   2 = moderate  3 = severe    Pain:     Dyspnea:  [ ] YES [x ] NO on vent  Anxiety:  [ ] YES [x ] NO  Fatigue: [ x] YES [ ] NO  Nausea: [ ] YES [ ] NO  na  Loss of Appetite: [ ] YES [ ] NO  na NPO  Other symptoms: __________      MEDICATIONS  (STANDING):  buDESOnide    Inhalation Suspension 0.25 milliGRAM(s) Inhalation every 12 hours  cefTRIAXone   IVPB 1000 milliGRAM(s) IV Intermittent every 24 hours  chlorhexidine 0.12% Liquid 15 milliLiter(s) Oral Mucosa every 12 hours  chlorhexidine 4% Liquid 1 Application(s) Topical <User Schedule>  dexMEDEtomidine Infusion 0.4 MICROgram(s)/kG/Hr (8.39 mL/Hr) IV Continuous <Continuous>  diazepam    Tablet 5 milliGRAM(s) Oral every 8 hours  diltiazem    Tablet 30 milliGRAM(s) Oral every 6 hours  enoxaparin Injectable 80 milliGRAM(s) SubCutaneous two times a day  methylPREDNISolone sodium succinate Injectable 40 milliGRAM(s) IV Push every 8 hours  nicotine - 21 mG/24Hr(s) Patch 1 patch Transdermal daily  norepinephrine Infusion 0.05 MICROgram(s)/kG/Min (7.87 mL/Hr) IV Continuous <Continuous>  oxyCODONE    IR 5 milliGRAM(s) Oral every 6 hours  pantoprazole  Injectable 40 milliGRAM(s) IV Push daily  sertraline 100 milliGRAM(s) Oral daily    MEDICATIONS  (PRN):  acetaminophen   Tablet .. 650 milliGRAM(s) Oral every 6 hours PRN Temp greater or equal to 38C (100.4F), Mild Pain (1 - 3), Moderate Pain (4 - 6)  albuterol/ipratropium for Nebulization 3 milliLiter(s) Nebulizer every 6 hours PRN Shortness of Breath and/or Wheezing  fentaNYL    Injectable 50 MICROGram(s) IV Push every 1 hour PRN breakthrough      Allergies    No Known Allergies    Intolerances    Karnofsky Performance Score/Palliative Performance Status Version 2:   30 %    Vital Signs Last 24 Hrs  T(C): 36.4 (19 May 2021 07:44), Max: 37.2 (18 May 2021 19:00)  T(F): 97.5 (19 May 2021 07:44), Max: 98.9 (18 May 2021 19:00)  HR: 43 (19 May 2021 10:30) (43 - 99)  BP: 113/75 (19 May 2021 10:30) (72/50 - 144/77)  BP(mean): 89 (19 May 2021 10:30) (57 - 96)  RR: 20 (19 May 2021 10:30) (13 - 42)  SpO2: 99% (19 May 2021 10:30) (88% - 100%)    PHYSICAL EXAM:    General: M intubated NAD -responsive to verbal communication     HEENT: [x ] normal  [ ] dry mouth  [ x] ET tube    Lungs: [x ] comfortable [ ] tachypnea/labored breathing  [ ] excessive secretions    CV: [ x] normal  [ ] tachycardia    GI: [x ] normal  [ ] distended  [ ] tender  [ ] no BS               [ ] PEG/NG/OG tube    : [ x] normal  [ ] incontinent  [ ] oliguria/anuria  [ ] bocanegra    MSK: [ ] normal  [ x] weakness  [ ] edema             [ ] ambulatory  [x ] bedbound/wheelchair bound    Skin: [ x] normal  [ ] pressure ulcers- Stage_____  [ ] no rash    LABS:                        11.4   7.21  )-----------( 158      ( 19 May 2021 04:41 )             36.6     05-19    135  |  94<L>  |  11.0  ----------------------------<  103<H>  3.5   |  33.0<H>  |  0.49<L>    Ca    8.0<L>      19 May 2021 04:41  Phos  2.0     05-19  Mg     2.0     05-19    TPro  6.3<L>  /  Alb  2.6<L>  /  TBili  1.5  /  DBili  x   /  AST  25  /  ALT  13  /  AlkPhos  104  05-19    PT/INR - ( 18 May 2021 05:20 )   PT: 17.3 sec;   INR: 1.52 ratio             I&O's Summary    18 May 2021 07:01  -  19 May 2021 07:00  --------------------------------------------------------  IN: 1131.2 mL / OUT: 1035 mL / NET: 96.2 mL    19 May 2021 07:01  -  19 May 2021 11:09  --------------------------------------------------------  IN: 71.2 mL / OUT: 70 mL / NET: 1.2 mL        RADIOLOGY & ADDITIONAL STUDIES:

## 2021-05-19 NOTE — PROGRESS NOTE ADULT - SUBJECTIVE AND OBJECTIVE BOX
Subjective: Pt. intubated & follows commands    VITAL SIGNS  Vital Signs Last 24 Hrs  T(C): 36.1 (21 @ 11:42), Max: 37.2 (21 @ 19:00)  T(F): 97 (21 @ 11:42), Max: 98.9 (21 @ 19:00)  HR: 59 (21 @ 12:07) (43 - 99)  BP: 90/59 (21 @ 12:00) (72/50 - 144/77)  RR: 20 (21 @ 12:00) (13 - 42)  SpO2: 99% (21 @ 12:07) (88% - 100%)  on (O2)              Telemetry:  Sinus rhythm    MEDICATIONS  acetaminophen   Tablet .. 650 milliGRAM(s) Oral every 6 hours PRN  albuterol/ipratropium for Nebulization 3 milliLiter(s) Nebulizer every 6 hours PRN  buDESOnide    Inhalation Suspension 0.25 milliGRAM(s) Inhalation every 12 hours  cefTRIAXone   IVPB 1000 milliGRAM(s) IV Intermittent every 24 hours  chlorhexidine 0.12% Liquid 15 milliLiter(s) Oral Mucosa every 12 hours  chlorhexidine 4% Liquid 1 Application(s) Topical <User Schedule>  dexMEDEtomidine Infusion 0.4 MICROgram(s)/kG/Hr IV Continuous <Continuous>  diazepam    Tablet 5 milliGRAM(s) Oral every 8 hours  diltiazem    Tablet 30 milliGRAM(s) Oral every 6 hours  enoxaparin Injectable 80 milliGRAM(s) SubCutaneous two times a day  fentaNYL    Injectable 50 MICROGram(s) IV Push every 1 hour PRN  methylPREDNISolone sodium succinate Injectable 40 milliGRAM(s) IV Push every 8 hours  nicotine - 21 mG/24Hr(s) Patch 1 patch Transdermal daily  norepinephrine Infusion 0.05 MICROgram(s)/kG/Min IV Continuous <Continuous>  oxyCODONE    IR 5 milliGRAM(s) Oral every 6 hours  pantoprazole  Injectable 40 milliGRAM(s) IV Push daily  sertraline 100 milliGRAM(s) Oral daily      PHYSICAL EXAM  General: no acute distress noted  Neurology: intubated & follows commands  Respiratory: clear to auscultation bilaterally  CV: regular rate and rhythm, normal S1, S2  Abdomen: soft, nontender, nondistended, positive bowel sounds  Extremities: warm, well perfused. no edema. + DP pulses  Chest tubes: Left pigtail to water seal      I&O's Detail    18 May 2021 07:01  -  19 May 2021 07:00  --------------------------------------------------------  IN:    Dexmedetomidine: 23 mL    IV PiggyBack: 50 mL    Jevity 1.5: 30 mL    multiple electrolytes Injection Type 1 Bolus: 1000 mL    Norepinephrine: 28.2 mL  Total IN: 1131.2 mL    OUT:    Chest Tube (mL): 50 mL    Indwelling Catheter - Urethral (mL): 635 mL    Voided (mL): 350 mL  Total OUT: 1035 mL    Total NET: 96.2 mL      19 May 2021 07:01  -  19 May 2021 13:16  --------------------------------------------------------  IN:    Dexmedetomidine: 8 mL    Enteral Tube Flush: 50 mL    Norepinephrine: 13.2 mL  Total IN: 71.2 mL    OUT:    Chest Tube (mL): 0 mL    Indwelling Catheter - Urethral (mL): 170 mL    Jevity 1.5: 0 mL  Total OUT: 170 mL    Total NET: -98.8 mL          Weights:  Daily     Daily Weight in k.9 (19 May 2021 05:00)  Admit Wt: Drug Dosing Weight  Height (cm): 182.9 (15 May 2021 19:55)  Weight (kg): 83.915 (15 May 2021 22:18)  BMI (kg/m2): 25.1 (15 May 2021 22:18)  BSA (m2): 2.06 (15 May 2021 22:18)    LABS  -    135  |  94<L>  |  11.0  ----------------------------<  103<H>  3.5   |  33.0<H>  |  0.49<L>    Ca    8.0<L>      19 May 2021 04:41  Phos  2.0       Mg     2.0         TPro  6.3<L>  /  Alb  2.6<L>  /  TBili  1.5  /  DBili  x   /  AST  25  /  ALT  13  /  AlkPhos  104                                   11.4   7.21  )-----------( 158      ( 19 May 2021 04:41 )             36.6          PT/INR - ( 18 May 2021 05:20 )   PT: 17.3 sec;   INR: 1.52 ratio               Bilirubin Total, Serum: 1.5 mg/dL ( @ 04:41)    CAPILLARY BLOOD GLUCOSE               Today's CXR:    Today's EKG:    PAST MEDICAL & SURGICAL HISTORY:  Poor historian    COPD (chronic obstructive pulmonary disease)    Pulmonary embolism    Atrial flutter    H/O solitary pulmonary nodule    Depression    No significant past surgical history          Subjective: Pt. intubated & follows commands    VITAL SIGNS  Vital Signs Last 24 Hrs  T(C): 36.1 (21 @ 11:42), Max: 37.2 (21 @ 19:00)  T(F): 97 (21 @ 11:42), Max: 98.9 (21 @ 19:00)  HR: 59 (21 @ 12:07) (43 - 99)  BP: 90/59 (21 @ 12:00) (72/50 - 144/77)  RR: 20 (21 @ 12:00) (13 - 42)  SpO2: 99% (21 @ 12:07) (88% - 100%)  on (O2)              Telemetry:  Sinus rhythm    MEDICATIONS  acetaminophen   Tablet .. 650 milliGRAM(s) Oral every 6 hours PRN  albuterol/ipratropium for Nebulization 3 milliLiter(s) Nebulizer every 6 hours PRN  buDESOnide    Inhalation Suspension 0.25 milliGRAM(s) Inhalation every 12 hours  cefTRIAXone   IVPB 1000 milliGRAM(s) IV Intermittent every 24 hours  chlorhexidine 0.12% Liquid 15 milliLiter(s) Oral Mucosa every 12 hours  chlorhexidine 4% Liquid 1 Application(s) Topical <User Schedule>  dexMEDEtomidine Infusion 0.4 MICROgram(s)/kG/Hr IV Continuous <Continuous>  diazepam    Tablet 5 milliGRAM(s) Oral every 8 hours  diltiazem    Tablet 30 milliGRAM(s) Oral every 6 hours  enoxaparin Injectable 80 milliGRAM(s) SubCutaneous two times a day  fentaNYL    Injectable 50 MICROGram(s) IV Push every 1 hour PRN  methylPREDNISolone sodium succinate Injectable 40 milliGRAM(s) IV Push every 8 hours  nicotine - 21 mG/24Hr(s) Patch 1 patch Transdermal daily  norepinephrine Infusion 0.05 MICROgram(s)/kG/Min IV Continuous <Continuous>  oxyCODONE    IR 5 milliGRAM(s) Oral every 6 hours  pantoprazole  Injectable 40 milliGRAM(s) IV Push daily  sertraline 100 milliGRAM(s) Oral daily      PHYSICAL EXAM  General: no acute distress noted  Neurology: intubated & follows commands  Respiratory: clear to auscultation bilaterally  CV: regular rate and rhythm, normal S1, S2  Abdomen: soft, nontender, nondistended, positive bowel sounds  Extremities: warm, well perfused. no edema. + DP pulses  Chest tubes: Left pigtail to water seal      I&O's Detail    18 May 2021 07:01  -  19 May 2021 07:00  --------------------------------------------------------  IN:    Dexmedetomidine: 23 mL    IV PiggyBack: 50 mL    Jevity 1.5: 30 mL    multiple electrolytes Injection Type 1 Bolus: 1000 mL    Norepinephrine: 28.2 mL  Total IN: 1131.2 mL    OUT:    Chest Tube (mL): 50 mL    Indwelling Catheter - Urethral (mL): 635 mL    Voided (mL): 350 mL  Total OUT: 1035 mL    Total NET: 96.2 mL      19 May 2021 07:01  -  19 May 2021 13:16  --------------------------------------------------------  IN:    Dexmedetomidine: 8 mL    Enteral Tube Flush: 50 mL    Norepinephrine: 13.2 mL  Total IN: 71.2 mL    OUT:    Chest Tube (mL): 0 mL    Indwelling Catheter - Urethral (mL): 170 mL    Jevity 1.5: 0 mL  Total OUT: 170 mL    Total NET: -98.8 mL          Weights:  Daily     Daily Weight in k.9 (19 May 2021 05:00)  Admit Wt: Drug Dosing Weight  Height (cm): 182.9 (15 May 2021 19:55)  Weight (kg): 83.915 (15 May 2021 22:18)  BMI (kg/m2): 25.1 (15 May 2021 22:18)  BSA (m2): 2.06 (15 May 2021 22:18)    LABS  -    135  |  94<L>  |  11.0  ----------------------------<  103<H>  3.5   |  33.0<H>  |  0.49<L>    Ca    8.0<L>      19 May 2021 04:41  Phos  2.0       Mg     2.0         TPro  6.3<L>  /  Alb  2.6<L>  /  TBili  1.5  /  DBili  x   /  AST  25  /  ALT  13  /  AlkPhos  104                                   11.4   7.21  )-----------( 158      ( 19 May 2021 04:41 )             36.6          PT/INR - ( 18 May 2021 05:20 )   PT: 17.3 sec;   INR: 1.52 ratio               Bilirubin Total, Serum: 1.5 mg/dL ( @ 04:41)    CAPILLARY BLOOD GLUCOSE               CXR:< from: Xray Chest 1 View-PORTABLE IMMEDIATE (Xray Chest 1 View-PORTABLE IMMEDIATE .) (21 @ 16:48) >  EXAM:  XR CHEST PORTABLE IMMED 1V                          PROCEDURE DATE:  2021          INTERPRETATION:  AP chest on May 18, 2021 at 4:16 PM. Patient has a left chest tube which developing airleak.    Heart is enlarged.    Catheter left chest tube and NG tube remain.    There is a persistent moderate left base effusion and adjacent infiltrate with some mild loss of fine of the left chest.    Findings are similar to earlier in the day.    IMPRESSION: Unchanged chest as above.            JOSE WATT MD; Attending Radiologist  This document has been electronically signed. May 18 2021  5:26PM    < end of copied text >          PAST MEDICAL & SURGICAL HISTORY:  Poor historian    COPD (chronic obstructive pulmonary disease)    Pulmonary embolism    Atrial flutter    H/O solitary pulmonary nodule    Depression    No significant past surgical history

## 2021-05-19 NOTE — PROGRESS NOTE ADULT - ASSESSMENT
61yr man hx of COPD, PE, Aflutter, opiate abuse,  depression admitted with acute respirator failure in the setting of   HF, Aflutter, PNA , pleural effusion      Problem/Recommendation - 1:  Acute Respiratory Failure  s/p bronchoscopy for mucous plugging  Patient ReIntubated  Discussed with Dr. Beard - patient very weak, unable to handle secretions. Need GOC discussion with HCP- trach/peg vs DNR/I     Problem/Recommendation - 2:  ·  Problem: COPD (chronic obstructive pulmonary disease). Recommendation: on nebs.      Problem/Recommendation - 3:  ·  Problem: Pleural effusion. Recommendation: s/p CT  Management per ICU  IV abx.      Problem/Recommendation - 4:  ·  Problem: Encounter for palliative care. Recommendation: Palliative Care consulted to assist with GOC and establishment of appropriate surrogate.   Karey Bocanegra is HCP - number in Provider Handoff   61yr man hx of COPD, PE, Aflutter, opiate abuse,  depression admitted with acute respirator failure in the setting of   HF, Aflutter, PNA , pleural effusion      Problem/Recommendation - 1:  Acute Respiratory Failure  s/p bronchoscopy for mucous plugging  Patient ReIntubated  Discussed with Dr. Beard - patient very weak, unable to handle secretions. Need GOC discussion with HCP   Problem/Recommendation - 2:  ·  Problem: COPD (chronic obstructive pulmonary disease). Recommendation: on nebs.      Problem/Recommendation - 3:  ·  Problem: Pleural effusion. Recommendation: s/p CT  Management per ICU  IV abx.      Problem/Recommendation - 4:  ·  Problem: Encounter for palliative care. Recommendation: Palliative Care consulted to assist with GOC and establishment of appropriate surrogate.   Karey Bocanegra is HCP - number in Provider Handoff   61yr man hx of COPD, PE, Aflutter, opiate abuse,  depression admitted with acute respirator failure in the setting of   HF, Aflutter, PNA , pleural effusion      Problem/Recommendation - 1:  Acute Respiratory Failure  s/p bronchoscopy for mucous plugging  Patient ReIntubated  Discussed with Dr. Beard - patient very weak, unable to handle secretions. Need GOC discussion with HCP   Problem/Recommendation - 2:  ·  Problem: COPD (chronic obstructive pulmonary disease). Recommendation: on nebs.      Problem/Recommendation - 3:  ·  Problem: Pleural effusion. Recommendation: s/p CT  Management per ICU  IV abx.      Problem/Recommendation - 4:  ·  Problem: Encounter for palliative care. Recommendation: Palliative Care consulted to assist with GOC and establishment of appropriate surrogate.   Stepdaughter Daljit is HCP - number in Provider Handoff  Spoke to Daljit who is aware of patient's reintubation. I informed her that stepfather named her as primary proxy and brother Han as alternate.  Discussed possibility of trach/peg given his inability to handle secretions.   Recommended to meet with medical team to discuss in detail his medical condition,  options and  next steps.   She is at work right now until 4pm, and will come after.  She will like her brother to be there as well for the discussion       61yr man hx of COPD, PE, Aflutter, opiate abuse,  depression admitted with acute respirator failure in the setting of   HF, Aflutter, PNA , pleural effusion      Problem/Recommendation - 1:  Acute Respiratory Failure  s/p bronchoscopy for mucous plugging  Patient ReIntubated  Discussed with Dr. Beard - patient very weak, unable to handle secretions. Need GOC discussion with HCP   Problem/Recommendation - 2:  ·  Problem: COPD (chronic obstructive pulmonary disease). Recommendation: on nebs.      Problem/Recommendation - 3:  ·  Problem: Pleural effusion. Recommendation: s/p CT  Management per ICU  IV abx.      Problem/Recommendation - 4:  ·  Problem: Encounter for palliative care. Recommendation: Palliative Care consulted to assist with GOC and establishment of appropriate surrogate.   Karey Bocanegra is HCP - number in Provider Handoff  Spoke to Daljit who is aware of patient's reintubation. I informed her that stepfather named her as primary proxy and brother Han as alternate.  Discussed possibility of trach/peg given his inability to handle secretions.   Recommended to meet with medical team to discuss in detail his medical condition,  options and  next steps.   She is at work right now until 4pm, and will come after.  She will like her brother to be there as well for the discussion     ADDENDUM:  Daljit zaragozalarry [facility] HCP) will be at hospital approx 4:30 today with her brothers.  Informed Dr. Beard

## 2021-05-20 LAB
ALBUMIN SERPL ELPH-MCNC: 2.5 G/DL — LOW (ref 3.3–5.2)
ALP SERPL-CCNC: 111 U/L — SIGNIFICANT CHANGE UP (ref 40–120)
ALT FLD-CCNC: 19 U/L — SIGNIFICANT CHANGE UP
ANION GAP SERPL CALC-SCNC: 9 MMOL/L — SIGNIFICANT CHANGE UP (ref 5–17)
AST SERPL-CCNC: 38 U/L — SIGNIFICANT CHANGE UP
BILIRUB SERPL-MCNC: 1.2 MG/DL — SIGNIFICANT CHANGE UP (ref 0.4–2)
BUN SERPL-MCNC: 14 MG/DL — SIGNIFICANT CHANGE UP (ref 8–20)
CALCIUM SERPL-MCNC: 8.2 MG/DL — LOW (ref 8.6–10.2)
CHLORIDE SERPL-SCNC: 94 MMOL/L — LOW (ref 98–107)
CO2 SERPL-SCNC: 31 MMOL/L — HIGH (ref 22–29)
CREAT SERPL-MCNC: 0.46 MG/DL — LOW (ref 0.5–1.3)
CULTURE RESULTS: SIGNIFICANT CHANGE UP
CULTURE RESULTS: SIGNIFICANT CHANGE UP
GLUCOSE BLDC GLUCOMTR-MCNC: 130 MG/DL — HIGH (ref 70–99)
GLUCOSE SERPL-MCNC: 108 MG/DL — HIGH (ref 70–99)
HCT VFR BLD CALC: 35.9 % — LOW (ref 39–50)
HGB BLD-MCNC: 11.5 G/DL — LOW (ref 13–17)
MAGNESIUM SERPL-MCNC: 2.1 MG/DL — SIGNIFICANT CHANGE UP (ref 1.6–2.6)
MCHC RBC-ENTMCNC: 32 GM/DL — SIGNIFICANT CHANGE UP (ref 32–36)
MCHC RBC-ENTMCNC: 32.4 PG — SIGNIFICANT CHANGE UP (ref 27–34)
MCV RBC AUTO: 101.1 FL — HIGH (ref 80–100)
PHOSPHATE SERPL-MCNC: 2.4 MG/DL — SIGNIFICANT CHANGE UP (ref 2.4–4.7)
PLATELET # BLD AUTO: 165 K/UL — SIGNIFICANT CHANGE UP (ref 150–400)
POTASSIUM SERPL-MCNC: 4 MMOL/L — SIGNIFICANT CHANGE UP (ref 3.5–5.3)
POTASSIUM SERPL-SCNC: 4 MMOL/L — SIGNIFICANT CHANGE UP (ref 3.5–5.3)
PROT SERPL-MCNC: 6.2 G/DL — LOW (ref 6.6–8.7)
RBC # BLD: 3.55 M/UL — LOW (ref 4.2–5.8)
RBC # FLD: 15.4 % — HIGH (ref 10.3–14.5)
SODIUM SERPL-SCNC: 134 MMOL/L — LOW (ref 135–145)
SPECIMEN SOURCE: SIGNIFICANT CHANGE UP
SPECIMEN SOURCE: SIGNIFICANT CHANGE UP
WBC # BLD: 3.35 K/UL — LOW (ref 3.8–10.5)
WBC # FLD AUTO: 3.35 K/UL — LOW (ref 3.8–10.5)

## 2021-05-20 PROCEDURE — 99231 SBSQ HOSP IP/OBS SF/LOW 25: CPT

## 2021-05-20 PROCEDURE — 99233 SBSQ HOSP IP/OBS HIGH 50: CPT

## 2021-05-20 PROCEDURE — 71045 X-RAY EXAM CHEST 1 VIEW: CPT | Mod: 26,76

## 2021-05-20 RX ADMIN — CHLORHEXIDINE GLUCONATE 1 APPLICATION(S): 213 SOLUTION TOPICAL at 06:01

## 2021-05-20 RX ADMIN — CEFTRIAXONE 100 MILLIGRAM(S): 500 INJECTION, POWDER, FOR SOLUTION INTRAMUSCULAR; INTRAVENOUS at 01:31

## 2021-05-20 RX ADMIN — Medication 1 PATCH: at 07:13

## 2021-05-20 RX ADMIN — Medication 1 PATCH: at 21:12

## 2021-05-20 RX ADMIN — ENOXAPARIN SODIUM 80 MILLIGRAM(S): 100 INJECTION SUBCUTANEOUS at 05:53

## 2021-05-20 RX ADMIN — Medication 1 PATCH: at 08:30

## 2021-05-20 RX ADMIN — OXYCODONE HYDROCHLORIDE 5 MILLIGRAM(S): 5 TABLET ORAL at 17:43

## 2021-05-20 RX ADMIN — Medication 40 MILLIGRAM(S): at 21:26

## 2021-05-20 RX ADMIN — Medication 0.25 MILLIGRAM(S): at 09:18

## 2021-05-20 RX ADMIN — Medication 5 MILLIGRAM(S): at 21:26

## 2021-05-20 RX ADMIN — Medication 1 PATCH: at 08:47

## 2021-05-20 RX ADMIN — CHLORHEXIDINE GLUCONATE 15 MILLILITER(S): 213 SOLUTION TOPICAL at 17:24

## 2021-05-20 RX ADMIN — Medication 3 MILLILITER(S): at 20:36

## 2021-05-20 RX ADMIN — ENOXAPARIN SODIUM 80 MILLIGRAM(S): 100 INJECTION SUBCUTANEOUS at 17:25

## 2021-05-20 RX ADMIN — Medication 5 MILLIGRAM(S): at 05:53

## 2021-05-20 RX ADMIN — CHLORHEXIDINE GLUCONATE 15 MILLILITER(S): 213 SOLUTION TOPICAL at 05:53

## 2021-05-20 RX ADMIN — Medication 40 MILLIGRAM(S): at 05:53

## 2021-05-20 RX ADMIN — Medication 0.25 MILLIGRAM(S): at 20:36

## 2021-05-20 RX ADMIN — Medication 40 MILLIGRAM(S): at 13:18

## 2021-05-20 RX ADMIN — SERTRALINE 100 MILLIGRAM(S): 25 TABLET, FILM COATED ORAL at 08:47

## 2021-05-20 RX ADMIN — PANTOPRAZOLE SODIUM 40 MILLIGRAM(S): 20 TABLET, DELAYED RELEASE ORAL at 08:47

## 2021-05-20 RX ADMIN — OXYCODONE HYDROCHLORIDE 5 MILLIGRAM(S): 5 TABLET ORAL at 18:40

## 2021-05-20 NOTE — PROGRESS NOTE ADULT - ASSESSMENT
1: Acute hypoxic respiratory failure now VDRF with failed extubation  2: A fib flutter with RVR s/p electric cardioversion   3: Shock: distributive   4: Left sided pleural effusion: Complex complicated by loculation   5: CAP with Left lower lobe aspirition and atelectasis : Serratia   7: Seg PE, right   8:  Acute heart failure: combined diastolic with RV systolic failure with underlying Pulmonary HTN- moderate     Patient seen and examined  Full code for now   Palliative care : Appreciated input, Family wanted patient to decide on trach and Peg and as mentioned above, patient is agreeable to that     Neuro:   Pain Mx: Continuation of Suboxone but since intubated, will do Oxy PRN with IV Fentanyl as needed   Sedation/Anxiolytic:  Scheduled Valium restarted as per home dosing and Precedex infusion for rest and anxiolysis as needed   Zoloft  Fall precautions     Cardiovascular:   MAP Target: 65  S/p Levo infusion, Midodrine, Stress dose steroid   TTE done through this hospitalization PAH mx with pulmonary eval and follow up     Resp/Chest:     Volume AC to PS for SBT as tolerated  PT  C/w Pulmicort and changed to PRN Duoneb   Thoracic removed CT on 5.19 with plan for trach and Peg next week   Will need Rehab     Gi/Nutrition:   Diet: Will start Jevity to goal 80 cc/hour   IV Pepcid to be restarted   Bowel Regimen as needed    ID:   Microbiological studies: neg blood Cx; neg Cx from Left pleural fluid and Bronch cx + Serratia   Abx: CTX continued and d/c'd Azithromycin     Nephro/Electrolyte/Acid-Base:   Avoid nephrotoxic agents  Strict I&O with Cr monitoring   Medications adjusted for   Close Electrolyte monitoring and correction as needed   Removing indwelling urinary catheter     Endocrinology:   TSH normal and A1C minimally elevated  ISS      Haem/Oncology:   Lovenox BID for now for PE  US LE doppler b/l    Lines/ Tubes/ Catheters/ devices:   ETT: 5/15 removed 5/17 and reintubated on 5/18-5/19  OGT: 5/15, removed on 5.17 and reinserted on 5/18-5/19  TLC: none  A-line: none  Larson:  5/15, removing on 5/20

## 2021-05-20 NOTE — PROGRESS NOTE ADULT - ASSESSMENT
61 year old male with a PMH of  smoking p/w respiratory failure, PNA, A flutter, CHF and left pleural effusion.  Left pigtail inserted by MICU on 5/15/21.  On 5/18 Thoracic surgery notified to monitor left chest tube,, chest tube clamped.  5/19 cytology obtained from left pigtail & removed, CXR stable. Pt reintubated and consult requested for tracheostomy.

## 2021-05-20 NOTE — PROGRESS NOTE ADULT - SUBJECTIVE AND OBJECTIVE BOX
Subjective: intubated, awake, follows commands    Vital Signs:  Vital Signs Last 24 Hrs  T(C): 36.6 (05-20-21 @ 16:38), Max: 37 (05-20-21 @ 04:00)  T(F): 97.9 (05-20-21 @ 16:38), Max: 98.6 (05-20-21 @ 04:00)  HR: 64 (05-20-21 @ 18:00) (57 - 76)  BP: 110/61 (05-20-21 @ 18:00) (90/57 - 120/58)  RR: 13 (05-20-21 @ 18:00) (10 - 20)  SpO2: 98% (05-20-21 @ 18:00) (96% - 100%) on 40% +5   Mode: CPAP with PS  FiO2: 40  PEEP: 5  PS: 10  MAP: 7.5      Relevant labs, radiology and Medications reviewed    Pertinent Physical Exam  Gen: WN/WD NAD  Neuro: alert, nonfocal  Pulm: essentially clear  CV: RRR  LE: no edema    CXR: < from: Xray Chest 1 View- PORTABLE-Routine (Xray Chest 1 View- PORTABLE-Routine in AM.) (05.20.21 @ 06:36) >    IMPRESSION: Persistent infiltrates.    < end of copied text >

## 2021-05-20 NOTE — PROGRESS NOTE ADULT - PROBLEM SELECTOR PLAN 1
Patient on CPAP  Continue to exercise and consider extubation per MICU team  If requires trach and settings remain low, will consider for next Tuesday.  Will continue to follow  D/W Dr Ty

## 2021-05-20 NOTE — PROGRESS NOTE ADULT - SUBJECTIVE AND OBJECTIVE BOX
Patient is a 61y old  Male who presents with a chief complaint of Respiratory Failure (16 May 2021 12:07)      BRIEF HOSPITAL COURSE: 62 y/o male with pmhx of COPD (not on home O2), active smoker 1.5 ppd, aflutter and PE on eliquis (? compliance), pulmonary nodule who presented on 5/15 with SOB, cough, anorexia found to be in aflutter with RVR along with acute hypoxic respiratory failure due to large left pleural effusion and pulmonary edema requiring intubation. Ultimately no improvement in HR and worsening shock state after intubation therefore was successfully cardioverted at 150 J. Also found to have right segmental PE and left peroneal DVT.    5.16: s/p left chest tube with 1400 cc serosanguineous fluid. remains intubated, improving fio2 requirements now on 50%/+5. remains on levophed. in NSR. Failed SBt his AM due to resp distress   5/17: Failed SAT and SBT in AM, restrated opioid and BZD and later in evening cleared SAT and SBT with subsequent extubation   5.18: Failed Swallow eval  5/19: Reintubated as failed to manage secretion and extreme intangible muscle wasting, s/p Bronchoscopy to remove thick secretion; s/p removal of Chest tube by thoracic team   5/20: GOC with patient, full code and agreeable to trach and Peg with nodding  to different individual     PAST MEDICAL & SURGICAL HISTORY:  Poor historian    COPD (chronic obstructive pulmonary disease)    Pulmonary embolism    Atrial flutter    H/O solitary pulmonary nodule    Depression    No significant past surgical history        Review of Systems:  unable to obtain due to current clinical condition.      ICU Vital Signs Last 24 Hrs  T(C): 36.6 (20 May 2021 11:49), Max: 37 (20 May 2021 04:00)  T(F): 97.9 (20 May 2021 11:49), Max: 98.6 (20 May 2021 04:00)  HR: 64 (20 May 2021 12:37) (57 - 76)  BP: 110/61 (20 May 2021 12:00) (86/62 - 117/67)  BP(mean): 75 (20 May 2021 12:00) (65 - 81)  ABP: --  ABP(mean): --  RR: 13 (20 May 2021 12:00) (11 - 20)  SpO2: 97% (20 May 2021 12:37) (96% - 100%)        General: Mild distress in bed, no acute neuro deficit, awake and alert with orientation 1-2 and slow with speech but no ac neurological deficit     HEENT: Pupils equal, reactive to light.  Symmetric.    PULM: Clear to auscultation bilaterally, no significant sputum production. chest tube secured in place.     NECK: Supple, no lymphadenopathy, trachea midline    CVS: Regular rate and rhythm, no murmurs, rubs, or gallops    ABD: Soft, nondistended, nontender, normoactive bowel sounds, no masses    EXT: No edema, nontender    SKIN: Warm and well perfused, no rashes noted.    MEDICATIONS  (STANDING):  buDESOnide    Inhalation Suspension 0.25 milliGRAM(s) Inhalation every 12 hours  cefTRIAXone   IVPB 1000 milliGRAM(s) IV Intermittent every 24 hours  chlorhexidine 0.12% Liquid 15 milliLiter(s) Oral Mucosa every 12 hours  chlorhexidine 4% Liquid 1 Application(s) Topical <User Schedule>  dexMEDEtomidine Infusion 0.4 MICROgram(s)/kG/Hr (8.39 mL/Hr) IV Continuous <Continuous>  diazepam    Tablet 5 milliGRAM(s) Oral every 8 hours  diltiazem    Tablet 30 milliGRAM(s) Oral every 6 hours  enoxaparin Injectable 80 milliGRAM(s) SubCutaneous two times a day  methylPREDNISolone sodium succinate Injectable 40 milliGRAM(s) IV Push every 8 hours  nicotine - 21 mG/24Hr(s) Patch 1 patch Transdermal daily  sertraline 100 milliGRAM(s) Oral daily    MEDICATIONS  (PRN):  acetaminophen   Tablet .. 650 milliGRAM(s) Oral every 6 hours PRN Temp greater or equal to 38C (100.4F), Mild Pain (1 - 3), Moderate Pain (4 - 6)  albuterol/ipratropium for Nebulization 3 milliLiter(s) Nebulizer every 6 hours PRN Shortness of Breath and/or Wheezing  fentaNYL    Injectable 50 MICROGram(s) IV Push every 1 hour PRN breakthrough  oxyCODONE    IR 5 milliGRAM(s) Oral every 6 hours PRN moderate to severe pain    I&O's Summary    19 May 2021 07:01  -  20 May 2021 07:00  --------------------------------------------------------  IN: 121.2 mL / OUT: 975 mL / NET: -853.8 mL    20 May 2021 07:01  -  20 May 2021 13:00  --------------------------------------------------------  IN: 0 mL / OUT: 120 mL / NET: -120 mL          I have personally provided 35  minutes of critical care time excluding time spent on separate procedures

## 2021-05-20 NOTE — PROGRESS NOTE ADULT - SUBJECTIVE AND OBJECTIVE BOX
CC:  follow up GOC  INTERVAL HPI/OVERNIGHT EVENTS:  Discussed with Dr. Beard - family came yesterday wanted patient to make decision regarding trach/peg  Patient consenting to trach/peg    PRESENT SYMPTOMS: SOURCE:  Patient/Family/Team    PAIN SCALE:  0 = none  1 = mild   2 = moderate  3 = severe    Pain:     Dyspnea:  [ ] YES [ ] NO  na on vent  Anxiety:  [ ] YES [x ] NO  Fatigue: [ x] YES [ ] NO   Nausea: [ ] YES [ ] NO  na    Loss of Appetite: [ ] YES [ ] NO  na NPO  Other symptoms: __________    MEDICATIONS  (STANDING):  buDESOnide    Inhalation Suspension 0.25 milliGRAM(s) Inhalation every 12 hours  cefTRIAXone   IVPB 1000 milliGRAM(s) IV Intermittent every 24 hours  chlorhexidine 0.12% Liquid 15 milliLiter(s) Oral Mucosa every 12 hours  chlorhexidine 4% Liquid 1 Application(s) Topical <User Schedule>  dexMEDEtomidine Infusion 0.4 MICROgram(s)/kG/Hr (8.39 mL/Hr) IV Continuous <Continuous>  diazepam    Tablet 5 milliGRAM(s) Oral every 8 hours  diltiazem    Tablet 30 milliGRAM(s) Oral every 6 hours  enoxaparin Injectable 80 milliGRAM(s) SubCutaneous two times a day  methylPREDNISolone sodium succinate Injectable 40 milliGRAM(s) IV Push every 8 hours  nicotine - 21 mG/24Hr(s) Patch 1 patch Transdermal daily  sertraline 100 milliGRAM(s) Oral daily    MEDICATIONS  (PRN):  acetaminophen   Tablet .. 650 milliGRAM(s) Oral every 6 hours PRN Temp greater or equal to 38C (100.4F), Mild Pain (1 - 3), Moderate Pain (4 - 6)  albuterol/ipratropium for Nebulization 3 milliLiter(s) Nebulizer every 6 hours PRN Shortness of Breath and/or Wheezing  fentaNYL    Injectable 50 MICROGram(s) IV Push every 1 hour PRN breakthrough  oxyCODONE    IR 5 milliGRAM(s) Oral every 6 hours PRN moderate to severe pain      Allergies    No Known Allergies    Intolerances      Karnofsky Performance Score/Palliative Performance Status Version 2: 30  %    Vital Signs Last 24 Hrs  T(C): 36.6 (20 May 2021 11:49), Max: 37 (20 May 2021 04:00)  T(F): 97.9 (20 May 2021 11:49), Max: 98.6 (20 May 2021 04:00)  HR: 57 (20 May 2021 12:00) (57 - 76)  BP: 110/61 (20 May 2021 12:00) (86/62 - 117/67)  BP(mean): 75 (20 May 2021 12:00) (65 - 81)  RR: 13 (20 May 2021 12:00) (11 - 20)  SpO2: 98% (20 May 2021 12:00) (96% - 100%)    PHYSICAL EXAM:    General: M intubated NAD  alert, responsive to questions    HEENT: [x] normal  [ ] dry mouth  [x ] ET tube  Lungs: [ x] comfortable [ ] tachypnea/labored breathing  [ ] excessive secretions    CV: [x ] normal  [ ] tachycardia    GI: [ x] normal  [ ] distended  [ ] tender  [ ] no BS               [ ] PEG/NG/OG tube    : [ x] normal  [ ] incontinent  [ ] oliguria/anuria  [ ] bocanegra    MSK: [ ] normal  [x ] weakness  [ ] edema             [ ] ambulatory  [ ] bedbound/wheelchair bound    Skin: [ ] normal  [ ] pressure ulcers- Stage_____  [x ] no rash    LABS:                        11.5   3.35  )-----------( 165      ( 20 May 2021 04:35 )             35.9     05-20    134<L>  |  94<L>  |  14.0  ----------------------------<  108<H>  4.0   |  31.0<H>  |  0.46<L>    Ca    8.2<L>      20 May 2021 04:35  Phos  2.4     05-20  Mg     2.1     05-20    TPro  6.2<L>  /  Alb  2.5<L>  /  TBili  1.2  /  DBili  x   /  AST  38  /  ALT  19  /  AlkPhos  111  05-20        I&O's Summary    19 May 2021 07:01  -  20 May 2021 07:00  --------------------------------------------------------  IN: 121.2 mL / OUT: 975 mL / NET: -853.8 mL    20 May 2021 07:01  -  20 May 2021 12:29  --------------------------------------------------------  IN: 0 mL / OUT: 120 mL / NET: -120 mL        RADIOLOGY & ADDITIONAL STUDIES:

## 2021-05-20 NOTE — PROGRESS NOTE ADULT - ASSESSMENT
61yr man hx of COPD, PE, Aflutter, opiate abuse,  depression admitted with acute respirator failure in the setting of   HF, Aflutter, PNA , pleural effusion      Problem/Recommendation - 1:  Acute Respiratory Failure  s/p bronchoscopy for mucous plugging  Patient ReIntubated - plan for trach/peg     Problem/Recommendation - 2:  ·  Problem: COPD (chronic obstructive pulmonary disease). Recommendation: on nebs.      Problem/Recommendation - 3:  ·  Problem: Pleural effusion. Recommendation: s/p CT  Management per ICU  IV abx.      Problem/Recommendation - 4:  ·  Problem: Encounter for palliative care. Recommendation: Palliative Care consulted to assist with GOC and establishment of appropriate surrogate.   Stepdasisi Bocanegra is HCP - number in Provider Handoff  Discussed with Dr. Beard - family came yesterday and wanted patient to make decisions regarding trach/peg. Patient was alert and agreeable to procedure  Met with patient this morning.  Patient intubated, responsive to verbal communications.  Followed up on discussion regarding trach/peg. He nodded his head in agreement.  Continued support

## 2021-05-20 NOTE — PROVIDER CONTACT NOTE (EICU) - ACTION/TREATMENT ORDERED:
Chest x-ray order requested to confirm ETT position. Order placed as requested, to be followed up by bedside team.
E-alerted by bedside team requesting CXR pt now s/p NGT placement, order placed as requested results to be followed up by bedside provider
Case d/w ICU PA, admit to ICU

## 2021-05-21 LAB
ALBUMIN SERPL ELPH-MCNC: 2.8 G/DL — LOW (ref 3.3–5.2)
ALP SERPL-CCNC: 126 U/L — HIGH (ref 40–120)
ALT FLD-CCNC: 37 U/L — SIGNIFICANT CHANGE UP
ANION GAP SERPL CALC-SCNC: 7 MMOL/L — SIGNIFICANT CHANGE UP (ref 5–17)
AST SERPL-CCNC: 67 U/L — HIGH
BILIRUB SERPL-MCNC: 1.1 MG/DL — SIGNIFICANT CHANGE UP (ref 0.4–2)
BUN SERPL-MCNC: 18 MG/DL — SIGNIFICANT CHANGE UP (ref 8–20)
CALCIUM SERPL-MCNC: 8.7 MG/DL — SIGNIFICANT CHANGE UP (ref 8.6–10.2)
CHLORIDE SERPL-SCNC: 93 MMOL/L — LOW (ref 98–107)
CO2 SERPL-SCNC: 35 MMOL/L — HIGH (ref 22–29)
CREAT SERPL-MCNC: 0.53 MG/DL — SIGNIFICANT CHANGE UP (ref 0.5–1.3)
CULTURE RESULTS: SIGNIFICANT CHANGE UP
GLUCOSE SERPL-MCNC: 166 MG/DL — HIGH (ref 70–99)
HCT VFR BLD CALC: 38.7 % — LOW (ref 39–50)
HGB BLD-MCNC: 12.3 G/DL — LOW (ref 13–17)
MAGNESIUM SERPL-MCNC: 2.2 MG/DL — SIGNIFICANT CHANGE UP (ref 1.6–2.6)
MCHC RBC-ENTMCNC: 31.8 GM/DL — LOW (ref 32–36)
MCHC RBC-ENTMCNC: 32.2 PG — SIGNIFICANT CHANGE UP (ref 27–34)
MCV RBC AUTO: 101.3 FL — HIGH (ref 80–100)
NON-GYNECOLOGICAL CYTOLOGY STUDY: SIGNIFICANT CHANGE UP
PHOSPHATE SERPL-MCNC: 1.6 MG/DL — LOW (ref 2.4–4.7)
PLATELET # BLD AUTO: 188 K/UL — SIGNIFICANT CHANGE UP (ref 150–400)
POTASSIUM SERPL-MCNC: 3.8 MMOL/L — SIGNIFICANT CHANGE UP (ref 3.5–5.3)
POTASSIUM SERPL-SCNC: 3.8 MMOL/L — SIGNIFICANT CHANGE UP (ref 3.5–5.3)
PROT SERPL-MCNC: 6.5 G/DL — LOW (ref 6.6–8.7)
RBC # BLD: 3.82 M/UL — LOW (ref 4.2–5.8)
RBC # FLD: 14.9 % — HIGH (ref 10.3–14.5)
SODIUM SERPL-SCNC: 135 MMOL/L — SIGNIFICANT CHANGE UP (ref 135–145)
SPECIMEN SOURCE: SIGNIFICANT CHANGE UP
WBC # BLD: 3.46 K/UL — LOW (ref 3.8–10.5)
WBC # FLD AUTO: 3.46 K/UL — LOW (ref 3.8–10.5)

## 2021-05-21 PROCEDURE — 71045 X-RAY EXAM CHEST 1 VIEW: CPT | Mod: 26

## 2021-05-21 PROCEDURE — 99233 SBSQ HOSP IP/OBS HIGH 50: CPT

## 2021-05-21 PROCEDURE — 99231 SBSQ HOSP IP/OBS SF/LOW 25: CPT

## 2021-05-21 RX ORDER — CEFTRIAXONE 500 MG/1
1000 INJECTION, POWDER, FOR SOLUTION INTRAMUSCULAR; INTRAVENOUS EVERY 24 HOURS
Refills: 0 | Status: COMPLETED | OUTPATIENT
Start: 2021-05-22 | End: 2021-05-24

## 2021-05-21 RX ORDER — DOXAZOSIN MESYLATE 4 MG
4 TABLET ORAL AT BEDTIME
Refills: 0 | Status: DISCONTINUED | OUTPATIENT
Start: 2021-05-21 | End: 2021-05-29

## 2021-05-21 RX ORDER — PANTOPRAZOLE SODIUM 20 MG/1
40 TABLET, DELAYED RELEASE ORAL DAILY
Refills: 0 | Status: DISCONTINUED | OUTPATIENT
Start: 2021-05-21 | End: 2021-06-05

## 2021-05-21 RX ADMIN — ENOXAPARIN SODIUM 80 MILLIGRAM(S): 100 INJECTION SUBCUTANEOUS at 05:34

## 2021-05-21 RX ADMIN — Medication 3 MILLILITER(S): at 20:18

## 2021-05-21 RX ADMIN — CHLORHEXIDINE GLUCONATE 15 MILLILITER(S): 213 SOLUTION TOPICAL at 18:14

## 2021-05-21 RX ADMIN — Medication 40 MILLIGRAM(S): at 05:35

## 2021-05-21 RX ADMIN — Medication 30 MILLIGRAM(S): at 12:02

## 2021-05-21 RX ADMIN — CHLORHEXIDINE GLUCONATE 1 APPLICATION(S): 213 SOLUTION TOPICAL at 05:34

## 2021-05-21 RX ADMIN — ENOXAPARIN SODIUM 80 MILLIGRAM(S): 100 INJECTION SUBCUTANEOUS at 18:14

## 2021-05-21 RX ADMIN — CEFTRIAXONE 100 MILLIGRAM(S): 500 INJECTION, POWDER, FOR SOLUTION INTRAMUSCULAR; INTRAVENOUS at 02:29

## 2021-05-21 RX ADMIN — CHLORHEXIDINE GLUCONATE 15 MILLILITER(S): 213 SOLUTION TOPICAL at 05:35

## 2021-05-21 RX ADMIN — SERTRALINE 100 MILLIGRAM(S): 25 TABLET, FILM COATED ORAL at 12:02

## 2021-05-21 RX ADMIN — Medication 5 MILLIGRAM(S): at 05:34

## 2021-05-21 RX ADMIN — Medication 0.25 MILLIGRAM(S): at 20:18

## 2021-05-21 RX ADMIN — Medication 1 PATCH: at 18:18

## 2021-05-21 RX ADMIN — Medication 4 MILLIGRAM(S): at 21:07

## 2021-05-21 RX ADMIN — Medication 1 PATCH: at 12:02

## 2021-05-21 RX ADMIN — Medication 0.25 MILLIGRAM(S): at 09:24

## 2021-05-21 NOTE — PHYSICAL THERAPY INITIAL EVALUATION ADULT - LEVEL OF INDEPENDENCE: SIT/STAND, REHAB EVAL
not performed. Pt refused to attempt scooting along edge of bed; refused further activity beyond sitting at edge of bed

## 2021-05-21 NOTE — PHYSICAL THERAPY INITIAL EVALUATION ADULT - GENERAL OBSERVATIONS, REHAB EVAL
Pt received lying in bed on 3 east, (+) endotracheal tube to vent-CPAP, (+) cardiac monitor, (+) bocanegra, (+) NG tube feed, sleeping, but arouses to verbal cues. Agreeable to PT with encouragement.

## 2021-05-21 NOTE — PROGRESS NOTE ADULT - SUBJECTIVE AND OBJECTIVE BOX
Subjective - patient seen and evaluated bedside. Sitting comfortably in bed. Intubated. Denies CP, SOB, HA, dizziness, n/v/d    Review of Systems: negative x 10 systems except as noted above    Brief summary:  61yMale with hypoxic resp failure. consult for trach/PEG      PAST MEDICAL & SURGICAL HISTORY:  Poor historian    COPD (chronic obstructive pulmonary disease)    Pulmonary embolism    Atrial flutter    H/O solitary pulmonary nodule    Depression    No significant past surgical history    No significant past surgical history          albuterol/ipratropium for Nebulization 3 milliLiter(s) Nebulizer every 6 hours PRN  buDESOnide    Inhalation Suspension 0.25 milliGRAM(s) Inhalation every 12 hours  cefTRIAXone   IVPB 1000 milliGRAM(s) IV Intermittent every 24 hours  chlorhexidine 0.12% Liquid 15 milliLiter(s) Oral Mucosa every 12 hours  chlorhexidine 4% Liquid 1 Application(s) Topical <User Schedule>  diazepam    Tablet 5 milliGRAM(s) Oral every 8 hours  diltiazem    Tablet 30 milliGRAM(s) Oral every 6 hours  doxazosin 4 milliGRAM(s) Oral at bedtime  enoxaparin Injectable 80 milliGRAM(s) SubCutaneous two times a day  fentaNYL    Injectable 50 MICROGram(s) IV Push every 1 hour PRN  nicotine - 21 mG/24Hr(s) Patch 1 patch Transdermal daily  oxyCODONE    IR 5 milliGRAM(s) Oral every 6 hours PRN  predniSONE   Tablet 20 milliGRAM(s) Oral daily  sertraline 100 milliGRAM(s) Oral daily  MEDICATIONS  (PRN):  albuterol/ipratropium for Nebulization 3 milliLiter(s) Nebulizer every 6 hours PRN Shortness of Breath and/or Wheezing  fentaNYL    Injectable 50 MICROGram(s) IV Push every 1 hour PRN breakthrough  oxyCODONE    IR 5 milliGRAM(s) Oral every 6 hours PRN moderate to severe pain    Mode: CPAP with PS, PEEP: 5, PS: 10  Daily     Daily Weight in k.4 (21 May 2021 04:00)                              12.3   3.46  )-----------( 188      ( 21 May 2021 04:23 )             38.7   05-21    135  |  93<L>  |  18.0  ----------------------------<  166<H>  3.8   |  35.0<H>  |  0.53    Ca    8.7      21 May 2021 04:23  Phos  1.6       Mg     2.2         TPro  6.5<L>  /  Alb  2.8<L>  /  TBili  1.1  /  DBili  x   /  AST  67<H>  /  ALT  37  /  AlkPhos  126<H>              Objective:  T(C): 37 (21 @ 11:45), Max: 37.1 (21 @ 03:13)  HR: 58 (21 @ 11:00) (53 - 69)  BP: 111/62 (21 @ 10:00) (99/60 - 122/68)  RR: 12 (21 @ 11:00) (10 - 20)  SpO2: 98% (21 @ 11:00) (97% - 100%)  Wt(kg): --CAPILLARY BLOOD GLUCOSE      POCT Blood Glucose.: 130 mg/dL (20 May 2021 16:02)  I&O's Summary    20 May 2021 07:  -  21 May 2021 07:00  --------------------------------------------------------  IN: 600 mL / OUT: 1065 mL / NET: -465 mL    21 May 2021 07:  -  21 May 2021 12:42  --------------------------------------------------------  IN: 240 mL / OUT: 70 mL / NET: 170 mL        Physical Exam  Neuro: Alert, follows commands, can nod head yes/no to questions. MAEx4  Pulm: CTA, equal bilaterally  CV: RRR,  +S1S2  Abd: soft, NT, ND, +BS  Ext: +DP Pulses b/l,  no edema      Imaging:  CXR:  < from: Xray Chest 1 View-PORTABLE IMMEDIATE (Xray Chest 1 View-PORTABLE IMMEDIATE .) (21 @ 20:00) >  INTERPRETATION:  AP chest on May 20, 2021 at 7:43 PM. Patient had NG tube placement.    Heart magnified by technique.    There is a persistent left base pleural pulmonary process.    Infiltrate at right base medially earlier in the day shows improvement.    There are several old right rib fractures again noted.    Endotracheal tube and nasogastric tube again noted. The NG tube courses into the stomach.    IMPRESSION: Improved right base infiltrate. Otherwise stable findings as above.    < end of copied text >

## 2021-05-21 NOTE — PROGRESS NOTE ADULT - ASSESSMENT
1: Acute hypoxic respiratory failure now VDRF with failed extubation  2: A fib flutter with RVR s/p electric cardioversion   3: Shock: distributive   4: Left sided pleural effusion: Complex complicated by loculation   5: CAP with Left lower lobe aspirition and atelectasis : Serratia   7: Seg PE, right   8:  Acute heart failure: combined diastolic with RV systolic failure with underlying Pulmonary HTN- moderate     Patient seen and examined  Full code for now   Palliative care : Appreciated input    Neuro:   Pain Mx: Continuation of Suboxone but since intubated, will do Oxy PRN with IV Fentanyl as needed   Sedation/Anxiolytic:  Scheduled Valium restarted as per home dosing , Stopped Precedex as he has not required > 24 hours   Zoloft continued   Fall precautions     Cardiovascular:   MAP Target: 65  S/p Levo infusion, Midodrine, Stress dose steroid   TTE done through this hospitalization PAH mx with pulmonary eval and follow up post trach and Peg    Resp/Chest:     Volume AC to PS for SBT as tolerated  PT  C/w Pulmicort and changed to PRN Duoneb   Thoracic removed CT on 5.19 with plan for trach and Peg next week   Will need Rehab   OOB with PT    Gi/Nutrition:   Diet: Will start Jevity to goal 80 cc/hour   IV Pepcid to be restarted   Bowel Regimen as needed    ID:   Microbiological studies: neg blood Cx; neg Cx from Left pleural fluid and Bronch cx + Serratia   Abx: CTX continued and d/c'd Azithromycin     Nephro/Electrolyte/Acid-Base:   Avoid nephrotoxic agents  Strict I&O with Cr monitoring   Medications adjusted for   Close Electrolyte monitoring and correction as needed   Removing indwelling urinary catheter     Endocrinology:   TSH normal and A1C minimally elevated  ISS      Haem/Oncology:   Lovenox BID for now for PE  US LE doppler b/l    Lines/ Tubes/ Catheters/ devices:   ETT: 5/15 removed 5/17 and reintubated on 5/18-5/19  OGT: 5/15, removed on 5.17 and reinserted on 5/18-5/19  TLC: none  A-line: none  Larson:  5/15, removing on 5/20 and placed again last night for retention

## 2021-05-21 NOTE — PHYSICAL THERAPY INITIAL EVALUATION ADULT - ADDITIONAL COMMENTS
Pt is a questionable historian. SW: Tabitha spoke with pt's family and reports to this PT that pt lives with stepson in a private house. Pt resides in basement. 3 steps to enter dwelling, then full flight down to basement with rail. Independent at baseline, no device.

## 2021-05-21 NOTE — PROGRESS NOTE ADULT - SUBJECTIVE AND OBJECTIVE BOX
Patient is a 61y old  Male who presents with a chief complaint of Respiratory Failure (16 May 2021 12:07)      BRIEF HOSPITAL COURSE: 62 y/o male with pmhx of COPD (not on home O2), active smoker 1.5 ppd, aflutter and PE on eliquis (? compliance), pulmonary nodule who presented on 5/15 with SOB, cough, anorexia found to be in aflutter with RVR along with acute hypoxic respiratory failure due to large left pleural effusion and pulmonary edema requiring intubation. Ultimately no improvement in HR and worsening shock state after intubation therefore was successfully cardioverted at 150 J. Also found to have right segmental PE and left peroneal DVT.    5.16: s/p left chest tube with 1400 cc serosanguineous fluid. remains intubated, improving fio2 requirements now on 50%/+5. remains on levophed. in NSR. Failed SBt his AM due to resp distress   5/17: Failed SAT and SBT in AM, restrated opioid and BZD and later in evening cleared SAT and SBT with subsequent extubation   5.18: Failed Swallow eval  5/19: Reintubated as failed to manage secretion and extreme intangible muscle wasting, s/p Bronchoscopy to remove thick secretion; s/p removal of Chest tube by thoracic team   5/20: GOC with patient, full code and agreeable to trach and Peg  5/21: Remained off of Precedex with following commands, plan for trach and Peg next week     PAST MEDICAL & SURGICAL HISTORY:  Poor historian    COPD (chronic obstructive pulmonary disease)    Pulmonary embolism    Atrial flutter    H/O solitary pulmonary nodule    Depression    No significant past surgical history        Review of Systems:  unable to obtain due to current clinical condition.      ICU Vital Signs Last 24 Hrs  T(C): 36.9 (21 May 2021 08:06), Max: 37.1 (21 May 2021 03:13)  T(F): 98.4 (21 May 2021 08:06), Max: 98.8 (21 May 2021 03:13)  HR: 59 (21 May 2021 10:00) (53 - 69)  BP: 111/62 (21 May 2021 10:00) (99/60 - 122/68)  BP(mean): 77 (21 May 2021 10:00) (68 - 82)  ABP: --  ABP(mean): --  RR: 16 (21 May 2021 10:00) (10 - 20)  SpO2: 99% (21 May 2021 10:00) (97% - 100%)          General: Mild distress in bed, no acute neuro deficit, awake and alert with orientation 1-2 and slow with speech but no ac neurological deficit     HEENT: Pupils equal, reactive to light.  Symmetric.    PULM: Clear to auscultation bilaterally, no significant sputum production.   NECK: Supple, no lymphadenopathy, trachea midline    CVS: Regular rate and rhythm, no murmurs, rubs, or gallops    ABD: Soft, nondistended, nontender, normoactive bowel sounds, no masses    EXT: No edema, nontender    SKIN: Warm and well perfused, no rashes noted.    MEDICATIONS  (STANDING):  buDESOnide    Inhalation Suspension 0.25 milliGRAM(s) Inhalation every 12 hours  cefTRIAXone   IVPB 1000 milliGRAM(s) IV Intermittent every 24 hours  chlorhexidine 0.12% Liquid 15 milliLiter(s) Oral Mucosa every 12 hours  chlorhexidine 4% Liquid 1 Application(s) Topical <User Schedule>  diazepam    Tablet 5 milliGRAM(s) Oral every 8 hours  diltiazem    Tablet 30 milliGRAM(s) Oral every 6 hours  enoxaparin Injectable 80 milliGRAM(s) SubCutaneous two times a day  nicotine - 21 mG/24Hr(s) Patch 1 patch Transdermal daily  predniSONE   Tablet 20 milliGRAM(s) Oral daily  sertraline 100 milliGRAM(s) Oral daily    MEDICATIONS  (PRN):  albuterol/ipratropium for Nebulization 3 milliLiter(s) Nebulizer every 6 hours PRN Shortness of Breath and/or Wheezing  fentaNYL    Injectable 50 MICROGram(s) IV Push every 1 hour PRN breakthrough  oxyCODONE    IR 5 milliGRAM(s) Oral every 6 hours PRN moderate to severe pain      I&O's Summary    20 May 2021 07:01  -  21 May 2021 07:00  --------------------------------------------------------  IN: 600 mL / OUT: 1065 mL / NET: -465 mL    21 May 2021 07:01  -  21 May 2021 10:47  --------------------------------------------------------  IN: 240 mL / OUT: 70 mL / NET: 170 mL        I have personally provided 35  minutes of critical care time excluding time spent on separate procedures

## 2021-05-21 NOTE — PHYSICAL THERAPY INITIAL EVALUATION ADULT - PERTINENT HX OF CURRENT PROBLEM, REHAB EVAL
60 y/o male with respiratory failure, PNA. Extubated and reintubated 2/2 mucus plugging and subsequent bronch.  Plan for possible trach and PEG this coming week.

## 2021-05-22 LAB
ALBUMIN SERPL ELPH-MCNC: 2.7 G/DL — LOW (ref 3.3–5.2)
ALP SERPL-CCNC: 135 U/L — HIGH (ref 40–120)
ALT FLD-CCNC: 61 U/L — HIGH
ANION GAP SERPL CALC-SCNC: 5 MMOL/L — SIGNIFICANT CHANGE UP (ref 5–17)
ANION GAP SERPL CALC-SCNC: 6 MMOL/L — SIGNIFICANT CHANGE UP (ref 5–17)
APTT BLD: 38.6 SEC — HIGH (ref 27.5–35.5)
AST SERPL-CCNC: 74 U/L — HIGH
BASOPHILS # BLD AUTO: 0 K/UL — SIGNIFICANT CHANGE UP (ref 0–0.2)
BASOPHILS NFR BLD AUTO: 0 % — SIGNIFICANT CHANGE UP (ref 0–2)
BILIRUB SERPL-MCNC: 0.8 MG/DL — SIGNIFICANT CHANGE UP (ref 0.4–2)
BLD GP AB SCN SERPL QL: SIGNIFICANT CHANGE UP
BUN SERPL-MCNC: 13 MG/DL — SIGNIFICANT CHANGE UP (ref 8–20)
BUN SERPL-MCNC: 17 MG/DL — SIGNIFICANT CHANGE UP (ref 8–20)
CALCIUM SERPL-MCNC: 8.1 MG/DL — LOW (ref 8.6–10.2)
CALCIUM SERPL-MCNC: 8.3 MG/DL — LOW (ref 8.6–10.2)
CHLORIDE SERPL-SCNC: 94 MMOL/L — LOW (ref 98–107)
CHLORIDE SERPL-SCNC: 95 MMOL/L — LOW (ref 98–107)
CO2 SERPL-SCNC: 37 MMOL/L — HIGH (ref 22–29)
CO2 SERPL-SCNC: 38 MMOL/L — HIGH (ref 22–29)
CREAT SERPL-MCNC: 0.38 MG/DL — LOW (ref 0.5–1.3)
CREAT SERPL-MCNC: 0.45 MG/DL — LOW (ref 0.5–1.3)
EOSINOPHIL # BLD AUTO: 0 K/UL — SIGNIFICANT CHANGE UP (ref 0–0.5)
EOSINOPHIL NFR BLD AUTO: 0 % — SIGNIFICANT CHANGE UP (ref 0–6)
GLUCOSE BLDC GLUCOMTR-MCNC: 106 MG/DL — HIGH (ref 70–99)
GLUCOSE BLDC GLUCOMTR-MCNC: 187 MG/DL — HIGH (ref 70–99)
GLUCOSE SERPL-MCNC: 149 MG/DL — HIGH (ref 70–99)
GLUCOSE SERPL-MCNC: 219 MG/DL — HIGH (ref 70–99)
HCT VFR BLD CALC: 36.9 % — LOW (ref 39–50)
HCT VFR BLD CALC: 37.5 % — LOW (ref 39–50)
HCT VFR BLD CALC: 38.5 % — LOW (ref 39–50)
HGB BLD-MCNC: 11.3 G/DL — LOW (ref 13–17)
HGB BLD-MCNC: 11.7 G/DL — LOW (ref 13–17)
HGB BLD-MCNC: 11.8 G/DL — LOW (ref 13–17)
IMM GRANULOCYTES NFR BLD AUTO: 0.4 % — SIGNIFICANT CHANGE UP (ref 0–1.5)
INR BLD: 1.25 RATIO — HIGH (ref 0.88–1.16)
LYMPHOCYTES # BLD AUTO: 0.51 K/UL — LOW (ref 1–3.3)
LYMPHOCYTES # BLD AUTO: 11.1 % — LOW (ref 13–44)
MAGNESIUM SERPL-MCNC: 2 MG/DL — SIGNIFICANT CHANGE UP (ref 1.6–2.6)
MAGNESIUM SERPL-MCNC: 2 MG/DL — SIGNIFICANT CHANGE UP (ref 1.6–2.6)
MCHC RBC-ENTMCNC: 30.6 GM/DL — LOW (ref 32–36)
MCHC RBC-ENTMCNC: 30.6 GM/DL — LOW (ref 32–36)
MCHC RBC-ENTMCNC: 31.2 GM/DL — LOW (ref 32–36)
MCHC RBC-ENTMCNC: 32 PG — SIGNIFICANT CHANGE UP (ref 27–34)
MCHC RBC-ENTMCNC: 32.2 PG — SIGNIFICANT CHANGE UP (ref 27–34)
MCHC RBC-ENTMCNC: 32.2 PG — SIGNIFICANT CHANGE UP (ref 27–34)
MCV RBC AUTO: 103.3 FL — HIGH (ref 80–100)
MCV RBC AUTO: 104.5 FL — HIGH (ref 80–100)
MCV RBC AUTO: 104.9 FL — HIGH (ref 80–100)
MONOCYTES # BLD AUTO: 0.3 K/UL — SIGNIFICANT CHANGE UP (ref 0–0.9)
MONOCYTES NFR BLD AUTO: 6.5 % — SIGNIFICANT CHANGE UP (ref 2–14)
NEUTROPHILS # BLD AUTO: 3.78 K/UL — SIGNIFICANT CHANGE UP (ref 1.8–7.4)
NEUTROPHILS NFR BLD AUTO: 82 % — HIGH (ref 43–77)
PHOSPHATE SERPL-MCNC: 1.8 MG/DL — LOW (ref 2.4–4.7)
PHOSPHATE SERPL-MCNC: 2.3 MG/DL — LOW (ref 2.4–4.7)
PLATELET # BLD AUTO: 144 K/UL — LOW (ref 150–400)
PLATELET # BLD AUTO: 151 K/UL — SIGNIFICANT CHANGE UP (ref 150–400)
PLATELET # BLD AUTO: 167 K/UL — SIGNIFICANT CHANGE UP (ref 150–400)
POTASSIUM SERPL-MCNC: 3.9 MMOL/L — SIGNIFICANT CHANGE UP (ref 3.5–5.3)
POTASSIUM SERPL-MCNC: 4.3 MMOL/L — SIGNIFICANT CHANGE UP (ref 3.5–5.3)
POTASSIUM SERPL-SCNC: 3.9 MMOL/L — SIGNIFICANT CHANGE UP (ref 3.5–5.3)
POTASSIUM SERPL-SCNC: 4.3 MMOL/L — SIGNIFICANT CHANGE UP (ref 3.5–5.3)
PROT SERPL-MCNC: 6.2 G/DL — LOW (ref 6.6–8.7)
PROTHROM AB SERPL-ACNC: 14.3 SEC — HIGH (ref 10.6–13.6)
RBC # BLD: 3.53 M/UL — LOW (ref 4.2–5.8)
RBC # BLD: 3.63 M/UL — LOW (ref 4.2–5.8)
RBC # BLD: 3.67 M/UL — LOW (ref 4.2–5.8)
RBC # FLD: 14.9 % — HIGH (ref 10.3–14.5)
RBC # FLD: 15 % — HIGH (ref 10.3–14.5)
RBC # FLD: 15 % — HIGH (ref 10.3–14.5)
SARS-COV-2 RNA SPEC QL NAA+PROBE: SIGNIFICANT CHANGE UP
SODIUM SERPL-SCNC: 137 MMOL/L — SIGNIFICANT CHANGE UP (ref 135–145)
SODIUM SERPL-SCNC: 138 MMOL/L — SIGNIFICANT CHANGE UP (ref 135–145)
WBC # BLD: 3.88 K/UL — SIGNIFICANT CHANGE UP (ref 3.8–10.5)
WBC # BLD: 4.28 K/UL — SIGNIFICANT CHANGE UP (ref 3.8–10.5)
WBC # BLD: 4.61 K/UL — SIGNIFICANT CHANGE UP (ref 3.8–10.5)
WBC # FLD AUTO: 3.88 K/UL — SIGNIFICANT CHANGE UP (ref 3.8–10.5)
WBC # FLD AUTO: 4.28 K/UL — SIGNIFICANT CHANGE UP (ref 3.8–10.5)
WBC # FLD AUTO: 4.61 K/UL — SIGNIFICANT CHANGE UP (ref 3.8–10.5)

## 2021-05-22 PROCEDURE — 99233 SBSQ HOSP IP/OBS HIGH 50: CPT

## 2021-05-22 PROCEDURE — 99231 SBSQ HOSP IP/OBS SF/LOW 25: CPT

## 2021-05-22 RX ORDER — POTASSIUM PHOSPHATE, MONOBASIC POTASSIUM PHOSPHATE, DIBASIC 236; 224 MG/ML; MG/ML
15 INJECTION, SOLUTION INTRAVENOUS ONCE
Refills: 0 | Status: COMPLETED | OUTPATIENT
Start: 2021-05-22 | End: 2021-05-22

## 2021-05-22 RX ADMIN — Medication 0.25 MILLIGRAM(S): at 20:35

## 2021-05-22 RX ADMIN — Medication 0.25 MILLIGRAM(S): at 09:15

## 2021-05-22 RX ADMIN — Medication 1 PATCH: at 08:19

## 2021-05-22 RX ADMIN — Medication 5 MILLIGRAM(S): at 17:13

## 2021-05-22 RX ADMIN — Medication 1 PATCH: at 11:44

## 2021-05-22 RX ADMIN — CEFTRIAXONE 100 MILLIGRAM(S): 500 INJECTION, POWDER, FOR SOLUTION INTRAMUSCULAR; INTRAVENOUS at 23:20

## 2021-05-22 RX ADMIN — Medication 30 MILLIGRAM(S): at 00:05

## 2021-05-22 RX ADMIN — FENTANYL CITRATE 50 MICROGRAM(S): 50 INJECTION INTRAVENOUS at 21:45

## 2021-05-22 RX ADMIN — FENTANYL CITRATE 50 MICROGRAM(S): 50 INJECTION INTRAVENOUS at 15:45

## 2021-05-22 RX ADMIN — CHLORHEXIDINE GLUCONATE 15 MILLILITER(S): 213 SOLUTION TOPICAL at 17:13

## 2021-05-22 RX ADMIN — SERTRALINE 100 MILLIGRAM(S): 25 TABLET, FILM COATED ORAL at 11:45

## 2021-05-22 RX ADMIN — Medication 30 MILLIGRAM(S): at 17:13

## 2021-05-22 RX ADMIN — ENOXAPARIN SODIUM 80 MILLIGRAM(S): 100 INJECTION SUBCUTANEOUS at 05:13

## 2021-05-22 RX ADMIN — CHLORHEXIDINE GLUCONATE 15 MILLILITER(S): 213 SOLUTION TOPICAL at 05:14

## 2021-05-22 RX ADMIN — Medication 1 PATCH: at 12:02

## 2021-05-22 RX ADMIN — Medication 20 MILLIGRAM(S): at 05:13

## 2021-05-22 RX ADMIN — PANTOPRAZOLE SODIUM 40 MILLIGRAM(S): 20 TABLET, DELAYED RELEASE ORAL at 12:37

## 2021-05-22 RX ADMIN — Medication 30 MILLIGRAM(S): at 11:45

## 2021-05-22 RX ADMIN — FENTANYL CITRATE 50 MICROGRAM(S): 50 INJECTION INTRAVENOUS at 15:50

## 2021-05-22 RX ADMIN — FENTANYL CITRATE 50 MICROGRAM(S): 50 INJECTION INTRAVENOUS at 21:41

## 2021-05-22 RX ADMIN — FENTANYL CITRATE 50 MICROGRAM(S): 50 INJECTION INTRAVENOUS at 13:40

## 2021-05-22 RX ADMIN — Medication 30 MILLIGRAM(S): at 23:20

## 2021-05-22 RX ADMIN — POTASSIUM PHOSPHATE, MONOBASIC POTASSIUM PHOSPHATE, DIBASIC 62.5 MILLIMOLE(S): 236; 224 INJECTION, SOLUTION INTRAVENOUS at 06:38

## 2021-05-22 RX ADMIN — CHLORHEXIDINE GLUCONATE 1 APPLICATION(S): 213 SOLUTION TOPICAL at 05:14

## 2021-05-22 RX ADMIN — FENTANYL CITRATE 50 MICROGRAM(S): 50 INJECTION INTRAVENOUS at 12:50

## 2021-05-22 RX ADMIN — OXYCODONE HYDROCHLORIDE 5 MILLIGRAM(S): 5 TABLET ORAL at 11:45

## 2021-05-22 RX ADMIN — Medication 4 MILLIGRAM(S): at 21:35

## 2021-05-22 RX ADMIN — CEFTRIAXONE 100 MILLIGRAM(S): 500 INJECTION, POWDER, FOR SOLUTION INTRAMUSCULAR; INTRAVENOUS at 00:05

## 2021-05-22 RX ADMIN — Medication 5 MILLIGRAM(S): at 05:13

## 2021-05-22 NOTE — PROGRESS NOTE ADULT - SUBJECTIVE AND OBJECTIVE BOX
Subjective: Pt intubated but awake and follows commands appropriately, communicates with writing, tolerating CPAP 5/5 40%.     Vital Signs:  Vital Signs Last 24 Hrs  T(C): 37.3 (05-22-21 @ 15:49), Max: 37.5 (05-22-21 @ 07:32)  T(F): 99.1 (05-22-21 @ 15:49), Max: 99.5 (05-22-21 @ 07:32)  HR: 69 (05-22-21 @ 15:00) (57 - 84)  BP: 114/65 (05-22-21 @ 15:00) (95/54 - 129/64)  RR: 18 (05-22-21 @ 08:00) (10 - 20)  SpO2: 97% (05-22-21 @ 15:00) (97% - 100%) on (O2)    Telemetry/Alarms:    Relevant labs, radiology and Medications reviewed    Pertinent Physical Exam  Gen: *WN/WD NAD  Neuro: A+Ox3, nonfocal  Pulm:  CV: RRR  LE: no edema*    CXR:   Subjective: Pt intubated but awake and follows commands appropriately, communicates with writing, tolerating CPAP 5/5 40%.     Vital Signs:  Vital Signs Last 24 Hrs  T(C): 37.3 (05-22-21 @ 15:49), Max: 37.5 (05-22-21 @ 07:32)  T(F): 99.1 (05-22-21 @ 15:49), Max: 99.5 (05-22-21 @ 07:32)  HR: 69 (05-22-21 @ 15:00) (57 - 84)  BP: 114/65 (05-22-21 @ 15:00) (95/54 - 129/64)  RR: 18 (05-22-21 @ 08:00) (10 - 20)  SpO2: 97% (05-22-21 @ 15:00) (97% - 100%) on40%    Telemetry/Alarms: SR    Relevant labs, radiology and Medications reviewed    Pertinent Physical Exam  Gen: WN/WD NAD  Neuro: A+Ox3, nonfocal  Pulm: diminished b/l, + bloody secretions  CV: RRR  LE: no edema    CXR: < from: Xray Chest 1 View-PORTABLE IMMEDIATE (Xray Chest 1 View-PORTABLE IMMEDIATE .) (05.21.21 @ 19:03) >    IMPRESSION:   ET tube tip above tracheal bifurcation.  NG tube tip beyond GE junction.  . Residual mild LEFT pleuraleffusion and/or basilar airspace disease.        < end of copied text >

## 2021-05-22 NOTE — PROGRESS NOTE ADULT - ASSESSMENT
60y/o male     : Acute hypoxic respiratory failure now VDRF with failed extubation  2: A fib flutter with RVR s/p electric cardioversion   3: Shock: distributive   4: Left sided pleural effusion: Complex complicated by loculation   5: CAP with Left lower lobe aspirition and atelectasis : Serratia   7: Seg PE, right   8:  Acute heart failure: combined diastolic with RV systolic failure with underlying Pulmonary HTN- moderate   9- bloody secretions  stable hb    neurologic oxy prn fentanyl  trial of anti-depressant zoloft    valium pm  precedex    respiratory   pending  trach    40 %  a/c 20/ 400cc/5 keep sats > 90 %  duonebs  symbicort    follow up fluid cytology left effusion     cvs    s/p levo  midodrine stress dose steroids now on prednisone     heme oncology full dose lovenox          gi   peg pending    jevity goal  80 cc/hour     Lines/ Tubes/ Catheters/ devices:   ETT: 5/15 removed 5/17 and reintubated on 5/18-5/19  OGT: 5/15, removed on 5.17 and reinserted on 5/18-5/19  TLC: none  A-line: none  Larson:  5/15, removing on 5/20    Critical  care TIme  > 35 minutes were spent assessing the patient's presenting problems of acute illness that pose a high probability   of life threatening  deterioration or end organ damage / dysfunction.  Medical decision making includes initiation/ continuation of plan or care review data/labwork/  radiographic study,  direct patient  care bedside ,  discussions with  consultants regarding care,     evaluation  and  interpretation of vital signs,  any necessary ventilator management,   NIV or BIPAP changes  or initiation,    discussions with multidisciplinary team,  am or pm rounds,  discussions of goals of care with patient and family  all non-inclusive of procedures.

## 2021-05-22 NOTE — PROGRESS NOTE ADULT - PROBLEM SELECTOR PLAN 1
Patient on CPAP  Continue to exercise and consider extubation per MICU team  Lovenox held for bloody secretions  If requires trach and settings remain low, will consider for next Tuesday.  Will continue to follow  D/W Dr Ty

## 2021-05-22 NOTE — PROGRESS NOTE ADULT - SUBJECTIVE AND OBJECTIVE BOX
LUKAS Rolle    Patient is a 61y old  Male who presents with a chief complaint of Respiratory Failure (21 May 2021 12:42)      BRIEF HOSPITAL COURSE: 62 y/o male with pmhx of COPD (not on home O2), active smoker 1.5 ppd, aflutter and PE on eliquis (? compliance), pulmonary nodule who presented on 5/15 with SOB, cough, anorexia found to be in aflutter with RVR along with acute hypoxic respiratory failure due to large left pleural effusion and pulmonary edema requiring intubation. Ultimately no improvement in HR and worsening shock state after intubation therefore was successfully cardioverted at 150 J. Also found to have right segmental PE and left peroneal DVT.    5.16: s/p left chest tube with 1400 cc serosanguineous fluid. remains intubated, improving fio2 requirements now on 50%/+5. remains on levophed. in NSR. Failed SBt his AM due to resp distress   5/17: Failed SAT and SBT in AM, restrated opioid and BZD and later in evening cleared SAT and SBT with subsequent extubation   5.18: Failed Swallow eval  5/19: Reintubated as failed to manage secretion and extreme intangible muscle wasting, s/p Bronchoscopy to remove thick secretion; s/p removal of Chest tube by thoracic team   5/20: GOC with patient, full code and agreeable to trach and Peg with nodding  to different individual   ***    Events last 24 hours: * afebrile  continues to have   secretions    mouth and  et tube    thick bloody  suctioned   hb stable   on full dose lovenox     REVIEW OF SYSTEMS     CONSTITUTIONAL   no fevers  no loss of appetite  no weight loss   HEENT  no sore throat   no ringing in ears  NECK   no pain   RESPIRATORY  see HPI   CARDIOVASCULAR  no chest  pain no palpitations   GASTROINTESTINAL no vomiting  no diarrhea    no   gerd   MUSCULOSKELETAL  no joint pains    no  back pain   SKIN   no rash   no itchiness   GENITOURINARY  no dysuria   HEME    above   ENDOCRINE     no   warmth   no  sweating   no  cold intolerance   NEUROLOGIC  no tremors  no seizures  no    weakness    PSYCHIATRIC   ? depressed      PAST MEDICAL & SURGICAL HISTORY:  Poor historian    COPD (chronic obstructive pulmonary disease)    Pulmonary embolism    Atrial flutter    H/O solitary pulmonary nodule    Depression    No significant past surgical history          Medications:  cefTRIAXone   IVPB 1000 milliGRAM(s) IV Intermittent every 24 hours    diltiazem    Tablet 30 milliGRAM(s) Oral every 6 hours  doxazosin 4 milliGRAM(s) Oral at bedtime    albuterol/ipratropium for Nebulization 3 milliLiter(s) Nebulizer every 6 hours PRN  buDESOnide    Inhalation Suspension 0.25 milliGRAM(s) Inhalation every 12 hours    diazepam    Tablet 5 milliGRAM(s) Oral every 12 hours  fentaNYL    Injectable 50 MICROGram(s) IV Push every 1 hour PRN  oxyCODONE    IR 5 milliGRAM(s) Oral every 6 hours PRN  sertraline 100 milliGRAM(s) Oral daily      enoxaparin Injectable 80 milliGRAM(s) SubCutaneous two times a day    pantoprazole  Injectable 40 milliGRAM(s) IV Push daily      predniSONE   Tablet 20 milliGRAM(s) Oral daily        chlorhexidine 0.12% Liquid 15 milliLiter(s) Oral Mucosa every 12 hours  chlorhexidine 4% Liquid 1 Application(s) Topical <User Schedule>    nicotine - 21 mG/24Hr(s) Patch 1 patch Transdermal daily      Mode: CPAP with PS  FiO2: 40  PEEP: 5  PS: 5  MAP: 7      ICU Vital Signs Last 24 Hrs  T(C): 37.5 (22 May 2021 12:00), Max: 37.5 (22 May 2021 07:32)  T(F): 99.5 (22 May 2021 12:00), Max: 99.5 (22 May 2021 07:32)  HR: 74 (22 May 2021 11:47) (57 - 84)  BP: 112/62 (22 May 2021 11:00) (95/54 - 129/64)  BP(mean): 77 (22 May 2021 11:00) (60 - 84)  ABP: --  ABP(mean): --  RR: 18 (22 May 2021 08:00) (10 - 20)  SpO2: 100% (22 May 2021 11:47) (97% - 100%)          I&O's Detail    21 May 2021 07:01  -  22 May 2021 07:00  --------------------------------------------------------  IN:    IV PiggyBack: 50 mL    Jevity 1.5: 1490 mL    Oral Fluid: 360 mL  Total IN: 1900 mL    OUT:    Dexmedetomidine: 0 mL    Indwelling Catheter - Urethral (mL): 850 mL  Total OUT: 850 mL    Total NET: 1050 mL      22 May 2021 07:01  -  22 May 2021 12:10  --------------------------------------------------------  IN:    IV PiggyBack: 250 mL    Jevity 1.5: 320 mL  Total IN: 570 mL    OUT:    Indwelling Catheter - Urethral (mL): 180 mL  Total OUT: 180 mL    Total NET: 390 mL            LABS:                        11.8   4.61  )-----------( 167      ( 22 May 2021 04:10 )             38.5     05-22    137  |  94<L>  |  17.0  ----------------------------<  219<H>  3.9   |  37.0<H>  |  0.45<L>    Ca    8.3<L>      22 May 2021 04:10  Phos  1.8     05-22  Mg     2.0     05-22    TPro  6.2<L>  /  Alb  2.7<L>  /  TBili  0.8  /  DBili  x   /  AST  74<H>  /  ALT  61<H>  /  AlkPhos  135<H>  05-22          CAPILLARY BLOOD GLUCOSE      POCT Blood Glucose.: 187 mg/dL (22 May 2021 11:17)        CULTURES:  Culture Results:   Rare Yeast (05-17 @ 02:28)  Culture Results:   Normal Respiratory Payton present (05-17 @ 02:04)  Culture Results:   No growth at 5 days (05-17 @ 01:57)  Culture Results:   Testing in progress (05-17 @ 01:57)  Culture Results:   Moderate Serratia marcescens  Normal Respiratory Payton present (05-16 @ 20:46)  Rapid RVP Result: NotDetec (05-16 @ 14:39)  Culture Results:   <10,000 CFU/mL Normal Urogenital Payton (05-16 @ 11:12)  Culture Results:   No growth at 5 days. (05-15 @ 20:50)  Culture Results:   No growth at 5 days. (05-15 @ 20:49)      Physical Examination:    General:   frail male  alert   no distress    HEENT:  sclera anicteric    et  og   PULM: Clear to auscultation bilaterally, no significant sputum production    NECK: Supple, no lymphadenopathy, trachea midline    CVS: Regular rate and rhythm, no murmurs, rubs, or gallops    ABD: Soft, nondistended, nontender, normoactive bowel sounds, no masses    EXT: No edema, nontender    SKIN: Warm and well perfused, no rashes noted.    NEURO: Alert, oriented, interactive, nonfocal    DEVICES:     RADIOLOGY: IMPRESSION:   ET tube tip above tracheal bifurcation.  NG tube tip beyond GE junction.  . Residual mild LEFT pleuraleffusion and/or basilar airspace disease.            JOSE LEON MD; Attending Radiologist  This document has been electronically signed. May 22 2021  8:19AM  **

## 2021-05-23 LAB
ALBUMIN SERPL ELPH-MCNC: 2.7 G/DL — LOW (ref 3.3–5.2)
ALP SERPL-CCNC: 123 U/L — HIGH (ref 40–120)
ALT FLD-CCNC: 65 U/L — HIGH
ANION GAP SERPL CALC-SCNC: 5 MMOL/L — SIGNIFICANT CHANGE UP (ref 5–17)
AST SERPL-CCNC: 55 U/L — HIGH
BASOPHILS # BLD AUTO: 0 K/UL — SIGNIFICANT CHANGE UP (ref 0–0.2)
BASOPHILS NFR BLD AUTO: 0 % — SIGNIFICANT CHANGE UP (ref 0–2)
BILIRUB SERPL-MCNC: 0.8 MG/DL — SIGNIFICANT CHANGE UP (ref 0.4–2)
BUN SERPL-MCNC: 10 MG/DL — SIGNIFICANT CHANGE UP (ref 8–20)
CALCIUM SERPL-MCNC: 8.3 MG/DL — LOW (ref 8.6–10.2)
CHLORIDE SERPL-SCNC: 96 MMOL/L — LOW (ref 98–107)
CO2 SERPL-SCNC: 39 MMOL/L — HIGH (ref 22–29)
CREAT SERPL-MCNC: 0.41 MG/DL — LOW (ref 0.5–1.3)
EOSINOPHIL # BLD AUTO: 0.04 K/UL — SIGNIFICANT CHANGE UP (ref 0–0.5)
EOSINOPHIL NFR BLD AUTO: 0.9 % — SIGNIFICANT CHANGE UP (ref 0–6)
GLUCOSE BLDC GLUCOMTR-MCNC: 120 MG/DL — HIGH (ref 70–99)
GLUCOSE BLDC GLUCOMTR-MCNC: 221 MG/DL — HIGH (ref 70–99)
GLUCOSE SERPL-MCNC: 103 MG/DL — HIGH (ref 70–99)
HCT VFR BLD CALC: 38.2 % — LOW (ref 39–50)
HGB BLD-MCNC: 11.7 G/DL — LOW (ref 13–17)
IMM GRANULOCYTES NFR BLD AUTO: 0.7 % — SIGNIFICANT CHANGE UP (ref 0–1.5)
LYMPHOCYTES # BLD AUTO: 1.25 K/UL — SIGNIFICANT CHANGE UP (ref 1–3.3)
LYMPHOCYTES # BLD AUTO: 29.4 % — SIGNIFICANT CHANGE UP (ref 13–44)
MAGNESIUM SERPL-MCNC: 2 MG/DL — SIGNIFICANT CHANGE UP (ref 1.8–2.6)
MCHC RBC-ENTMCNC: 30.6 GM/DL — LOW (ref 32–36)
MCHC RBC-ENTMCNC: 31.9 PG — SIGNIFICANT CHANGE UP (ref 27–34)
MCV RBC AUTO: 104.1 FL — HIGH (ref 80–100)
MONOCYTES # BLD AUTO: 0.4 K/UL — SIGNIFICANT CHANGE UP (ref 0–0.9)
MONOCYTES NFR BLD AUTO: 9.4 % — SIGNIFICANT CHANGE UP (ref 2–14)
NEUTROPHILS # BLD AUTO: 2.53 K/UL — SIGNIFICANT CHANGE UP (ref 1.8–7.4)
NEUTROPHILS NFR BLD AUTO: 59.6 % — SIGNIFICANT CHANGE UP (ref 43–77)
PHOSPHATE SERPL-MCNC: 2 MG/DL — LOW (ref 2.4–4.7)
PLATELET # BLD AUTO: 166 K/UL — SIGNIFICANT CHANGE UP (ref 150–400)
POTASSIUM SERPL-MCNC: 4 MMOL/L — SIGNIFICANT CHANGE UP (ref 3.5–5.3)
POTASSIUM SERPL-SCNC: 4 MMOL/L — SIGNIFICANT CHANGE UP (ref 3.5–5.3)
PROT SERPL-MCNC: 6.2 G/DL — LOW (ref 6.6–8.7)
RBC # BLD: 3.67 M/UL — LOW (ref 4.2–5.8)
RBC # FLD: 14.9 % — HIGH (ref 10.3–14.5)
SODIUM SERPL-SCNC: 140 MMOL/L — SIGNIFICANT CHANGE UP (ref 135–145)
T3FREE SERPL-MCNC: 1.8 PG/ML — SIGNIFICANT CHANGE UP (ref 1.8–4.6)
T4 FREE SERPL-MCNC: 1.1 NG/DL — SIGNIFICANT CHANGE UP (ref 0.9–1.8)
WBC # BLD: 4.25 K/UL — SIGNIFICANT CHANGE UP (ref 3.8–10.5)
WBC # FLD AUTO: 4.25 K/UL — SIGNIFICANT CHANGE UP (ref 3.8–10.5)

## 2021-05-23 PROCEDURE — 99232 SBSQ HOSP IP/OBS MODERATE 35: CPT

## 2021-05-23 PROCEDURE — 71045 X-RAY EXAM CHEST 1 VIEW: CPT | Mod: 26,76

## 2021-05-23 PROCEDURE — 99233 SBSQ HOSP IP/OBS HIGH 50: CPT

## 2021-05-23 RX ORDER — FENTANYL CITRATE 50 UG/ML
100 INJECTION INTRAVENOUS ONCE
Refills: 0 | Status: DISCONTINUED | OUTPATIENT
Start: 2021-05-23 | End: 2021-05-23

## 2021-05-23 RX ORDER — MIDAZOLAM HYDROCHLORIDE 1 MG/ML
2 INJECTION, SOLUTION INTRAMUSCULAR; INTRAVENOUS ONCE
Refills: 0 | Status: DISCONTINUED | OUTPATIENT
Start: 2021-05-23 | End: 2021-05-23

## 2021-05-23 RX ORDER — LIDOCAINE HCL 20 MG/ML
10 VIAL (ML) INJECTION ONCE
Refills: 0 | Status: COMPLETED | OUTPATIENT
Start: 2021-05-23 | End: 2021-05-23

## 2021-05-23 RX ORDER — LIDOCAINE 4 G/100G
30 CREAM TOPICAL ONCE
Refills: 0 | Status: COMPLETED | OUTPATIENT
Start: 2021-05-23 | End: 2021-05-23

## 2021-05-23 RX ORDER — LIDOCAINE 4 G/100G
15 CREAM TOPICAL EVERY 4 HOURS
Refills: 0 | Status: DISCONTINUED | OUTPATIENT
Start: 2021-05-23 | End: 2021-06-05

## 2021-05-23 RX ORDER — OXYMETAZOLINE HYDROCHLORIDE 0.5 MG/ML
2 SPRAY NASAL
Refills: 0 | Status: DISCONTINUED | OUTPATIENT
Start: 2021-05-23 | End: 2021-06-05

## 2021-05-23 RX ORDER — POTASSIUM PHOSPHATE, MONOBASIC POTASSIUM PHOSPHATE, DIBASIC 236; 224 MG/ML; MG/ML
15 INJECTION, SOLUTION INTRAVENOUS ONCE
Refills: 0 | Status: COMPLETED | OUTPATIENT
Start: 2021-05-23 | End: 2021-05-23

## 2021-05-23 RX ADMIN — FENTANYL CITRATE 50 MICROGRAM(S): 50 INJECTION INTRAVENOUS at 01:45

## 2021-05-23 RX ADMIN — PANTOPRAZOLE SODIUM 40 MILLIGRAM(S): 20 TABLET, DELAYED RELEASE ORAL at 13:00

## 2021-05-23 RX ADMIN — FENTANYL CITRATE 100 MICROGRAM(S): 50 INJECTION INTRAVENOUS at 18:09

## 2021-05-23 RX ADMIN — LIDOCAINE 30 MILLILITER(S): 4 CREAM TOPICAL at 18:07

## 2021-05-23 RX ADMIN — FENTANYL CITRATE 50 MICROGRAM(S): 50 INJECTION INTRAVENOUS at 03:51

## 2021-05-23 RX ADMIN — Medication 30 MILLIGRAM(S): at 13:00

## 2021-05-23 RX ADMIN — CHLORHEXIDINE GLUCONATE 15 MILLILITER(S): 213 SOLUTION TOPICAL at 06:14

## 2021-05-23 RX ADMIN — LIDOCAINE 15 MILLILITER(S): 4 CREAM TOPICAL at 17:47

## 2021-05-23 RX ADMIN — Medication 5 MILLIGRAM(S): at 06:13

## 2021-05-23 RX ADMIN — CEFTRIAXONE 100 MILLIGRAM(S): 500 INJECTION, POWDER, FOR SOLUTION INTRAMUSCULAR; INTRAVENOUS at 23:17

## 2021-05-23 RX ADMIN — POTASSIUM PHOSPHATE, MONOBASIC POTASSIUM PHOSPHATE, DIBASIC 62.5 MILLIMOLE(S): 236; 224 INJECTION, SOLUTION INTRAVENOUS at 06:36

## 2021-05-23 RX ADMIN — MIDAZOLAM HYDROCHLORIDE 2 MILLIGRAM(S): 1 INJECTION, SOLUTION INTRAMUSCULAR; INTRAVENOUS at 18:08

## 2021-05-23 RX ADMIN — FENTANYL CITRATE 50 MICROGRAM(S): 50 INJECTION INTRAVENOUS at 10:51

## 2021-05-23 RX ADMIN — FENTANYL CITRATE 50 MICROGRAM(S): 50 INJECTION INTRAVENOUS at 07:38

## 2021-05-23 RX ADMIN — FENTANYL CITRATE 50 MICROGRAM(S): 50 INJECTION INTRAVENOUS at 15:20

## 2021-05-23 RX ADMIN — SERTRALINE 100 MILLIGRAM(S): 25 TABLET, FILM COATED ORAL at 13:00

## 2021-05-23 RX ADMIN — CHLORHEXIDINE GLUCONATE 1 APPLICATION(S): 213 SOLUTION TOPICAL at 06:25

## 2021-05-23 RX ADMIN — Medication 1 PATCH: at 13:00

## 2021-05-23 RX ADMIN — CHLORHEXIDINE GLUCONATE 15 MILLILITER(S): 213 SOLUTION TOPICAL at 17:46

## 2021-05-23 RX ADMIN — FENTANYL CITRATE 50 MICROGRAM(S): 50 INJECTION INTRAVENOUS at 10:21

## 2021-05-23 RX ADMIN — FENTANYL CITRATE 50 MICROGRAM(S): 50 INJECTION INTRAVENOUS at 00:12

## 2021-05-23 RX ADMIN — Medication 30 MILLIGRAM(S): at 17:47

## 2021-05-23 RX ADMIN — Medication 1 PATCH: at 12:59

## 2021-05-23 RX ADMIN — Medication 1 PATCH: at 07:15

## 2021-05-23 RX ADMIN — Medication 20 MILLIGRAM(S): at 06:26

## 2021-05-23 RX ADMIN — Medication 4 MILLIGRAM(S): at 21:30

## 2021-05-23 RX ADMIN — FENTANYL CITRATE 50 MICROGRAM(S): 50 INJECTION INTRAVENOUS at 08:47

## 2021-05-23 RX ADMIN — Medication 1 PATCH: at 19:33

## 2021-05-23 RX ADMIN — FENTANYL CITRATE 50 MICROGRAM(S): 50 INJECTION INTRAVENOUS at 13:46

## 2021-05-23 RX ADMIN — Medication 10 MILLILITER(S): at 18:08

## 2021-05-23 RX ADMIN — Medication 5 MILLIGRAM(S): at 17:47

## 2021-05-23 RX ADMIN — Medication 0.25 MILLIGRAM(S): at 08:39

## 2021-05-23 RX ADMIN — OXYMETAZOLINE HYDROCHLORIDE 2 SPRAY(S): 0.5 SPRAY NASAL at 21:30

## 2021-05-23 RX ADMIN — Medication 0.25 MILLIGRAM(S): at 20:51

## 2021-05-23 RX ADMIN — FENTANYL CITRATE 50 MICROGRAM(S): 50 INJECTION INTRAVENOUS at 00:05

## 2021-05-23 NOTE — PROGRESS NOTE ADULT - ASSESSMENT
61 year old male with a PMH of  smoking p/w respiratory failure, PNA, A flutter, CHF and left pleural effusion.  Left pigtail inserted by MICU on 5/15/21.  On 5/18 Thoracic surgery notified to monitor left chest tube,, chest tube clamped.  5/19 cytology obtained from left pigtail & removed, CXR stable. Pt reintubated and consult requested for tracheostomy.  5/23 CPAP trials during day, vent rest overnite ENT called for oropharynx bleeding

## 2021-05-23 NOTE — PROGRESS NOTE ADULT - ASSESSMENT
62y/o male     : Acute hypoxic respiratory failure now VDRF with failed extubation  2: A fib flutter with RVR s/p electric cardioversion   3: Shock: distributive   4: Left sided pleural effusion: Complex complicated by loculation   5: CAP with Left lower lobe aspiration  and atelectasis : Serratia   7: Seg PE, right   8:  Acute heart failure: combined diastolic with RV systolic failure with underlying Pulmonary HTN- moderate   9- bloody secretions  stable hb    neurologic oxy prn fentanyl   zoloft    valium pm  precedex    respiratory   pending  trach    40 %  a/c 20/ 400cc/5 keep sats > 90 %  duonebs  symbicort    follow up fluid cytology left effusion --  ENT inspection    possible FOB     cvs    s/p levo  midodrine stress dose steroids now on prednisone     heme oncology   hold lovenox    vascular consult for  IVC filter   gi   peg pending    jevity goal  80 cc/hour     Critical  care TIme  > 35 minutes were spent assessing the patient's presenting problems of acute illness that pose a high probability   of life threatening  deterioration or end organ damage / dysfunction.  Medical decision making includes initiation/ continuation of plan or care review data/labwork/  radiographic study,  direct patient  care bedside ,  discussions with  consultants regarding care,     evaluation  and  interpretation of vital signs,  any necessary ventilator management,   NIV or BIPAP changes  or initiation,    discussions with multidisciplinary team,  am or pm rounds,  discussions of goals of care with patient and family  all non-inclusive of procedures.   62y/o male     : Acute hypoxic respiratory failure now VDRF with failed extubation  2: A fib flutter with RVR s/p electric cardioversion   3: Shock: distributive   4: Left sided pleural effusion: Complex complicated by loculation   5: CAP with Left lower lobe aspiration  and atelectasis : Serratia   7: Seg PE, right   8:  Acute heart failure: combined diastolic with RV systolic failure with underlying Pulmonary HTN- moderate   9- bloody secretions  stable hb    neurologic oxy prn fentanyl   zoloft    valium pm  precedex    respiratory   pending  trach    40 %  a/c 20/ 400cc/5 keep sats > 90 %  duonebs  symbicort    follow up fluid cytology left effusion --  ENT inspection    possible FOB     cvs    s/p levo  midodrine stress dose steroids now on prednisone     heme oncology   hold lovenox    vascular consult for  IVC filter   gi   peg pending    jevity goal  80 cc/hour     Critical  care TIme  > 35 minutes were spent assessing the patient's presenting problems of acute illness that pose a high probability   of life threatening  deterioration or end organ damage / dysfunction.  Medical decision making includes initiation/ continuation of plan or care review data/labwork/  radiographic study,  direct patient  care bedside ,  discussions with  consultants regarding care,     evaluation  and  interpretation of vital signs,  any necessary ventilator management,   NIV or BIPAP changes  or initiation,    discussions with multidisciplinary team,  am or pm rounds,  discussions of goals of care with patient and family  all non-inclusive of procedures.        Addendum  -case was discussed with step daughter -    bronchoscopy for inspection / ? hemoptysis     -   explained  consent signed in chart  -pre-bronchoscopy exam  above

## 2021-05-23 NOTE — PROGRESS NOTE ADULT - SUBJECTIVE AND OBJECTIVE BOX
Subjective: Orally intubated, nods head      V/S  T(C): 37.7 (05-23-21 @ 11:15), Max: 37.7 (05-23-21 @ 07:00)  HR: 77 (05-23-21 @ 11:26) (62 - 83)  BP: 106/59 (05-23-21 @ 09:00) (96/44 - 127/61)  RR: 18 (05-23-21 @ 07:00) (15 - 18)  SpO2: 98% (05-23-21 @ 11:26) (96% - 100%)      MEDICATIONS  (STANDING):  buDESOnide    Inhalation Suspension 0.25 milliGRAM(s) Inhalation every 12 hours  cefTRIAXone   IVPB 1000 milliGRAM(s) IV Intermittent every 24 hours  chlorhexidine 0.12% Liquid 15 milliLiter(s) Oral Mucosa every 12 hours  chlorhexidine 4% Liquid 1 Application(s) Topical <User Schedule>  diazepam    Tablet 5 milliGRAM(s) Oral every 12 hours  diltiazem    Tablet 30 milliGRAM(s) Oral every 6 hours  doxazosin 4 milliGRAM(s) Oral at bedtime  nicotine - 21 mG/24Hr(s) Patch 1 patch Transdermal daily  pantoprazole  Injectable 40 milliGRAM(s) IV Push daily  predniSONE   Tablet 20 milliGRAM(s) Oral daily  sertraline 100 milliGRAM(s) Oral daily      05-23    140  |  96<L>  |  10.0  ----------------------------<  103<H>  4.0   |  39.0<H>  |  0.41<L>    Ca    8.3<L>      23 May 2021 04:59  Phos  2.0     05-23  Mg     2.0     05-23    TPro  6.2<L>  /  Alb  2.7<L>  /  TBili  0.8  /  DBili  x   /  AST  55<H>  /  ALT  65<H>  /  AlkPhos  123<H>  05-23                               11.7   4.25  )-----------( 166      ( 23 May 2021 04:59 )             38.2        PT/INR - ( 22 May 2021 13:26 )   PT: 14.3 sec;   INR: 1.25 ratio         PTT - ( 22 May 2021 13:26 )  PTT:38.6 sec         CAPILLARY BLOOD GLUCOSE      POCT Blood Glucose.: 106 mg/dL (22 May 2021 16:28)            Physical Exam:    Gen: WN/WD NAD    Neuro: A+Ox3, nonfocal    Pulm: diminished b/l, + bloody secretions    CV: RRR    LE: no edema            PAST MEDICAL & SURGICAL HISTORY:  Poor historian    COPD (chronic obstructive pulmonary disease)    Pulmonary embolism    Atrial flutter    H/O solitary pulmonary nodule    Depression    No significant past surgical history

## 2021-05-23 NOTE — PROGRESS NOTE ADULT - SUBJECTIVE AND OBJECTIVE BOX
LUKAS Rolle    Patient is a 61y old  Male who presents with a chief complaint of Respiratory Failure (23 May 2021 11:48)      BRIEF HOSPITAL COURSE: **60 y/o male with pmhx of COPD (not on home O2), active smoker 1.5 ppd, aflutter and PE on eliquis (? compliance), pulmonary nodule who presented on 5/15 with SOB, cough, anorexia found to be in aflutter with RVR along with acute hypoxic respiratory failure due to large left pleural effusion and pulmonary edema requiring intubation. Ultimately no improvement in HR and worsening shock state after intubation therefore was successfully cardioverted at 150 J. Also found to have right segmental PE and left peroneal DVT.    5.16: s/p left chest tube with 1400 cc serosanguineous fluid. remains intubated, improving fio2 requirements now on 50%/+5. remains on levophed. in NSR. Failed SBt his AM due to resp distress   5/17: Failed SAT and SBT in AM, restrated opioid and BZD and later in evening cleared SAT and SBT with subsequent extubation   5.18: Failed Swallow eval  5/19: Reintubated as failed to manage secretion and extreme intangible muscle wasting, s/p Bronchoscopy to remove thick secretion; s/p removal of Chest tube by thoracic team   5/20: GOC with patient, full code and agreeable to trach and Peg with nodding  to different individual   ***  *    Events last 24 hours: ** off lovenox   less  bleeding noted from  trach    palate with  erythematous area   no fever alert  *    PAST MEDICAL & SURGICAL HISTORY:  Poor historian    COPD (chronic obstructive pulmonary disease)    Pulmonary embolism    Atrial flutter    H/O solitary pulmonary nodule    Depression    No significant past surgical history          Medications:  cefTRIAXone   IVPB 1000 milliGRAM(s) IV Intermittent every 24 hours    diltiazem    Tablet 30 milliGRAM(s) Oral every 6 hours  doxazosin 4 milliGRAM(s) Oral at bedtime    albuterol/ipratropium for Nebulization 3 milliLiter(s) Nebulizer every 6 hours PRN  buDESOnide    Inhalation Suspension 0.25 milliGRAM(s) Inhalation every 12 hours    diazepam    Tablet 5 milliGRAM(s) Oral every 12 hours  fentaNYL    Injectable 50 MICROGram(s) IV Push every 1 hour PRN  oxyCODONE    IR 5 milliGRAM(s) Oral every 6 hours PRN  sertraline 100 milliGRAM(s) Oral daily        pantoprazole  Injectable 40 milliGRAM(s) IV Push daily      predniSONE   Tablet 20 milliGRAM(s) Oral daily        chlorhexidine 0.12% Liquid 15 milliLiter(s) Oral Mucosa every 12 hours  chlorhexidine 4% Liquid 1 Application(s) Topical <User Schedule>  lidocaine 2% Viscous 15 milliLiter(s) Mucosal every 4 hours PRN    nicotine - 21 mG/24Hr(s) Patch 1 patch Transdermal daily      Mode: CPAP with PS  FiO2: 40  PEEP: 5  PS: 5  MAP: 7      ICU Vital Signs Last 24 Hrs  T(C): 37.7 (23 May 2021 11:15), Max: 37.7 (23 May 2021 07:00)  T(F): 99.9 (23 May 2021 11:15), Max: 99.9 (23 May 2021 07:00)  HR: 80 (23 May 2021 13:00) (62 - 83)  BP: 115/65 (23 May 2021 13:00) (96/44 - 127/61)  BP(mean): 80 (23 May 2021 13:00) (59 - 85)  ABP: --  ABP(mean): --  RR: 18 (23 May 2021 07:00) (15 - 18)  SpO2: 99% (23 May 2021 13:00) (96% - 100%)          I&O's Detail    22 May 2021 07:01  -  23 May 2021 07:00  --------------------------------------------------------  IN:    Enteral Tube Flush: 300 mL    IV PiggyBack: 50 mL    IV PiggyBack: 312 mL    Jevity 1.5: 1920 mL  Total IN: 2582 mL    OUT:    Indwelling Catheter - Urethral (mL): 1025 mL    Nasogastric/Oral tube (mL): 200 mL  Total OUT: 1225 mL    Total NET: 1357 mL      23 May 2021 07:01  -  23 May 2021 14:07  --------------------------------------------------------  IN:    Enteral Tube Flush: 150 mL    IV PiggyBack: 187.5 mL    Jevity 1.5: 560 mL  Total IN: 897.5 mL    OUT:    Indwelling Catheter - Urethral (mL): 310 mL  Total OUT: 310 mL    Total NET: 587.5 mL            LABS:                        11.7   4.25  )-----------( 166      ( 23 May 2021 04:59 )             38.2     05-23    140  |  96<L>  |  10.0  ----------------------------<  103<H>  4.0   |  39.0<H>  |  0.41<L>    Ca    8.3<L>      23 May 2021 04:59  Phos  2.0     05-23  Mg     2.0     05-23    TPro  6.2<L>  /  Alb  2.7<L>  /  TBili  0.8  /  DBili  x   /  AST  55<H>  /  ALT  65<H>  /  AlkPhos  123<H>  05-23          CAPILLARY BLOOD GLUCOSE      POCT Blood Glucose.: 221 mg/dL (23 May 2021 12:03)    PT/INR - ( 22 May 2021 13:26 )   PT: 14.3 sec;   INR: 1.25 ratio         PTT - ( 22 May 2021 13:26 )  PTT:38.6 sec    CULTURES:  Culture Results:   Rare Yeast (05-17 @ 02:28)  Culture Results:   Normal Respiratory Payton present (05-17 @ 02:04)  Culture Results:   No growth at 5 days (05-17 @ 01:57)  Culture Results:   Testing in progress (05-17 @ 01:57)  Culture Results:   Moderate Serratia marcescens  Normal Respiratory Payton present (05-16 @ 20:46)  Rapid RVP Result: NotDetec (05-16 @ 14:39)      Physical Examination:    General:   frail male no distress in bed good eye contact    HEENT: Pupils equal, reactive to light.  Symmetric.   erythematous area palate  et og     PULM: Clear to auscultation bilaterally, no significant sputum production bloody secretions     NECK: Supple, no lymphadenopathy, trachea midline  old trach area  scar   well healed     CVS: Regular rate and rhythm, no murmurs, rubs, or gallops    ABD: Soft, nondistended, nontender, normoactive bowel sounds, no masses    EXT: mild edema   SKIN: Warm and well perfused, no rashes noted.    NEURO: Alert, oriented, interactive, nonfocal    DEVICES: padmini/ Tubes/ Catheters/ devices:   ETT: 5/15 removed 5/17 and reintubated on 5/18-5/19  OGT: 5/15, removed on 5.17 and reinserted on 5/18-5/19  TLC: none  A-line: none  Larson:  5/15, removing on 5/20      RADIOLOGY: IMPRESSION:  ET and NG tube in satisfactory position.    Effusion/atelectasis/possible consolidation at the left base, grossly unchanged.  TALI ABDI DO; Attending Radiologist  This document has been electronically signed. May 23 2021  8:49AM***

## 2021-05-23 NOTE — PROCEDURE NOTE - NSTOLERANCE_GEN_A_CORE
Patient tolerated procedure well.
Patient tolerated procedure well./Reversal agents were not used.
Patient tolerated procedure well.

## 2021-05-24 ENCOUNTER — TRANSCRIPTION ENCOUNTER (OUTPATIENT)
Age: 61
End: 2021-05-24

## 2021-05-24 LAB
ALBUMIN SERPL ELPH-MCNC: 2.8 G/DL — LOW (ref 3.3–5.2)
ALP SERPL-CCNC: 122 U/L — HIGH (ref 40–120)
ALT FLD-CCNC: 65 U/L — HIGH
ANION GAP SERPL CALC-SCNC: 7 MMOL/L — SIGNIFICANT CHANGE UP (ref 5–17)
APTT BLD: 131.3 SEC — CRITICAL HIGH (ref 27.5–35.5)
AST SERPL-CCNC: 48 U/L — HIGH
BASOPHILS # BLD AUTO: 0 K/UL — SIGNIFICANT CHANGE UP (ref 0–0.2)
BASOPHILS NFR BLD AUTO: 0 % — SIGNIFICANT CHANGE UP (ref 0–2)
BILIRUB SERPL-MCNC: 0.9 MG/DL — SIGNIFICANT CHANGE UP (ref 0.4–2)
BLD GP AB SCN SERPL QL: SIGNIFICANT CHANGE UP
BUN SERPL-MCNC: 9 MG/DL — SIGNIFICANT CHANGE UP (ref 8–20)
CALCIUM SERPL-MCNC: 8.5 MG/DL — LOW (ref 8.6–10.2)
CHLORIDE SERPL-SCNC: 96 MMOL/L — LOW (ref 98–107)
CO2 SERPL-SCNC: 34 MMOL/L — HIGH (ref 22–29)
CREAT SERPL-MCNC: 0.44 MG/DL — LOW (ref 0.5–1.3)
EOSINOPHIL # BLD AUTO: 0.08 K/UL — SIGNIFICANT CHANGE UP (ref 0–0.5)
EOSINOPHIL NFR BLD AUTO: 2 % — SIGNIFICANT CHANGE UP (ref 0–6)
GLUCOSE SERPL-MCNC: 118 MG/DL — HIGH (ref 70–99)
GRAM STN FLD: SIGNIFICANT CHANGE UP
HCT VFR BLD CALC: 36.4 % — LOW (ref 39–50)
HCT VFR BLD CALC: 38.1 % — LOW (ref 39–50)
HGB BLD-MCNC: 11.5 G/DL — LOW (ref 13–17)
HGB BLD-MCNC: 11.9 G/DL — LOW (ref 13–17)
IMM GRANULOCYTES NFR BLD AUTO: 0.7 % — SIGNIFICANT CHANGE UP (ref 0–1.5)
LYMPHOCYTES # BLD AUTO: 1.29 K/UL — SIGNIFICANT CHANGE UP (ref 1–3.3)
LYMPHOCYTES # BLD AUTO: 31.5 % — SIGNIFICANT CHANGE UP (ref 13–44)
MAGNESIUM SERPL-MCNC: 1.9 MG/DL — SIGNIFICANT CHANGE UP (ref 1.6–2.6)
MCHC RBC-ENTMCNC: 31.2 GM/DL — LOW (ref 32–36)
MCHC RBC-ENTMCNC: 31.6 GM/DL — LOW (ref 32–36)
MCHC RBC-ENTMCNC: 31.8 PG — SIGNIFICANT CHANGE UP (ref 27–34)
MCHC RBC-ENTMCNC: 32.2 PG — SIGNIFICANT CHANGE UP (ref 27–34)
MCV RBC AUTO: 101.9 FL — HIGH (ref 80–100)
MCV RBC AUTO: 102 FL — HIGH (ref 80–100)
MONOCYTES # BLD AUTO: 0.34 K/UL — SIGNIFICANT CHANGE UP (ref 0–0.9)
MONOCYTES NFR BLD AUTO: 8.3 % — SIGNIFICANT CHANGE UP (ref 2–14)
NEUTROPHILS # BLD AUTO: 2.36 K/UL — SIGNIFICANT CHANGE UP (ref 1.8–7.4)
NEUTROPHILS NFR BLD AUTO: 57.5 % — SIGNIFICANT CHANGE UP (ref 43–77)
NIGHT BLUE STAIN TISS: SIGNIFICANT CHANGE UP
PHOSPHATE SERPL-MCNC: 2.4 MG/DL — SIGNIFICANT CHANGE UP (ref 2.4–4.7)
PLATELET # BLD AUTO: 140 K/UL — LOW (ref 150–400)
PLATELET # BLD AUTO: 151 K/UL — SIGNIFICANT CHANGE UP (ref 150–400)
POTASSIUM SERPL-MCNC: 3.9 MMOL/L — SIGNIFICANT CHANGE UP (ref 3.5–5.3)
POTASSIUM SERPL-SCNC: 3.9 MMOL/L — SIGNIFICANT CHANGE UP (ref 3.5–5.3)
PROT SERPL-MCNC: 6.5 G/DL — LOW (ref 6.6–8.7)
RBC # BLD: 3.57 M/UL — LOW (ref 4.2–5.8)
RBC # BLD: 3.74 M/UL — LOW (ref 4.2–5.8)
RBC # FLD: 14.9 % — HIGH (ref 10.3–14.5)
RBC # FLD: 15 % — HIGH (ref 10.3–14.5)
SARS-COV-2 RNA SPEC QL NAA+PROBE: SIGNIFICANT CHANGE UP
SODIUM SERPL-SCNC: 137 MMOL/L — SIGNIFICANT CHANGE UP (ref 135–145)
SPECIMEN SOURCE: SIGNIFICANT CHANGE UP
SPECIMEN SOURCE: SIGNIFICANT CHANGE UP
WBC # BLD: 4.1 K/UL — SIGNIFICANT CHANGE UP (ref 3.8–10.5)
WBC # BLD: 4.88 K/UL — SIGNIFICANT CHANGE UP (ref 3.8–10.5)
WBC # FLD AUTO: 4.1 K/UL — SIGNIFICANT CHANGE UP (ref 3.8–10.5)
WBC # FLD AUTO: 4.88 K/UL — SIGNIFICANT CHANGE UP (ref 3.8–10.5)

## 2021-05-24 PROCEDURE — 99232 SBSQ HOSP IP/OBS MODERATE 35: CPT

## 2021-05-24 PROCEDURE — 99233 SBSQ HOSP IP/OBS HIGH 50: CPT

## 2021-05-24 RX ORDER — HEPARIN SODIUM 5000 [USP'U]/ML
6500 INJECTION INTRAVENOUS; SUBCUTANEOUS EVERY 6 HOURS
Refills: 0 | Status: DISCONTINUED | OUTPATIENT
Start: 2021-05-24 | End: 2021-05-26

## 2021-05-24 RX ORDER — HEPARIN SODIUM 5000 [USP'U]/ML
6500 INJECTION INTRAVENOUS; SUBCUTANEOUS ONCE
Refills: 0 | Status: COMPLETED | OUTPATIENT
Start: 2021-05-24 | End: 2021-05-24

## 2021-05-24 RX ORDER — HEPARIN SODIUM 5000 [USP'U]/ML
3000 INJECTION INTRAVENOUS; SUBCUTANEOUS EVERY 6 HOURS
Refills: 0 | Status: DISCONTINUED | OUTPATIENT
Start: 2021-05-24 | End: 2021-05-26

## 2021-05-24 RX ORDER — MIDAZOLAM HYDROCHLORIDE 1 MG/ML
2 INJECTION, SOLUTION INTRAMUSCULAR; INTRAVENOUS
Refills: 0 | Status: DISCONTINUED | OUTPATIENT
Start: 2021-05-24 | End: 2021-05-26

## 2021-05-24 RX ORDER — DIAZEPAM 5 MG
2.5 TABLET ORAL
Refills: 0 | Status: DISCONTINUED | OUTPATIENT
Start: 2021-05-24 | End: 2021-05-26

## 2021-05-24 RX ORDER — CEFUROXIME AXETIL 250 MG
1500 TABLET ORAL ONCE
Refills: 0 | Status: COMPLETED | OUTPATIENT
Start: 2021-05-24 | End: 2021-05-24

## 2021-05-24 RX ORDER — HEPARIN SODIUM 5000 [USP'U]/ML
INJECTION INTRAVENOUS; SUBCUTANEOUS
Qty: 25000 | Refills: 0 | Status: DISCONTINUED | OUTPATIENT
Start: 2021-05-24 | End: 2021-05-26

## 2021-05-24 RX ORDER — MIDAZOLAM HYDROCHLORIDE 1 MG/ML
4 INJECTION, SOLUTION INTRAMUSCULAR; INTRAVENOUS ONCE
Refills: 0 | Status: DISCONTINUED | OUTPATIENT
Start: 2021-05-24 | End: 2021-05-24

## 2021-05-24 RX ADMIN — Medication 30 MILLIGRAM(S): at 17:45

## 2021-05-24 RX ADMIN — FENTANYL CITRATE 50 MICROGRAM(S): 50 INJECTION INTRAVENOUS at 09:23

## 2021-05-24 RX ADMIN — Medication 5 MILLIGRAM(S): at 05:42

## 2021-05-24 RX ADMIN — MIDAZOLAM HYDROCHLORIDE 2 MILLIGRAM(S): 1 INJECTION, SOLUTION INTRAMUSCULAR; INTRAVENOUS at 21:55

## 2021-05-24 RX ADMIN — HEPARIN SODIUM 6500 UNIT(S): 5000 INJECTION INTRAVENOUS; SUBCUTANEOUS at 14:10

## 2021-05-24 RX ADMIN — FENTANYL CITRATE 50 MICROGRAM(S): 50 INJECTION INTRAVENOUS at 09:08

## 2021-05-24 RX ADMIN — CHLORHEXIDINE GLUCONATE 15 MILLILITER(S): 213 SOLUTION TOPICAL at 05:42

## 2021-05-24 RX ADMIN — CHLORHEXIDINE GLUCONATE 1 APPLICATION(S): 213 SOLUTION TOPICAL at 05:42

## 2021-05-24 RX ADMIN — PANTOPRAZOLE SODIUM 40 MILLIGRAM(S): 20 TABLET, DELAYED RELEASE ORAL at 11:59

## 2021-05-24 RX ADMIN — CEFTRIAXONE 100 MILLIGRAM(S): 500 INJECTION, POWDER, FOR SOLUTION INTRAMUSCULAR; INTRAVENOUS at 23:25

## 2021-05-24 RX ADMIN — Medication 30 MILLIGRAM(S): at 05:43

## 2021-05-24 RX ADMIN — Medication 0.25 MILLIGRAM(S): at 20:54

## 2021-05-24 RX ADMIN — Medication 4 MILLIGRAM(S): at 21:55

## 2021-05-24 RX ADMIN — Medication 1 PATCH: at 13:30

## 2021-05-24 RX ADMIN — FENTANYL CITRATE 50 MICROGRAM(S): 50 INJECTION INTRAVENOUS at 01:24

## 2021-05-24 RX ADMIN — Medication 20 MILLIGRAM(S): at 05:43

## 2021-05-24 RX ADMIN — Medication 1 PATCH: at 12:00

## 2021-05-24 RX ADMIN — HEPARIN SODIUM 1500 UNIT(S)/HR: 5000 INJECTION INTRAVENOUS; SUBCUTANEOUS at 14:15

## 2021-05-24 RX ADMIN — CHLORHEXIDINE GLUCONATE 15 MILLILITER(S): 213 SOLUTION TOPICAL at 17:45

## 2021-05-24 RX ADMIN — SERTRALINE 100 MILLIGRAM(S): 25 TABLET, FILM COATED ORAL at 11:59

## 2021-05-24 RX ADMIN — Medication 3 MILLILITER(S): at 20:54

## 2021-05-24 RX ADMIN — Medication 1 PATCH: at 19:00

## 2021-05-24 RX ADMIN — MIDAZOLAM HYDROCHLORIDE 4 MILLIGRAM(S): 1 INJECTION, SOLUTION INTRAMUSCULAR; INTRAVENOUS at 17:37

## 2021-05-24 RX ADMIN — Medication 2.5 MILLIGRAM(S): at 17:50

## 2021-05-24 RX ADMIN — Medication 1 PATCH: at 08:00

## 2021-05-24 RX ADMIN — Medication 30 MILLIGRAM(S): at 11:59

## 2021-05-24 RX ADMIN — OXYMETAZOLINE HYDROCHLORIDE 2 SPRAY(S): 0.5 SPRAY NASAL at 05:43

## 2021-05-24 RX ADMIN — FENTANYL CITRATE 50 MICROGRAM(S): 50 INJECTION INTRAVENOUS at 01:30

## 2021-05-24 RX ADMIN — Medication 0.25 MILLIGRAM(S): at 09:17

## 2021-05-24 NOTE — PROGRESS NOTE ADULT - SUBJECTIVE AND OBJECTIVE BOX
On Admission  05-15-21 (9d)  HPI:  62 yo m pmhx COPD, active smoker (1.5 ppd), Aflutter on diltiazem (planned for ablation), PE (2018) on Eliquis, Pulmonary Nodule, Depression on Zoloft, questionable compliance with medications biba from home with complaints of sob, cough, weakness and anorexia for about a week.  In ED patient lethargic but oriented, hypoxic on RA place don 4L with waxing and waning mental status.  Patient also noted to be in aflutter with HR in the 130s; patient given cardizem 10mg IVP, began to have issues with worsening hypoxia/mental status and was subsequently intubated.  Chest xray obtained, large left sided pleural effusion (not present on 11/2020 films). Patient seen at bedside, HR in the 130s Aflutter with 2:1 conduction, BP in the 80s, given 500cc NS.  POCUS exam performed terrell predominant with some scattered bline, left effusion appreciated.  Cardiac portion difficult to obtain, however RV appears WNL, LV function appears diminished from previous TTE where EF was ~58%; however windows difficult.  Initially patient responded to IVF however patient now persistently in the 80s with HR in the 130s, patient electrically cardioverted with 150J, converted to NSR in the 70s.  Additional 500cc bolus ordered, plan for imaging en route to MICU.  (15 May 2021 23:55)    PAST MEDICAL & SURGICAL HISTORY:  Poor historian    COPD (chronic obstructive pulmonary disease)    Pulmonary embolism    Atrial flutter    H/O solitary pulmonary nodule    Depression    No significant past surgical history        Antimicrobial:  cefTRIAXone   IVPB 1000 milliGRAM(s) IV Intermittent every 24 hours  cefuroxime  IVPB 1500 milliGRAM(s) IV Intermittent once    Cardiovascular:  diltiazem    Tablet 30 milliGRAM(s) Oral every 6 hours  doxazosin 4 milliGRAM(s) Oral at bedtime    Pulmonary:  albuterol/ipratropium for Nebulization 3 milliLiter(s) Nebulizer every 6 hours PRN  buDESOnide    Inhalation Suspension 0.25 milliGRAM(s) Inhalation every 12 hours    Hematalogic:  heparin   Injectable 6500 Unit(s) IV Push every 6 hours PRN  heparin   Injectable 3000 Unit(s) IV Push every 6 hours PRN  heparin  Infusion.  Unit(s)/Hr IV Continuous <Continuous>    Other:  chlorhexidine 0.12% Liquid 15 milliLiter(s) Oral Mucosa every 12 hours  chlorhexidine 4% Liquid 1 Application(s) Topical <User Schedule>  diazepam    Tablet 2.5 milliGRAM(s) Oral two times a day  lidocaine 2% Viscous 15 milliLiter(s) Mucosal every 4 hours PRN  nicotine - 21 mG/24Hr(s) Patch 1 patch Transdermal daily  oxyCODONE    IR 5 milliGRAM(s) Oral every 6 hours PRN  oxymetazoline 0.05% Nasal Spray 2 Spray(s) Left Nostril two times a day  pantoprazole  Injectable 40 milliGRAM(s) IV Push daily  sertraline 100 milliGRAM(s) Oral daily      Drug Dosing Weight  Height (cm): 182.9 (15 May 2021 19:55)  Weight (kg): 83.915 (15 May 2021 22:18)  BMI (kg/m2): 25.1 (15 May 2021 22:18)  BSA (m2): 2.06 (15 May 2021 22:18)    T(C): 37.5 (05-24-21 @ 14:00), Max: 37.7 (05-23-21 @ 16:19)  HR: 72 (05-24-21 @ 14:00)  BP: 103/62 (05-24-21 @ 14:00)  BP(mean): 75 (05-24-21 @ 14:00)  ABP: --  ABP(mean): --  RR: 20 (05-24-21 @ 14:00)  SpO2: 99% (05-24-21 @ 14:00)          05-23 @ 07:01  -  05-24 @ 07:00  --------------------------------------------------------  IN: 1707.5 mL / OUT: 1105 mL / NET: 602.5 mL        Mode: CPAP with PS  FiO2: 40  PEEP: 5  PS: 5  MAP: 7        LABS:  CBC Full  -  ( 24 May 2021 04:45 )  WBC Count : 4.10 K/uL  RBC Count : 3.74 M/uL  Hemoglobin : 11.9 g/dL  Hematocrit : 38.1 %  Platelet Count - Automated : 151 K/uL  Mean Cell Volume : 101.9 fl  Mean Cell Hemoglobin : 31.8 pg  Mean Cell Hemoglobin Concentration : 31.2 gm/dL  Auto Neutrophil # : 2.36 K/uL  Auto Lymphocyte # : 1.29 K/uL  Auto Monocyte # : 0.34 K/uL  Auto Eosinophil # : 0.08 K/uL  Auto Basophil # : 0.00 K/uL  Auto Neutrophil % : 57.5 %  Auto Lymphocyte % : 31.5 %  Auto Monocyte % : 8.3 %  Auto Eosinophil % : 2.0 %  Auto Basophil % : 0.0 %    05-24    137  |  96<L>  |  9.0  ----------------------------<  118<H>  3.9   |  34.0<H>  |  0.44<L>    Ca    8.5<L>      24 May 2021 04:45  Phos  2.4     05-24  Mg     1.9     05-24    TPro  6.5<L>  /  Alb  2.8<L>  /  TBili  0.9  /  DBili  x   /  AST  48<H>  /  ALT  65<H>  /  AlkPhos  122<H>  05-24          ____________________________________________________________________________________________________   Use Enhanced Medication Counseling?: No High Dose Vitamin A Counseling: Side effects reviewed, pt to contact office should one occur. Erythromycin Pregnancy And Lactation Text: This medication is Pregnancy Category B and is considered safe during pregnancy. It is also excreted in breast milk. Azithromycin Counseling:  I discussed with the patient the risks of azithromycin including but not limited to GI upset, allergic reaction, drug rash, diarrhea, and yeast infections. Birth Control Pills Pregnancy And Lactation Text: This medication should be avoided if pregnant and for the first 30 days post-partum. Benzoyl Peroxide Pregnancy And Lactation Text: This medication is Pregnancy Category C. It is unknown if benzoyl peroxide is excreted in breast milk. Doxycycline Counseling:  Patient counseled regarding possible photosensitivity and increased risk for sunburn.  Patient instructed to avoid sunlight, if possible.  When exposed to sunlight, patients should wear protective clothing, sunglasses, and sunscreen.  The patient was instructed to call the office immediately if the following severe adverse effects occur:  hearing changes, easy bruising/bleeding, severe headache, or vision changes.  The patient verbalized understanding of the proper use and possible adverse effects of doxycycline.  All of the patient's questions and concerns were addressed. Spironolactone Counseling: Patient advised regarding risks of diarrhea, abdominal pain, hyperkalemia, birth defects (for female patients), liver toxicity and renal toxicity. The patient may need blood work to monitor liver and kidney function and potassium levels while on therapy. The patient verbalized understanding of the proper use and possible adverse effects of spironolactone.  All of the patient's questions and concerns were addressed. Tetracycline Pregnancy And Lactation Text: This medication is Pregnancy Category D and not consider safe during pregnancy. It is also excreted in breast milk. Tazorac Counseling:  Patient advised that medication is irritating and drying.  Patient may need to apply sparingly and wash off after an hour before eventually leaving it on overnight.  The patient verbalized understanding of the proper use and possible adverse effects of tazorac.  All of the patient's questions and concerns were addressed. Topical Clindamycin Pregnancy And Lactation Text: This medication is Pregnancy Category B and is considered safe during pregnancy. It is unknown if it is excreted in breast milk. Bactrim Counseling:  I discussed with the patient the risks of sulfa antibiotics including but not limited to GI upset, allergic reaction, drug rash, diarrhea, dizziness, photosensitivity, and yeast infections.  Rarely, more serious reactions can occur including but not limited to aplastic anemia, agranulocytosis, methemoglobinemia, blood dyscrasias, liver or kidney failure, lung infiltrates or desquamative/blistering drug rashes. High Dose Vitamin A Pregnancy And Lactation Text: High dose vitamin A therapy is contraindicated during pregnancy and breast feeding. Isotretinoin Counseling: Patient should get monthly blood tests, not donate blood, not drive at night if vision affected, not share medication, and not undergo elective surgery for 6 months after tx completed. Side effects reviewed, pt to contact office should one occur. Benzoyl Peroxide Counseling: Patient counseled that medicine may cause skin irritation and bleach clothing.  In the event of skin irritation, the patient was advised to reduce the amount of the drug applied or use it less frequently.   The patient verbalized understanding of the proper use and possible adverse effects of benzoyl peroxide.  All of the patient's questions and concerns were addressed. Dapsone Counseling: I discussed with the patient the risks of dapsone including but not limited to hemolytic anemia, agranulocytosis, rashes, methemoglobinemia, kidney failure, peripheral neuropathy, headaches, GI upset, and liver toxicity.  Patients who start dapsone require monitoring including baseline LFTs and weekly CBCs for the first month, then every month thereafter.  The patient verbalized understanding of the proper use and possible adverse effects of dapsone.  All of the patient's questions and concerns were addressed. Doxycycline Pregnancy And Lactation Text: This medication is Pregnancy Category D and not consider safe during pregnancy. It is also excreted in breast milk but is considered safe for shorter treatment courses. Topical Retinoid Pregnancy And Lactation Text: This medication is Pregnancy Category C. It is unknown if this medication is excreted in breast milk. Minocycline Counseling: Patient advised regarding possible photosensitivity and discoloration of the teeth, skin, lips, tongue and gums.  Patient instructed to avoid sunlight, if possible.  When exposed to sunlight, patients should wear protective clothing, sunglasses, and sunscreen.  The patient was instructed to call the office immediately if the following severe adverse effects occur:  hearing changes, easy bruising/bleeding, severe headache, or vision changes.  The patient verbalized understanding of the proper use and possible adverse effects of minocycline.  All of the patient's questions and concerns were addressed. Bactrim Pregnancy And Lactation Text: This medication is Pregnancy Category D and is known to cause fetal risk.  It is also excreted in breast milk. Topical Clindamycin Counseling: Patient counseled that this medication may cause skin irritation or allergic reactions.  In the event of skin irritation, the patient was advised to reduce the amount of the drug applied or use it less frequently.   The patient verbalized understanding of the proper use and possible adverse effects of clindamycin.  All of the patient's questions and concerns were addressed. Topical Sulfur Applications Pregnancy And Lactation Text: This medication is Pregnancy Category C and has an unknown safety profile during pregnancy. It is unknown if this topical medication is excreted in breast milk. Spironolactone Pregnancy And Lactation Text: This medication can cause feminization of the male fetus and should be avoided during pregnancy. The active metabolite is also found in breast milk. Isotretinoin Pregnancy And Lactation Text: This medication is Pregnancy Category X and is considered extremely dangerous during pregnancy. It is unknown if it is excreted in breast milk. Detail Level: Zone Dapsone Pregnancy And Lactation Text: This medication is Pregnancy Category C and is not considered safe during pregnancy or breast feeding. Erythromycin Counseling:  I discussed with the patient the risks of erythromycin including but not limited to GI upset, allergic reaction, drug rash, diarrhea, increase in liver enzymes, and yeast infections. Topical Retinoid counseling:  Patient advised to apply a pea-sized amount only at bedtime and wait 30 minutes after washing their face before applying.  If too drying, patient may add a non-comedogenic moisturizer. The patient verbalized understanding of the proper use and possible adverse effects of retinoids.  All of the patient's questions and concerns were addressed. Birth Control Pills Counseling: Birth Control Pill Counseling: I discussed with the patient the potential side effects of OCPs including but not limited to increased risk of stroke, heart attack, thrombophlebitis, deep venous thrombosis, hepatic adenomas, breast changes, GI upset, headaches, and depression.  The patient verbalized understanding of the proper use and possible adverse effects of OCPs. All of the patient's questions and concerns were addressed. Topical Sulfur Applications Counseling: Topical Sulfur Counseling: Patient counseled that this medication may cause skin irritation or allergic reactions.  In the event of skin irritation, the patient was advised to reduce the amount of the drug applied or use it less frequently.   The patient verbalized understanding of the proper use and possible adverse effects of topical sulfur application.  All of the patient's questions and concerns were addressed. Tazorac Pregnancy And Lactation Text: This medication is not safe during pregnancy. It is unknown if this medication is excreted in breast milk. Tetracycline Counseling: Patient counseled regarding possible photosensitivity and increased risk for sunburn.  Patient instructed to avoid sunlight, if possible.  When exposed to sunlight, patients should wear protective clothing, sunglasses, and sunscreen.  The patient was instructed to call the office immediately if the following severe adverse effects occur:  hearing changes, easy bruising/bleeding, severe headache, or vision changes.  The patient verbalized understanding of the proper use and possible adverse effects of tetracycline.  All of the patient's questions and concerns were addressed. Patient understands to avoid pregnancy while on therapy due to potential birth defects. Azithromycin Pregnancy And Lactation Text: This medication is considered safe during pregnancy and is also secreted in breast milk. Sarecycline Counseling: Patient advised regarding possible photosensitivity and discoloration of the teeth, skin, lips, tongue and gums.  Patient instructed to avoid sunlight, if possible.  When exposed to sunlight, patients should wear protective clothing, sunglasses, and sunscreen.  The patient was instructed to call the office immediately if the following severe adverse effects occur:  hearing changes, easy bruising/bleeding, severe headache, or vision changes.  The patient verbalized understanding of the proper use and possible adverse effects of sarecycline.  All of the patient's questions and concerns were addressed.

## 2021-05-24 NOTE — PROGRESS NOTE ADULT - SUBJECTIVE AND OBJECTIVE BOX
Subjective:      V/S  T(C): 37.6 (05-24-21 @ 11:45), Max: 37.7 (05-23-21 @ 16:19)  HR: 85 (05-24-21 @ 11:59) (55 - 85)  BP: 115/66 (05-24-21 @ 10:00) (94/57 - 131/81)  ABP: --  ABP(mean): --  RR: 20 (05-24-21 @ 10:00) (16 - 20)  SpO2: 99% (05-24-21 @ 11:59) (96% - 100%)  Wt(kg): --  CVP(mm Hg): --  CO: --  CI: --  PA: --                                                Tele:    CHEST TUBE:                           OUTPUT:             per 24 hours     Drainage:    AIR LEAKS:  [ ] YES [ ] NO      MEDICATIONS  (STANDING):  buDESOnide    Inhalation Suspension 0.25 milliGRAM(s) Inhalation every 12 hours  cefTRIAXone   IVPB 1000 milliGRAM(s) IV Intermittent every 24 hours  cefuroxime  IVPB 1500 milliGRAM(s) IV Intermittent once  chlorhexidine 0.12% Liquid 15 milliLiter(s) Oral Mucosa every 12 hours  chlorhexidine 4% Liquid 1 Application(s) Topical <User Schedule>  diazepam    Tablet 5 milliGRAM(s) Oral every 12 hours  diltiazem    Tablet 30 milliGRAM(s) Oral every 6 hours  doxazosin 4 milliGRAM(s) Oral at bedtime  nicotine - 21 mG/24Hr(s) Patch 1 patch Transdermal daily  oxymetazoline 0.05% Nasal Spray 2 Spray(s) Left Nostril two times a day  pantoprazole  Injectable 40 milliGRAM(s) IV Push daily  sertraline 100 milliGRAM(s) Oral daily      05-24    137  |  96<L>  |  9.0  ----------------------------<  118<H>  3.9   |  34.0<H>  |  0.44<L>    Ca    8.5<L>      24 May 2021 04:45  Phos  2.4     05-24  Mg     1.9     05-24    TPro  6.5<L>  /  Alb  2.8<L>  /  TBili  0.9  /  DBili  x   /  AST  48<H>  /  ALT  65<H>  /  AlkPhos  122<H>  05-24                               11.9   4.10  )-----------( 151      ( 24 May 2021 04:45 )             38.1        PT/INR - ( 22 May 2021 13:26 )   PT: 14.3 sec;   INR: 1.25 ratio         PTT - ( 22 May 2021 13:26 )  PTT:38.6 sec         CAPILLARY BLOOD GLUCOSE      POCT Blood Glucose.: 120 mg/dL (23 May 2021 16:23)           CXR:      Physical Exam:      Gen: WN/WD NAD    Neuro: A+Ox3, nonfocal    Pulm: diminished b/l, + bloody secretions    CV: RRR    LE: no edema              PAST MEDICAL & SURGICAL HISTORY:  Poor historian    COPD (chronic obstructive pulmonary disease)    Pulmonary embolism    Atrial flutter    H/O solitary pulmonary nodule    Depression    No significant past surgical history             Subjective: Sedated on ventilator>orally intubated      V/S  T(C): 37.6 (05-24-21 @ 11:45), Max: 37.7 (05-23-21 @ 16:19)  HR: 85 (05-24-21 @ 11:59) (55 - 85)  BP: 115/66 (05-24-21 @ 10:00) (94/57 - 131/81)  RR: 20 (05-24-21 @ 10:00) (16 - 20)  SpO2: 99% (05-24-21 @ 11:59) (96% - 100%)    MEDICATIONS  (STANDING):  buDESOnide    Inhalation Suspension 0.25 milliGRAM(s) Inhalation every 12 hours  cefTRIAXone   IVPB 1000 milliGRAM(s) IV Intermittent every 24 hours  cefuroxime  IVPB 1500 milliGRAM(s) IV Intermittent once  chlorhexidine 0.12% Liquid 15 milliLiter(s) Oral Mucosa every 12 hours  chlorhexidine 4% Liquid 1 Application(s) Topical <User Schedule>  diazepam    Tablet 5 milliGRAM(s) Oral every 12 hours  diltiazem    Tablet 30 milliGRAM(s) Oral every 6 hours  doxazosin 4 milliGRAM(s) Oral at bedtime  nicotine - 21 mG/24Hr(s) Patch 1 patch Transdermal daily  oxymetazoline 0.05% Nasal Spray 2 Spray(s) Left Nostril two times a day  pantoprazole  Injectable 40 milliGRAM(s) IV Push daily  sertraline 100 milliGRAM(s) Oral daily      05-24    137  |  96<L>  |  9.0  ----------------------------<  118<H>  3.9   |  34.0<H>  |  0.44<L>    Ca    8.5<L>      24 May 2021 04:45  Phos  2.4     05-24  Mg     1.9     05-24    TPro  6.5<L>  /  Alb  2.8<L>  /  TBili  0.9  /  DBili  x   /  AST  48<H>  /  ALT  65<H>  /  AlkPhos  122<H>  05-24                               11.9   4.10  )-----------( 151      ( 24 May 2021 04:45 )             38.1        PT/INR - ( 22 May 2021 13:26 )   PT: 14.3 sec;   INR: 1.25 ratio         PTT - ( 22 May 2021 13:26 )  PTT:38.6 sec         CAPILLARY BLOOD GLUCOSE      POCT Blood Glucose.: 120 mg/dL (23 May 2021 16:23)           CXR:      Physical Exam:      Gen: WN/WD NAD    Neuro: A+Ox3, nonfocal    Pulm: diminished b/l, + bloody secretions Orally intubated    CV: RRR    LE: no edema              PAST MEDICAL & SURGICAL HISTORY:  Poor historian    COPD (chronic obstructive pulmonary disease)    Pulmonary embolism    Atrial flutter    H/O solitary pulmonary nodule    Depression    No significant past surgical history

## 2021-05-24 NOTE — PROGRESS NOTE ADULT - ASSESSMENT
61 year old male with a PMH of  smoking p/w respiratory failure, PNA, A flutter, CHF and left pleural effusion.  Left pigtail inserted by MICU on 5/15/21.  On 5/18 Thoracic surgery notified to monitor left chest tube,, chest tube clamped.  5/19 cytology obtained from left pigtail & removed, CXR stable. Pt reintubated and consult requested for tracheostomy.  5/23 CPAP trials during day, vent rest overnite ENT called for oropharynx bleeding  5/24 Bronch last evening by MICU ENT evaluated >no acute source bleeding, old blood left bronchus  For Trach Tuesday

## 2021-05-24 NOTE — PROGRESS NOTE ADULT - ASSESSMENT
HPI/INTERVAL EVENTS: no active bleeding, Hb stable    EXAM:   intubated, calm and comfortable  minimal fio2  reg rhythm  bilat air entry  abdomen soft  bilat edema    RADIOLOGY REVIEWED:  cxr - left sided effusion     IMPRESSION/ASSESSMENT & PLAN:   60 yo male with COPD, smoker, aflutter, prior PE on eliquis, depression, opioid dependence reportedly on suboxone, admitted with acute respiratory failure, left sided pneumonia with parapneumonic effusion, PE.  Patient failed 1 extubation attempt, developed complete atelectasis requiring bronchoscopy.  Now awaiting tracheostomy/PEG.    Pneumonia/ with effusion  - on ceftriaxone  - sp left pigtail catheter drainage and subsequent removal    Acute respiratory failure  - failed extubation attempt, now awaiting tracheostomy    PE   - restarting anticoagulation with heparin  - lovenox stopped due to bleeding from mouth and nose   - if bleeding from nose occurs again will pack    Opioid dependence on suboxone and valium at home  - step son states patient may not have been taking meds  - slowly weaning off all opioids and benzos, patient doing well.     Diet: tube feeds  Activity: OOB, PT

## 2021-05-24 NOTE — CONSULT NOTE ADULT - SUBJECTIVE AND OBJECTIVE BOX
VASCULAR SURGERY CONSULT    Consulting surgical team: VASCULAR Surgery  Consulting attending: Treva VILLEDA  Patient seen and examined: 05-24-21 @ 10:19      HPI:  62 yo m pmhx COPD, active smoker (1.5 ppd), Aflutter on diltiazem (planned for ablation), PE (2018) on Eliquis, Pulmonary Nodule, Depression on Zoloft, questionable compliance with medications biba from home with complaints of sob, cough, weakness and anorexia for about a week.  In ED patient lethargic but oriented, hypoxic on RA place don 4L with waxing and waning mental status.  Patient also noted to be in aflutter with HR in the 130s; patient given cardizem 10mg IVP, began to have issues with worsening hypoxia/mental status and was subsequently intubated.  Chest xray obtained, large left sided pleural effusion (not present on 11/2020 films). Patient seen at bedside, HR in the 130s Aflutter with 2:1 conduction, BP in the 80s, given 500cc NS.  POCUS exam performed terrell predominant with some scattered bline, left effusion appreciated.  Cardiac portion difficult to obtain, however RV appears WNL, LV function appears diminished from previous TTE where EF was ~58%; however windows difficult.  Initially patient responded to IVF however patient now persistently in the 80s with HR in the 130s, patient electrically cardioverted with 150J, converted to NSR in the 70s.  Additional 500cc bolus ordered, plan for imaging en route to MICU.  (15 May 2021 23:55)    Vascular Surgery consulted for placement of IVC filter. Pt has hisotry of acute on chronic PE and recently diagnosed DVT in the L peroneal vein. Patient was stopped the LOv therapeutic for oral bleeding. S/p bronchoscopy that showed no active bleeding.     PAST MEDICAL HISTORY:  Poor historian    COPD (chronic obstructive pulmonary disease)    Pulmonary embolism    Atrial flutter    H/O solitary pulmonary nodule    Depression        PAST SURGICAL HISTORY:  No significant past surgical history        ALLERGIES:  No Known Allergies      MEDICATIONS  (STANDING):  buDESOnide    Inhalation Suspension 0.25 milliGRAM(s) Inhalation every 12 hours  cefTRIAXone   IVPB 1000 milliGRAM(s) IV Intermittent every 24 hours  cefuroxime  IVPB 1500 milliGRAM(s) IV Intermittent once  chlorhexidine 0.12% Liquid 15 milliLiter(s) Oral Mucosa every 12 hours  chlorhexidine 4% Liquid 1 Application(s) Topical <User Schedule>  diazepam    Tablet 5 milliGRAM(s) Oral every 12 hours  diltiazem    Tablet 30 milliGRAM(s) Oral every 6 hours  doxazosin 4 milliGRAM(s) Oral at bedtime  nicotine - 21 mG/24Hr(s) Patch 1 patch Transdermal daily  oxymetazoline 0.05% Nasal Spray 2 Spray(s) Left Nostril two times a day  pantoprazole  Injectable 40 milliGRAM(s) IV Push daily  sertraline 100 milliGRAM(s) Oral daily    MEDICATIONS  (PRN):  albuterol/ipratropium for Nebulization 3 milliLiter(s) Nebulizer every 6 hours PRN Shortness of Breath and/or Wheezing  fentaNYL    Injectable 50 MICROGram(s) IV Push every 1 hour PRN breakthrough  lidocaine 2% Viscous 15 milliLiter(s) Mucosal every 4 hours PRN oral pain  oxyCODONE    IR 5 milliGRAM(s) Oral every 6 hours PRN moderate to severe pain      VITALS & I/Os:  Vital Signs Last 24 Hrs  T(C): 37.6 (24 May 2021 09:00), Max: 37.7 (23 May 2021 11:15)  T(F): 99.7 (24 May 2021 09:00), Max: 99.9 (23 May 2021 11:15)  HR: 66 (24 May 2021 09:50) (55 - 83)  BP: 114/62 (24 May 2021 09:00) (94/57 - 131/81)  BP(mean): 78 (24 May 2021 09:00) (65 - 96)  RR: 20 (24 May 2021 09:00) (16 - 20)  SpO2: 99% (24 May 2021 09:50) (96% - 100%)  CAPILLARY BLOOD GLUCOSE      POCT Blood Glucose.: 120 mg/dL (23 May 2021 16:23)  POCT Blood Glucose.: 221 mg/dL (23 May 2021 12:03)    Mode: CPAP with PS  FiO2: 40  PEEP: 5  PS: 5  MAP: 8    I&O's Summary    23 May 2021 07:01  -  24 May 2021 07:00  --------------------------------------------------------  IN: 1707.5 mL / OUT: 1105 mL / NET: 602.5 mL    24 May 2021 07:01  -  24 May 2021 10:19  --------------------------------------------------------  IN: 320 mL / OUT: 110 mL / NET: 210 mL          GEN: NAD, intubated  HEENT: WNL  CHEST: Symmetrical chest rise, breath sounds CTAB  HEART: RRR, non-muffled heart sounds  ABD: Soft, non-tender, non-distended  EXT/VASC:         LABS:                        11.9   4.10  )-----------( 151      ( 24 May 2021 04:45 )             38.1     05-24    137  |  96<L>  |  9.0  ----------------------------<  118<H>  3.9   |  34.0<H>  |  0.44<L>    Ca    8.5<L>      24 May 2021 04:45  Phos  2.4     05-24  Mg     1.9     05-24    TPro  6.5<L>  /  Alb  2.8<L>  /  TBili  0.9  /  DBili  x   /  AST  48<H>  /  ALT  65<H>  /  AlkPhos  122<H>  05-24      PT/INR - ( 22 May 2021 13:26 )   PT: 14.3 sec;   INR: 1.25 ratio         PTT - ( 22 May 2021 13:26 )  PTT:38.6 sec              IMAGING:

## 2021-05-24 NOTE — CONSULT NOTE ADULT - ASSESSMENT
61M admitted for COPD exacerbation complicated by reintubation and heart failure on a-fib. Patient had acute on chronic PEs with a DVT in the Lower extremity as per Duplex on 5/16.     Recommendations:     Patient does not meet criteria for an IVC filter at this point. Due to an isolated peroneal vein DVT, IVC filter would very invasive. Bleeding from the oropharynx should be addressed if continues with cauterization, rhino rockets, etc... to control the bleeding and be able to start anticoagulation. As off today patient hasn't dropped his Hb or needed PRBC transfusion.  For now we recommend to start heparin drip so it can be stopped immediately if patient re-bleeds or drops his Hemoglobin. If he bleeds the bleeding needs to be addressed and stopped. At this point giving his afib, DVT and history of PEs discontinuing the anticoagulation presents a higher risk than the oral bleeding that caused no drop in Hb.     Discussed with Dr. Tilley whom agrees.     Thank you for your consult any questions please call.

## 2021-05-25 ENCOUNTER — APPOINTMENT (OUTPATIENT)
Dept: THORACIC SURGERY | Facility: CLINIC | Age: 61
End: 2021-05-25

## 2021-05-25 ENCOUNTER — APPOINTMENT (OUTPATIENT)
Dept: THORACIC SURGERY | Facility: HOSPITAL | Age: 61
End: 2021-05-25

## 2021-05-25 DIAGNOSIS — M79.89 OTHER SPECIFIED SOFT TISSUE DISORDERS: ICD-10-CM

## 2021-05-25 LAB
ALBUMIN SERPL ELPH-MCNC: 2.7 G/DL — LOW (ref 3.3–5.2)
ALP SERPL-CCNC: 113 U/L — SIGNIFICANT CHANGE UP (ref 40–120)
ALT FLD-CCNC: 63 U/L — HIGH
ANION GAP SERPL CALC-SCNC: 7 MMOL/L — SIGNIFICANT CHANGE UP (ref 5–17)
APTT BLD: 31.8 SEC — SIGNIFICANT CHANGE UP (ref 27.5–35.5)
AST SERPL-CCNC: 39 U/L — SIGNIFICANT CHANGE UP
BASOPHILS # BLD AUTO: 0.01 K/UL — SIGNIFICANT CHANGE UP (ref 0–0.2)
BASOPHILS NFR BLD AUTO: 0.2 % — SIGNIFICANT CHANGE UP (ref 0–2)
BILIRUB SERPL-MCNC: 0.8 MG/DL — SIGNIFICANT CHANGE UP (ref 0.4–2)
BUN SERPL-MCNC: 10 MG/DL — SIGNIFICANT CHANGE UP (ref 8–20)
CALCIUM SERPL-MCNC: 8.1 MG/DL — LOW (ref 8.6–10.2)
CHLORIDE SERPL-SCNC: 99 MMOL/L — SIGNIFICANT CHANGE UP (ref 98–107)
CO2 SERPL-SCNC: 32 MMOL/L — HIGH (ref 22–29)
CREAT SERPL-MCNC: 0.42 MG/DL — LOW (ref 0.5–1.3)
EOSINOPHIL # BLD AUTO: 0.08 K/UL — SIGNIFICANT CHANGE UP (ref 0–0.5)
EOSINOPHIL NFR BLD AUTO: 1.9 % — SIGNIFICANT CHANGE UP (ref 0–6)
GLUCOSE SERPL-MCNC: 126 MG/DL — HIGH (ref 70–99)
HCT VFR BLD CALC: 35.5 % — LOW (ref 39–50)
HGB BLD-MCNC: 11.3 G/DL — LOW (ref 13–17)
IMM GRANULOCYTES NFR BLD AUTO: 0.5 % — SIGNIFICANT CHANGE UP (ref 0–1.5)
INR BLD: 1.16 RATIO — SIGNIFICANT CHANGE UP (ref 0.88–1.16)
LYMPHOCYTES # BLD AUTO: 1.24 K/UL — SIGNIFICANT CHANGE UP (ref 1–3.3)
LYMPHOCYTES # BLD AUTO: 29.2 % — SIGNIFICANT CHANGE UP (ref 13–44)
MAGNESIUM SERPL-MCNC: 1.9 MG/DL — SIGNIFICANT CHANGE UP (ref 1.8–2.6)
MCHC RBC-ENTMCNC: 31.8 GM/DL — LOW (ref 32–36)
MCHC RBC-ENTMCNC: 32.3 PG — SIGNIFICANT CHANGE UP (ref 27–34)
MCV RBC AUTO: 101.4 FL — HIGH (ref 80–100)
MONOCYTES # BLD AUTO: 0.39 K/UL — SIGNIFICANT CHANGE UP (ref 0–0.9)
MONOCYTES NFR BLD AUTO: 9.2 % — SIGNIFICANT CHANGE UP (ref 2–14)
NEUTROPHILS # BLD AUTO: 2.5 K/UL — SIGNIFICANT CHANGE UP (ref 1.8–7.4)
NEUTROPHILS NFR BLD AUTO: 59 % — SIGNIFICANT CHANGE UP (ref 43–77)
PHOSPHATE SERPL-MCNC: 2.5 MG/DL — SIGNIFICANT CHANGE UP (ref 2.4–4.7)
PLATELET # BLD AUTO: 145 K/UL — LOW (ref 150–400)
POTASSIUM SERPL-MCNC: 3.9 MMOL/L — SIGNIFICANT CHANGE UP (ref 3.5–5.3)
POTASSIUM SERPL-SCNC: 3.9 MMOL/L — SIGNIFICANT CHANGE UP (ref 3.5–5.3)
PROT SERPL-MCNC: 5.9 G/DL — LOW (ref 6.6–8.7)
PROTHROM AB SERPL-ACNC: 13.4 SEC — SIGNIFICANT CHANGE UP (ref 10.6–13.6)
RBC # BLD: 3.5 M/UL — LOW (ref 4.2–5.8)
RBC # FLD: 15 % — HIGH (ref 10.3–14.5)
SODIUM SERPL-SCNC: 138 MMOL/L — SIGNIFICANT CHANGE UP (ref 135–145)
WBC # BLD: 4.24 K/UL — SIGNIFICANT CHANGE UP (ref 3.8–10.5)
WBC # FLD AUTO: 4.24 K/UL — SIGNIFICANT CHANGE UP (ref 3.8–10.5)

## 2021-05-25 PROCEDURE — 31600 PLANNED TRACHEOSTOMY: CPT

## 2021-05-25 PROCEDURE — 31622 DX BRONCHOSCOPE/WASH: CPT

## 2021-05-25 PROCEDURE — 99233 SBSQ HOSP IP/OBS HIGH 50: CPT | Mod: 25

## 2021-05-25 PROCEDURE — 71045 X-RAY EXAM CHEST 1 VIEW: CPT | Mod: 26

## 2021-05-25 RX ORDER — MIDAZOLAM HYDROCHLORIDE 1 MG/ML
4 INJECTION, SOLUTION INTRAMUSCULAR; INTRAVENOUS ONCE
Refills: 0 | Status: DISCONTINUED | OUTPATIENT
Start: 2021-05-25 | End: 2021-05-25

## 2021-05-25 RX ORDER — LIDOCAINE HYDROCHLORIDE AND EPINEPHRINE 10; 10 MG/ML; UG/ML
10 INJECTION, SOLUTION INFILTRATION; PERINEURAL ONCE
Refills: 0 | Status: DISCONTINUED | OUTPATIENT
Start: 2021-05-25 | End: 2021-05-26

## 2021-05-25 RX ORDER — FENTANYL CITRATE 50 UG/ML
50 INJECTION INTRAVENOUS ONCE
Refills: 0 | Status: DISCONTINUED | OUTPATIENT
Start: 2021-05-25 | End: 2021-05-25

## 2021-05-25 RX ORDER — PROPOFOL 10 MG/ML
20 INJECTION, EMULSION INTRAVENOUS
Qty: 1000 | Refills: 0 | Status: DISCONTINUED | OUTPATIENT
Start: 2021-05-25 | End: 2021-05-26

## 2021-05-25 RX ORDER — FENTANYL CITRATE 50 UG/ML
150 INJECTION INTRAVENOUS ONCE
Refills: 0 | Status: DISCONTINUED | OUTPATIENT
Start: 2021-05-25 | End: 2021-05-25

## 2021-05-25 RX ORDER — ROCURONIUM BROMIDE 10 MG/ML
100 VIAL (ML) INTRAVENOUS ONCE
Refills: 0 | Status: COMPLETED | OUTPATIENT
Start: 2021-05-25 | End: 2021-05-25

## 2021-05-25 RX ADMIN — PANTOPRAZOLE SODIUM 40 MILLIGRAM(S): 20 TABLET, DELAYED RELEASE ORAL at 12:31

## 2021-05-25 RX ADMIN — FENTANYL CITRATE 50 MICROGRAM(S): 50 INJECTION INTRAVENOUS at 04:07

## 2021-05-25 RX ADMIN — FENTANYL CITRATE 150 MICROGRAM(S): 50 INJECTION INTRAVENOUS at 15:05

## 2021-05-25 RX ADMIN — SERTRALINE 100 MILLIGRAM(S): 25 TABLET, FILM COATED ORAL at 12:31

## 2021-05-25 RX ADMIN — MIDAZOLAM HYDROCHLORIDE 2 MILLIGRAM(S): 1 INJECTION, SOLUTION INTRAMUSCULAR; INTRAVENOUS at 15:00

## 2021-05-25 RX ADMIN — MIDAZOLAM HYDROCHLORIDE 4 MILLIGRAM(S): 1 INJECTION, SOLUTION INTRAMUSCULAR; INTRAVENOUS at 15:05

## 2021-05-25 RX ADMIN — Medication 1 PATCH: at 07:23

## 2021-05-25 RX ADMIN — FENTANYL CITRATE 50 MICROGRAM(S): 50 INJECTION INTRAVENOUS at 04:30

## 2021-05-25 RX ADMIN — CHLORHEXIDINE GLUCONATE 15 MILLILITER(S): 213 SOLUTION TOPICAL at 05:12

## 2021-05-25 RX ADMIN — OXYMETAZOLINE HYDROCHLORIDE 2 SPRAY(S): 0.5 SPRAY NASAL at 05:11

## 2021-05-25 RX ADMIN — MIDAZOLAM HYDROCHLORIDE 2 MILLIGRAM(S): 1 INJECTION, SOLUTION INTRAMUSCULAR; INTRAVENOUS at 03:04

## 2021-05-25 RX ADMIN — Medication 1 PATCH: at 12:30

## 2021-05-25 RX ADMIN — MIDAZOLAM HYDROCHLORIDE 2 MILLIGRAM(S): 1 INJECTION, SOLUTION INTRAMUSCULAR; INTRAVENOUS at 06:29

## 2021-05-25 RX ADMIN — Medication 100 MILLIGRAM(S): at 15:00

## 2021-05-25 RX ADMIN — OXYMETAZOLINE HYDROCHLORIDE 2 SPRAY(S): 0.5 SPRAY NASAL at 17:37

## 2021-05-25 RX ADMIN — Medication 4 MILLIGRAM(S): at 23:12

## 2021-05-25 RX ADMIN — FENTANYL CITRATE 150 MICROGRAM(S): 50 INJECTION INTRAVENOUS at 15:15

## 2021-05-25 RX ADMIN — Medication 0.25 MILLIGRAM(S): at 20:14

## 2021-05-25 RX ADMIN — Medication 1 PATCH: at 12:31

## 2021-05-25 RX ADMIN — Medication 0.25 MILLIGRAM(S): at 08:11

## 2021-05-25 RX ADMIN — MIDAZOLAM HYDROCHLORIDE 2 MILLIGRAM(S): 1 INJECTION, SOLUTION INTRAMUSCULAR; INTRAVENOUS at 09:30

## 2021-05-25 RX ADMIN — Medication 2.5 MILLIGRAM(S): at 05:11

## 2021-05-25 RX ADMIN — PROPOFOL 10.1 MICROGRAM(S)/KG/MIN: 10 INJECTION, EMULSION INTRAVENOUS at 15:15

## 2021-05-25 RX ADMIN — CHLORHEXIDINE GLUCONATE 1 APPLICATION(S): 213 SOLUTION TOPICAL at 05:11

## 2021-05-25 RX ADMIN — CHLORHEXIDINE GLUCONATE 15 MILLILITER(S): 213 SOLUTION TOPICAL at 17:37

## 2021-05-25 RX ADMIN — Medication 1 PATCH: at 19:18

## 2021-05-25 RX ADMIN — FENTANYL CITRATE 50 MICROGRAM(S): 50 INJECTION INTRAVENOUS at 18:40

## 2021-05-25 RX ADMIN — FENTANYL CITRATE 50 MICROGRAM(S): 50 INJECTION INTRAVENOUS at 18:55

## 2021-05-25 NOTE — BRIEF OPERATIVE NOTE - COMMENTS
Invasive Lines: NONE  IV Medication Infusions: Bedside sedation/paralytic prior to procedure.  No qualified resident was available to assist in this case. I have personally first assisted the Cardiothoracic Surgeon listed in this brief op note throughout the entirety of this case.

## 2021-05-25 NOTE — PROCEDURE NOTE - PROCEDURE DATE TIME, MLM
25-May-2021 15:15
16-May-2021 13:30
15-May-2021 23:44
16-May-2021 14:30
23-May-2021 18:04
18-May-2021 23:45

## 2021-05-25 NOTE — PROGRESS NOTE ADULT - ASSESSMENT
HPI/INTERVAL EVENTS: heparin stopped last night for todays procedure    EXAM:   intubated, calm and comfortable  minimal fio2  reg rhythm  bilat air entry  abdomen soft  bilat edema    RADIOLOGY REVIEWED:  cxr - left sided effusion     IMPRESSION/ASSESSMENT & PLAN:   62 yo male with COPD, smoker, aflutter, prior PE on eliquis, depression, opioid dependence reportedly on suboxone, admitted with acute respiratory failure, left sided pneumonia with parapneumonic effusion, PE.  Patient failed 1 extubation attempt, developed complete atelectasis requiring bronchoscopy.  Now awaiting tracheostomy/PEG.    Pneumonia/ with effusion  - on ceftriaxone  - sp left pigtail catheter drainage and subsequent removal    Acute respiratory failure  - failed extubation attempt  - plan for tracheostomy later today    PE   - holding AC until after trach  - had episode of bleeding from nose while on AC    Opioid dependence on suboxone and valium at home  - step son states patient may not have been taking meds  - slowly weaning off all opioids and benzos, patient doing well.     Diet: tube feeds  Activity: OOB, PT

## 2021-05-25 NOTE — PROCEDURE NOTE - NSINFORMCONSENT_GEN_A_CORE
Benefits, risks, and possible complications of procedure explained to patient/caregiver who verbalized understanding and gave written consent.
This was an emergent procedure.
Benefits, risks, and possible complications of procedure explained to patient/caregiver who verbalized understanding and gave written consent.
Step-daughter Daljit Lozada/Benefits, risks, and possible complications of procedure explained to patient/caregiver who verbalized understanding and gave verbal consent.
This was an emergent procedure.
Ernestine Lozada (Step-daughter)/Benefits, risks, and possible complications of procedure explained to patient/caregiver who verbalized understanding and gave verbal consent.

## 2021-05-25 NOTE — PROGRESS NOTE ADULT - SUBJECTIVE AND OBJECTIVE BOX
On Admission  05-15-21 (10d)  HPI:  62 yo m pmhx COPD, active smoker (1.5 ppd), Aflutter on diltiazem (planned for ablation), PE (2018) on Eliquis, Pulmonary Nodule, Depression on Zoloft, questionable compliance with medications biba from home with complaints of sob, cough, weakness and anorexia for about a week.  In ED patient lethargic but oriented, hypoxic on RA place don 4L with waxing and waning mental status.  Patient also noted to be in aflutter with HR in the 130s; patient given cardizem 10mg IVP, began to have issues with worsening hypoxia/mental status and was subsequently intubated.  Chest xray obtained, large left sided pleural effusion (not present on 11/2020 films). Patient seen at bedside, HR in the 130s Aflutter with 2:1 conduction, BP in the 80s, given 500cc NS.  POCUS exam performed terrell predominant with some scattered bline, left effusion appreciated.  Cardiac portion difficult to obtain, however RV appears WNL, LV function appears diminished from previous TTE where EF was ~58%; however windows difficult.  Initially patient responded to IVF however patient now persistently in the 80s with HR in the 130s, patient electrically cardioverted with 150J, converted to NSR in the 70s.  Additional 500cc bolus ordered, plan for imaging en route to MICU.  (15 May 2021 23:55)    PAST MEDICAL & SURGICAL HISTORY:  Poor historian    COPD (chronic obstructive pulmonary disease)    Pulmonary embolism    Atrial flutter    H/O solitary pulmonary nodule    Depression    No significant past surgical history        Antimicrobial:  cefuroxime  IVPB 1500 milliGRAM(s) IV Intermittent once    Cardiovascular:  diltiazem    Tablet 30 milliGRAM(s) Oral every 6 hours  doxazosin 4 milliGRAM(s) Oral at bedtime    Pulmonary:  albuterol/ipratropium for Nebulization 3 milliLiter(s) Nebulizer every 6 hours PRN  buDESOnide    Inhalation Suspension 0.25 milliGRAM(s) Inhalation every 12 hours    Hematalogic:  heparin   Injectable 6500 Unit(s) IV Push every 6 hours PRN  heparin   Injectable 3000 Unit(s) IV Push every 6 hours PRN  heparin  Infusion.  Unit(s)/Hr IV Continuous <Continuous>    Other:  chlorhexidine 0.12% Liquid 15 milliLiter(s) Oral Mucosa every 12 hours  chlorhexidine 4% Liquid 1 Application(s) Topical <User Schedule>  diazepam    Tablet 2.5 milliGRAM(s) Oral two times a day  fentaNYL    Injectable 150 MICROGram(s) IV Push once  lidocaine 1%/epinephrine 1:100,000 Inj 10 milliLiter(s) Local Injection once  lidocaine 2% Viscous 15 milliLiter(s) Mucosal every 4 hours PRN  midazolam Injectable 2 milliGRAM(s) IV Push every 3 hours PRN  nicotine - 21 mG/24Hr(s) Patch 1 patch Transdermal daily  oxyCODONE    IR 5 milliGRAM(s) Oral every 6 hours PRN  oxymetazoline 0.05% Nasal Spray 2 Spray(s) Left Nostril two times a day  pantoprazole  Injectable 40 milliGRAM(s) IV Push daily  propofol Infusion 20 MICROgram(s)/kG/Min IV Continuous <Continuous>  sertraline 100 milliGRAM(s) Oral daily      Drug Dosing Weight  Height (cm): 182.9 (24 May 2021 15:54)  Weight (kg): 83.9 (24 May 2021 15:54)  BMI (kg/m2): 25.1 (24 May 2021 15:54)  BSA (m2): 2.06 (24 May 2021 15:54)    T(C): 37.3 (05-25-21 @ 12:02), Max: 37.6 (05-24-21 @ 13:00)  HR: 74 (05-25-21 @ 12:09)  BP: 125/74 (05-25-21 @ 09:00)  BP(mean): 90 (05-25-21 @ 09:00)  ABP: --  ABP(mean): --  RR: 23 (05-25-21 @ 08:00)  SpO2: 100% (05-25-21 @ 12:09)          05-24 @ 07:01  -  05-25 @ 07:00  --------------------------------------------------------  IN: 1635 mL / OUT: 1165 mL / NET: 470 mL        Mode: AC/ CMV (Assist Control/ Continuous Mandatory Ventilation)  RR (machine): 20  TV (machine): 400  FiO2: 40  PEEP: 5  MAP: 9  PIP: 17        LABS:  CBC Full  -  ( 25 May 2021 04:42 )  WBC Count : 4.24 K/uL  RBC Count : 3.50 M/uL  Hemoglobin : 11.3 g/dL  Hematocrit : 35.5 %  Platelet Count - Automated : 145 K/uL  Mean Cell Volume : 101.4 fl  Mean Cell Hemoglobin : 32.3 pg  Mean Cell Hemoglobin Concentration : 31.8 gm/dL  Auto Neutrophil # : 2.50 K/uL  Auto Lymphocyte # : 1.24 K/uL  Auto Monocyte # : 0.39 K/uL  Auto Eosinophil # : 0.08 K/uL  Auto Basophil # : 0.01 K/uL  Auto Neutrophil % : 59.0 %  Auto Lymphocyte % : 29.2 %  Auto Monocyte % : 9.2 %  Auto Eosinophil % : 1.9 %  Auto Basophil % : 0.2 %    05-25    138  |  99  |  10.0  ----------------------------<  126<H>  3.9   |  32.0<H>  |  0.42<L>    Ca    8.1<L>      25 May 2021 04:42  Phos  2.5     05-25  Mg     1.9     05-25    TPro  5.9<L>  /  Alb  2.7<L>  /  TBili  0.8  /  DBili  x   /  AST  39  /  ALT  63<H>  /  AlkPhos  113  05-25    PT/INR - ( 25 May 2021 04:42 )   PT: 13.4 sec;   INR: 1.16 ratio         PTT - ( 25 May 2021 04:42 )  PTT:31.8 sec    Culture Results:   Normal Respiratory Payton present (05-24 @ 07:08)  Culture Results:   Testing in progress (05-24 @ 07:08)    ____________________________________________________________________________________________________

## 2021-05-25 NOTE — CHART NOTE - NSCHARTNOTEFT_GEN_A_CORE
Source: Patient [ ]  Family [ ]   other [ x] EMR, discussed on rounds    Current Diet: Diet, NPO after Midnight:      NPO Start Date: 24-May-2021,   NPO Start Time: 23:59 (05-24-21 @ 18:30)  Diet, NPO after Midnight:      NPO Start Date: 24-May-2021,   NPO Start Time: 23:59  Except Medications (05-24-21 @ 07:47)  Diet, NPO with Tube Feed:   Tube Feeding Modality: Orogastric  Jevity 1.5 Venu (JEVITY1.5)  Total Volume for 24 Hours (mL): 1760  Continuous  Starting Tube Feed Rate {mL per Hour}: 20  Increase Tube Feed Rate by (mL): 10     Every 4 hours  Until Goal Tube Feed Rate (mL per Hour): 80  Tube Feed Duration (in Hours): 22  Tube Feed Start Time: 13:15  Bolus   Total Volume per Flush (mL): 125   Frequency: Every 6 Hours (05-21-21 @ 10:38)    Enteral /Parenteral Nutrition: Tube feeds currently on hold for plan for trach/PEG today.    Current Weight:   (5/24) 184.9 lbs  (5/23) 173.9 lbs  (5/21) 179.4 lbs  (5/20) 177.9 lbs  (5/19) 173.9 lbs  (5/18) 172.4 lbs  ? accuracy of weights, noted with 1+ generalized edema, continue to trend and maintain strict Is&Os     Pertinent Medications: MEDICATIONS  (STANDING):  buDESOnide    Inhalation Suspension 0.25 milliGRAM(s) Inhalation every 12 hours  cefuroxime  IVPB 1500 milliGRAM(s) IV Intermittent once  chlorhexidine 0.12% Liquid 15 milliLiter(s) Oral Mucosa every 12 hours  chlorhexidine 4% Liquid 1 Application(s) Topical <User Schedule>  diazepam    Tablet 2.5 milliGRAM(s) Oral two times a day  diltiazem    Tablet 30 milliGRAM(s) Oral every 6 hours  doxazosin 4 milliGRAM(s) Oral at bedtime  heparin  Infusion.  Unit(s)/Hr (15 mL/Hr) IV Continuous <Continuous>  nicotine - 21 mG/24Hr(s) Patch 1 patch Transdermal daily  oxymetazoline 0.05% Nasal Spray 2 Spray(s) Left Nostril two times a day  pantoprazole  Injectable 40 milliGRAM(s) IV Push daily  sertraline 100 milliGRAM(s) Oral daily    MEDICATIONS  (PRN):  albuterol/ipratropium for Nebulization 3 milliLiter(s) Nebulizer every 6 hours PRN Shortness of Breath and/or Wheezing  heparin   Injectable 6500 Unit(s) IV Push every 6 hours PRN For aPTT less than 40  heparin   Injectable 3000 Unit(s) IV Push every 6 hours PRN For aPTT between 40 - 57  lidocaine 2% Viscous 15 milliLiter(s) Mucosal every 4 hours PRN oral pain  midazolam Injectable 2 milliGRAM(s) IV Push every 3 hours PRN severe agitation  oxyCODONE    IR 5 milliGRAM(s) Oral every 6 hours PRN moderate to severe pain    Pertinent Labs: CBC Full  -  ( 25 May 2021 04:42 )  WBC Count : 4.24 K/uL  RBC Count : 3.50 M/uL  Hemoglobin : 11.3 g/dL  Hematocrit : 35.5 %  Platelet Count - Automated : 145 K/uL  Mean Cell Volume : 101.4 fl  Mean Cell Hemoglobin : 32.3 pg  Mean Cell Hemoglobin Concentration : 31.8 gm/dL  Auto Neutrophil # : 2.50 K/uL  Auto Lymphocyte # : 1.24 K/uL  Auto Monocyte # : 0.39 K/uL  Auto Eosinophil # : 0.08 K/uL  Auto Basophil # : 0.01 K/uL  Auto Neutrophil % : 59.0 %  Auto Lymphocyte % : 29.2 %  Auto Monocyte % : 9.2 %  Auto Eosinophil % : 1.9 %  Auto Basophil % : 0.2 %    05-25 Na138 mmol/L Glu 126 mg/dL<H> K+ 3.9 mmol/L Cr  0.42 mg/dL<L> BUN 10.0 mg/dL Phos 2.5 mg/dL Alb 2.7 g/dL<L> PAB n/a       Skin: Intact per documentation     Nutrition focused physical exam previously conducted - found signs of malnutrition [ ]absent [ x]present    Subcutaneous fat loss: [ ] Orbital fat pads region, x[ ]Buccal fat region, [ ]Triceps region,  [ x]Ribs region    Muscle wasting: [ x]Temples region, [ x]Clavicle region, [x ]Shoulder region, [ ]Scapula region, [ ]Interosseous region,  [ ]thigh region, [ ]Calf region    Estimated Needs:   [ x] no change since previous assessment  [ ] recalculated:     Current Nutrition Diagnosis: Pt remains at high nutrition risk secondary to malnutrition (severe, acute) related to inability to meet sufficient protein-energy in setting of COPD, depression, now with LLL pneumonia with parapneumonic effusion, pulmonary embolism, acute respiratory failure requiring intubation (now with plan for trach/PEG) as evidenced by meeting <50% nutrient needs >5 days, mild muscle loss of temples, moderate muscle loss of clavicles and shoulders, moderate fat loss of buccal pads and rib region, and 1+ edema. Pt remains intubated; NPO since midnight for plan for trach/PEG today. Last BM 5/23. RD to follow up.     Recommendations:   1) As medically feasible, resume Jevity 1.5 via PEG @ 20 ml/hr and advance 10 ml/hr q4 hrs until goal rate of 80 ml/hr (x20 hrs) to provide 1600 ml, 2400 kcal, 102g protein, and 1216 ml free water; additional free water per MD discretion.   2) Rx: liquid MVI daily as feasible.  3) Obtain daily weights to monitor trends.     Monitoring and Evaluation:   [ ] PO intake [ ] Tolerance to diet prescription [X] Weights  [X] Follow up per protocol [X] Labs.

## 2021-05-25 NOTE — BRIEF OPERATIVE NOTE - OPERATION/FINDINGS
FB with percutaneous tracheostomy performed at bedside.   -Direct visualization of needle/ placement, Seldinger technique utilized for percutaneous trach placement.

## 2021-05-25 NOTE — PROCEDURE NOTE - ADDITIONAL PROCEDURE DETAILS
Scope used to enter R nare, no old blood or stigmata of recent bleeding noted.  Bleeding noted from L nare with nasal swab.    Patient was sedated with Frentanyl, Versed, and Propofol  Preoxygenated with 100% FiO2 on ventilator  Bronchoscopy performed, noted to have blood pooled in LLL bronchus, with no actively bleeding vessel or lesion visualized. No blood identified on Right pulmonary tree.  Cold saline lavage was performed, and washings were sent for culture and cytology.    Indications: acute hypoxemic respiratory failure, hemoptysis
Bronchoscope passed through ET tube.  Needle and guidewire visualized entering the anterior tracheal wall.  Dilator passed and tracheostomy placed with minimal bleeding.
Cardioversion in setting of unstable aflutter  not included in cc time
Patient with acute hypoxemic respiratory failure, large pleural effusion, atelectasis, lobar pneumonia, septic shock.    Procedure performed independently of critical care time.
Patient with acute hypoxemic respiratory failure, large pleural effusion, atelectasis, lobar pneumonia, septic shock.    Procedure performed independently of critical care time.
ETT placed in setting of hypoxic respiratory failure/pneumonia  not included in cc time

## 2021-05-26 LAB
ALBUMIN SERPL ELPH-MCNC: 2.7 G/DL — LOW (ref 3.3–5.2)
ALP SERPL-CCNC: 122 U/L — HIGH (ref 40–120)
ALT FLD-CCNC: 61 U/L — HIGH
ANION GAP SERPL CALC-SCNC: 9 MMOL/L — SIGNIFICANT CHANGE UP (ref 5–17)
APTT BLD: 128.8 SEC — CRITICAL HIGH (ref 27.5–35.5)
APTT BLD: 88.6 SEC — HIGH (ref 27.5–35.5)
APTT BLD: 96 SEC — HIGH (ref 27.5–35.5)
AST SERPL-CCNC: 42 U/L — HIGH
BASOPHILS # BLD AUTO: 0.01 K/UL — SIGNIFICANT CHANGE UP (ref 0–0.2)
BASOPHILS NFR BLD AUTO: 0.2 % — SIGNIFICANT CHANGE UP (ref 0–2)
BILIRUB SERPL-MCNC: 1.1 MG/DL — SIGNIFICANT CHANGE UP (ref 0.4–2)
BUN SERPL-MCNC: 10 MG/DL — SIGNIFICANT CHANGE UP (ref 8–20)
CALCIUM SERPL-MCNC: 8.4 MG/DL — LOW (ref 8.6–10.2)
CHLORIDE SERPL-SCNC: 98 MMOL/L — SIGNIFICANT CHANGE UP (ref 98–107)
CO2 SERPL-SCNC: 29 MMOL/L — SIGNIFICANT CHANGE UP (ref 22–29)
CREAT SERPL-MCNC: 0.41 MG/DL — LOW (ref 0.5–1.3)
CULTURE RESULTS: SIGNIFICANT CHANGE UP
EOSINOPHIL # BLD AUTO: 0.15 K/UL — SIGNIFICANT CHANGE UP (ref 0–0.5)
EOSINOPHIL NFR BLD AUTO: 3.5 % — SIGNIFICANT CHANGE UP (ref 0–6)
GLUCOSE SERPL-MCNC: 122 MG/DL — HIGH (ref 70–99)
HCT VFR BLD CALC: 34.9 % — LOW (ref 39–50)
HGB BLD-MCNC: 11.3 G/DL — LOW (ref 13–17)
IMM GRANULOCYTES NFR BLD AUTO: 0.7 % — SIGNIFICANT CHANGE UP (ref 0–1.5)
LYMPHOCYTES # BLD AUTO: 0.99 K/UL — LOW (ref 1–3.3)
LYMPHOCYTES # BLD AUTO: 22.8 % — SIGNIFICANT CHANGE UP (ref 13–44)
MAGNESIUM SERPL-MCNC: 1.9 MG/DL — SIGNIFICANT CHANGE UP (ref 1.6–2.6)
MCHC RBC-ENTMCNC: 32.1 PG — SIGNIFICANT CHANGE UP (ref 27–34)
MCHC RBC-ENTMCNC: 32.4 GM/DL — SIGNIFICANT CHANGE UP (ref 32–36)
MCV RBC AUTO: 99.1 FL — SIGNIFICANT CHANGE UP (ref 80–100)
MONOCYTES # BLD AUTO: 0.39 K/UL — SIGNIFICANT CHANGE UP (ref 0–0.9)
MONOCYTES NFR BLD AUTO: 9 % — SIGNIFICANT CHANGE UP (ref 2–14)
NEUTROPHILS # BLD AUTO: 2.77 K/UL — SIGNIFICANT CHANGE UP (ref 1.8–7.4)
NEUTROPHILS NFR BLD AUTO: 63.8 % — SIGNIFICANT CHANGE UP (ref 43–77)
PHOSPHATE SERPL-MCNC: 3.7 MG/DL — SIGNIFICANT CHANGE UP (ref 2.4–4.7)
PLATELET # BLD AUTO: 150 K/UL — SIGNIFICANT CHANGE UP (ref 150–400)
POTASSIUM SERPL-MCNC: 4.1 MMOL/L — SIGNIFICANT CHANGE UP (ref 3.5–5.3)
POTASSIUM SERPL-SCNC: 4.1 MMOL/L — SIGNIFICANT CHANGE UP (ref 3.5–5.3)
PROT SERPL-MCNC: 6.2 G/DL — LOW (ref 6.6–8.7)
RBC # BLD: 3.52 M/UL — LOW (ref 4.2–5.8)
RBC # FLD: 15.7 % — HIGH (ref 10.3–14.5)
SODIUM SERPL-SCNC: 135 MMOL/L — SIGNIFICANT CHANGE UP (ref 135–145)
SPECIMEN SOURCE: SIGNIFICANT CHANGE UP
WBC # BLD: 4.34 K/UL — SIGNIFICANT CHANGE UP (ref 3.8–10.5)
WBC # FLD AUTO: 4.34 K/UL — SIGNIFICANT CHANGE UP (ref 3.8–10.5)

## 2021-05-26 PROCEDURE — 99233 SBSQ HOSP IP/OBS HIGH 50: CPT

## 2021-05-26 PROCEDURE — 99231 SBSQ HOSP IP/OBS SF/LOW 25: CPT

## 2021-05-26 RX ORDER — HEPARIN SODIUM 5000 [USP'U]/ML
INJECTION INTRAVENOUS; SUBCUTANEOUS
Qty: 25000 | Refills: 0 | Status: DISCONTINUED | OUTPATIENT
Start: 2021-05-26 | End: 2021-05-26

## 2021-05-26 RX ORDER — FENTANYL CITRATE 50 UG/ML
50 INJECTION INTRAVENOUS ONCE
Refills: 0 | Status: DISCONTINUED | OUTPATIENT
Start: 2021-05-26 | End: 2021-05-26

## 2021-05-26 RX ORDER — HEPARIN SODIUM 5000 [USP'U]/ML
1200 INJECTION INTRAVENOUS; SUBCUTANEOUS
Qty: 25000 | Refills: 0 | Status: DISCONTINUED | OUTPATIENT
Start: 2021-05-26 | End: 2021-05-29

## 2021-05-26 RX ORDER — HEPARIN SODIUM 5000 [USP'U]/ML
3000 INJECTION INTRAVENOUS; SUBCUTANEOUS EVERY 6 HOURS
Refills: 0 | Status: DISCONTINUED | OUTPATIENT
Start: 2021-05-26 | End: 2021-05-29

## 2021-05-26 RX ORDER — HEPARIN SODIUM 5000 [USP'U]/ML
3000 INJECTION INTRAVENOUS; SUBCUTANEOUS EVERY 6 HOURS
Refills: 0 | Status: DISCONTINUED | OUTPATIENT
Start: 2021-05-26 | End: 2021-05-26

## 2021-05-26 RX ORDER — HEPARIN SODIUM 5000 [USP'U]/ML
6500 INJECTION INTRAVENOUS; SUBCUTANEOUS EVERY 6 HOURS
Refills: 0 | Status: DISCONTINUED | OUTPATIENT
Start: 2021-05-26 | End: 2021-05-26

## 2021-05-26 RX ORDER — MULTIVIT-MIN/FERROUS GLUCONATE 9 MG/15 ML
15 LIQUID (ML) ORAL DAILY
Refills: 0 | Status: DISCONTINUED | OUTPATIENT
Start: 2021-05-26 | End: 2021-05-26

## 2021-05-26 RX ORDER — HYDROMORPHONE HYDROCHLORIDE 2 MG/ML
1 INJECTION INTRAMUSCULAR; INTRAVENOUS; SUBCUTANEOUS
Refills: 0 | Status: DISCONTINUED | OUTPATIENT
Start: 2021-05-26 | End: 2021-05-28

## 2021-05-26 RX ORDER — HEPARIN SODIUM 5000 [USP'U]/ML
6500 INJECTION INTRAVENOUS; SUBCUTANEOUS EVERY 6 HOURS
Refills: 0 | Status: DISCONTINUED | OUTPATIENT
Start: 2021-05-26 | End: 2021-05-29

## 2021-05-26 RX ORDER — MAGNESIUM SULFATE 500 MG/ML
1 VIAL (ML) INJECTION ONCE
Refills: 0 | Status: COMPLETED | OUTPATIENT
Start: 2021-05-26 | End: 2021-05-26

## 2021-05-26 RX ADMIN — Medication 30 MILLIGRAM(S): at 00:05

## 2021-05-26 RX ADMIN — HEPARIN SODIUM 1200 UNIT(S)/HR: 5000 INJECTION INTRAVENOUS; SUBCUTANEOUS at 22:44

## 2021-05-26 RX ADMIN — PANTOPRAZOLE SODIUM 40 MILLIGRAM(S): 20 TABLET, DELAYED RELEASE ORAL at 12:40

## 2021-05-26 RX ADMIN — Medication 1 PATCH: at 12:31

## 2021-05-26 RX ADMIN — OXYMETAZOLINE HYDROCHLORIDE 2 SPRAY(S): 0.5 SPRAY NASAL at 05:43

## 2021-05-26 RX ADMIN — Medication 0.25 MILLIGRAM(S): at 09:52

## 2021-05-26 RX ADMIN — HYDROMORPHONE HYDROCHLORIDE 1 MILLIGRAM(S): 2 INJECTION INTRAMUSCULAR; INTRAVENOUS; SUBCUTANEOUS at 10:12

## 2021-05-26 RX ADMIN — HEPARIN SODIUM 1500 UNIT(S)/HR: 5000 INJECTION INTRAVENOUS; SUBCUTANEOUS at 07:24

## 2021-05-26 RX ADMIN — HYDROMORPHONE HYDROCHLORIDE 1 MILLIGRAM(S): 2 INJECTION INTRAMUSCULAR; INTRAVENOUS; SUBCUTANEOUS at 19:49

## 2021-05-26 RX ADMIN — HYDROMORPHONE HYDROCHLORIDE 1 MILLIGRAM(S): 2 INJECTION INTRAMUSCULAR; INTRAVENOUS; SUBCUTANEOUS at 20:00

## 2021-05-26 RX ADMIN — OXYCODONE HYDROCHLORIDE 5 MILLIGRAM(S): 5 TABLET ORAL at 03:00

## 2021-05-26 RX ADMIN — Medication 30 MILLIGRAM(S): at 05:43

## 2021-05-26 RX ADMIN — Medication 1 PATCH: at 19:29

## 2021-05-26 RX ADMIN — HEPARIN SODIUM 0 UNIT(S)/HR: 5000 INJECTION INTRAVENOUS; SUBCUTANEOUS at 14:09

## 2021-05-26 RX ADMIN — CHLORHEXIDINE GLUCONATE 15 MILLILITER(S): 213 SOLUTION TOPICAL at 18:14

## 2021-05-26 RX ADMIN — Medication 100 GRAM(S): at 12:39

## 2021-05-26 RX ADMIN — CHLORHEXIDINE GLUCONATE 15 MILLILITER(S): 213 SOLUTION TOPICAL at 05:43

## 2021-05-26 RX ADMIN — Medication 1 PATCH: at 06:08

## 2021-05-26 RX ADMIN — HEPARIN SODIUM 1500 UNIT(S)/HR: 5000 INJECTION INTRAVENOUS; SUBCUTANEOUS at 01:48

## 2021-05-26 RX ADMIN — OXYMETAZOLINE HYDROCHLORIDE 2 SPRAY(S): 0.5 SPRAY NASAL at 18:13

## 2021-05-26 RX ADMIN — HYDROMORPHONE HYDROCHLORIDE 1 MILLIGRAM(S): 2 INJECTION INTRAMUSCULAR; INTRAVENOUS; SUBCUTANEOUS at 16:21

## 2021-05-26 RX ADMIN — Medication 3 MILLILITER(S): at 16:09

## 2021-05-26 RX ADMIN — Medication 2.5 MILLIGRAM(S): at 05:42

## 2021-05-26 RX ADMIN — Medication 3 MILLILITER(S): at 10:00

## 2021-05-26 RX ADMIN — FENTANYL CITRATE 50 MICROGRAM(S): 50 INJECTION INTRAVENOUS at 06:52

## 2021-05-26 RX ADMIN — SERTRALINE 100 MILLIGRAM(S): 25 TABLET, FILM COATED ORAL at 12:31

## 2021-05-26 RX ADMIN — FENTANYL CITRATE 50 MICROGRAM(S): 50 INJECTION INTRAVENOUS at 06:45

## 2021-05-26 RX ADMIN — HYDROMORPHONE HYDROCHLORIDE 1 MILLIGRAM(S): 2 INJECTION INTRAMUSCULAR; INTRAVENOUS; SUBCUTANEOUS at 16:51

## 2021-05-26 RX ADMIN — CHLORHEXIDINE GLUCONATE 1 APPLICATION(S): 213 SOLUTION TOPICAL at 06:45

## 2021-05-26 RX ADMIN — HYDROMORPHONE HYDROCHLORIDE 1 MILLIGRAM(S): 2 INJECTION INTRAMUSCULAR; INTRAVENOUS; SUBCUTANEOUS at 10:42

## 2021-05-26 RX ADMIN — OXYCODONE HYDROCHLORIDE 5 MILLIGRAM(S): 5 TABLET ORAL at 01:55

## 2021-05-26 RX ADMIN — Medication 30 MILLIGRAM(S): at 12:31

## 2021-05-26 NOTE — PROGRESS NOTE ADULT - SUBJECTIVE AND OBJECTIVE BOX
CC:  follow up GOC  INTERVAL HPI/OVERNIGHT EVENTS:  s/p tracheostomy  PRESENT SYMPTOMS: SOURCE:  Patient/Family/Team    PAIN SCALE:  0 = none  1 = mild   2 = moderate  3 = severe    Pain: trach site    Dyspnea:  [ ] YES [x ] NO  Anxiety:  [ x] YES [ ] NO  Fatigue: [ x] YES [ ] NO  Nausea: [ ] YES [ x] NO  Loss of Appetite: [ ] YES [ ] NO na NPO  Other symptoms: __________    MEDICATIONS  (STANDING):  buDESOnide    Inhalation Suspension 0.25 milliGRAM(s) Inhalation every 12 hours  cefuroxime  IVPB 1500 milliGRAM(s) IV Intermittent once  chlorhexidine 0.12% Liquid 15 milliLiter(s) Oral Mucosa every 12 hours  chlorhexidine 4% Liquid 1 Application(s) Topical <User Schedule>  diltiazem    Tablet 30 milliGRAM(s) Oral every 6 hours  doxazosin 4 milliGRAM(s) Oral at bedtime  heparin  Infusion.  Unit(s)/Hr (15 mL/Hr) IV Continuous <Continuous>  multivitamin 1 Tablet(s) Oral daily  nicotine - 21 mG/24Hr(s) Patch 1 patch Transdermal daily  oxymetazoline 0.05% Nasal Spray 2 Spray(s) Left Nostril two times a day  pantoprazole  Injectable 40 milliGRAM(s) IV Push daily  sertraline 100 milliGRAM(s) Oral daily    MEDICATIONS  (PRN):  albuterol/ipratropium for Nebulization 3 milliLiter(s) Nebulizer every 6 hours PRN Shortness of Breath and/or Wheezing  heparin   Injectable 6500 Unit(s) IV Push every 6 hours PRN For aPTT less than 40  heparin   Injectable 3000 Unit(s) IV Push every 6 hours PRN For aPTT between 40 - 57  HYDROmorphone  Injectable 1 milliGRAM(s) IV Push every 3 hours PRN Moderate Pain (4 - 6)  lidocaine 2% Viscous 15 milliLiter(s) Mucosal every 4 hours PRN oral pain      Allergies    No Known Allergies    Intolerances          Karnofsky Performance Score/Palliative Performance Status Version 2:  40  %    Vital Signs Last 24 Hrs  T(C): 36.8 (26 May 2021 12:11), Max: 37.1 (26 May 2021 00:07)  T(F): 98.2 (26 May 2021 12:11), Max: 98.8 (26 May 2021 00:07)  HR: 67 (26 May 2021 13:00) (58 - 99)  BP: 111/60 (26 May 2021 13:00) (95/59 - 135/76)  BP(mean): 76 (26 May 2021 13:00) (69 - 95)  RR: 16 (26 May 2021 08:00) (16 - 24)  SpO2: 100% (26 May 2021 13:00) (96% - 100%)    PHYSICAL EXAM:    General: awake alert NAD - sad in demeanor    HEENT: [x normal  [ ] dry mouth  [x ] trach    Lungs: [ x] comfortable [ ] tachypnea/labored breathing  [ ] excessive secretions    CV: [ x] normal  [ ] tachycardia    GI: [ x] normal  [ ] distended  [ ] tender  [ ] no BS               [ ] PEG/NG/OG tube    : [ x] normal  [ ] incontinent  [ ] oliguria/anuria  [ ] bocanegra    MSK: [ ] normal  [ x] weakness  [ ] edema             [ ] ambulatory  [ ] bedbound/wheelchair bound    Skin: [ ] normal  [ ] pressure ulcers- Stage_____  [ ] no rash    LABS:                        11.3   4.34  )-----------( 150      ( 26 May 2021 04:06 )             34.9     05-26    135  |  98  |  10.0  ----------------------------<  122<H>  4.1   |  29.0  |  0.41<L>    Ca    8.4<L>      26 May 2021 04:06  Phos  3.7     05-26  Mg     1.9     05-26    TPro  6.2<L>  /  Alb  2.7<L>  /  TBili  1.1  /  DBili  x   /  AST  42<H>  /  ALT  61<H>  /  AlkPhos  122<H>  05-26    PT/INR - ( 25 May 2021 04:42 )   PT: 13.4 sec;   INR: 1.16 ratio         PTT - ( 26 May 2021 13:22 )  PTT:128.8 sec    I&O's Summary    25 May 2021 07:01  -  26 May 2021 07:00  --------------------------------------------------------  IN: 90 mL / OUT: 785 mL / NET: -695 mL    26 May 2021 07:01  -  26 May 2021 14:10  --------------------------------------------------------  IN: 105 mL / OUT: 270 mL / NET: -165 mL        RADIOLOGY & ADDITIONAL STUDIES:  < from: Xray Chest 1 View- PORTABLE-Urgent (Xray Chest 1 View- PORTABLE-Urgent .) (05.25.21 @ 18:11) >   EXAM:  XR CHEST PORTABLE URGENT 1V                          PROCEDURE DATE:  05/25/2021          INTERPRETATION:  Clinical history: 61-year-old male, tracheostomy.    Four views of the chest are compared to 5/23/2021 and demonstrate an NG tube with the tip in the stomach, unchanged. ET tube has been removed.    Cardiac silhouette and pulmonary vasculature are within normal limits with no acute fracture, pneumothorax or right effusion.    Effusion/atelectasis/possible consolidation at the left base, unchanged.    IMPRESSION:  NG tube with the tip in the stomach. ET tube removed.    Effusion/atelectasis/possible consolidation at the left base, unchanged.      < end of copied text >

## 2021-05-26 NOTE — PROGRESS NOTE ADULT - ASSESSMENT
61yr man hx of COPD, PE, Aflutter, opiate abuse,  depression admitted with acute respirator failure in the setting of   HF, Aflutter, PNA , pleural effusion      Problem/Recommendation - 1:  Acute Respiratory Failure  s/p trach  await PEG   Problem/Recommendation - 2:  ·  Problem: COPD (chronic obstructive pulmonary disease). Recommendation: on nebs.      Problem/Recommendation - 3:  ·  Problem: Pleural effusion. Recommendation: s/p CT  Management per ICU  IV abx.     Problem/Recommendation 4  Depression  Reported patient has been grieving over wife's death apporx 1 year ago  Emotional support  Zoloft 100mg/day  Recommend eventual bereavement counseling when discharge     Problem/Recommendation - 4:  ·  Problem: Encounter for palliative care. Recommendation: Palliative Care consulted to assist with GOC and establishment of appropriate surrogate.   Karey Bocanegra is HCP  Plan for PEG  Patient with depression - wife  1yrs ago. Continued support

## 2021-05-26 NOTE — PROGRESS NOTE ADULT - ASSESSMENT
HPI/INTERVAL EVENTS: now on trach collar, back on heparin drip    EXAM:   calm, alert, oriented, depressed affect   minimal fio2 via tracheostomy,   reg rhythm  bilat air entry  abdomen soft  bilat edema    RADIOLOGY REVIEWED:  cxr - left sided effusion     IMPRESSION/ASSESSMENT & PLAN:   62 yo male with COPD, smoker, aflutter, prior PE on eliquis, depression, opioid dependence reportedly on suboxone, admitted with acute respiratory failure, left sided pneumonia with parapneumonic effusion, PE.  Patient failed 1 extubation attempt, developed complete atelectasis requiring bronchoscopy.  S/p tracheostomy 5/25.    Pneumonia/ with effusion  - on ceftriaxone  - sp left pigtail catheter drainage and subsequent removal    Acute respiratory failure  - failed extubation attempt, underwent tracheostomy 5/25  - now on trach mask doing well  - was planned for eventual PEG however will obtain swallow eval to see if he can swallow and avoid PEG    PE   - back on heparin drip  - had episode of bleeding from nose while on AC, no bleeding today    Opioid dependence on suboxone and valium at home  - step son states patient may not have been taking meds  - slowly weaning off opioids and benzos, patient doing well. will give 24 hours of prn dilaudid for post-op pain from trach, then stop.    Diet: npo pending swallow eval  Activity: OOB, PT  son updated at bedside

## 2021-05-26 NOTE — PROGRESS NOTE ADULT - ASSESSMENT
61 year old male with a PMH of  smoking p/w respiratory failure, PNA, A flutter, CHF and left pleural effusion.  Left pigtail inserted by MICU on 5/15/21.  On 5/18 Thoracic surgery notified to monitor left chest tube,, chest tube clamped.  5/19 cytology obtained from left pigtail & removed, CXR stable. Pt reintubated and consult requested for tracheostomy.  5/23 CPAP trials during day, vent rest overnite ENT called for oropharynx bleeding  5/24 Bronch last evening by MICU ENT evaluated >no acute source bleeding, old blood left bronchus  For Trach Tuesday 5/25 bedside percutaneous tracheostomy

## 2021-05-26 NOTE — PROGRESS NOTE ADULT - SUBJECTIVE AND OBJECTIVE BOX
Subjective - patient seen and evaluated bedside. Unable to participate in HPI or ROS due to clinical condition.        Brief summary:  61yMale POD# 1 bedside percutaneous tracheostomy.      PAST MEDICAL & SURGICAL HISTORY:  Poor historian    COPD (chronic obstructive pulmonary disease)    Pulmonary embolism    Atrial flutter    H/O solitary pulmonary nodule    Depression    No significant past surgical history    No significant past surgical history          albuterol/ipratropium for Nebulization 3 milliLiter(s) Nebulizer every 6 hours PRN  buDESOnide    Inhalation Suspension 0.25 milliGRAM(s) Inhalation every 12 hours  cefuroxime  IVPB 1500 milliGRAM(s) IV Intermittent once  chlorhexidine 0.12% Liquid 15 milliLiter(s) Oral Mucosa every 12 hours  chlorhexidine 4% Liquid 1 Application(s) Topical <User Schedule>  diltiazem    Tablet 30 milliGRAM(s) Oral every 6 hours  doxazosin 4 milliGRAM(s) Oral at bedtime  heparin   Injectable 3000 Unit(s) IV Push every 6 hours PRN  heparin   Injectable 6500 Unit(s) IV Push every 6 hours PRN  heparin  Infusion.  Unit(s)/Hr IV Continuous <Continuous>  HYDROmorphone  Injectable 1 milliGRAM(s) IV Push every 3 hours PRN  lidocaine 2% Viscous 15 milliLiter(s) Mucosal every 4 hours PRN  multivitamin 1 Tablet(s) Oral daily  nicotine - 21 mG/24Hr(s) Patch 1 patch Transdermal daily  oxymetazoline 0.05% Nasal Spray 2 Spray(s) Left Nostril two times a day  pantoprazole  Injectable 40 milliGRAM(s) IV Push daily  sertraline 100 milliGRAM(s) Oral daily  MEDICATIONS  (PRN):  albuterol/ipratropium for Nebulization 3 milliLiter(s) Nebulizer every 6 hours PRN Shortness of Breath and/or Wheezing  heparin   Injectable 6500 Unit(s) IV Push every 6 hours PRN For aPTT less than 40  heparin   Injectable 3000 Unit(s) IV Push every 6 hours PRN For aPTT between 40 - 57  HYDROmorphone  Injectable 1 milliGRAM(s) IV Push every 3 hours PRN Moderate Pain (4 - 6)  lidocaine 2% Viscous 15 milliLiter(s) Mucosal every 4 hours PRN oral pain    Mode: standby,Pt is on 40% T/C  Daily     Daily                               11.3   4.34  )-----------( 150      ( 26 May 2021 04:06 )             34.9   05-26    135  |  98  |  10.0  ----------------------------<  122<H>  4.1   |  29.0  |  0.41<L>    Ca    8.4<L>      26 May 2021 04:06  Phos  3.7     05-26  Mg     1.9     05-26    TPro  6.2<L>  /  Alb  2.7<L>  /  TBili  1.1  /  DBili  x   /  AST  42<H>  /  ALT  61<H>  /  AlkPhos  122<H>  05-26      PT/INR - ( 25 May 2021 04:42 )   PT: 13.4 sec;   INR: 1.16 ratio         PTT - ( 26 May 2021 06:53 )  PTT:96.0 sec      Objective:  T(C): 36.8 (05-26-21 @ 12:11), Max: 37.1 (05-26-21 @ 00:07)  HR: 67 (05-26-21 @ 13:00) (58 - 99)  BP: 111/60 (05-26-21 @ 13:00) (95/59 - 135/76)  RR: 16 (05-26-21 @ 08:00) (16 - 24)  SpO2: 100% (05-26-21 @ 13:00) (96% - 100%)  Wt(kg): --CAPILLARY BLOOD GLUCOSE      I&O's Summary    25 May 2021 07:01  -  26 May 2021 07:00  --------------------------------------------------------  IN: 90 mL / OUT: 785 mL / NET: -695 mL    26 May 2021 07:01  -  26 May 2021 13:41  --------------------------------------------------------  IN: 105 mL / OUT: 270 mL / NET: -165 mL        Physical Exam  Neuro: Alert, responsive, follows commands. MAEx4  Pulm: CTA, equal bilaterally. Tracheostomy site c/d/i, no evidence of infection or skin breakdown.   CV: RRR,  +S1S2  Abd: soft, NT, ND, +BS  Ext: +DP Pulses b/l,  no edema      Imaging:  CXR:  Post- tracheostomy formal CXR read pending.    No evidence of pneumothorax, Trach tube appears in correct position.

## 2021-05-26 NOTE — PROGRESS NOTE ADULT - PROBLEM SELECTOR PLAN 1
POD1 bedside percutaneous tracheostomy.  Surgicel removed.  Wound well appearing with no bleeding, infection, excessive drainage.  Sutures remain in place.  Will continue to follow  D/W Dr Ty

## 2021-05-26 NOTE — PHARMACOTHERAPY INTERVENTION NOTE - INTERVENTION TYPE RECOOMEND
Therapy Recommended - Additional therapy
Therapy Recommended - Additional therapy
Therapy Recommended - Med Rec related
Therapy Discontinuation Recommended - No indication
Therapy Recommended - Alternative treatment
Therapy duplication

## 2021-05-26 NOTE — PROGRESS NOTE ADULT - SUBJECTIVE AND OBJECTIVE BOX
On Admission  05-15-21 (11d)  HPI:  60 yo m pmhx COPD, active smoker (1.5 ppd), Aflutter on diltiazem (planned for ablation), PE (2018) on Eliquis, Pulmonary Nodule, Depression on Zoloft, questionable compliance with medications biba from home with complaints of sob, cough, weakness and anorexia for about a week.  In ED patient lethargic but oriented, hypoxic on RA place don 4L with waxing and waning mental status.  Patient also noted to be in aflutter with HR in the 130s; patient given cardizem 10mg IVP, began to have issues with worsening hypoxia/mental status and was subsequently intubated.  Chest xray obtained, large left sided pleural effusion (not present on 11/2020 films). Patient seen at bedside, HR in the 130s Aflutter with 2:1 conduction, BP in the 80s, given 500cc NS.  POCUS exam performed terrell predominant with some scattered bline, left effusion appreciated.  Cardiac portion difficult to obtain, however RV appears WNL, LV function appears diminished from previous TTE where EF was ~58%; however windows difficult.  Initially patient responded to IVF however patient now persistently in the 80s with HR in the 130s, patient electrically cardioverted with 150J, converted to NSR in the 70s.  Additional 500cc bolus ordered, plan for imaging en route to MICU.  (15 May 2021 23:55)    PAST MEDICAL & SURGICAL HISTORY:  Poor historian    COPD (chronic obstructive pulmonary disease)    Pulmonary embolism    Atrial flutter    H/O solitary pulmonary nodule    Depression    No significant past surgical history        Antimicrobial:  cefuroxime  IVPB 1500 milliGRAM(s) IV Intermittent once    Cardiovascular:  diltiazem    Tablet 30 milliGRAM(s) Oral every 6 hours  doxazosin 4 milliGRAM(s) Oral at bedtime    Pulmonary:  albuterol/ipratropium for Nebulization 3 milliLiter(s) Nebulizer every 6 hours PRN  buDESOnide    Inhalation Suspension 0.25 milliGRAM(s) Inhalation every 12 hours    Hematalogic:  heparin   Injectable 6500 Unit(s) IV Push every 6 hours PRN  heparin   Injectable 3000 Unit(s) IV Push every 6 hours PRN  heparin  Infusion.  Unit(s)/Hr IV Continuous <Continuous>    Other:  chlorhexidine 0.12% Liquid 15 milliLiter(s) Oral Mucosa every 12 hours  chlorhexidine 4% Liquid 1 Application(s) Topical <User Schedule>  HYDROmorphone  Injectable 1 milliGRAM(s) IV Push every 3 hours PRN  lidocaine 2% Viscous 15 milliLiter(s) Mucosal every 4 hours PRN  multivitamin 1 Tablet(s) Oral daily  nicotine - 21 mG/24Hr(s) Patch 1 patch Transdermal daily  oxymetazoline 0.05% Nasal Spray 2 Spray(s) Left Nostril two times a day  pantoprazole  Injectable 40 milliGRAM(s) IV Push daily  sertraline 100 milliGRAM(s) Oral daily      Drug Dosing Weight  Height (cm): 182.9 (26 May 2021 13:40)  Weight (kg): 83.9 (26 May 2021 13:40)  BMI (kg/m2): 25.1 (26 May 2021 13:40)  BSA (m2): 2.06 (26 May 2021 13:40)    T(C): 36.8 (05-26-21 @ 12:11), Max: 37.1 (05-26-21 @ 00:07)  HR: 67 (05-26-21 @ 13:00)  BP: 111/60 (05-26-21 @ 13:00)  BP(mean): 76 (05-26-21 @ 13:00)  ABP: --  ABP(mean): --  RR: 16 (05-26-21 @ 08:00)  SpO2: 100% (05-26-21 @ 13:00)          05-25 @ 07:01  -  05-26 @ 07:00  --------------------------------------------------------  IN: 90 mL / OUT: 785 mL / NET: -695 mL        Mode: standby,Pt is on 40% T/C        LABS:  CBC Full  -  ( 26 May 2021 04:06 )  WBC Count : 4.34 K/uL  RBC Count : 3.52 M/uL  Hemoglobin : 11.3 g/dL  Hematocrit : 34.9 %  Platelet Count - Automated : 150 K/uL  Mean Cell Volume : 99.1 fl  Mean Cell Hemoglobin : 32.1 pg  Mean Cell Hemoglobin Concentration : 32.4 gm/dL  Auto Neutrophil # : 2.77 K/uL  Auto Lymphocyte # : 0.99 K/uL  Auto Monocyte # : 0.39 K/uL  Auto Eosinophil # : 0.15 K/uL  Auto Basophil # : 0.01 K/uL  Auto Neutrophil % : 63.8 %  Auto Lymphocyte % : 22.8 %  Auto Monocyte % : 9.0 %  Auto Eosinophil % : 3.5 %  Auto Basophil % : 0.2 %    05-26    135  |  98  |  10.0  ----------------------------<  122<H>  4.1   |  29.0  |  0.41<L>    Ca    8.4<L>      26 May 2021 04:06  Phos  3.7     05-26  Mg     1.9     05-26    TPro  6.2<L>  /  Alb  2.7<L>  /  TBili  1.1  /  DBili  x   /  AST  42<H>  /  ALT  61<H>  /  AlkPhos  122<H>  05-26    PT/INR - ( 25 May 2021 04:42 )   PT: 13.4 sec;   INR: 1.16 ratio         PTT - ( 26 May 2021 13:22 )  PTT:128.8 sec    Culture Results:   Culture is being performed. (05-24 @ 07:09)  Culture Results:   Normal Respiratory Payton present (05-24 @ 07:08)  Culture Results:   Testing in progress (05-24 @ 07:08)    ____________________________________________________________________________________________________

## 2021-05-27 LAB
ALBUMIN SERPL ELPH-MCNC: 2.8 G/DL — LOW (ref 3.3–5.2)
ALP SERPL-CCNC: 124 U/L — HIGH (ref 40–120)
ALT FLD-CCNC: 60 U/L — HIGH
ANION GAP SERPL CALC-SCNC: 8 MMOL/L — SIGNIFICANT CHANGE UP (ref 5–17)
APTT BLD: 68.6 SEC — HIGH (ref 27.5–35.5)
APTT BLD: 73.7 SEC — HIGH (ref 27.5–35.5)
AST SERPL-CCNC: 41 U/L — HIGH
BASOPHILS # BLD AUTO: 0.01 K/UL — SIGNIFICANT CHANGE UP (ref 0–0.2)
BASOPHILS NFR BLD AUTO: 0.2 % — SIGNIFICANT CHANGE UP (ref 0–2)
BILIRUB SERPL-MCNC: 1.1 MG/DL — SIGNIFICANT CHANGE UP (ref 0.4–2)
BUN SERPL-MCNC: 7.7 MG/DL — LOW (ref 8–20)
CALCIUM SERPL-MCNC: 8.5 MG/DL — LOW (ref 8.6–10.2)
CHLORIDE SERPL-SCNC: 97 MMOL/L — LOW (ref 98–107)
CO2 SERPL-SCNC: 30 MMOL/L — HIGH (ref 22–29)
CREAT SERPL-MCNC: 0.37 MG/DL — LOW (ref 0.5–1.3)
EOSINOPHIL # BLD AUTO: 0.17 K/UL — SIGNIFICANT CHANGE UP (ref 0–0.5)
EOSINOPHIL NFR BLD AUTO: 3.8 % — SIGNIFICANT CHANGE UP (ref 0–6)
GLUCOSE SERPL-MCNC: 102 MG/DL — HIGH (ref 70–99)
HCT VFR BLD CALC: 32.6 % — LOW (ref 39–50)
HGB BLD-MCNC: 10.6 G/DL — LOW (ref 13–17)
IMM GRANULOCYTES NFR BLD AUTO: 0.5 % — SIGNIFICANT CHANGE UP (ref 0–1.5)
LYMPHOCYTES # BLD AUTO: 1.13 K/UL — SIGNIFICANT CHANGE UP (ref 1–3.3)
LYMPHOCYTES # BLD AUTO: 25.5 % — SIGNIFICANT CHANGE UP (ref 13–44)
MAGNESIUM SERPL-MCNC: 1.9 MG/DL — SIGNIFICANT CHANGE UP (ref 1.6–2.6)
MCHC RBC-ENTMCNC: 32.2 PG — SIGNIFICANT CHANGE UP (ref 27–34)
MCHC RBC-ENTMCNC: 32.5 GM/DL — SIGNIFICANT CHANGE UP (ref 32–36)
MCV RBC AUTO: 99.1 FL — SIGNIFICANT CHANGE UP (ref 80–100)
MONOCYTES # BLD AUTO: 0.35 K/UL — SIGNIFICANT CHANGE UP (ref 0–0.9)
MONOCYTES NFR BLD AUTO: 7.9 % — SIGNIFICANT CHANGE UP (ref 2–14)
NEUTROPHILS # BLD AUTO: 2.75 K/UL — SIGNIFICANT CHANGE UP (ref 1.8–7.4)
NEUTROPHILS NFR BLD AUTO: 62.1 % — SIGNIFICANT CHANGE UP (ref 43–77)
PHOSPHATE SERPL-MCNC: 3.3 MG/DL — SIGNIFICANT CHANGE UP (ref 2.4–4.7)
PLATELET # BLD AUTO: 162 K/UL — SIGNIFICANT CHANGE UP (ref 150–400)
POTASSIUM SERPL-MCNC: 3.8 MMOL/L — SIGNIFICANT CHANGE UP (ref 3.5–5.3)
POTASSIUM SERPL-SCNC: 3.8 MMOL/L — SIGNIFICANT CHANGE UP (ref 3.5–5.3)
PROT SERPL-MCNC: 5.9 G/DL — LOW (ref 6.6–8.7)
RBC # BLD: 3.29 M/UL — LOW (ref 4.2–5.8)
RBC # FLD: 15.5 % — HIGH (ref 10.3–14.5)
SODIUM SERPL-SCNC: 135 MMOL/L — SIGNIFICANT CHANGE UP (ref 135–145)
WBC # BLD: 4.43 K/UL — SIGNIFICANT CHANGE UP (ref 3.8–10.5)
WBC # FLD AUTO: 4.43 K/UL — SIGNIFICANT CHANGE UP (ref 3.8–10.5)

## 2021-05-27 PROCEDURE — 99231 SBSQ HOSP IP/OBS SF/LOW 25: CPT

## 2021-05-27 PROCEDURE — 99222 1ST HOSP IP/OBS MODERATE 55: CPT

## 2021-05-27 PROCEDURE — 12345: CPT | Mod: NC

## 2021-05-27 PROCEDURE — 71045 X-RAY EXAM CHEST 1 VIEW: CPT | Mod: 26

## 2021-05-27 PROCEDURE — 99406 BEHAV CHNG SMOKING 3-10 MIN: CPT

## 2021-05-27 RX ADMIN — Medication 0.5 MILLIGRAM(S): at 17:38

## 2021-05-27 RX ADMIN — Medication 1 PATCH: at 21:18

## 2021-05-27 RX ADMIN — Medication 0.5 MILLIGRAM(S): at 11:24

## 2021-05-27 RX ADMIN — HYDROMORPHONE HYDROCHLORIDE 1 MILLIGRAM(S): 2 INJECTION INTRAMUSCULAR; INTRAVENOUS; SUBCUTANEOUS at 06:30

## 2021-05-27 RX ADMIN — CHLORHEXIDINE GLUCONATE 1 APPLICATION(S): 213 SOLUTION TOPICAL at 06:33

## 2021-05-27 RX ADMIN — CHLORHEXIDINE GLUCONATE 15 MILLILITER(S): 213 SOLUTION TOPICAL at 06:32

## 2021-05-27 RX ADMIN — Medication 1 PATCH: at 06:33

## 2021-05-27 RX ADMIN — HEPARIN SODIUM 1200 UNIT(S)/HR: 5000 INJECTION INTRAVENOUS; SUBCUTANEOUS at 05:26

## 2021-05-27 RX ADMIN — HYDROMORPHONE HYDROCHLORIDE 1 MILLIGRAM(S): 2 INJECTION INTRAMUSCULAR; INTRAVENOUS; SUBCUTANEOUS at 14:13

## 2021-05-27 RX ADMIN — Medication 0.5 MILLIGRAM(S): at 23:52

## 2021-05-27 RX ADMIN — HYDROMORPHONE HYDROCHLORIDE 1 MILLIGRAM(S): 2 INJECTION INTRAMUSCULAR; INTRAVENOUS; SUBCUTANEOUS at 11:02

## 2021-05-27 RX ADMIN — Medication 1 PATCH: at 11:05

## 2021-05-27 RX ADMIN — HYDROMORPHONE HYDROCHLORIDE 1 MILLIGRAM(S): 2 INJECTION INTRAMUSCULAR; INTRAVENOUS; SUBCUTANEOUS at 11:32

## 2021-05-27 RX ADMIN — PANTOPRAZOLE SODIUM 40 MILLIGRAM(S): 20 TABLET, DELAYED RELEASE ORAL at 11:05

## 2021-05-27 RX ADMIN — HYDROMORPHONE HYDROCHLORIDE 1 MILLIGRAM(S): 2 INJECTION INTRAMUSCULAR; INTRAVENOUS; SUBCUTANEOUS at 06:58

## 2021-05-27 RX ADMIN — HYDROMORPHONE HYDROCHLORIDE 1 MILLIGRAM(S): 2 INJECTION INTRAMUSCULAR; INTRAVENOUS; SUBCUTANEOUS at 02:05

## 2021-05-27 RX ADMIN — HEPARIN SODIUM 1200 UNIT(S)/HR: 5000 INJECTION INTRAVENOUS; SUBCUTANEOUS at 13:12

## 2021-05-27 RX ADMIN — HYDROMORPHONE HYDROCHLORIDE 1 MILLIGRAM(S): 2 INJECTION INTRAMUSCULAR; INTRAVENOUS; SUBCUTANEOUS at 14:43

## 2021-05-27 RX ADMIN — HYDROMORPHONE HYDROCHLORIDE 1 MILLIGRAM(S): 2 INJECTION INTRAMUSCULAR; INTRAVENOUS; SUBCUTANEOUS at 01:54

## 2021-05-27 RX ADMIN — Medication 1 PATCH: at 11:39

## 2021-05-27 NOTE — CONSULT NOTE ADULT - SUBJECTIVE AND OBJECTIVE BOX
CC:    HPI: per chart review:   60 y/o male with PMH of COPD (not on home O2), active smoker 1.5 ppd, aflutter and PE on Eliquis, pulmonary nodule who came to the ED on 5/15 complaining of shortness of breath and  cough, found to be in aflutter with RVR along with acute hypoxic respiratory failure due to large left pleural effusion and pulmonary edema requiring intubation. Ultimately no improvement in HR with  Cardizem and worsening shock state after intubation therefore was successfully cardioverted at 150 J. Also found to have right segmental PE and left peroneal DVT.   Left chest tube was placed (05/16) with 1400 cc serosanguineous fluid.  He was extubated on 5/17 and reintubated on 5/19 because of increase secretion and patient could not manage it.  Bronchoscopy done to remove thick secretion.  Chest tube removed. Goal of care discussion had with patient and step daughter; patient is full code, agreed to trach and PEG tube placement. S/p tracheostomy (05/25/). Swallow evaluation pending before proceeding to PEG if failed. Patient on antibiotic for PNA.      Interval Hx:  Patient seen during rounds       T(C): 37.1 (05-27-21 @ 12:00), Max: 37.2 (05-27-21 @ 08:00)  HR: 58 (05-27-21 @ 07:00) (54 - 72)  BP: 91/53 (05-27-21 @ 07:00) (91/53 - 116/64)  RR: 17 (05-27-21 @ 04:00) (16 - 20)  SpO2: 100% (05-27-21 @ 12:42) (99% - 100%)      05-26-21 @ 07:01  -  05-27-21 @ 07:00  --------------------------------------------------------  IN: 309 mL / OUT: 1035 mL / NET: -726 mL        albuterol/ipratropium for Nebulization 3 milliLiter(s) Nebulizer every 6 hours PRN  chlorhexidine 0.12% Liquid 15 milliLiter(s) Oral Mucosa every 12 hours  chlorhexidine 4% Liquid 1 Application(s) Topical <User Schedule>  diltiazem    Tablet 30 milliGRAM(s) Oral every 6 hours  doxazosin 4 milliGRAM(s) Oral at bedtime  heparin   Injectable 6500 Unit(s) IV Push every 6 hours PRN  heparin   Injectable 3000 Unit(s) IV Push every 6 hours PRN  heparin  Infusion. 1200 Unit(s)/Hr IV Continuous <Continuous>  HYDROmorphone  Injectable 1 milliGRAM(s) IV Push every 3 hours PRN  lidocaine 2% Viscous 15 milliLiter(s) Mucosal every 4 hours PRN  LORazepam   Injectable 0.5 milliGRAM(s) IV Push every 6 hours PRN  multivitamin 1 Tablet(s) Oral daily  nicotine - 21 mG/24Hr(s) Patch 1 patch Transdermal daily  oxymetazoline 0.05% Nasal Spray 2 Spray(s) Left Nostril two times a day  pantoprazole  Injectable 40 milliGRAM(s) IV Push daily  sertraline 100 milliGRAM(s) Oral daily                          10.6   4.43  )-----------( 162      ( 27 May 2021 05:03 )             32.6     05-27    135  |  97<L>  |  7.7<L>  ----------------------------<  102<H>  3.8   |  30.0<H>  |  0.37<L>    Ca    8.5<L>      27 May 2021 05:03  Phos  3.3     05-27  Mg     1.9     05-27    TPro  5.9<L>  /  Alb  2.8<L>  /  TBili  1.1  /  DBili  x   /  AST  41<H>  /  ALT  60<H>  /  AlkPhos  124<H>  05-27    PTT - ( 27 May 2021 12:19 )  PTT:68.6 sec      Pain Service   [phone] CC: neck and throat pain    HPI: per chart review:   60 y/o male with PMH of COPD (not on home O2), active smoker 1.5 ppd, aflutter and PE on Eliquis, pulmonary nodule who came to the ED on 5/15 complaining of shortness of breath and  cough, found to be in aflutter with RVR along with acute hypoxic respiratory failure due to large left pleural effusion and pulmonary edema requiring intubation. Ultimately no improvement in HR with  Cardizem and worsening shock state after intubation therefore was successfully cardioverted at 150 J. Also found to have right segmental PE and left peroneal DVT.   Left chest tube was placed (05/16) with 1400 cc serosanguineous fluid.  He was extubated on 5/17 and reintubated on 5/19 because of increase secretion and patient could not manage it.  Bronchoscopy done to remove thick secretion.  Chest tube removed. Goal of care discussion had with patient and step daughter; patient is full code, agreed to trach and PEG tube placement. S/p tracheostomy (05/25/). Swallow evaluation pending before proceeding to PEG if failed. Patient on antibiotic for PNA.      Interval Hx:  Patient seen during rounds   c/o of postop pain in neck and throat  home meds reviewed - suboxone 8mg tid  mod relief with dilaudid iv prn but not lasting enough  denies sedation    T(C): 37.1 (05-27-21 @ 12:00), Max: 37.2 (05-27-21 @ 08:00)  HR: 58 (05-27-21 @ 07:00) (54 - 72)  BP: 91/53 (05-27-21 @ 07:00) (91/53 - 116/64)  RR: 17 (05-27-21 @ 04:00) (16 - 20)  SpO2: 100% (05-27-21 @ 12:42) (99% - 100%)      05-26-21 @ 07:01  -  05-27-21 @ 07:00  --------------------------------------------------------  IN: 309 mL / OUT: 1035 mL / NET: -726 mL        albuterol/ipratropium for Nebulization 3 milliLiter(s) Nebulizer every 6 hours PRN  chlorhexidine 0.12% Liquid 15 milliLiter(s) Oral Mucosa every 12 hours  chlorhexidine 4% Liquid 1 Application(s) Topical <User Schedule>  diltiazem    Tablet 30 milliGRAM(s) Oral every 6 hours  doxazosin 4 milliGRAM(s) Oral at bedtime  heparin   Injectable 6500 Unit(s) IV Push every 6 hours PRN  heparin   Injectable 3000 Unit(s) IV Push every 6 hours PRN  heparin  Infusion. 1200 Unit(s)/Hr IV Continuous <Continuous>  HYDROmorphone  Injectable 1 milliGRAM(s) IV Push every 3 hours PRN  lidocaine 2% Viscous 15 milliLiter(s) Mucosal every 4 hours PRN  LORazepam   Injectable 0.5 milliGRAM(s) IV Push every 6 hours PRN  multivitamin 1 Tablet(s) Oral daily  nicotine - 21 mG/24Hr(s) Patch 1 patch Transdermal daily  oxymetazoline 0.05% Nasal Spray 2 Spray(s) Left Nostril two times a day  pantoprazole  Injectable 40 milliGRAM(s) IV Push daily  sertraline 100 milliGRAM(s) Oral daily                          10.6   4.43  )-----------( 162      ( 27 May 2021 05:03 )             32.6     05-27    135  |  97<L>  |  7.7<L>  ----------------------------<  102<H>  3.8   |  30.0<H>  |  0.37<L>    Ca    8.5<L>      27 May 2021 05:03  Phos  3.3     05-27  Mg     1.9     05-27    TPro  5.9<L>  /  Alb  2.8<L>  /  TBili  1.1  /  DBili  x   /  AST  41<H>  /  ALT  60<H>  /  AlkPhos  124<H>  05-27    PTT - ( 27 May 2021 12:19 )  PTT:68.6 sec      Pain Service   [phone]

## 2021-05-27 NOTE — CHART NOTE - NSCHARTNOTEFT_GEN_A_CORE
Palliative care social work note.    SW met with patient who actively engaged with SW. 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 patient reports having 5 children who live locally and are supportive. Palliative care following.

## 2021-05-27 NOTE — PROGRESS NOTE ADULT - PROBLEM SELECTOR PLAN 1
POD2 bedside percutaneous tracheostomy.  Surgicel removed.  Wound well appearing with no bleeding, infection, excessive drainage.  Sutures remain in place.  Will continue to follow  D/W Dr Enrique

## 2021-05-27 NOTE — PROGRESS NOTE ADULT - SUBJECTIVE AND OBJECTIVE BOX
60 y/o male with PMH of COPD (not on home O2), active smoker 1.5 ppd, aflutter and PE on Eliquis, pulmonary nodule who came to the ED on 5/15 complaining of shortness of breath and  cough, found to be in aflutter with RVR along with acute hypoxic respiratory failure due to large left pleural effusion and pulmonary edema requiring intubation. Ultimately no improvement in HR with  Cardizem and worsening shock state after intubation therefore was successfully cardioverted at 150 J. Also found to have right segmental PE and left peroneal DVT.   Left chest tube was placed (05/16) with 1400 cc serosanguineous fluid.  He was extubated on 5/17 and reintubated on 5/19 because of increase secretion and patient could not manage it.  Bronchoscopy done to remove thick secretion.  Chest tube removed. Goal of care discussion had with patient and step daughter; patient is full code, agreed to trach and PEG tube placement. S/p tracheostomy (05/25/). Swallow evaluation pending before proceeding to PEG if failed. Patient on antibiotic for PNA.      Patient seen and examined at bed side; not in acute distress. Open eyes to when called.     PAST MEDICAL & SURGICAL HISTORY:  Poor historian    COPD (chronic obstructive pulmonary disease)    Pulmonary embolism    Atrial flutter    H/O solitary pulmonary nodule    Depression    No significant past surgical history        REVIEW OF SYSTEMS: unable to obtain, patient sleeping during evaluation    MEDICATIONS  (STANDING):  cefuroxime  IVPB 1500 milliGRAM(s) IV Intermittent once  chlorhexidine 0.12% Liquid 15 milliLiter(s) Oral Mucosa every 12 hours  chlorhexidine 4% Liquid 1 Application(s) Topical <User Schedule>  diltiazem    Tablet 30 milliGRAM(s) Oral every 6 hours  doxazosin 4 milliGRAM(s) Oral at bedtime  heparin  Infusion. 1200 Unit(s)/Hr (12 mL/Hr) IV Continuous <Continuous>  multivitamin 1 Tablet(s) Oral daily  nicotine - 21 mG/24Hr(s) Patch 1 patch Transdermal daily  oxymetazoline 0.05% Nasal Spray 2 Spray(s) Left Nostril two times a day  pantoprazole  Injectable 40 milliGRAM(s) IV Push daily  sertraline 100 milliGRAM(s) Oral daily    MEDICATIONS  (PRN):  albuterol/ipratropium for Nebulization 3 milliLiter(s) Nebulizer every 6 hours PRN Shortness of Breath and/or Wheezing  heparin   Injectable 6500 Unit(s) IV Push every 6 hours PRN For aPTT less than 40  heparin   Injectable 3000 Unit(s) IV Push every 6 hours PRN For aPTT between 40 - 57  HYDROmorphone  Injectable 1 milliGRAM(s) IV Push every 3 hours PRN Moderate Pain (4 - 6)  lidocaine 2% Viscous 15 milliLiter(s) Mucosal every 4 hours PRN oral pain      Allergies: No Known Allergies    SOCIAL HISTORY: hx of cigarette use;  (as per chart)     FAMILY HISTORY: unable to obtain       Vital Signs Last 24 Hrs  T(C): 36.4 (27 May 2021 00:00), Max: 37.1 (26 May 2021 04:00)  T(F): 97.5 (27 May 2021 00:00), Max: 98.8 (26 May 2021 04:00)  HR: 55 (27 May 2021 01:00) (55 - 99)  BP: 91/58 (27 May 2021 01:00) (91/58 - 123/62)  BP(mean): 69 (27 May 2021 01:00) (65 - 95)  RR: 20 (27 May 2021 00:00) (16 - 20)  SpO2: 100% (27 May 2021 01:00) (96% - 100%)    PHYSICAL EXAM:    Constitutional: well groomed, sleeping comfortably not in acute distress     Eyes: PERRLA    Respiratory: s/p tracheostomy     Cardiovascular: RRR, s1, s2     Gastrointestinal: soft, non-distended, non-tender, BS+     Extremities: no edema     Neurological: open eyes spontaneously to verbal command.     Skin: warm, no rash     Musculoskeletal: no joint erythema       LABS:                        11.3   4.34  )-----------( 150      ( 26 May 2021 04:06 )             34.9     05-26    135  |  98  |  10.0  ----------------------------<  122<H>  4.1   |  29.0  |  0.41<L>    Ca    8.4<L>      26 May 2021 04:06  Phos  3.7     05-26  Mg     1.9     05-26    TPro  6.2<L>  /  Alb  2.7<L>  /  TBili  1.1  /  DBili  x   /  AST  42<H>  /  ALT  61<H>  /  AlkPhos  122<H>  05-26    PT/INR - ( 25 May 2021 04:42 )   PT: 13.4 sec;   INR: 1.16 ratio         PTT - ( 26 May 2021 21:35 )  PTT:88.6 sec      RADIOLOGY & ADDITIONAL STUDIES:

## 2021-05-27 NOTE — PROGRESS NOTE ADULT - ASSESSMENT
62 y/o male with PMH of COPD (not on home O2), active smoker 1.5 ppd, aflutter and PE on Eliquis, pulmonary nodule who came to the ED on 5/15 complaining of shortness of breath and  cough, found to be in aflutter with RVR along with acute hypoxic respiratory failure due to large left pleural effusion and pulmonary edema requiring intubation. Ultimately no improvement in HR with  Cardizem and worsening shock state after intubation therefore was successfully cardioverted at 150 J. Also found to have right segmental PE and left peroneal DVT.   Left chest tube was placed (05/16) with 1400 cc serosanguineous fluid.  He was extubated on 5/17 and reintubated on 5/19 because of increase secretion and patient could not manage it.  Bronchoscopy done to remove thick secretion.  Chest tube removed. Goal of care discussion had with patient and step daughter; patient is full code, agreed to trach and PEG tube placement. S/p tracheostomy (05/25/). Swallow evaluation pending before proceeding to PEG if failed. Patient on antibiotic for PNA.      Acute respiratory failure due to PNA/ pleural effusion requiring intubation   S/p tracheostomy  Continue Antibiotic   S/p chest tube   Thoracic surgery on board     PE   On heparin drip     Aflutter   S/p cardioversion  Diltiazem 30mg q6h     Depression   Zoloft 100mg     Tobacco use   On Nicotine patch     Supportive   DVT prophylaxis: on Heparin   GI prophylaxis: PPI   Diet: pending swallow eval in AM.     50 minutes spent

## 2021-05-27 NOTE — PROGRESS NOTE ADULT - SUBJECTIVE AND OBJECTIVE BOX
Subjective - patient seen and evaluated bedside. Sitting comfortably in bed. Nods head yes and no to questions. Denies CP, SOB, HA, dizziness, n/v/d    Review of Systems: negative x 10 systems except as noted above    Brief summary:  61yMale POD# 2 bedside percutaneous tracheostomy    Significant/[id number] events: none      PAST MEDICAL & SURGICAL HISTORY:  Poor historian    COPD (chronic obstructive pulmonary disease)    Pulmonary embolism    Atrial flutter    H/O solitary pulmonary nodule    Depression    No significant past surgical history    No significant past surgical history          albuterol/ipratropium for Nebulization 3 milliLiter(s) Nebulizer every 6 hours PRN  chlorhexidine 0.12% Liquid 15 milliLiter(s) Oral Mucosa every 12 hours  chlorhexidine 4% Liquid 1 Application(s) Topical <User Schedule>  diltiazem    Tablet 30 milliGRAM(s) Oral every 6 hours  doxazosin 4 milliGRAM(s) Oral at bedtime  heparin   Injectable 6500 Unit(s) IV Push every 6 hours PRN  heparin   Injectable 3000 Unit(s) IV Push every 6 hours PRN  heparin  Infusion. 1200 Unit(s)/Hr IV Continuous <Continuous>  HYDROmorphone  Injectable 1 milliGRAM(s) IV Push every 3 hours PRN  lidocaine 2% Viscous 15 milliLiter(s) Mucosal every 4 hours PRN  LORazepam   Injectable 0.5 milliGRAM(s) IV Push every 6 hours PRN  multivitamin 1 Tablet(s) Oral daily  nicotine - 21 mG/24Hr(s) Patch 1 patch Transdermal daily  oxymetazoline 0.05% Nasal Spray 2 Spray(s) Left Nostril two times a day  pantoprazole  Injectable 40 milliGRAM(s) IV Push daily  sertraline 100 milliGRAM(s) Oral daily  MEDICATIONS  (PRN):  albuterol/ipratropium for Nebulization 3 milliLiter(s) Nebulizer every 6 hours PRN Shortness of Breath and/or Wheezing  heparin   Injectable 6500 Unit(s) IV Push every 6 hours PRN For aPTT less than 40  heparin   Injectable 3000 Unit(s) IV Push every 6 hours PRN For aPTT between 40 - 57  HYDROmorphone  Injectable 1 milliGRAM(s) IV Push every 3 hours PRN Moderate Pain (4 - 6)  lidocaine 2% Viscous 15 milliLiter(s) Mucosal every 4 hours PRN oral pain  LORazepam   Injectable 0.5 milliGRAM(s) IV Push every 6 hours PRN Anxiety    Mode: standby,Pt is off the vent and placed on 28% T-piece  Daily     Daily                               10.6   4.43  )-----------( 162      ( 27 May 2021 05:03 )             32.6   05-27    135  |  97<L>  |  7.7<L>  ----------------------------<  102<H>  3.8   |  30.0<H>  |  0.37<L>    Ca    8.5<L>      27 May 2021 05:03  Phos  3.3     05-27  Mg     1.9     05-27    TPro  5.9<L>  /  Alb  2.8<L>  /  TBili  1.1  /  DBili  x   /  AST  41<H>  /  ALT  60<H>  /  AlkPhos  124<H>  05-27      PTT - ( 27 May 2021 12:19 )  PTT:68.6 sec      Objective:  T(C): 37.1 (05-27-21 @ 12:00), Max: 37.2 (05-27-21 @ 08:00)  HR: 58 (05-27-21 @ 07:00) (54 - 72)  BP: 91/53 (05-27-21 @ 07:00) (91/53 - 113/63)  RR: 17 (05-27-21 @ 04:00) (16 - 20)  SpO2: 100% (05-27-21 @ 12:42) (99% - 100%)  Wt(kg): --CAPILLARY BLOOD GLUCOSE      I&O's Summary    26 May 2021 07:01  -  27 May 2021 07:00  --------------------------------------------------------  IN: 309 mL / OUT: 1035 mL / NET: -726 mL        Physical Exam  Neuro: A+O x 3, non-focal, speech clear and intact  Pulm: CTA, equal bilaterally, no wheezes, rales, tracheostomy site c/d/i, no evidence of skin breakdown, bleeding, or infection.   CV: RRR, , +S1S2  Abd: soft, NT, ND, +BS  Ext: +DP Pulses b/l, no edema    Chest tubes: < from: Xray Chest 1 View- PORTABLE-Routine (Xray Chest 1 View- PORTABLE-Routine in AM.) (05.27.21 @ 06:30) >    INTERPRETATION:  Portable chest radiograph    CLINICAL INFORMATION: Status post tracheostomy.    TECHNIQUE:  Portable  AP view of the chest was obtained.    COMPARISON: 5/25/2021 chest available for review.    FINDINGS: Tracheostomy tube in place    The lungs show residual lung mild-moderate LEFT effusion and/or basilar airspace consolidation. RIGHT lung parenchyma clear.. No pneumothorax.    The heart and mediastinum size and configuration are within normal limits.    Visualized osseous structures are intact.    IMPRESSION:   Tracheostomy tube in place.  Unchanged LEFT pleural effusion and/or basilar airspace consolidation..    < end of copied text >      Imaging:  CXR: < from: Xray Chest 1 View- PORTABLE-Routine (Xray Chest 1 View- PORTABLE-Routine in AM.) (05.27.21 @ 06:30) >    INTERPRETATION:  Portable chest radiograph    CLINICAL INFORMATION: Status post tracheostomy.    TECHNIQUE:  Portable  AP view of the chest was obtained.    COMPARISON: 5/25/2021 chest available for review.    FINDINGS: Tracheostomy tube in place    The lungs show residual lung mild-moderate LEFT effusion and/or basilar airspace consolidation. RIGHT lung parenchyma clear.. No pneumothorax.    The heart and mediastinum size and configuration are within normal limits.    Visualized osseous structures are intact.    IMPRESSION:   Tracheostomy tube in place.  Unchanged LEFT pleural effusion and/or basilar airspace consolidation..    < end of copied text >        ECG:

## 2021-05-27 NOTE — PROGRESS NOTE ADULT - SUBJECTIVE AND OBJECTIVE BOX
Patient is a 61y old  Male who presents with a chief complaint of Respiratory Failure (27 May 2021 15:03)    Patient seen and examined at bedside.     ALLERGIES:  No Known Allergies    MEDICATIONS  (STANDING):  chlorhexidine 0.12% Liquid 15 milliLiter(s) Oral Mucosa every 12 hours  chlorhexidine 4% Liquid 1 Application(s) Topical <User Schedule>  diltiazem    Tablet 30 milliGRAM(s) Oral every 6 hours  doxazosin 4 milliGRAM(s) Oral at bedtime  heparin  Infusion. 1200 Unit(s)/Hr (12 mL/Hr) IV Continuous <Continuous>  multivitamin 1 Tablet(s) Oral daily  nicotine - 21 mG/24Hr(s) Patch 1 patch Transdermal daily  oxymetazoline 0.05% Nasal Spray 2 Spray(s) Left Nostril two times a day  pantoprazole  Injectable 40 milliGRAM(s) IV Push daily  sertraline 100 milliGRAM(s) Oral daily    MEDICATIONS  (PRN):  albuterol/ipratropium for Nebulization 3 milliLiter(s) Nebulizer every 6 hours PRN Shortness of Breath and/or Wheezing  heparin   Injectable 6500 Unit(s) IV Push every 6 hours PRN For aPTT less than 40  heparin   Injectable 3000 Unit(s) IV Push every 6 hours PRN For aPTT between 40 - 57  HYDROmorphone  Injectable 1 milliGRAM(s) IV Push every 3 hours PRN Moderate Pain (4 - 6)  lidocaine 2% Viscous 15 milliLiter(s) Mucosal every 4 hours PRN oral pain  LORazepam   Injectable 0.5 milliGRAM(s) IV Push every 6 hours PRN Anxiety    Vital Signs Last 24 Hrs  T(F): 98.8 (27 May 2021 16:16), Max: 99 (27 May 2021 08:00)  HR: 58 (27 May 2021 07:00) (54 - 72)  BP: 91/53 (27 May 2021 07:00) (91/53 - 113/63)  RR: 17 (27 May 2021 04:00) (17 - 20)  SpO2: 100% (27 May 2021 15:33) (99% - 100%)  I&O's Summary    26 May 2021 07:01  -  27 May 2021 07:00  --------------------------------------------------------  IN: 309 mL / OUT: 1035 mL / NET: -726 mL      PHYSICAL EXAM:  General: NAD, alert   ENT: MMM, no thrush  Neck: Supple, No JVD  Lungs:  good air entry, non-labored breathing +tacheostomy  Cardio: +s1/s2  Abdomen: Soft, Nontender, Nondistended; Bowel sounds present  Extremities: able to move extremities    LABS:                        10.6   4.43  )-----------( 162      ( 27 May 2021 05:03 )             32.6     05-27    135  |  97  |  7.7  ----------------------------<  102  3.8   |  30.0  |  0.37    Ca    8.5      27 May 2021 05:03  Phos  3.3     05-27  Mg     1.9     05-27    TPro  5.9  /  Alb  2.8  /  TBili  1.1  /  DBili  x   /  AST  41  /  ALT  60  /  AlkPhos  124  05-27    eGFR if Non African American: 134 mL/min/1.73M2 (05-27-21 @ 05:03)  eGFR if African American: 156 mL/min/1.73M2 (05-27-21 @ 05:03)    PT/INR - ( 25 May 2021 04:42 )   PT: 13.4 sec;   INR: 1.16 ratio    PTT - ( 27 May 2021 12:19 )  PTT:68.6 sec    Cultures  Culture - Acid Fast - Bronchial w/Smear (collected 24 May 2021 07:09)  Source: BAL Bronchoalveolar Lavage  Preliminary Report (26 May 2021 15:04):    Culture is being performed.    Culture - Fungal, Bronchial (collected 24 May 2021 07:08)  Source: BAL Bronchoalveolar Lavage  Preliminary Report (27 May 2021 09:10):    Few Yeast    Culture - Bronchial (collected 24 May 2021 07:08)  Source: Bronch [place] Bronchoalveolar Lavage  Gram Stain (24 May 2021 13:32):    Few polymorphonuclear leukocytes per low power field    Few Squamous epithelial cells per low power field    Few Gram Negative Rods per oil power field  Final Report (26 May 2021 10:07):    Normal Respiratory Payton present      COVID-19 PCR: NotDetec (05-24-21 @ 09:44)  COVID-19 PCR: NotDetec (05-22-21 @ 13:27)    RADIOLOGY & ADDITIONAL TESTS:  - no new tests    Care Discussed with Consultants/Other Providers:   Psychiatry

## 2021-05-27 NOTE — PROGRESS NOTE ADULT - ASSESSMENT
62 y/o male with PMH of COPD (not on home O2), active smoker 1.5 ppd, aflutter and PE on Eliquis, pulmonary nodule who came to the ED on 5/15 complaining of shortness of breath and  cough, found to be in aflutter with RVR along with acute hypoxic respiratory failure due to large left pleural effusion and pulmonary edema requiring intubation. Ultimately no improvement in HR with  Cardizem and worsening shock state after intubation therefore was successfully cardioverted at 150 J. Also found to have right segmental PE and left peroneal DVT.   Left chest tube was placed (05/16) with 1400 cc serosanguineous fluid.  He was extubated on 5/17 and reintubated on 5/19 because of increase secretion and patient could not manage it.  Bronchoscopy done to remove thick secretion.  Chest tube removed. Goal of care discussion had with patient and step daughter; patient is full code, agreed to trach and PEG tube placement. S/p tracheostomy (05/25/). Swallow evaluation pending before proceeding to PEG if failed.     #Acute respiratory failure   - multifactorial- plueral effusion, pna, pe  - s/p trach   - s/p chest tube  - s/p abx  - thoracic surgery consult appreciated    #chronic pain   - combined with post op pain   - pain mgmt consult appreciated    #dysphagia  - may need peg in future  - pending modified barium in am   - npo for now     #severe protein calorie malnutrition  - nutritionist consult appreciated     #PE   - heparin drip     #Aflutter   - S/p cardioversion  - diltiazem    #Tobacco Abuse  - 5 minutes spent on cessation counseling   - nicotine patch    #depression   - w/ ?passive suicidality   - psych consult pending    - zoloft    #DVT prophylaxis  - on heparin drip

## 2021-05-27 NOTE — CONSULT NOTE ADULT - ASSESSMENT
62 y/o male with PMH of COPD (not on home O2), active smoker 1.5 ppd, aflutter and PE on Eliquis, S/p tracheostomy (05/25/).   on chronic suboxone 8mg tid    npo currently    cont dilaudid ivp 1mg q3h PRN pain    start acetaminophen IVPB 1gram, can do up to 4 times a day, MAX daily dose = 4g  start ketorolac   Injectable 30 milliGRAM(s) IV Push every 6 hours x8 doses. Afterwards, recommend starting celecoxib 200mg PO BID with food. HOLD for black/red stools   if available, start methocarbamol 1000mg IVPB TID standing HOLD for sedation    will consider PO meds tomorrow if passes S&S

## 2021-05-28 LAB
APTT BLD: 86.8 SEC — HIGH (ref 27.5–35.5)
HCT VFR BLD CALC: 35.3 % — LOW (ref 39–50)
HGB BLD-MCNC: 11.3 G/DL — LOW (ref 13–17)
MCHC RBC-ENTMCNC: 31.9 PG — SIGNIFICANT CHANGE UP (ref 27–34)
MCHC RBC-ENTMCNC: 32 GM/DL — SIGNIFICANT CHANGE UP (ref 32–36)
MCV RBC AUTO: 99.7 FL — SIGNIFICANT CHANGE UP (ref 80–100)
PLATELET # BLD AUTO: 175 K/UL — SIGNIFICANT CHANGE UP (ref 150–400)
RBC # BLD: 3.54 M/UL — LOW (ref 4.2–5.8)
RBC # FLD: 15.4 % — HIGH (ref 10.3–14.5)
WBC # BLD: 4.86 K/UL — SIGNIFICANT CHANGE UP (ref 3.8–10.5)
WBC # FLD AUTO: 4.86 K/UL — SIGNIFICANT CHANGE UP (ref 3.8–10.5)

## 2021-05-28 PROCEDURE — 99231 SBSQ HOSP IP/OBS SF/LOW 25: CPT

## 2021-05-28 PROCEDURE — 99233 SBSQ HOSP IP/OBS HIGH 50: CPT

## 2021-05-28 PROCEDURE — 74230 X-RAY XM SWLNG FUNCJ C+: CPT | Mod: 26

## 2021-05-28 RX ORDER — BUPRENORPHINE AND NALOXONE 2; .5 MG/1; MG/1
1 TABLET SUBLINGUAL ONCE
Refills: 0 | Status: DISCONTINUED | OUTPATIENT
Start: 2021-05-28 | End: 2021-05-28

## 2021-05-28 RX ORDER — ACETAMINOPHEN 500 MG
975 TABLET ORAL EVERY 8 HOURS
Refills: 0 | Status: DISCONTINUED | OUTPATIENT
Start: 2021-05-28 | End: 2021-05-31

## 2021-05-28 RX ORDER — KETOROLAC TROMETHAMINE 30 MG/ML
30 SYRINGE (ML) INJECTION EVERY 6 HOURS
Refills: 0 | Status: DISCONTINUED | OUTPATIENT
Start: 2021-05-28 | End: 2021-05-28

## 2021-05-28 RX ORDER — BUPRENORPHINE AND NALOXONE 2; .5 MG/1; MG/1
1 TABLET SUBLINGUAL
Refills: 0 | Status: DISCONTINUED | OUTPATIENT
Start: 2021-05-28 | End: 2021-05-31

## 2021-05-28 RX ORDER — METHOCARBAMOL 500 MG/1
750 TABLET, FILM COATED ORAL THREE TIMES A DAY
Refills: 0 | Status: DISCONTINUED | OUTPATIENT
Start: 2021-05-28 | End: 2021-05-31

## 2021-05-28 RX ORDER — METHOCARBAMOL 500 MG/1
1000 TABLET, FILM COATED ORAL THREE TIMES A DAY
Refills: 0 | Status: DISCONTINUED | OUTPATIENT
Start: 2021-05-28 | End: 2021-05-28

## 2021-05-28 RX ORDER — GABAPENTIN 400 MG/1
300 CAPSULE ORAL AT BEDTIME
Refills: 0 | Status: DISCONTINUED | OUTPATIENT
Start: 2021-05-28 | End: 2021-06-05

## 2021-05-28 RX ADMIN — Medication 30 MILLIGRAM(S): at 12:39

## 2021-05-28 RX ADMIN — CHLORHEXIDINE GLUCONATE 15 MILLILITER(S): 213 SOLUTION TOPICAL at 08:27

## 2021-05-28 RX ADMIN — Medication 1 PATCH: at 12:39

## 2021-05-28 RX ADMIN — HYDROMORPHONE HYDROCHLORIDE 1 MILLIGRAM(S): 2 INJECTION INTRAMUSCULAR; INTRAVENOUS; SUBCUTANEOUS at 05:00

## 2021-05-28 RX ADMIN — HYDROMORPHONE HYDROCHLORIDE 1 MILLIGRAM(S): 2 INJECTION INTRAMUSCULAR; INTRAVENOUS; SUBCUTANEOUS at 01:06

## 2021-05-28 RX ADMIN — HEPARIN SODIUM 1200 UNIT(S)/HR: 5000 INJECTION INTRAVENOUS; SUBCUTANEOUS at 07:12

## 2021-05-28 RX ADMIN — Medication 30 MILLIGRAM(S): at 13:00

## 2021-05-28 RX ADMIN — HYDROMORPHONE HYDROCHLORIDE 1 MILLIGRAM(S): 2 INJECTION INTRAMUSCULAR; INTRAVENOUS; SUBCUTANEOUS at 08:30

## 2021-05-28 RX ADMIN — Medication 30 MILLIGRAM(S): at 18:44

## 2021-05-28 RX ADMIN — HYDROMORPHONE HYDROCHLORIDE 1 MILLIGRAM(S): 2 INJECTION INTRAMUSCULAR; INTRAVENOUS; SUBCUTANEOUS at 09:00

## 2021-05-28 RX ADMIN — Medication 1 PATCH: at 07:14

## 2021-05-28 RX ADMIN — BUPRENORPHINE AND NALOXONE 1 TABLET(S): 2; .5 TABLET SUBLINGUAL at 16:12

## 2021-05-28 RX ADMIN — Medication 1 PATCH: at 12:35

## 2021-05-28 RX ADMIN — Medication 975 MILLIGRAM(S): at 21:32

## 2021-05-28 RX ADMIN — Medication 1 PATCH: at 19:39

## 2021-05-28 RX ADMIN — CHLORHEXIDINE GLUCONATE 15 MILLILITER(S): 213 SOLUTION TOPICAL at 18:44

## 2021-05-28 RX ADMIN — PANTOPRAZOLE SODIUM 40 MILLIGRAM(S): 20 TABLET, DELAYED RELEASE ORAL at 12:39

## 2021-05-28 RX ADMIN — Medication 975 MILLIGRAM(S): at 22:02

## 2021-05-28 RX ADMIN — HYDROMORPHONE HYDROCHLORIDE 1 MILLIGRAM(S): 2 INJECTION INTRAMUSCULAR; INTRAVENOUS; SUBCUTANEOUS at 01:40

## 2021-05-28 RX ADMIN — Medication 4 MILLIGRAM(S): at 21:32

## 2021-05-28 RX ADMIN — HYDROMORPHONE HYDROCHLORIDE 1 MILLIGRAM(S): 2 INJECTION INTRAMUSCULAR; INTRAVENOUS; SUBCUTANEOUS at 04:12

## 2021-05-28 RX ADMIN — CHLORHEXIDINE GLUCONATE 1 APPLICATION(S): 213 SOLUTION TOPICAL at 08:28

## 2021-05-28 RX ADMIN — OXYMETAZOLINE HYDROCHLORIDE 2 SPRAY(S): 0.5 SPRAY NASAL at 18:44

## 2021-05-28 NOTE — PROGRESS NOTE ADULT - PROBLEM SELECTOR PLAN 1
POD2 bedside percutaneous tracheostomy.  Surgicel removed.  Wound well appearing. Small amount of bloody ooziness surrounding trach site.   No evidence of active bleed. D/w Dr. Enrique and hospitalist attending.  Primary team believes patient may have manipulated tracheostomy causing trauma to area. Patient is now on a 1:1 constant observation. Likely oozy in setting of anticoagulation on heparin. If no active bleed, OK to continue heparin as per Dr. Enrique. If new evidence of active bleeding, recomment pausing heparin gtt.   Sutures remain in place.  Will continue to follow  D/W Dr Enrique

## 2021-05-28 NOTE — SWALLOW VFSS/MBS ASSESSMENT ADULT - SLP PERTINENT HISTORY OF CURRENT PROBLEM
As per MD note, "61 year old male with a PMH of  smoking p/w respiratory failure, PNA, A flutter, CHF and left pleural effusion.  Left pigtail inserted by MICU on 5/15/21.  On 5/18 Thoracic surgery notified to monitor left chest tube,, chest tube clamped.  5/19 cytology obtained from left pigtail & removed, CXR stable. Pt reintubated and consult requested for tracheostomy.5/23 CPAP trials during day, vent rest overnite ENT called for oropharynx bleeding. 5/24 Bronch last evening by MICU ENT evaluated >no acute source bleeding, old blood left bronchus  For Trach Tuesday. 5/25 bedside percutaneous tracheostomy".

## 2021-05-28 NOTE — SWALLOW VFSS/MBS ASSESSMENT ADULT - RECOMMENDED FEEDING/EATING TECHNIQUES
maintain upright posture during/after eating for 30 mins/oral hygiene/position upright (90 degrees)/provide rest periods between swallows/small sips/bites

## 2021-05-28 NOTE — PROGRESS NOTE ADULT - ASSESSMENT
60 y/o male with PMH of COPD (not on home O2), active smoker 1.5 ppd, aflutter and PE on Eliquis, pulmonary nodule who came to the ED on 5/15 complaining of shortness of breath and  cough, found to be in aflutter with RVR along with acute hypoxic respiratory failure due to large left pleural effusion and pulmonary edema requiring intubation. Ultimately no improvement in HR with  Cardizem and worsening shock state after intubation therefore was successfully cardioverted at 150 J. Also found to have right segmental PE and left peroneal DVT. Left chest tube was placed (05/16) with 1400 cc serosanguineous fluid.  He was extubated on 5/17 and reintubated on 5/19 because of increase secretion and patient could not manage it.  Bronchoscopy done to remove thick secretion.  Chest tube removed. Goal of care discussion had with patient and step daughter; patient is full code, agreed to trach and PEG tube placement. S/p tracheostomy (05/25/). Swallow evaluation pending before proceeding to PEG if failed.     #Acute respiratory failure   - multifactorial- pleural effusion, pna, pe  - s/p trach   - s/p chest tube  - s/p abx  - thoracic surgery consult appreciated    #chronic pain   - combined with post op pain   - pain mgmt consult appreciated    #dysphagia  - may need peg in future  - pending modified barium  - npo for now     #severe protein calorie malnutrition  - nutritionist consult appreciated     #PE   - heparin drip     #Aflutter   - S/p cardioversion  - diltiazem    #Tobacco Abuse  - 5 minutes spent on cessation counseling   - nicotine patch    #depression - w/ suicidal thoughts   - psych consult pending    - zoloft  - 1:1 for safety    #DVT prophylaxis  - on heparin drip     patient with continued oozing from trach site concern for heparin drip thoracic surgery following will continue step down level of care for now. patient also with active suicidality thoughts will continue 1:1 psych consult pending    [phone] attempted to call patient son no answer.

## 2021-05-28 NOTE — PROGRESS NOTE ADULT - SUBJECTIVE AND OBJECTIVE BOX
Patient is a 61y old  Male who presents with a chief complaint of Respiratory Failure (27 May 2021 16:41)    Patient seen and examined at bedside.     ALLERGIES:  No Known Allergies    MEDICATIONS  (STANDING):  chlorhexidine 0.12% Liquid 15 milliLiter(s) Oral Mucosa every 12 hours  chlorhexidine 4% Liquid 1 Application(s) Topical <User Schedule>  diltiazem    Tablet 30 milliGRAM(s) Oral every 6 hours  doxazosin 4 milliGRAM(s) Oral at bedtime  heparin  Infusion. 1200 Unit(s)/Hr (12 mL/Hr) IV Continuous <Continuous>  multivitamin 1 Tablet(s) Oral daily  nicotine - 21 mG/24Hr(s) Patch 1 patch Transdermal daily  oxymetazoline 0.05% Nasal Spray 2 Spray(s) Left Nostril two times a day  pantoprazole  Injectable 40 milliGRAM(s) IV Push daily  sertraline 100 milliGRAM(s) Oral daily    MEDICATIONS  (PRN):  albuterol/ipratropium for Nebulization 3 milliLiter(s) Nebulizer every 6 hours PRN Shortness of Breath and/or Wheezing  heparin   Injectable 6500 Unit(s) IV Push every 6 hours PRN For aPTT less than 40  heparin   Injectable 3000 Unit(s) IV Push every 6 hours PRN For aPTT between 40 - 57  HYDROmorphone  Injectable 1 milliGRAM(s) IV Push every 3 hours PRN Moderate Pain (4 - 6)  lidocaine 2% Viscous 15 milliLiter(s) Mucosal every 4 hours PRN oral pain  LORazepam   Injectable 0.5 milliGRAM(s) IV Push every 6 hours PRN Anxiety    Vital Signs Last 24 Hrs  T(F): 98.1 (28 May 2021 08:00), Max: 99.1 (27 May 2021 20:00)  HR: 81 (28 May 2021 08:00) (59 - 92)  BP: 132/75 (28 May 2021 08:00) (97/53 - 132/75)  RR: 18 (28 May 2021 08:00) (16 - 20)  SpO2: 96% (28 May 2021 08:45) (92% - 100%)  I&O's Summary    27 May 2021 07:01  -  28 May 2021 07:00  --------------------------------------------------------  IN: 144 mL / OUT: 825 mL / NET: -681 mL      PHYSICAL EXAM:  General: NAD, alert   ENT: MMM, no thrush  Neck: Supple, No JVD  Lungs:  good air entry, non-labored breathing +tracheostomy  Cardio: +s1/s2  Abdomen: Soft, Nontender, Nondistended; Bowel sounds present  Extremities: able to move extremities    LABS:                        11.3   4.86  )-----------( 175      ( 28 May 2021 05:33 )             35.3     05-27    135  |  97  |  7.7  ----------------------------<  102  3.8   |  30.0  |  0.37    Ca    8.5      27 May 2021 05:03  Phos  3.3     05-27  Mg     1.9     05-27    TPro  5.9  /  Alb  2.8  /  TBili  1.1  /  DBili  x   /  AST  41  /  ALT  60  /  AlkPhos  124  05-27    eGFR if Non African American: 134 mL/min/1.73M2 (05-27-21 @ 05:03)  eGFR if African American: 156 mL/min/1.73M2 (05-27-21 @ 05:03)    PTT - ( 28 May 2021 05:33 )  PTT:86.8 sec    Cultures  Culture - Acid Fast - Bronchial w/Smear (collected 24 May 2021 07:09)  Source: BAL Bronchoalveolar Lavage  Preliminary Report (26 May 2021 15:04):    Culture is being performed.    Culture - Fungal, Bronchial (collected 24 May 2021 07:08)  Source: BAL Bronchoalveolar Lavage  Preliminary Report (27 May 2021 09:10):    Few Yeast    Culture - Bronchial (collected 24 May 2021 07:08)  Source: Bronch Wash Bronchoalveolar Lavage  Gram Stain (24 May 2021 13:32):    Few polymorphonuclear leukocytes per low power field    Few Squamous epithelial cells per low power field    Few Gram Negative Rods per oil power field  Final Report (26 May 2021 10:07):    Normal Respiratory Payton present    COVID-19 PCR: NotDetec (05-24-21 @ 09:44)  COVID-19 PCR: NotDetec (05-22-21 @ 13:27)    RADIOLOGY & ADDITIONAL TESTS:  - no new tests    Care Discussed with Consultants/Other Providers:   Thoracic Surgery

## 2021-05-28 NOTE — PROGRESS NOTE ADULT - SUBJECTIVE AND OBJECTIVE BOX
Subjective - patient seen and evaluated bedside. Sitting comfortably in bed. Nods head yes and no. Complains of mild pain at tracheostomy site.  Denies CP, SOB, HA, dizziness, n/v/d    Review of Systems: negative x 10 systems except as noted above    Brief summary:  61yMale s/p bedside trach/peg    Significant/[id number] events: none      PAST MEDICAL & SURGICAL HISTORY:  Poor historian    COPD (chronic obstructive pulmonary disease)    Pulmonary embolism    Atrial flutter    H/O solitary pulmonary nodule    Depression    No significant past surgical history    No significant past surgical history          albuterol/ipratropium for Nebulization 3 milliLiter(s) Nebulizer every 6 hours PRN  chlorhexidine 0.12% Liquid 15 milliLiter(s) Oral Mucosa every 12 hours  chlorhexidine 4% Liquid 1 Application(s) Topical <User Schedule>  diltiazem    Tablet 30 milliGRAM(s) Oral every 6 hours  doxazosin 4 milliGRAM(s) Oral at bedtime  heparin   Injectable 6500 Unit(s) IV Push every 6 hours PRN  heparin   Injectable 3000 Unit(s) IV Push every 6 hours PRN  heparin  Infusion. 1200 Unit(s)/Hr IV Continuous <Continuous>  HYDROmorphone  Injectable 1 milliGRAM(s) IV Push every 3 hours PRN  ketorolac   Injectable 30 milliGRAM(s) IV Push every 6 hours  lidocaine 2% Viscous 15 milliLiter(s) Mucosal every 4 hours PRN  LORazepam   Injectable 0.5 milliGRAM(s) IV Push every 6 hours PRN  methocarbamol IVPB 1000 milliGRAM(s) IV Intermittent three times a day  multivitamin 1 Tablet(s) Oral daily  nicotine - 21 mG/24Hr(s) Patch 1 patch Transdermal daily  oxymetazoline 0.05% Nasal Spray 2 Spray(s) Left Nostril two times a day  pantoprazole  Injectable 40 milliGRAM(s) IV Push daily  sertraline 100 milliGRAM(s) Oral daily  MEDICATIONS  (PRN):  albuterol/ipratropium for Nebulization 3 milliLiter(s) Nebulizer every 6 hours PRN Shortness of Breath and/or Wheezing  heparin   Injectable 6500 Unit(s) IV Push every 6 hours PRN For aPTT less than 40  heparin   Injectable 3000 Unit(s) IV Push every 6 hours PRN For aPTT between 40 - 57  HYDROmorphone  Injectable 1 milliGRAM(s) IV Push every 3 hours PRN Moderate Pain (4 - 6)  lidocaine 2% Viscous 15 milliLiter(s) Mucosal every 4 hours PRN oral pain  LORazepam   Injectable 0.5 milliGRAM(s) IV Push every 6 hours PRN Anxiety    Height (cm): 182.9 ( @ 15:03)  Weight (kg): 83.9 ( @ 15:03)  BMI (kg/m2): 25.1 ( @ 15:03)  BSA (m2): 2.06 ( @ 15:03)Mode: standby  Daily Height in cm: 182.88 (27 May 2021 15:03)    Daily Weight in k.4 (28 May 2021 05:03)                              11.3   4.86  )-----------( 175      ( 28 May 2021 05:33 )             35.3       135  |  97<L>  |  7.7<L>  ----------------------------<  102<H>  3.8   |  30.0<H>  |  0.37<L>    Ca    8.5<L>      27 May 2021 05:03  Phos  3.3       Mg     1.9         TPro  5.9<L>  /  Alb  2.8<L>  /  TBili  1.1  /  DBili  x   /  AST  41<H>  /  ALT  60<H>  /  AlkPhos  124<H>        PTT - ( 28 May 2021 05:33 )  PTT:86.8 sec      Objective:  T(C): 36.7 (21 @ 08:00), Max: 37.3 (21 @ 20:00)  HR: 81 (21 @ 08:00) (68 - 92)  BP: 132/75 (21 @ 08:00) (106/62 - 132/75)  RR: 18 (21 @ 08:00) (16 - 20)  SpO2: 96% (21 @ 08:45) (92% - 100%)  Wt(kg): --CAPILLARY BLOOD GLUCOSE      I&O's Summary    27 May 2021 07:01  -  28 May 2021 07:00  --------------------------------------------------------  IN: 144 mL / OUT: 825 mL / NET: -681 mL        Physical Exam  Neuro: A+O x 3, non-focal  Pulm: CTA, equal bilaterally. tracheostomy site noted to have mild bloody ooze. no evidence of active bleed.   CV: RRR, +S1S2  Abd: soft, NT, ND, +BS  Ext: +DP Pulses b/l,  no edema      Imaging:  CXR: < from: Xray Chest 1 View- PORTABLE-Routine (Xray Chest 1 View- PORTABLE-Routine in AM.) (21 @ 06:30) >    FINDINGS: Tracheostomy tube in place    The lungs show residual lung mild-moderate LEFT effusion and/or basilar airspace consolidation. RIGHT lung parenchyma clear.. No pneumothorax.    The heart and mediastinum size and configuration are within normal limits.    Visualized osseous structures are intact.    < end of copied text >  < from: Xray Chest 1 View- PORTABLE-Routine (Xray Chest 1 View- PORTABLE-Routine in AM.) (21 @ 06:30) >    FINDINGS: Tracheostomy tube in place    The lungs show residual lung mild-moderate LEFT effusion and/or basilar airspace consolidation. RIGHT lung parenchyma clear.. No pneumothorax.    The heart and mediastinum size and configuration are within normal limits.    Visualized osseous structures are intact.    < end of copied text >

## 2021-05-28 NOTE — CHART NOTE - NSCHARTNOTEFT_GEN_A_CORE
palliative care social work note.    SW met with patient earlier today to provide ongoing support. Patient acknowledged appreciation for my visit but was exhausted after returning from testing. Palliative care to follow.

## 2021-05-28 NOTE — PROGRESS NOTE ADULT - ASSESSMENT
60 y/o male with PMH of COPD (not on home O2), active smoker 1.5 ppd, aflutter and PE on Eliquis, S/p tracheostomy (05/25/).   on chronic suboxone 8mg tid    npo currently  dilaudid ivp 1mg q3h PRN pain  acetaminophen IVPB 1gram, can do up to 4 times a day, MAX daily dose = 4g  ketorolac   Injectable 30 milliGRAM(s) IV Push every 6 hours x8 doses. Afterwards, recommend starting celecoxib 200mg PO BID with food. HOLD for black/red stools   if available, methocarbamol 1000mg IVPB TID standing HOLD for sedation    will consider PO meds tomorrow if passes S&S    When able to start PO:  DC all IV meds, including iv dilaudid  Start suboxone 8mg TID HOLD for sedation. FIRST DOSE SHOULD BE AT LEAST 4 HOURS AFTER LAST OPIOID DOSE  Start acetaminophen PO 975mg [id number] standing. HOLD for liver function test dysfunction  Start gabapentin PO 300mg qhs standing. HOLD for sedation  If no surgical contraindication, cont ketorolac IV 30mg [id number] standing x 4 doses. Can use celebrex 200mg PO q12h, with food thereafter.  Start robaxin 750mg TID standing. HOLD for sedation

## 2021-05-28 NOTE — SWALLOW VFSS/MBS ASSESSMENT ADULT - DIAGNOSTIC IMPRESSIONS
Pt presents w/overall functional oropharyngeal swallow. Oral phase characterized by adequate PO acceptance via utensil following verbal prompt x 1, given initial episode of decreased oral grading, though not observed for remainder of trials. Mild oral holding of thin liquids x 2/3, however deemed generally functional (also ?impact of cognition vs fatigue at this time - nsg reports recent pain meds provided). Pt w/functional bolus formation/cohesion/propulsion w/timely oral transit. No anterior loss or significant oral stasis visualized throughout.     Pharyngeal phase of swallow significant for adequate contractility w/only trace-min vallecular residue observed following mech soft trials (which cleared w/independent & cued subsequent swallow). Reduced UES opening noted, w/mild pyriform sinus residue observed following all trials, (increasing as trials progressed), again cleared w/subsequent swallow. Pt w/intact hyo-laryngeal elevation/excursion & upper/lower airway protection, as no penetration or aspiration were noted across consistencies for entirety of study.

## 2021-05-28 NOTE — PROGRESS NOTE ADULT - SUBJECTIVE AND OBJECTIVE BOX
Interval Hx:  Patient unable to be seen during rounds   off floor at test  chart reviewed, remains npo  will provide PO pain recs - to be started when able to tolerate PO

## 2021-05-28 NOTE — SWALLOW VFSS/MBS ASSESSMENT ADULT - SLP GENERAL OBSERVATIONS
Pt recd awake & upright in stretcher in radiology, #7 portex trach, cuffed/deflated, nonfenestrated trach, +28% FiO2 8L via trach collar SpO2 93-94% throughout, on monitor, accompanied by RN Clotilde (providing tracheal suctioning prior to study). Reduced voicing over trach noted vs yesterday, w/?cognition in comparison to yesterday, nsg states likely 2* recent pain meds provided, Pt with c/o pain at trach site.

## 2021-05-28 NOTE — SWALLOW VFSS/MBS ASSESSMENT ADULT - RECOMMENDED CONSISTENCY
Initiation of Dysphagia I pureed solids w/thin liquids (Pt w/preference for puree at this time)  STRICT aspiration precautions  100% supervision for all meals given reduced cognition  Meds whole in puree  Upright for all PO/>30 minutes follownig  Small bites/sips, slow rate  Oral care  SLP to follow for diet tolerance monitoring, as schedule allows

## 2021-05-29 LAB
APTT BLD: 108.3 SEC — HIGH (ref 27.5–35.5)
APTT BLD: 78.5 SEC — HIGH (ref 27.5–35.5)
APTT BLD: 82.1 SEC — HIGH (ref 27.5–35.5)
HCT VFR BLD CALC: 33.1 % — LOW (ref 39–50)
HCT VFR BLD CALC: 35 % — LOW (ref 39–50)
HCT VFR BLD CALC: 37.1 % — LOW (ref 39–50)
HGB BLD-MCNC: 10.8 G/DL — LOW (ref 13–17)
HGB BLD-MCNC: 11.2 G/DL — LOW (ref 13–17)
HGB BLD-MCNC: 11.8 G/DL — LOW (ref 13–17)
MCHC RBC-ENTMCNC: 31.8 GM/DL — LOW (ref 32–36)
MCHC RBC-ENTMCNC: 32 GM/DL — SIGNIFICANT CHANGE UP (ref 32–36)
MCHC RBC-ENTMCNC: 32 PG — SIGNIFICANT CHANGE UP (ref 27–34)
MCHC RBC-ENTMCNC: 32.2 PG — SIGNIFICANT CHANGE UP (ref 27–34)
MCHC RBC-ENTMCNC: 32.2 PG — SIGNIFICANT CHANGE UP (ref 27–34)
MCHC RBC-ENTMCNC: 32.6 GM/DL — SIGNIFICANT CHANGE UP (ref 32–36)
MCV RBC AUTO: 100 FL — SIGNIFICANT CHANGE UP (ref 80–100)
MCV RBC AUTO: 101.1 FL — HIGH (ref 80–100)
MCV RBC AUTO: 98.8 FL — SIGNIFICANT CHANGE UP (ref 80–100)
PLATELET # BLD AUTO: 184 K/UL — SIGNIFICANT CHANGE UP (ref 150–400)
PLATELET # BLD AUTO: 190 K/UL — SIGNIFICANT CHANGE UP (ref 150–400)
PLATELET # BLD AUTO: 201 K/UL — SIGNIFICANT CHANGE UP (ref 150–400)
RBC # BLD: 3.35 M/UL — LOW (ref 4.2–5.8)
RBC # BLD: 3.5 M/UL — LOW (ref 4.2–5.8)
RBC # BLD: 3.67 M/UL — LOW (ref 4.2–5.8)
RBC # FLD: 15.5 % — HIGH (ref 10.3–14.5)
RBC # FLD: 15.7 % — HIGH (ref 10.3–14.5)
RBC # FLD: 15.9 % — HIGH (ref 10.3–14.5)
WBC # BLD: 5.81 K/UL — SIGNIFICANT CHANGE UP (ref 3.8–10.5)
WBC # BLD: 5.92 K/UL — SIGNIFICANT CHANGE UP (ref 3.8–10.5)
WBC # BLD: 5.95 K/UL — SIGNIFICANT CHANGE UP (ref 3.8–10.5)
WBC # FLD AUTO: 5.81 K/UL — SIGNIFICANT CHANGE UP (ref 3.8–10.5)
WBC # FLD AUTO: 5.92 K/UL — SIGNIFICANT CHANGE UP (ref 3.8–10.5)
WBC # FLD AUTO: 5.95 K/UL — SIGNIFICANT CHANGE UP (ref 3.8–10.5)

## 2021-05-29 PROCEDURE — 99221 1ST HOSP IP/OBS SF/LOW 40: CPT

## 2021-05-29 PROCEDURE — 99233 SBSQ HOSP IP/OBS HIGH 50: CPT

## 2021-05-29 RX ORDER — HEPARIN SODIUM 5000 [USP'U]/ML
3000 INJECTION INTRAVENOUS; SUBCUTANEOUS EVERY 6 HOURS
Refills: 0 | Status: DISCONTINUED | OUTPATIENT
Start: 2021-05-29 | End: 2021-05-31

## 2021-05-29 RX ORDER — SODIUM CHLORIDE 9 MG/ML
500 INJECTION INTRAMUSCULAR; INTRAVENOUS; SUBCUTANEOUS ONCE
Refills: 0 | Status: COMPLETED | OUTPATIENT
Start: 2021-05-29 | End: 2021-05-29

## 2021-05-29 RX ORDER — ALPRAZOLAM 0.25 MG
0.25 TABLET ORAL EVERY 12 HOURS
Refills: 0 | Status: DISCONTINUED | OUTPATIENT
Start: 2021-05-29 | End: 2021-06-04

## 2021-05-29 RX ORDER — HEPARIN SODIUM 5000 [USP'U]/ML
6500 INJECTION INTRAVENOUS; SUBCUTANEOUS EVERY 6 HOURS
Refills: 0 | Status: DISCONTINUED | OUTPATIENT
Start: 2021-05-29 | End: 2021-05-31

## 2021-05-29 RX ORDER — HEPARIN SODIUM 5000 [USP'U]/ML
INJECTION INTRAVENOUS; SUBCUTANEOUS
Qty: 25000 | Refills: 0 | Status: DISCONTINUED | OUTPATIENT
Start: 2021-05-29 | End: 2021-05-31

## 2021-05-29 RX ORDER — DOXAZOSIN MESYLATE 4 MG
4 TABLET ORAL AT BEDTIME
Refills: 0 | Status: DISCONTINUED | OUTPATIENT
Start: 2021-05-29 | End: 2021-06-05

## 2021-05-29 RX ORDER — POLYETHYLENE GLYCOL 3350 17 G/17G
17 POWDER, FOR SOLUTION ORAL DAILY
Refills: 0 | Status: DISCONTINUED | OUTPATIENT
Start: 2021-05-29 | End: 2021-06-01

## 2021-05-29 RX ADMIN — SERTRALINE 100 MILLIGRAM(S): 25 TABLET, FILM COATED ORAL at 12:21

## 2021-05-29 RX ADMIN — PANTOPRAZOLE SODIUM 40 MILLIGRAM(S): 20 TABLET, DELAYED RELEASE ORAL at 12:21

## 2021-05-29 RX ADMIN — Medication 975 MILLIGRAM(S): at 05:57

## 2021-05-29 RX ADMIN — Medication 975 MILLIGRAM(S): at 23:35

## 2021-05-29 RX ADMIN — Medication 1 PATCH: at 12:19

## 2021-05-29 RX ADMIN — Medication 1 PATCH: at 16:56

## 2021-05-29 RX ADMIN — Medication 0.25 MILLIGRAM(S): at 16:58

## 2021-05-29 RX ADMIN — HEPARIN SODIUM 1000 UNIT(S)/HR: 5000 INJECTION INTRAVENOUS; SUBCUTANEOUS at 08:57

## 2021-05-29 RX ADMIN — Medication 975 MILLIGRAM(S): at 05:27

## 2021-05-29 RX ADMIN — CHLORHEXIDINE GLUCONATE 1 APPLICATION(S): 213 SOLUTION TOPICAL at 05:27

## 2021-05-29 RX ADMIN — HEPARIN SODIUM 1000 UNIT(S)/HR: 5000 INJECTION INTRAVENOUS; SUBCUTANEOUS at 22:09

## 2021-05-29 RX ADMIN — Medication 1 TABLET(S): at 12:21

## 2021-05-29 RX ADMIN — Medication 975 MILLIGRAM(S): at 22:05

## 2021-05-29 RX ADMIN — SODIUM CHLORIDE 1000 MILLILITER(S): 9 INJECTION INTRAMUSCULAR; INTRAVENOUS; SUBCUTANEOUS at 01:06

## 2021-05-29 RX ADMIN — Medication 4 MILLIGRAM(S): at 22:05

## 2021-05-29 RX ADMIN — CHLORHEXIDINE GLUCONATE 15 MILLILITER(S): 213 SOLUTION TOPICAL at 17:00

## 2021-05-29 RX ADMIN — Medication 1 PATCH: at 09:46

## 2021-05-29 RX ADMIN — Medication 975 MILLIGRAM(S): at 14:27

## 2021-05-29 RX ADMIN — Medication 1 PATCH: at 12:20

## 2021-05-29 RX ADMIN — METHOCARBAMOL 750 MILLIGRAM(S): 500 TABLET, FILM COATED ORAL at 05:27

## 2021-05-29 RX ADMIN — Medication 975 MILLIGRAM(S): at 14:26

## 2021-05-29 RX ADMIN — OXYMETAZOLINE HYDROCHLORIDE 2 SPRAY(S): 0.5 SPRAY NASAL at 05:27

## 2021-05-29 RX ADMIN — CHLORHEXIDINE GLUCONATE 15 MILLILITER(S): 213 SOLUTION TOPICAL at 05:27

## 2021-05-29 RX ADMIN — HEPARIN SODIUM 1000 UNIT(S)/HR: 5000 INJECTION INTRAVENOUS; SUBCUTANEOUS at 14:15

## 2021-05-29 RX ADMIN — POLYETHYLENE GLYCOL 3350 17 GRAM(S): 17 POWDER, FOR SOLUTION ORAL at 12:21

## 2021-05-29 NOTE — PROGRESS NOTE ADULT - SUBJECTIVE AND OBJECTIVE BOX
CHIEF COMPLAINT/INTERVAL HISTORY:    Patient is a 61y old  Male who presents with a chief complaint of Respiratory Failure (29 May 2021 12:51)    SUBJECTIVE & OBJECTIVE: Pt seen and examined at bedside. Asymptomatic hypotension overnight; received NS bolus. Patient with intermittent drowsiness reported by RN today. Suboxone held. Patient awake and able to follow commands today. Bladder scan > 500 cc; straight cath x 1 ordered. On [place]. Oozing from trach noted; no overt bleeding. Monitor H/H.    ICU Vital Signs Last 24 Hrs  T(C): 36.6 (29 May 2021 15:24), Max: 37.1 (29 May 2021 00:11)  T(F): 97.8 (29 May 2021 15:24), Max: 98.7 (29 May 2021 00:11)  HR: 66 (29 May 2021 16:45) (59 - 78)  BP: 96/59 (29 May 2021 16:45) (89/53 - 114/70)  BP(mean): 82 (29 May 2021 12:00) (65 - 82)  RR: 18 (29 May 2021 16:45) (18 - 20)  SpO2: 97% (29 May 2021 16:45) (90% - 98%)      MEDICATIONS  (STANDING):  acetaminophen   Tablet .. 975 milliGRAM(s) Oral every 8 hours  buprenorphine 8 mG/naloxone 2 mG SL  Tablet 1 Tablet(s) SubLingual <User Schedule>  chlorhexidine 0.12% Liquid 15 milliLiter(s) Oral Mucosa every 12 hours  chlorhexidine 4% Liquid 1 Application(s) Topical <User Schedule>  doxazosin 4 milliGRAM(s) Oral at bedtime  gabapentin 300 milliGRAM(s) Oral at bedtime  heparin  Infusion.  Unit(s)/Hr (14 mL/Hr) IV Continuous <Continuous>  methocarbamol 750 milliGRAM(s) Oral three times a day  multivitamin 1 Tablet(s) Oral daily  nicotine - 21 mG/24Hr(s) Patch 1 patch Transdermal daily  oxymetazoline 0.05% Nasal Spray 2 Spray(s) Left Nostril two times a day  pantoprazole  Injectable 40 milliGRAM(s) IV Push daily  polyethylene glycol 3350 17 Gram(s) Oral daily  sertraline 100 milliGRAM(s) Oral daily    MEDICATIONS  (PRN):  albuterol/ipratropium for Nebulization 3 milliLiter(s) Nebulizer every 6 hours PRN Shortness of Breath and/or Wheezing  ALPRAZolam 0.25 milliGRAM(s) Oral every 12 hours PRN anxiety  heparin   Injectable 6500 Unit(s) IV Push every 6 hours PRN For aPTT less than 40  heparin   Injectable 3000 Unit(s) IV Push every 6 hours PRN For aPTT between 40 - 57  lidocaine 2% Viscous 15 milliLiter(s) Mucosal every 4 hours PRN oral pain      LABS:                        11.2   5.81  )-----------( 184      ( 29 May 2021 06:21 )             35.0           PTT - ( 29 May 2021 13:33 )  PTT:82.1 sec      PHYSICAL EXAM:    GENERAL: middle aged male, chronically ill appearing, not in acute distress  HEAD:  Atraumatic, Normocephalic  EYES: EOMI, PERRLA, conjunctiva and sclera clear  ENMT: Moist mucous membranes   NECK: Supple, trach in situ  NERVOUS SYSTEM:  awake, alert, able to follow commands  CHEST/LUNG: coarse breath sounds  HEART: Regular rate and rhythm; + S1/S2  ABDOMEN: Soft, Nontender, Nondistended; Bowel sounds present  EXTREMITIES:  no pedal edema

## 2021-05-29 NOTE — CONSULT NOTE ADULT - SUBJECTIVE AND OBJECTIVE BOX
Patient a 60 y/o male, domiciled with son, no past Psychiatric hx, no prior hx of Depression with SI, denied drug abuse, (on Suboxone), admitted due to Respiratory failure with Multiple medical Issues.     Patient in bed,  alert, oriented to time and place, has a trach tube but able to communicate talking/writing at times. He also smiled at times endorsing that he had a good sleep last night with good appetite and has increased self esteem as things are improving. He endorses that he is not depressed or suicidal, never tired to commit suicide, smokes 1 PPD and also drank ETOH, mostly Vodka, last drink was in 2006. He feels that his memory is also OK at this time. NO internal pre-occupation noted, no A/V/ H or Paranoid beliefs noted. 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 Speech is limited due to Trach tube, but speaks well and able to understand. He smiles on approach, good eye contact. Mood is OK as things are improving with day-to-day. Thoughts are linear, and has no S/H/I/P, he is also AAOX3 with limited I+J. Memory seems intact.    Labs:                       11.2   5.81  )-----------( 184      ( 29 May 2021 06:21 )             35.0     < from: 12 Lead ECG (05.15.21 @ 23:36) >    Ventricular Rate 133 BPM    Atrial Rate 266 BPM    QRS Duration 72 ms    Q-T Interval 284 ms    QTC Calculation(Bazett) 422 ms    P Axis 220 degrees    R Axis -38 degrees    T Axis 17 degrees    Diagnosis:     Adjustment D/O                            Depressive D/O      Diagnosis Line Atrial flutter with 2:1 A-V conduction  Left axis deviation  Low voltage QRS  Cannot rule out Anteroseptal infarct , age undetermined  ST & T wave abnormality, consider inferior ischemia  Abnormal ECG    Confirmed by BRAULIO BARAJAS ([id number]) on 5/16/2021 6:45:30 PM    < end of copied text >

## 2021-05-29 NOTE — PROGRESS NOTE ADULT - ASSESSMENT
62 y/o male with PMH of COPD (not on home O2), active smoker 1.5 ppd, aflutter and PE on Eliquis, pulmonary nodule who came to the ED on 5/15 complaining of shortness of breath and  cough, found to be in aflutter with RVR along with acute hypoxic respiratory failure due to large left pleural effusion and pulmonary edema requiring intubation. Ultimately no improvement in HR with  Cardizem and worsening shock state after intubation therefore was successfully cardioverted at 150 J. Also found to have right segmental PE and left peroneal DVT. Left chest tube was placed (05/16) with 1400 cc serosanguineous fluid.  He was extubated on 5/17 and reintubated on 5/19 because of increase secretion and patient could not manage it.  Bronchoscopy done to remove thick secretion.  Chest tube removed. Goal of care discussion had with patient and step daughter; patient is full code, agreed to trach and PEG tube placement. S/p tracheostomy (05/25/). Patient passed MBS and diet was advanced; no PEG placement.     #Acute respiratory failure secondary to Multifocal PNA complicated by parapneumonic effusion and acute PE s/p trachestomy  - history of COPD (active smoker) and chronic PE on eliquis  - prolonged hospitalization complicated by reintubation on 5/19 due to increased secretions.   - s/p bronchoscopy on 5/24 with BAL; prelim results with normal la nena reported; no AFB, few yeast   - s/p trach on 5/25 complicated with oozing from trach site  - pleural fluid cytology negative for malignant cells  - RVP and COVID 19 negative  - left chest tube removed  - completed course of abx  - continue heparin gtt  - TTE without enlarged RV and decreased RV systolic function on 5/16  - continue trach care and suctioning as needed (requested RT to change to soft tip)  - continue bronchodilators as needed  - repeat CXR in AM  - thoracic surgery recommendations appreciated; sutures to be removed on 6/2  - pulmonary consulted to assist with further management    # Chronic Pain Syndrome  - patient complaining of lower back pain  - pain mgmt recommendations appreciated  - however, Suboxone held due to drowsiness  - continue tylenol ATC  - continue Robaxin and gabapentin with holding parameters    # Dysphagia  - completed modified barium  - advanced to pureed with thin liquids  - f/u further speech recommendations    # Severe protein calorie malnutrition  - nutritionist consult appreciated     # History of PE  - hold eliquis  - continue heparin drip per CT surgery  - CT surgery to comment on when eliquis can be safely resumed    # Aflutter   - S/p cardioversion on admission  - rate controlled  - Soft BP; hold cardizem  - telemonitoring     # Tobacco Abuse  - nicotine patch  - previously counseled on cessation    # Anxiety/Depression   -denies suicidal thought currently  -seen by psych; continue constant obs for now   - continue zoloft  - xanax PRN    # Acute Right Peroneal Vein DVT  -continue heparin gtt    Plan discussed with patient, CT surgery NP, RN    DVT ppx - Heparin SC    Dispo - Sutures to be removed on 6/2. Monitor oozing at trach site; monitor H/H. Pulm consult. PT evaluation for placement. Eventual downgrade from SDU to monitored bed. 60 y/o male with PMH of COPD (not on home O2), active smoker 1.5 ppd, aflutter and PE on Eliquis, pulmonary nodule who came to the ED on 5/15 complaining of shortness of breath and  cough, found to be in aflutter with RVR along with acute hypoxic respiratory failure due to large left pleural effusion and pulmonary edema requiring intubation. Ultimately no improvement in HR with  Cardizem and worsening shock state after intubation therefore was successfully cardioverted at 150 J. Also found to have right segmental PE and left peroneal DVT. Left chest tube was placed (05/16) with 1400 cc serosanguineous fluid.  He was extubated on 5/17 and reintubated on 5/19 because of increase secretion and patient could not manage it.  Bronchoscopy done to remove thick secretion.  Chest tube removed. Goal of care discussion had with patient and step daughter; patient is full code, agreed to trach and PEG tube placement. S/p tracheostomy (05/25/). Patient passed MBS and diet was advanced; no PEG placement.     #Acute respiratory failure secondary to Multifocal PNA complicated by parapneumonic effusion and acute PE s/p trachestomy  - history of COPD (active smoker) and chronic PE on eliquis  - currently remains hypoxic on 40% trach collar  - prolonged hospitalization complicated by reintubation on 5/19 due to increased secretions.   - s/p bronchoscopy on 5/24 with BAL; prelim results with normal la nena reported; no AFB, few yeast   - s/p trach on 5/25 complicated with oozing from trach site  - pleural fluid cytology negative for malignant cells  - RVP and COVID 19 negative  - left chest tube removed  - completed course of abx  - continue heparin gtt  - TTE with enlarged RV and decreased RV systolic function on 5/16  - continue trach care and suctioning as needed (requested RT to change to soft tip)  - continue bronchodilators as needed  - repeat CXR in AM  - chest PT  - thoracic surgery recommendations appreciated; sutures to be removed on 6/2  - pulmonary consulted to assist with further management    # Chronic Pain Syndrome  - patient complaining of lower back pain  - pain mgmt recommendations appreciated  - however, Suboxone held due to drowsiness  - continue tylenol ATC  - continue Robaxin and gabapentin with holding parameters    # Dysphagia  - completed modified barium  - advanced to pureed with thin liquids  - f/u further speech recommendations    # Severe protein calorie malnutrition  - nutritionist consult appreciated     # History of PE  - hold eliquis  - continue heparin drip per CT surgery  - CT surgery to comment on when eliquis can be safely resumed    # Aflutter   - S/p cardioversion on admission  - rate controlled  - Soft BP; hold cardizem  - telemonitoring     # Tobacco Abuse  - nicotine patch  - previously counseled on cessation    # Anxiety/Depression   -denies suicidal thought currently  -seen by psych; continue constant obs for now   - continue zoloft  - xanax PRN    # Acute Right Peroneal Vein DVT  -continue heparin gtt    Plan discussed with patient, CT surgery NP, RN    DVT ppx - Heparin SC    Dispo - Sutures to be removed on 6/2. Monitor oozing at trach site; monitor H/H. Pulm consult. PT evaluation for placement. Eventual downgrade from SDU to monitored bed.

## 2021-05-29 NOTE — CHART NOTE - NSCHARTNOTEFT_GEN_A_CORE
Pt with BP 89/53. Remainder of vital signs stable. 500cc NS bolus ordered. Stat CBC, pt with slow oozing around trach site. RN to notify with any acute change in pt status.

## 2021-05-29 NOTE — CONSULT NOTE ADULT - ASSESSMENT
Patient a 62 y/o male, domiciled with son, no past Psychiatric hx, no prior hx of Depression with SI, denied drug abuse, (on Suboxone), admitted due to Respiratory failure with Multiple medical Issues.     Patient in bed,  alert, oriented to time and place, has a trach tube but able to communicate talking/writing at times. He also smiled at times endorsing that he had a good sleep last night with good appetite and has increased self esteem as things are improving. He endorses that he is not depressed or suicidal, never tired to commit suicide, smokes 1 PPD and also drank ETOH, mostly Vodka, last drink was in 2006. He feels that his memory is also OK at this time. NO internal pre-occupation noted, no A/V/ H or Paranoid beliefs noted. Unable to get any collateral info listed Wife/son--@ [phone]    Plan: Patient is alert, oriented to time and place, with intact Memory, endorses that he is not suicidal and no prior hx of SI/depression, has hx of Cigarettes smoking with ETOH, last ETOH drink was in 2006.          To continue with Zoloft 100 mg daily          F/U PRN          Continue with 1:1 for safety for now, re-eval in AM tomorrow      Thank You

## 2021-05-29 NOTE — CHART NOTE - NSCHARTNOTEFT_GEN_A_CORE
Pt was found to have a speaking valve placed on his trach, with no order.  Pt has a cuffed 7 Portex that was placed on 5/25 and still has sutures intact.  Pt had a swallow eval done, however there was no order or clarification that a speaking valve should be in place.  Pt has copious blood secretions at the trach site, so there is concern for clotting/occlusion of the valve  Hospital protocol is that speaking valves are normally not placed until cuffless, fenestrated trach is in place, so team who placed the non-fenestrated, cuffed trach would have to re-evaluate/replace trach   Normally a trach exchange is not done until 2 weeks post-initial trach    Until that time it is not advised that pt use the speaking valve. Dusty Valdovinos aware of these issues.     Pt is not happy that he cannot speak/communicate with his nurse and family members at this time, but was informed of the safety issues with the valve

## 2021-05-30 LAB
ANION GAP SERPL CALC-SCNC: 9 MMOL/L — SIGNIFICANT CHANGE UP (ref 5–17)
APTT BLD: 127 SEC — CRITICAL HIGH (ref 27.5–35.5)
APTT BLD: 70.7 SEC — HIGH (ref 27.5–35.5)
APTT BLD: 76.1 SEC — HIGH (ref 27.5–35.5)
BASOPHILS # BLD AUTO: 0.02 K/UL — SIGNIFICANT CHANGE UP (ref 0–0.2)
BASOPHILS NFR BLD AUTO: 0.4 % — SIGNIFICANT CHANGE UP (ref 0–2)
BUN SERPL-MCNC: 8.1 MG/DL — SIGNIFICANT CHANGE UP (ref 8–20)
CALCIUM SERPL-MCNC: 8.5 MG/DL — LOW (ref 8.6–10.2)
CHLORIDE SERPL-SCNC: 99 MMOL/L — SIGNIFICANT CHANGE UP (ref 98–107)
CO2 SERPL-SCNC: 29 MMOL/L — SIGNIFICANT CHANGE UP (ref 22–29)
CREAT SERPL-MCNC: 0.47 MG/DL — LOW (ref 0.5–1.3)
EOSINOPHIL # BLD AUTO: 0.08 K/UL — SIGNIFICANT CHANGE UP (ref 0–0.5)
EOSINOPHIL NFR BLD AUTO: 1.5 % — SIGNIFICANT CHANGE UP (ref 0–6)
GLUCOSE BLDC GLUCOMTR-MCNC: 92 MG/DL — SIGNIFICANT CHANGE UP (ref 70–99)
GLUCOSE SERPL-MCNC: 68 MG/DL — LOW (ref 70–99)
HCT VFR BLD CALC: 35.2 % — LOW (ref 39–50)
HGB BLD-MCNC: 11.1 G/DL — LOW (ref 13–17)
IMM GRANULOCYTES NFR BLD AUTO: 0.4 % — SIGNIFICANT CHANGE UP (ref 0–1.5)
LYMPHOCYTES # BLD AUTO: 1.25 K/UL — SIGNIFICANT CHANGE UP (ref 1–3.3)
LYMPHOCYTES # BLD AUTO: 22.9 % — SIGNIFICANT CHANGE UP (ref 13–44)
MAGNESIUM SERPL-MCNC: 1.9 MG/DL — SIGNIFICANT CHANGE UP (ref 1.6–2.6)
MCHC RBC-ENTMCNC: 31.3 PG — SIGNIFICANT CHANGE UP (ref 27–34)
MCHC RBC-ENTMCNC: 31.5 GM/DL — LOW (ref 32–36)
MCV RBC AUTO: 99.2 FL — SIGNIFICANT CHANGE UP (ref 80–100)
MONOCYTES # BLD AUTO: 0.4 K/UL — SIGNIFICANT CHANGE UP (ref 0–0.9)
MONOCYTES NFR BLD AUTO: 7.3 % — SIGNIFICANT CHANGE UP (ref 2–14)
NEUTROPHILS # BLD AUTO: 3.68 K/UL — SIGNIFICANT CHANGE UP (ref 1.8–7.4)
NEUTROPHILS NFR BLD AUTO: 67.5 % — SIGNIFICANT CHANGE UP (ref 43–77)
PHOSPHATE SERPL-MCNC: 2.8 MG/DL — SIGNIFICANT CHANGE UP (ref 2.4–4.7)
PLATELET # BLD AUTO: 213 K/UL — SIGNIFICANT CHANGE UP (ref 150–400)
POTASSIUM SERPL-MCNC: 3.8 MMOL/L — SIGNIFICANT CHANGE UP (ref 3.5–5.3)
POTASSIUM SERPL-SCNC: 3.8 MMOL/L — SIGNIFICANT CHANGE UP (ref 3.5–5.3)
RBC # BLD: 3.55 M/UL — LOW (ref 4.2–5.8)
RBC # FLD: 15.7 % — HIGH (ref 10.3–14.5)
SODIUM SERPL-SCNC: 137 MMOL/L — SIGNIFICANT CHANGE UP (ref 135–145)
WBC # BLD: 5.45 K/UL — SIGNIFICANT CHANGE UP (ref 3.8–10.5)
WBC # FLD AUTO: 5.45 K/UL — SIGNIFICANT CHANGE UP (ref 3.8–10.5)

## 2021-05-30 PROCEDURE — 71045 X-RAY EXAM CHEST 1 VIEW: CPT | Mod: 26

## 2021-05-30 PROCEDURE — 99233 SBSQ HOSP IP/OBS HIGH 50: CPT

## 2021-05-30 PROCEDURE — 99223 1ST HOSP IP/OBS HIGH 75: CPT

## 2021-05-30 PROCEDURE — 99232 SBSQ HOSP IP/OBS MODERATE 35: CPT

## 2021-05-30 RX ADMIN — Medication 975 MILLIGRAM(S): at 21:16

## 2021-05-30 RX ADMIN — OXYMETAZOLINE HYDROCHLORIDE 2 SPRAY(S): 0.5 SPRAY NASAL at 06:25

## 2021-05-30 RX ADMIN — METHOCARBAMOL 750 MILLIGRAM(S): 500 TABLET, FILM COATED ORAL at 06:25

## 2021-05-30 RX ADMIN — HEPARIN SODIUM 800 UNIT(S)/HR: 5000 INJECTION INTRAVENOUS; SUBCUTANEOUS at 07:47

## 2021-05-30 RX ADMIN — METHOCARBAMOL 750 MILLIGRAM(S): 500 TABLET, FILM COATED ORAL at 21:16

## 2021-05-30 RX ADMIN — Medication 1 PATCH: at 12:08

## 2021-05-30 RX ADMIN — CHLORHEXIDINE GLUCONATE 1 APPLICATION(S): 213 SOLUTION TOPICAL at 06:25

## 2021-05-30 RX ADMIN — PANTOPRAZOLE SODIUM 40 MILLIGRAM(S): 20 TABLET, DELAYED RELEASE ORAL at 12:30

## 2021-05-30 RX ADMIN — Medication 975 MILLIGRAM(S): at 21:46

## 2021-05-30 RX ADMIN — HEPARIN SODIUM 800 UNIT(S)/HR: 5000 INJECTION INTRAVENOUS; SUBCUTANEOUS at 15:41

## 2021-05-30 RX ADMIN — Medication 1 PATCH: at 12:30

## 2021-05-30 RX ADMIN — CHLORHEXIDINE GLUCONATE 15 MILLILITER(S): 213 SOLUTION TOPICAL at 06:25

## 2021-05-30 RX ADMIN — Medication 975 MILLIGRAM(S): at 17:27

## 2021-05-30 RX ADMIN — Medication 1 TABLET(S): at 12:30

## 2021-05-30 RX ADMIN — SERTRALINE 100 MILLIGRAM(S): 25 TABLET, FILM COATED ORAL at 12:30

## 2021-05-30 RX ADMIN — Medication 975 MILLIGRAM(S): at 06:25

## 2021-05-30 RX ADMIN — Medication 4 MILLIGRAM(S): at 21:16

## 2021-05-30 RX ADMIN — Medication 1 PATCH: at 17:24

## 2021-05-30 RX ADMIN — POLYETHYLENE GLYCOL 3350 17 GRAM(S): 17 POWDER, FOR SOLUTION ORAL at 12:30

## 2021-05-30 RX ADMIN — Medication 975 MILLIGRAM(S): at 06:55

## 2021-05-30 RX ADMIN — Medication 975 MILLIGRAM(S): at 18:04

## 2021-05-30 RX ADMIN — HEPARIN SODIUM 800 UNIT(S)/HR: 5000 INJECTION INTRAVENOUS; SUBCUTANEOUS at 23:15

## 2021-05-30 RX ADMIN — Medication 1 PATCH: at 07:01

## 2021-05-30 RX ADMIN — GABAPENTIN 300 MILLIGRAM(S): 400 CAPSULE ORAL at 21:16

## 2021-05-30 NOTE — CONSULT NOTE ADULT - ASSESSMENT
Respiratory failure a combination of PE, COPD, Pleural effusion( exudative- cytology neg) with protein malnutrition.  Group 3 P HTN secondary to COPD and PE  Weaning slowly from Trach    Rec:  no change meds  passey Brian valve  wean as tolerated

## 2021-05-30 NOTE — PROGRESS NOTE ADULT - SUBJECTIVE AND OBJECTIVE BOX
Subjective: Pt sitting up in bed sleeping on 1:1 observation for safety.     Vital Signs:  Vital Signs Last 24 Hrs  T(C): 37.3 (05-30-21 @ 16:00), Max: 37.3 (05-30-21 @ 16:00)  T(F): 99.1 (05-30-21 @ 16:00), Max: 99.1 (05-30-21 @ 16:00)  HR: 74 (05-30-21 @ 16:00) (68 - 77)  BP: 105/67 (05-30-21 @ 16:00) (94/58 - 106/70)  RR: 18 (05-30-21 @ 16:00) (18 - 18)  SpO2: 97% (05-30-21 @ 16:00) (93% - 99%) on trach collar      Relevant labs, radiology and Medications reviewed    Physical Exam:  Neuro: A+O x 3, non-focal  Pulm: CTA, equal bilaterally. trach site intact.  CV: RRR, +S1S2  Abd: soft, NT, ND, +BS  Ext: +DP Pulses b/l,  no edema        05-29 @ 07:01  -  05-30 @ 07:00  --------------------------------------------------------  IN:    Heparin Infusion: 230 mL  Total IN: 230 mL    OUT:    Intermittent Catheterization - Urethral (mL): 550 mL    Post-Void Residual per Intermittent Catheterization (mL): 20 mL    Voided (mL): 350 mL  Total OUT: 920 mL    Total NET: -690 mL      05-30 @ 07:01  -  05-30 @ 17:58  --------------------------------------------------------  IN:    Heparin Infusion: 80 mL  Total IN: 80 mL    OUT:    Indwelling Catheter - Urethral (mL): 500 mL    Intermittent Catheterization - Urethral (mL): 550 mL  Total OUT: 1050 mL    Total NET: -970 mL

## 2021-05-30 NOTE — PROGRESS NOTE ADULT - PROBLEM SELECTOR PLAN 1
S/p bedside percutaneous tracheostomy.  Wound well appearing. Small amount of bloody ooziness surrounding trach site.   No evidence of active bleed.   Primary team believes patient may have manipulated tracheostomy causing trauma to area. Patient is now on a 1:1 constant observation. Likely oozy in setting of anticoagulation on heparin. If no active bleed, OK to continue heparin as per Dr. Enrique. If new evidence of active bleeding, recommend pausing heparin gtt.   Sutures remain in place.  Will continue to follow  D/W Dr Ried

## 2021-05-30 NOTE — PROGRESS NOTE ADULT - ASSESSMENT
PHYSICAL EXAM:    GENERAL: middle aged male, chronically ill appearing, not in acute distress  HEAD:  Atraumatic, Normocephalic  EYES: EOMI, PERRLA, conjunctiva and sclera clear  ENMT: Moist mucous membranes   NECK: Supple, trach in situ  NERVOUS SYSTEM:  awake, alert, able to follow commands  CHEST/LUNG: coarse breath sounds  HEART: Regular rate and rhythm; + S1/S2  ABDOMEN: Soft, Nontender, Nondistended; Bowel sounds present  EXTREMITIES:  no pedal edema    Assessment and Plan:   · Assessment	  60 y/o male with PMH of COPD (not on home O2), active smoker 1.5 ppd, aflutter and PE on Eliquis, pulmonary nodule who came to the ED on 5/15 complaining of shortness of breath and  cough, found to be in aflutter with RVR along with acute hypoxic respiratory failure due to large left pleural effusion and pulmonary edema requiring intubation. Ultimately no improvement in HR with  Cardizem and worsening shock state after intubation therefore was successfully cardioverted at 150 J. Also found to have right segmental PE and left peroneal DVT. Left chest tube was placed (05/16) with 1400 cc serosanguineous fluid.  He was extubated on 5/17 and reintubated on 5/19 because of increase secretion and patient could not manage it.  Bronchoscopy done to remove thick secretion.  Chest tube removed. Goal of care discussion had with patient and step daughter; patient is full code, agreed to trach and PEG tube placement. S/p tracheostomy (05/25/). Patient passed MBS and diet was advanced; no PEG placement.     #Acute respiratory failure secondary to Multifocal PNA complicated by parapneumonic effusion and acute PE, grade 3 pulmonary HTN in the setting of COPD/PE; now s/p trachestomy on 5/25  - currently remains hypoxic on 40% trach collar  - prolonged hospitalization complicated by reintubation on 5/19 due to increased secretions.   - s/p bronchoscopy on 5/24 with BAL; prelim results with normal la nena reported; no AFB, few yeast   - s/p trach on 5/25 complicated with oozing from trach site  - pleural fluid cytology negative for malignant cells  - RVP and COVID 19 negative  - left chest tube removed  - completed course of abx  - continue heparin gtt  - TTE with enlarged RV and decreased RV systolic function on 5/16  - continue trach care and suctioning as needed (requested RT to change to soft tip)  - continue bronchodilators as needed  - repeat CXR reviewed; unchanged  - chest PT  - thoracic surgery recommendations appreciated; sutures to be removed on 6/2  - pulmonary consult appreciated; initiate Layo hitchcock    # Chronic Pain Syndrome  - patient controlled today  - pain mgmt recommendations appreciated  - however, Suboxone held due to drowsiness and soft BP  - continue tylenol ATC  - continue Robaxin and gabapentin with holding parameters  - will discuss suboxone taper with pain management to prevent withdrawal    # Dysphagia  - completed modified barium  - advanced to pureed with thin liquids  - f/u further speech recommendations; follow up requested    # Severe protein calorie malnutrition  - nutritionist consult appreciated     # History of PE  - hold eliquis  - continue heparin drip per CT surgery  - CT surgery to comment on when eliquis can be safely resumed    # Aflutter   - S/p cardioversion on admission  - rate controlled  - Soft BP; hold cardizem  - telemonitoring     # Tobacco Abuse  - nicotine patch  - previously counseled on cessation    # Anxiety/Depression   -denies suicidal thought currently  -seen by psych; continue constant obs for now   - continue zoloft  - xanax PRN  - f/u further psych recommendations    # Acute Right Peroneal Vein DVT  -continue heparin gtt    Plan discussed with patient, Dr. Arias, RN    DVT ppx - Heparin gtt    Dispo - Sutures to be removed on 6/2. Monitor oozing at trach site; monitor H/H. Pulm consult appreciated; neeraj humphrey. PT evaluation for placement. Eventual downgrade from SDU to monitored bed.

## 2021-05-30 NOTE — PROVIDER CONTACT NOTE (CRITICAL VALUE NOTIFICATION) - ACTION/TREATMENT ORDERED:
LEFT MESSAGE WITH DR. GIBBS.  FOLLOWING FULL ANTICOAGULATION NOMOGRAM AS ORDERED.  AWAITING CALL BACK FROM MD TO ENSURE SHE RECEIVED MY MESSAGE.
follow nongram
gtt off

## 2021-05-30 NOTE — CHART NOTE - NSCHARTNOTEFT_GEN_A_CORE
PA NOTE-MEDICINE    Called by RN due to Pt wearing Speaking Valve attached to Trach     Pt had Tracheostomy 5/25 and as per Respiratory should not be on speaking Valve at this Point in Time   Instructed RT to remove Valve   Pt Stable VSS

## 2021-05-30 NOTE — CONSULT NOTE ADULT - SUBJECTIVE AND OBJECTIVE BOX
REASON FOR CONSULT: Respiratory Failure    CONSULT REQUESTED BY: Doreen    Patient is a 61y old  Male who presents with a chief complaint of Respiratory Failure (29 May 2021 17:22)      BRIEF HOSPITAL COURSE:   60 y/o male with PMH of COPD (not on home O2), active smoker 1.5 ppd, aflutter and PE on Eliquis, pulmonary nodule who came to the ED on 5/15 complaining of shortness of breath and  cough, found to be in aflutter with RVR along with acute hypoxic respiratory failure due to large left pleural effusion and pulmonary edema requiring intubation. Ultimately no improvement in HR with  Cardizem and worsening shock state after intubation therefore was successfully cardioverted at 150 J. Also found to have right segmental PE and left peroneal DVT. Left chest tube was placed (05/16) with 1400 cc serosanguineous fluid.  He was extubated on 5/17 and reintubated on 5/19 because of increase secretion and patient could not manage it.  Bronchoscopy done to remove thick secretion.  Chest tube removed. Goal of care discussion had with patient and step daughter; patient is full code, agreed to trach and PEG tube placement. S/p tracheostomy (05/25/). Patient passed MBS and diet was advanced; no PEG placement.   - currently remains hypoxic on 40% trach collar  - prolonged hospitalization complicated by reintubation on 5/19 due to increased secretions.   - s/p bronchoscopy on 5/24 with BAL; prelim results with normal la nena reported; no AFB, few yeast   - s/p trach on 5/25 complicated with oozing from trach site  - pleural fluid cytology negative for malignant cells  - RVP and COVID 19 negative  - left chest tube removed  - completed course of abx  - continue heparin gtt  - TTE with enlarged RV and decreased RV systolic function on 5/16  - continue trach care and suctioning as needed (requested RT to change to soft tip)    Events last 24 hours: ***    PAST MEDICAL & SURGICAL HISTORY:  Poor historian    COPD (chronic obstructive pulmonary disease)    Pulmonary embolism    Atrial flutter    H/O solitary pulmonary nodule    Depression    No significant past surgical history      Allergies    No Known Allergies    Intolerances      FAMILY HISTORY:      Review of Systems:  CONSTITUTIONAL: No fever, chills, or fatigue  EYES: No eye pain, visual disturbances, or discharge  ENMT:  No difficulty hearing, tinnitus, vertigo; No sinus or throat pain  NECK: No pain or stiffness  RESPIRATORY: No cough, wheezing, chills or hemoptysis; No shortness of breath  CARDIOVASCULAR: No chest pain, palpitations, dizziness, or leg swelling  GASTROINTESTINAL: No abdominal or epigastric pain. No nausea, vomiting, or hematemesis; No diarrhea or constipation. No melena or hematochezia.  GENITOURINARY: No dysuria, frequency, hematuria, or incontinence  NEUROLOGICAL: No headaches, memory loss, loss of strength, numbness, or tremors  SKIN: No itching, burning, rashes, or lesions   MUSCULOSKELETAL: No joint pain or swelling; No muscle, back, or extremity pain  PSYCHIATRIC: No depression, anxiety, mood swings, or difficulty sleeping      Medications:    doxazosin 4 milliGRAM(s) Oral at bedtime    albuterol/ipratropium for Nebulization 3 milliLiter(s) Nebulizer every 6 hours PRN    acetaminophen   Tablet .. 975 milliGRAM(s) Oral every 8 hours  ALPRAZolam 0.25 milliGRAM(s) Oral every 12 hours PRN  buprenorphine 8 mG/naloxone 2 mG SL  Tablet 1 Tablet(s) SubLingual <User Schedule>  gabapentin 300 milliGRAM(s) Oral at bedtime  methocarbamol 750 milliGRAM(s) Oral three times a day  sertraline 100 milliGRAM(s) Oral daily      heparin   Injectable 6500 Unit(s) IV Push every 6 hours PRN  heparin   Injectable 3000 Unit(s) IV Push every 6 hours PRN  heparin  Infusion.  Unit(s)/Hr IV Continuous <Continuous>    pantoprazole  Injectable 40 milliGRAM(s) IV Push daily  polyethylene glycol 3350 17 Gram(s) Oral daily        multivitamin 1 Tablet(s) Oral daily      chlorhexidine 0.12% Liquid 15 milliLiter(s) Oral Mucosa every 12 hours  chlorhexidine 4% Liquid 1 Application(s) Topical <User Schedule>  lidocaine 2% Viscous 15 milliLiter(s) Mucosal every 4 hours PRN  oxymetazoline 0.05% Nasal Spray 2 Spray(s) Left Nostril two times a day    nicotine - 21 mG/24Hr(s) Patch 1 patch Transdermal daily          ICU Vital Signs Last 24 Hrs  T(C): 37.2 (30 May 2021 08:00), Max: 37.2 (30 May 2021 08:00)  T(F): 99 (30 May 2021 08:00), Max: 99 (30 May 2021 08:00)  HR: 71 (30 May 2021 08:03) (66 - 78)  BP: 94/58 (30 May 2021 08:00) (94/58 - 114/70)  BP(mean): 70 (30 May 2021 08:00) (70 - 82)  ABP: --  ABP(mean): --  RR: 18 (30 May 2021 08:00) (18 - 18)  SpO2: 95% (30 May 2021 08:03) (93% - 99%)    Vital Signs Last 24 Hrs  T(C): 37.2 (30 May 2021 08:00), Max: 37.2 (30 May 2021 08:00)  T(F): 99 (30 May 2021 08:00), Max: 99 (30 May 2021 08:00)  HR: 71 (30 May 2021 08:03) (66 - 78)  BP: 94/58 (30 May 2021 08:00) (94/58 - 114/70)  BP(mean): 70 (30 May 2021 08:00) (70 - 82)  RR: 18 (30 May 2021 08:00) (18 - 18)  SpO2: 95% (30 May 2021 08:03) (93% - 99%)        I&O's Detail    29 May 2021 07:01  -  30 May 2021 07:00  --------------------------------------------------------  IN:    Heparin Infusion: 230 mL  Total IN: 230 mL    OUT:    Intermittent Catheterization - Urethral (mL): 550 mL    Post-Void Residual per Intermittent Catheterization (mL): 20 mL    Voided (mL): 350 mL  Total OUT: 920 mL    Total NET: -690 mL      30 May 2021 07:01  -  30 May 2021 09:29  --------------------------------------------------------  IN:    Heparin Infusion: 30 mL  Total IN: 30 mL    OUT:    Intermittent Catheterization - Urethral (mL): 550 mL  Total OUT: 550 mL    Total NET: -520 mL            LABS:                        11.1   5.45  )-----------( 213      ( 30 May 2021 06:56 )             35.2     05-30    137  |  99  |  8.1  ----------------------------<  68<L>  3.8   |  29.0  |  0.47<L>    Ca    8.5<L>      30 May 2021 06:56  Phos  2.8     05-30  Mg     1.9     05-30            CAPILLARY BLOOD GLUCOSE        PTT - ( 30 May 2021 06:56 )  PTT:127.0 sec    CULTURES:  Culture Results:   Culture is being performed. (05-24 @ 07:09)  Culture Results:   Normal Respiratory [place] present (05-24 @ 07:08)  Culture Results:   Few Yeast (05-24 @ 07:08)      Physical Examination:    General: No acute distress.  Alert, oriented, interactive, nonfocal    HEENT: Pupils equal, reactive to light.  Symmetric.    PULM: Clear to auscultation bilaterally, no significant sputum production    CVS: Regular rate and rhythm, no murmurs, rubs, or gallops    ABD: Soft, nondistended, nontender, normoactive bowel sounds, no masses    EXT: No edema, nontender    SKIN: Warm and well perfused, no rashes noted.    RADIOLOGY: ***    CRITICAL CARE TIME SPENT: ***   REASON FOR CONSULT: Respiratory Failure    CONSULT REQUESTED BY: Doreen    Patient is a 61y old  Male who presents with a chief complaint of Respiratory Failure (29 May 2021 17:22)      BRIEF HOSPITAL COURSE:   62 y/o male with PMH of COPD (not on home O2), active smoker 1.5 ppd, aflutter and PE on Eliquis, pulmonary nodule who came to the ED on 5/15 complaining of shortness of breath and  cough, found to be in aflutter with RVR along with acute hypoxic respiratory failure due to large left pleural effusion and pulmonary edema requiring intubation. Ultimately no improvement in HR with  Cardizem and worsening shock state after intubation therefore was successfully cardioverted at 150 J. Also found to have right segmental PE and left peroneal DVT. Left chest tube was placed (05/16) with 1400 cc serosanguineous fluid.  He was extubated on 5/17 and reintubated on 5/19 because of increase secretion and patient could not manage it.  Bronchoscopy done to remove thick secretion.  Chest tube removed. Goal of care discussion had with patient and step daughter; patient is full code, agreed to trach and PEG tube placement. S/p tracheostomy (05/25/). Patient passed MBS and diet was advanced; no PEG placement.   - currently remains hypoxic on 40% trach collar  - prolonged hospitalization complicated by reintubation on 5/19 due to increased secretions.   - s/p bronchoscopy on 5/24 with BAL; prelim results with normal la nena reported; no AFB, few yeast   - s/p trach on 5/25 complicated with oozing from trach site  - pleural fluid cytology negative for malignant cells  - RVP and COVID 19 negative  - left chest tube removed  - completed course of abx  - continue heparin gtt  - TTE with enlarged RV and decreased RV systolic function on 5/16  - continue trach care and suctioning as needed (requested RT to change to soft tip)    Events last 24 hours: ***    PAST MEDICAL & SURGICAL HISTORY:  Poor historian    COPD (chronic obstructive pulmonary disease)    Pulmonary embolism    Atrial flutter    H/O solitary pulmonary nodule    Depression    No significant past surgical history      Allergies    No Known Allergies    Intolerances      FAMILY HISTORY:      Review of Systems:  CONSTITUTIONAL: No fever, chills, or fatigue  EYES: No eye pain, visual disturbances, or discharge  ENMT:  No difficulty hearing, tinnitus, vertigo; No sinus or throat pain  NECK: No pain or stiffness  RESPIRATORY: No cough, wheezing, chills or hemoptysis; No shortness of breath  CARDIOVASCULAR: No chest pain, palpitations, dizziness, or leg swelling  GASTROINTESTINAL: No abdominal or epigastric pain. No nausea, vomiting, or hematemesis; No diarrhea or constipation. No melena or hematochezia.  GENITOURINARY: No dysuria, frequency, hematuria, or incontinence  NEUROLOGICAL: No headaches, memory loss, loss of strength, numbness, or tremors  SKIN: No itching, burning, rashes, or lesions   MUSCULOSKELETAL: No joint pain or swelling; No muscle, back, or extremity pain  PSYCHIATRIC: No depression, anxiety, mood swings, or difficulty sleeping      Medications:    doxazosin 4 milliGRAM(s) Oral at bedtime    albuterol/ipratropium for Nebulization 3 milliLiter(s) Nebulizer every 6 hours PRN    acetaminophen   Tablet .. 975 milliGRAM(s) Oral every 8 hours  ALPRAZolam 0.25 milliGRAM(s) Oral every 12 hours PRN  buprenorphine 8 mG/naloxone 2 mG SL  Tablet 1 Tablet(s) SubLingual <User Schedule>  gabapentin 300 milliGRAM(s) Oral at bedtime  methocarbamol 750 milliGRAM(s) Oral three times a day  sertraline 100 milliGRAM(s) Oral daily      heparin   Injectable 6500 Unit(s) IV Push every 6 hours PRN  heparin   Injectable 3000 Unit(s) IV Push every 6 hours PRN  heparin  Infusion.  Unit(s)/Hr IV Continuous <Continuous>    pantoprazole  Injectable 40 milliGRAM(s) IV Push daily  polyethylene glycol 3350 17 Gram(s) Oral daily        multivitamin 1 Tablet(s) Oral daily      chlorhexidine 0.12% Liquid 15 milliLiter(s) Oral Mucosa every 12 hours  chlorhexidine 4% Liquid 1 Application(s) Topical <User Schedule>  lidocaine 2% Viscous 15 milliLiter(s) Mucosal every 4 hours PRN  oxymetazoline 0.05% Nasal Spray 2 Spray(s) Left Nostril two times a day    nicotine - 21 mG/24Hr(s) Patch 1 patch Transdermal daily          ICU Vital Signs Last 24 Hrs  T(C): 37.2 (30 May 2021 08:00), Max: 37.2 (30 May 2021 08:00)  T(F): 99 (30 May 2021 08:00), Max: 99 (30 May 2021 08:00)  HR: 71 (30 May 2021 08:03) (66 - 78)  BP: 94/58 (30 May 2021 08:00) (94/58 - 114/70)  BP(mean): 70 (30 May 2021 08:00) (70 - 82)  ABP: --  ABP(mean): --  RR: 18 (30 May 2021 08:00) (18 - 18)  SpO2: 95% (30 May 2021 08:03) (93% - 99%)    Vital Signs Last 24 Hrs  T(C): 37.2 (30 May 2021 08:00), Max: 37.2 (30 May 2021 08:00)  T(F): 99 (30 May 2021 08:00), Max: 99 (30 May 2021 08:00)  HR: 71 (30 May 2021 08:03) (66 - 78)  BP: 94/58 (30 May 2021 08:00) (94/58 - 114/70)  BP(mean): 70 (30 May 2021 08:00) (70 - 82)  RR: 18 (30 May 2021 08:00) (18 - 18)  SpO2: 95% (30 May 2021 08:03) (93% - 99%)        I&O's Detail    29 May 2021 07:01  -  30 May 2021 07:00  --------------------------------------------------------  IN:    Heparin Infusion: 230 mL  Total IN: 230 mL    OUT:    Intermittent Catheterization - Urethral (mL): 550 mL    Post-Void Residual per Intermittent Catheterization (mL): 20 mL    Voided (mL): 350 mL  Total OUT: 920 mL    Total NET: -690 mL      30 May 2021 07:01  -  30 May 2021 09:29  --------------------------------------------------------  IN:    Heparin Infusion: 30 mL  Total IN: 30 mL    OUT:    Intermittent Catheterization - Urethral (mL): 550 mL  Total OUT: 550 mL    Total NET: -520 mL            LABS:                        11.1   5.45  )-----------( 213      ( 30 May 2021 06:56 )             35.2     05-30    137  |  99  |  8.1  ----------------------------<  68<L>  3.8   |  29.0  |  0.47<L>    Ca    8.5<L>      30 May 2021 06:56  Phos  2.8     05-30  Mg     1.9     05-30            CAPILLARY BLOOD GLUCOSE        PTT - ( 30 May 2021 06:56 )  PTT:127.0 sec    CULTURES:  Culture Results:   Culture is being performed. (05-24 @ 07:09)  Culture Results:   Normal Respiratory [place] present (05-24 @ 07:08)  Culture Results:   Few Yeast (05-24 @ 07:08)      Physical Examination:    General: No acute distress.  Alert, oriented, interactive, nonfocal    HEENT: Pupils equal, reactive to light.  Symmetric.    PULM: Clear to auscultation bilaterally, no significant sputum production    CVS: Regular rate and rhythm, no murmurs, rubs, or gallops`    ABD: Soft, nondistended, nontender, normoactive bowel sounds, no masses    EXT: No edema, nontender    SKIN: Warm and well perfused, no rashes noted.    RADIOLOGY:   < from: Xray Chest 1 View- PORTABLE-Routine (Xray Chest 1 View- PORTABLE-Routine in AM.) (05.27.21 @ 06:30) >   EXAM:  XR CHEST PORTABLE ROUTINE 1V                          PROCEDURE DATE:  05/27/2021          INTERPRETATION:  Portable chest radiograph    CLINICAL INFORMATION: Status post tracheostomy.    TECHNIQUE:  Portable  AP view of the chest was obtained.    COMPARISON: 5/25/2021 chest available for review.    FINDINGS: Tracheostomy tube in place    The lungs show residual lung mild-moderate LEFT effusion and/or basilar airspace consolidation. RIGHT lung parenchyma clear.. No pneumothorax.    The heart and mediastinum size and configuration are within normal limits.    Visualized osseous structures are intact.    IMPRESSION:   Tracheostomy tube in place.  Unchanged LEFT pleural effusion and/or basilar airspace consolidation..              JOSE LEON MD; Attending Radiologist  This document has been electronically signed. May 27 2021  1:06PM    < end of copied text >  < from: Xray Chest 1 View- PORTABLE-Urgent (Xray Chest 1 View- PORTABLE-Urgent .) (05.25.21 @ 18:11) >   EXAM:  XR CHEST PORTABLE URGENT 1V                          PROCEDURE DATE:  05/25/2021          INTERPRETATION:  Clinical history: 61-year-old male, tracheostomy.    Four views of the chest are compared to 5/23/2021 and demonstrate an NG tube with the tip in the stomach, unchanged. ET tube has been removed.    Cardiac silhouette and pulmonary vasculature are within normal limits with no acute fracture, pneumothorax or right effusion.    Effusion/atelectasis/possible consolidation at the left base, unchanged.    IMPRESSION:  NG tube with the tip in the stomach. ET tube removed.    Effusion/atelectasis/possible consolidation at the left base, unchanged.            TALI ABDI DO; Attending Radiologist  This document has been electronically signed. May 26 2021  1:55PM    < end of copied text >  < from: CT Angio Chest w/ IV Cont (05.16.21 @ 00:29) >     EXAM:  CT ABDOMEN AND PELVIS IC                         EXAM:  CT ANGIO CHEST (W)AW IC                          PROCEDURE DATE:  05/16/2021          INTERPRETATION:  CLINICAL INFORMATION: Hypoxia, tachycardia, lethargy, altered mental status, status post electrical cardioversion    COMPARISON: CT chest from November 18, 2020    CONTRAST/COMPLICATIONS:  IV Contrast: Omnipaque 350  94 cc administered   6 cc discarded  Oral Contrast: NONE  Complications: None reported at time of study completion    PROCEDURE:  CT Angiography of the Chest was performed followed by portal venous phase imaging of the Abdomen and Pelvis.  Sagittal and coronal reformats were performed as well as 3D (MIP) reconstructions.    FINDINGS:  CHEST:  LUNGS AND LARGE AIRWAYS: Scattered tree-in-bud opacities in the lungs, most prominent in the right middle lobe. Left lower lobe atelectasis. Peripheral interstitial thickening in the left upper lobe. Background of emphysema. Endotracheal tube terminates below the thoracicinlet. There is bubbly opacity in the left main bronchus.  PLEURA: Large, partially loculated left pleural effusion.  VESSELS: Pulmonary arteries are opacified to the segmental level. Acute right lower lobe pulmonary emboli extending from the lobar into the segmental and subsegmental branches. Main pulmonary artery is normal in caliber.  Atherosclerotic change of the thoracic aorta without aneurysm or dissection.  HEART: Heart size is normal. Trace pericardial effusion  MEDIASTINUM AND HAILEE: No enlarged lymph node measuring greater than 10 mm in short axis.  CHEST WALL AND LOWER NECK: Unremarkable    ABDOMEN AND PELVIS:  LIVER: Within normal limits.  BILE DUCTS: Common duct is mildly dilated  GALLBLADDER: Cholecystectomy.  SPLEEN: Within normal limits.  PANCREAS: Within normal limits.  ADRENALS: Within normal limits.  KIDNEYS/URETERS: Within normal limits.    BLADDER: Decompressed by Larson catheter  REPRODUCTIVE ORGANS: Mild prostatomegaly    BOWEL: No bowel obstruction. Nonvisualized appendix. No secondary findings of acute appendicitis. Scattered colonic diverticula.  PERITONEUM: No free air or free fluid  VESSELS: Atherosclerotic change of the abdominal aorta without aneurysm  RETROPERITONEUM/LYMPH NODES: No hemorrhage or enlarged lymph node measuring greater than 10 mm in short axis  ABDOMINAL WALL: Unremarkable  BONES: Healed right-sided rib fractures.    IMPRESSION:    1. Acute pulmonary emboli in the right lower lobe.  2. Large, partially loculated left pleural effusion with atelectasis in the left lower lobe.  3. Bubbly opacity in the left main bronchus represent inspissated secretions or aspirated fluid.  4. Multifocal tree-in-bud opacities, most prominent in the right middle lobe. This could represent an infectious or inflammatory process.  5. No acute CT finding in the abdomen and pelvis.    The critical findings of pulmonary emboli were discussed with PRAFUL Dover on the MICU at  5/16/2021 4:33 AM by Dr. Cullen with read back confirmation.            FREDI CULLEN MD; Attending Radiologist  This document has been electronically signed. May 16 2021  4:51AM    < end of copied text >  < from: US Duplex Venous Lower Ext Complete, Bilateral (05.16.21 @ 18:30) >     EXAM:  US DPLX LWR EXT VEINS COMPL BI                          PROCEDURE DATE:  05/16/2021          INTERPRETATION:  CLINICAL INFORMATION: History of pulmonary embolism. Assess for lower extremity DVT.    COMPARISON: 8/10/2020.    TECHNIQUE: Duplex sonography of the BILATERAL LOWER extremity veins with color and spectral Doppler, with and without compression.    FINDINGS:    RIGHT:  Normal compressibility of the RIGHT common femoral, femoral and popliteal veins.  Doppler examination shows normal spontaneous and phasic flow.  No RIGHT calf vein thrombosis is detected.    LEFT:  Normal compressibility of the LEFT common femoral, femoral and popliteal veins.  Doppler examination shows normal spontaneous and phasic flow.  Acute DVT is identified within the mid and distal aspects of the left peroneal vein. Patency of the left posterior tibial vein noted.    IMPRESSION:  Acute DVT left peroneal vein.      Findings were discussed with AYO Wen in the MICU 5/16/2021 6:50 PM by Dr. Kaiser with read back confirmation.            ELISA KAISER MD; Attending Radiologist  This document has been electronically signed. May 16 2021  6:50PM    < end of copied text >  < from: TTE Echo Complete w/ Contrast w/ Doppler (05.16.21 @ 12:06) >  Summary:   1. Endocardial visualization was enhanced with intravenous echo contrast.   2. There is mild left ventricular hypertrophy.   3. Normal left atrial size.   4. Left ventricular ejection fraction, by visual estimation, is 55 to 60%.   5. Mildly enlarged right atrium.   6. Moderately enlarged right ventricle. Moderately reduced RV systolic function.   7. Mild tricuspid regurgitation.   8. Estimated pulmonary artery systolic pressure is 48 mmHg- mild pulmonary hypertension.   9. Trivial pericardial effusion.  10. Large pleural effusion in the left lateral region.    MD Gerald, RPVI Electronically signed on 5/16/2021 at 4:12:12 PM    < end of copied text >      CRITICAL CARE TIME SPENT: ***   REASON FOR CONSULT: Respiratory Failure    CONSULT REQUESTED BY: Doreen    Patient is a 61y old  Male who presents with a chief complaint of Respiratory Failure (29 May 2021 17:22)      BRIEF HOSPITAL COURSE:   62 y/o male with PMH of COPD (not on home O2), active smoker 1.5 ppd, aflutter and PE on Eliquis, pulmonary nodule who came to the ED on 5/15 complaining of shortness of breath and  cough, found to be in aflutter with RVR along with acute hypoxic respiratory failure due to large left pleural effusion and pulmonary edema requiring intubation. Ultimately no improvement in HR with  Cardizem and worsening shock state after intubation therefore was successfully cardioverted at 150 J. Also found to have right segmental PE and left peroneal DVT. Left chest tube was placed (05/16) with 1400 cc serosanguineous fluid.  He was extubated on 5/17 and reintubated on 5/19 because of increase secretion and patient could not manage it.  Bronchoscopy done to remove thick secretion.  Chest tube removed. Goal of care discussion had with patient and step daughter; patient is full code, agreed to trach and PEG tube placement. S/p tracheostomy (05/25/). Patient passed MBS and diet was advanced; no PEG placement.   - currently remains hypoxic on 40% trach collar  - prolonged hospitalization complicated by reintubation on 5/19 due to increased secretions.   - s/p bronchoscopy on 5/24 with BAL; prelim results with normal la nena reported; no AFB, few yeast   - s/p trach on 5/25 complicated with oozing from trach site  - pleural fluid cytology negative for malignant cells  - RVP and COVID 19 negative  - left chest tube removed  - completed course of abx  - continue heparin gtt  - TTE with enlarged RV and decreased RV systolic function on 5/16  - continue trach care and suctioning as needed (requested RT to change to soft tip)    Events last 24 hours: Currently using Passey Brian valve without difficulty. No c/o SOB at rest. On TC    PAST MEDICAL & SURGICAL HISTORY:  Poor historian    COPD (chronic obstructive pulmonary disease)    Pulmonary embolism    Atrial flutter    H/O solitary pulmonary nodule    Depression    No significant past surgical history      Allergies    No Known Allergies    Intolerances      FAMILY HISTORY:      Review of Systems:  CONSTITUTIONAL: No fever, chills, or fatigue  EYES: No eye pain, visual disturbances, or discharge  ENMT:  No difficulty hearing, tinnitus, vertigo; No sinus or throat pain  NECK: No pain or stiffness  RESPIRATORY: No cough, wheezing, chills or hemoptysis; No shortness of breath  CARDIOVASCULAR: No chest pain, palpitations, dizziness, or leg swelling  GASTROINTESTINAL: No abdominal or epigastric pain. No nausea, vomiting, or hematemesis; No diarrhea or constipation. No melena or hematochezia.  GENITOURINARY: No dysuria, frequency, hematuria, or incontinence  NEUROLOGICAL: No headaches, memory loss, loss of strength, numbness, or tremors  SKIN: No itching, burning, rashes, or lesions   MUSCULOSKELETAL: No joint pain or swelling; No muscle, back, or extremity pain  PSYCHIATRIC: No depression, anxiety, mood swings, or difficulty sleeping      Medications:    doxazosin 4 milliGRAM(s) Oral at bedtime    albuterol/ipratropium for Nebulization 3 milliLiter(s) Nebulizer every 6 hours PRN    acetaminophen   Tablet .. 975 milliGRAM(s) Oral every 8 hours  ALPRAZolam 0.25 milliGRAM(s) Oral every 12 hours PRN  buprenorphine 8 mG/naloxone 2 mG SL  Tablet 1 Tablet(s) SubLingual <User Schedule>  gabapentin 300 milliGRAM(s) Oral at bedtime  methocarbamol 750 milliGRAM(s) Oral three times a day  sertraline 100 milliGRAM(s) Oral daily      heparin   Injectable 6500 Unit(s) IV Push every 6 hours PRN  heparin   Injectable 3000 Unit(s) IV Push every 6 hours PRN  heparin  Infusion.  Unit(s)/Hr IV Continuous <Continuous>    pantoprazole  Injectable 40 milliGRAM(s) IV Push daily  polyethylene glycol 3350 17 Gram(s) Oral daily        multivitamin 1 Tablet(s) Oral daily      chlorhexidine 0.12% Liquid 15 milliLiter(s) Oral Mucosa every 12 hours  chlorhexidine 4% Liquid 1 Application(s) Topical <User Schedule>  lidocaine 2% Viscous 15 milliLiter(s) Mucosal every 4 hours PRN  oxymetazoline 0.05% Nasal Spray 2 Spray(s) Left Nostril two times a day    nicotine - 21 mG/24Hr(s) Patch 1 patch Transdermal daily          ICU Vital Signs Last 24 Hrs  T(C): 37.2 (30 May 2021 08:00), Max: 37.2 (30 May 2021 08:00)  T(F): 99 (30 May 2021 08:00), Max: 99 (30 May 2021 08:00)  HR: 71 (30 May 2021 08:03) (66 - 78)  BP: 94/58 (30 May 2021 08:00) (94/58 - 114/70)  BP(mean): 70 (30 May 2021 08:00) (70 - 82)  ABP: --  ABP(mean): --  RR: 18 (30 May 2021 08:00) (18 - 18)  SpO2: 95% (30 May 2021 08:03) (93% - 99%)    Vital Signs Last 24 Hrs  T(C): 37.2 (30 May 2021 08:00), Max: 37.2 (30 May 2021 08:00)  T(F): 99 (30 May 2021 08:00), Max: 99 (30 May 2021 08:00)  HR: 71 (30 May 2021 08:03) (66 - 78)  BP: 94/58 (30 May 2021 08:00) (94/58 - 114/70)  BP(mean): 70 (30 May 2021 08:00) (70 - 82)  RR: 18 (30 May 2021 08:00) (18 - 18)  SpO2: 95% (30 May 2021 08:03) (93% - 99%)        I&O's Detail    29 May 2021 07:01  -  30 May 2021 07:00  --------------------------------------------------------  IN:    Heparin Infusion: 230 mL  Total IN: 230 mL    OUT:    Intermittent Catheterization - Urethral (mL): 550 mL    Post-Void Residual per Intermittent Catheterization (mL): 20 mL    Voided (mL): 350 mL  Total OUT: 920 mL    Total NET: -690 mL      30 May 2021 07:01  -  30 May 2021 09:29  --------------------------------------------------------  IN:    Heparin Infusion: 30 mL  Total IN: 30 mL    OUT:    Intermittent Catheterization - Urethral (mL): 550 mL  Total OUT: 550 mL    Total NET: -520 mL            LABS:                        11.1   5.45  )-----------( 213      ( 30 May 2021 06:56 )             35.2     05-30    137  |  99  |  8.1  ----------------------------<  68<L>  3.8   |  29.0  |  0.47<L>    Ca    8.5<L>      30 May 2021 06:56  Phos  2.8     05-30  Mg     1.9     05-30            CAPILLARY BLOOD GLUCOSE        PTT - ( 30 May 2021 06:56 )  PTT:127.0 sec    CULTURES:  Culture Results:   Culture is being performed. (05-24 @ 07:09)  Culture Results:   Normal Respiratory [place] present (05-24 @ 07:08)  Culture Results:   Few Yeast (05-24 @ 07:08)      Physical Examination:    General: No acute distress.  Alert, oriented, interactive, nonfocal    HEENT: Pupils equal, reactive to light.  Symmetric. trach    PULM: Clear to auscultation bilaterally, no significant sputum production    CVS: Regular rate and rhythm, no murmurs, rubs, or gallops`    ABD: Soft, nondistended, nontender, normoactive bowel sounds, no masses    EXT: No edema, nontender    SKIN: Warm and well perfused, no rashes noted.    RADIOLOGY:   < from: Xray Chest 1 View- PORTABLE-Routine (Xray Chest 1 View- PORTABLE-Routine in AM.) (05.27.21 @ 06:30) >   EXAM:  XR CHEST PORTABLE ROUTINE 1V                          PROCEDURE DATE:  05/27/2021          INTERPRETATION:  Portable chest radiograph    CLINICAL INFORMATION: Status post tracheostomy.    TECHNIQUE:  Portable  AP view of the chest was obtained.    COMPARISON: 5/25/2021 chest available for review.    FINDINGS: Tracheostomy tube in place    The lungs show residual lung mild-moderate LEFT effusion and/or basilar airspace consolidation. RIGHT lung parenchyma clear.. No pneumothorax.    The heart and mediastinum size and configuration are within normal limits.    Visualized osseous structures are intact.    IMPRESSION:   Tracheostomy tube in place.  Unchanged LEFT pleural effusion and/or basilar airspace consolidation..              JOSE LEON MD; Attending Radiologist  This document has been electronically signed. May 27 2021  1:06PM    < end of copied text >  < from: Xray Chest 1 View- PORTABLE-Urgent (Xray Chest 1 View- PORTABLE-Urgent .) (05.25.21 @ 18:11) >   EXAM:  XR CHEST PORTABLE URGENT 1V                          PROCEDURE DATE:  05/25/2021          INTERPRETATION:  Clinical history: 61-year-old male, tracheostomy.    Four views of the chest are compared to 5/23/2021 and demonstrate an NG tube with the tip in the stomach, unchanged. ET tube has been removed.    Cardiac silhouette and pulmonary vasculature are within normal limits with no acute fracture, pneumothorax or right effusion.    Effusion/atelectasis/possible consolidation at the left base, unchanged.    IMPRESSION:  NG tube with the tip in the stomach. ET tube removed.    Effusion/atelectasis/possible consolidation at the left base, unchanged.            TALI ABDI DO; Attending Radiologist  This document has been electronically signed. May 26 2021  1:55PM    < end of copied text >  < from: CT Angio Chest w/ IV Cont (05.16.21 @ 00:29) >     EXAM:  CT ABDOMEN AND PELVIS IC                         EXAM:  CT ANGIO CHEST (W)AW IC                          PROCEDURE DATE:  05/16/2021          INTERPRETATION:  CLINICAL INFORMATION: Hypoxia, tachycardia, lethargy, altered mental status, status post electrical cardioversion    COMPARISON: CT chest from November 18, 2020    CONTRAST/COMPLICATIONS:  IV Contrast: Omnipaque 350  94 cc administered   6 cc discarded  Oral Contrast: NONE  Complications: None reported at time of study completion    PROCEDURE:  CT Angiography of the Chest was performed followed by portal venous phase imaging of the Abdomen and Pelvis.  Sagittal and coronal reformats were performed as well as 3D (MIP) reconstructions.    FINDINGS:  CHEST:  LUNGS AND LARGE AIRWAYS: Scattered tree-in-bud opacities in the lungs, most prominent in the right middle lobe. Left lower lobe atelectasis. Peripheral interstitial thickening in the left upper lobe. Background of emphysema. Endotracheal tube terminates below the thoracicinlet. There is bubbly opacity in the left main bronchus.  PLEURA: Large, partially loculated left pleural effusion.  VESSELS: Pulmonary arteries are opacified to the segmental level. Acute right lower lobe pulmonary emboli extending from the lobar into the segmental and subsegmental branches. Main pulmonary artery is normal in caliber.  Atherosclerotic change of the thoracic aorta without aneurysm or dissection.  HEART: Heart size is normal. Trace pericardial effusion  MEDIASTINUM AND HAILEE: No enlarged lymph node measuring greater than 10 mm in short axis.  CHEST WALL AND LOWER NECK: Unremarkable    ABDOMEN AND PELVIS:  LIVER: Within normal limits.  BILE DUCTS: Common duct is mildly dilated  GALLBLADDER: Cholecystectomy.  SPLEEN: Within normal limits.  PANCREAS: Within normal limits.  ADRENALS: Within normal limits.  KIDNEYS/URETERS: Within normal limits.    BLADDER: Decompressed by Larson catheter  REPRODUCTIVE ORGANS: Mild prostatomegaly    BOWEL: No bowel obstruction. Nonvisualized appendix. No secondary findings of acute appendicitis. Scattered colonic diverticula.  PERITONEUM: No free air or free fluid  VESSELS: Atherosclerotic change of the abdominal aorta without aneurysm  RETROPERITONEUM/LYMPH NODES: No hemorrhage or enlarged lymph node measuring greater than 10 mm in short axis  ABDOMINAL WALL: Unremarkable  BONES: Healed right-sided rib fractures.    IMPRESSION:    1. Acute pulmonary emboli in the right lower lobe.  2. Large, partially loculated left pleural effusion with atelectasis in the left lower lobe.  3. Bubbly opacity in the left main bronchus represent inspissated secretions or aspirated fluid.  4. Multifocal tree-in-bud opacities, most prominent in the right middle lobe. This could represent an infectious or inflammatory process.  5. No acute CT finding in the abdomen and pelvis.    The critical findings of pulmonary emboli were discussed with PRAFUL Dover on the MICU at  5/16/2021 4:33 AM by Dr. Cullen with read back confirmation.            FREDI CULLEN MD; Attending Radiologist  This document has been electronically signed. May 16 2021  4:51AM    < end of copied text >  < from: US Duplex Venous Lower Ext Complete, Bilateral (05.16.21 @ 18:30) >     EXAM:  US DPLX LWR EXT VEINS COMPL BI                          PROCEDURE DATE:  05/16/2021          INTERPRETATION:  CLINICAL INFORMATION: History of pulmonary embolism. Assess for lower extremity DVT.    COMPARISON: 8/10/2020.    TECHNIQUE: Duplex sonography of the BILATERAL LOWER extremity veins with color and spectral Doppler, with and without compression.    FINDINGS:    RIGHT:  Normal compressibility of the RIGHT common femoral, femoral and popliteal veins.  Doppler examination shows normal spontaneous and phasic flow.  No RIGHT calf vein thrombosis is detected.    LEFT:  Normal compressibility of the LEFT common femoral, femoral and popliteal veins.  Doppler examination shows normal spontaneous and phasic flow.  Acute DVT is identified within the mid and distal aspects of the left peroneal vein. Patency of the left posterior tibial vein noted.    IMPRESSION:  Acute DVT left peroneal vein.      Findings were discussed with AYO Wen in the MICU 5/16/2021 6:50 PM by Dr. Kaiser with read back confirmation.            ELISA KAISER MD; Attending Radiologist  This document has been electronically signed. May 16 2021  6:50PM    < end of copied text >  < from: TTE Echo Complete w/ Contrast w/ Doppler (05.16.21 @ 12:06) >  Summary:   1. Endocardial visualization was enhanced with intravenous echo contrast.   2. There is mild left ventricular hypertrophy.   3. Normal left atrial size.   4. Left ventricular ejection fraction, by visual estimation, is 55 to 60%.   5. Mildly enlarged right atrium.   6. Moderately enlarged right ventricle. Moderately reduced RV systolic function.   7. Mild tricuspid regurgitation.   8. Estimated pulmonary artery systolic pressure is 48 mmHg- mild pulmonary hypertension.   9. Trivial pericardial effusion.  10. Large pleural effusion in the left lateral region.    MD Gerald, RPVI Electronically signed on 5/16/2021 at 4:12:12 PM    < end of copied text >

## 2021-05-30 NOTE — PROGRESS NOTE ADULT - SUBJECTIVE AND OBJECTIVE BOX
CHIEF COMPLAINT/INTERVAL HISTORY:    Patient is a 61y old  Male who presents with a chief complaint of Respiratory Failure (30 May 2021 09:28)    SUBJECTIVE & OBJECTIVE: Pt seen and examined at bedside. No overnight events.    ROS: No chest pain, palpitations, SOB, light headedness, dizziness, headache, nausea/vomiting, fevers/chills, abdominal pain, dysuria.    ICU Vital Signs Last 24 Hrs  T(C): 37.3 (30 May 2021 16:00), Max: 37.3 (30 May 2021 16:00)  T(F): 99.1 (30 May 2021 16:00), Max: 99.1 (30 May 2021 16:00)  HR: 74 (30 May 2021 16:00) (66 - 77)  BP: 105/67 (30 May 2021 16:00) (94/58 - 106/70)  BP(mean): 80 (30 May 2021 16:00) (70 - 82)  RR: 18 (30 May 2021 16:00) (18 - 18)  SpO2: 97% (30 May 2021 16:00) (93% - 99%)    MEDICATIONS  (STANDING):  acetaminophen   Tablet .. 975 milliGRAM(s) Oral every 8 hours  buprenorphine 8 mG/naloxone 2 mG SL  Tablet 1 Tablet(s) SubLingual <User Schedule>  chlorhexidine 4% Liquid 1 Application(s) Topical <User Schedule>  doxazosin 4 milliGRAM(s) Oral at bedtime  gabapentin 300 milliGRAM(s) Oral at bedtime  heparin  Infusion.  Unit(s)/Hr (14 mL/Hr) IV Continuous <Continuous>  methocarbamol 750 milliGRAM(s) Oral three times a day  multivitamin 1 Tablet(s) Oral daily  nicotine - 21 mG/24Hr(s) Patch 1 patch Transdermal daily  oxymetazoline 0.05% Nasal Spray 2 Spray(s) Left Nostril two times a day  pantoprazole  Injectable 40 milliGRAM(s) IV Push daily  polyethylene glycol 3350 17 Gram(s) Oral daily  sertraline 100 milliGRAM(s) Oral daily    MEDICATIONS  (PRN):  albuterol/ipratropium for Nebulization 3 milliLiter(s) Nebulizer every 6 hours PRN Shortness of Breath and/or Wheezing  ALPRAZolam 0.25 milliGRAM(s) Oral every 12 hours PRN anxiety  heparin   Injectable 6500 Unit(s) IV Push every 6 hours PRN For aPTT less than 40  heparin   Injectable 3000 Unit(s) IV Push every 6 hours PRN For aPTT between 40 - 57  lidocaine 2% Viscous 15 milliLiter(s) Mucosal every 4 hours PRN oral pain      LABS:                        11.1   5.45  )-----------( 213      ( 30 May 2021 06:56 )             35.2     05-30    137  |  99  |  8.1  ----------------------------<  68<L>  3.8   |  29.0  |  0.47<L>    Ca    8.5<L>      30 May 2021 06:56  Phos  2.8     05-30  Mg     1.9     05-30      PTT - ( 30 May 2021 14:15 )  PTT:76.1 sec      CAPILLARY BLOOD GLUCOSE      POCT Blood Glucose.: 92 mg/dL (30 May 2021 11:07)    PHYSICAL EXAM:    GENERAL: NAD, well-groomed, well-developed  HEAD:  Atraumatic, Normocephalic  EYES: EOMI, PERRLA, conjunctiva and sclera clear  ENMT: Moist mucous membranes  NECK: Supple, No JVD  NERVOUS SYSTEM:  Alert & Oriented X3, Motor Strength 5/5 B/L upper and lower extremities; DTRs 2+ intact and symmetric  CHEST/LUNG: Clear to auscultation bilaterally; No rales, rhonchi, wheezing, or rubs  HEART: Regular rate and rhythm; No murmurs, rubs, or gallops  ABDOMEN: Soft, Nontender, Nondistended; Bowel sounds present  EXTREMITIES:  2+ Peripheral Pulses, No clubbing, cyanosis, or edema

## 2021-05-30 NOTE — PROGRESS NOTE ADULT - PROBLEM SELECTOR PLAN 2
Left pigtail D/C'd 5/19  Sample for cytopathology obtained.    Continue care as per primary team
Left pigtail D/C'd 5/19  Cytopathology from 5/19 - negative for malignant cells  Continue care as per primary team
Left pigtail D/C'd 5/19  Sample for cytopathology obtained.    Continue care as per primary team
Left pigtail D/C'd 5/19  Cytopathology from 5/19 - negative for malignant cells  Continue care as per primary team

## 2021-05-30 NOTE — CONSULT NOTE ADULT - TIME BILLING
Pain Management
Pt's in patient and outpatient charts, pacs reviewed and cased discussed with medical staff

## 2021-05-31 LAB
ANION GAP SERPL CALC-SCNC: 10 MMOL/L — SIGNIFICANT CHANGE UP (ref 5–17)
APTT BLD: 75.2 SEC — HIGH (ref 27.5–35.5)
BASOPHILS # BLD AUTO: 0.03 K/UL — SIGNIFICANT CHANGE UP (ref 0–0.2)
BASOPHILS NFR BLD AUTO: 0.7 % — SIGNIFICANT CHANGE UP (ref 0–2)
BUN SERPL-MCNC: 7.5 MG/DL — LOW (ref 8–20)
CALCIUM SERPL-MCNC: 8.7 MG/DL — SIGNIFICANT CHANGE UP (ref 8.6–10.2)
CHLORIDE SERPL-SCNC: 101 MMOL/L — SIGNIFICANT CHANGE UP (ref 98–107)
CO2 SERPL-SCNC: 28 MMOL/L — SIGNIFICANT CHANGE UP (ref 22–29)
CREAT SERPL-MCNC: 0.51 MG/DL — SIGNIFICANT CHANGE UP (ref 0.5–1.3)
EOSINOPHIL # BLD AUTO: 0.07 K/UL — SIGNIFICANT CHANGE UP (ref 0–0.5)
EOSINOPHIL NFR BLD AUTO: 1.6 % — SIGNIFICANT CHANGE UP (ref 0–6)
GLUCOSE SERPL-MCNC: 88 MG/DL — SIGNIFICANT CHANGE UP (ref 70–99)
HCT VFR BLD CALC: 37.3 % — LOW (ref 39–50)
HGB BLD-MCNC: 11.7 G/DL — LOW (ref 13–17)
IMM GRANULOCYTES NFR BLD AUTO: 0.4 % — SIGNIFICANT CHANGE UP (ref 0–1.5)
LYMPHOCYTES # BLD AUTO: 1.34 K/UL — SIGNIFICANT CHANGE UP (ref 1–3.3)
LYMPHOCYTES # BLD AUTO: 29.7 % — SIGNIFICANT CHANGE UP (ref 13–44)
MAGNESIUM SERPL-MCNC: 2 MG/DL — SIGNIFICANT CHANGE UP (ref 1.6–2.6)
MCHC RBC-ENTMCNC: 31.4 GM/DL — LOW (ref 32–36)
MCHC RBC-ENTMCNC: 31.5 PG — SIGNIFICANT CHANGE UP (ref 27–34)
MCV RBC AUTO: 100.5 FL — HIGH (ref 80–100)
MONOCYTES # BLD AUTO: 0.36 K/UL — SIGNIFICANT CHANGE UP (ref 0–0.9)
MONOCYTES NFR BLD AUTO: 8 % — SIGNIFICANT CHANGE UP (ref 2–14)
NEUTROPHILS # BLD AUTO: 2.69 K/UL — SIGNIFICANT CHANGE UP (ref 1.8–7.4)
NEUTROPHILS NFR BLD AUTO: 59.6 % — SIGNIFICANT CHANGE UP (ref 43–77)
PHOSPHATE SERPL-MCNC: 2.7 MG/DL — SIGNIFICANT CHANGE UP (ref 2.4–4.7)
PLATELET # BLD AUTO: 232 K/UL — SIGNIFICANT CHANGE UP (ref 150–400)
POTASSIUM SERPL-MCNC: 3.6 MMOL/L — SIGNIFICANT CHANGE UP (ref 3.5–5.3)
POTASSIUM SERPL-SCNC: 3.6 MMOL/L — SIGNIFICANT CHANGE UP (ref 3.5–5.3)
RBC # BLD: 3.71 M/UL — LOW (ref 4.2–5.8)
RBC # FLD: 15.9 % — HIGH (ref 10.3–14.5)
SODIUM SERPL-SCNC: 139 MMOL/L — SIGNIFICANT CHANGE UP (ref 135–145)
WBC # BLD: 4.51 K/UL — SIGNIFICANT CHANGE UP (ref 3.8–10.5)
WBC # FLD AUTO: 4.51 K/UL — SIGNIFICANT CHANGE UP (ref 3.8–10.5)

## 2021-05-31 PROCEDURE — 99232 SBSQ HOSP IP/OBS MODERATE 35: CPT

## 2021-05-31 PROCEDURE — 99233 SBSQ HOSP IP/OBS HIGH 50: CPT

## 2021-05-31 RX ORDER — APIXABAN 2.5 MG/1
5 TABLET, FILM COATED ORAL EVERY 12 HOURS
Refills: 0 | Status: DISCONTINUED | OUTPATIENT
Start: 2021-05-31 | End: 2021-06-03

## 2021-05-31 RX ORDER — ACETAMINOPHEN 500 MG
650 TABLET ORAL EVERY 6 HOURS
Refills: 0 | Status: DISCONTINUED | OUTPATIENT
Start: 2021-05-31 | End: 2021-06-05

## 2021-05-31 RX ADMIN — Medication 975 MILLIGRAM(S): at 05:51

## 2021-05-31 RX ADMIN — Medication 1 PATCH: at 11:22

## 2021-05-31 RX ADMIN — Medication 1 PATCH: at 11:27

## 2021-05-31 RX ADMIN — Medication 1 PATCH: at 07:34

## 2021-05-31 RX ADMIN — APIXABAN 5 MILLIGRAM(S): 2.5 TABLET, FILM COATED ORAL at 11:19

## 2021-05-31 RX ADMIN — METHOCARBAMOL 750 MILLIGRAM(S): 500 TABLET, FILM COATED ORAL at 05:21

## 2021-05-31 RX ADMIN — Medication 1 TABLET(S): at 11:27

## 2021-05-31 RX ADMIN — CHLORHEXIDINE GLUCONATE 1 APPLICATION(S): 213 SOLUTION TOPICAL at 05:21

## 2021-05-31 RX ADMIN — Medication 1 PATCH: at 16:59

## 2021-05-31 RX ADMIN — PANTOPRAZOLE SODIUM 40 MILLIGRAM(S): 20 TABLET, DELAYED RELEASE ORAL at 11:26

## 2021-05-31 RX ADMIN — HEPARIN SODIUM 800 UNIT(S)/HR: 5000 INJECTION INTRAVENOUS; SUBCUTANEOUS at 07:28

## 2021-05-31 RX ADMIN — POLYETHYLENE GLYCOL 3350 17 GRAM(S): 17 POWDER, FOR SOLUTION ORAL at 11:26

## 2021-05-31 RX ADMIN — OXYMETAZOLINE HYDROCHLORIDE 2 SPRAY(S): 0.5 SPRAY NASAL at 05:21

## 2021-05-31 RX ADMIN — GABAPENTIN 300 MILLIGRAM(S): 400 CAPSULE ORAL at 21:33

## 2021-05-31 RX ADMIN — APIXABAN 5 MILLIGRAM(S): 2.5 TABLET, FILM COATED ORAL at 23:39

## 2021-05-31 RX ADMIN — Medication 975 MILLIGRAM(S): at 05:21

## 2021-05-31 RX ADMIN — Medication 4 MILLIGRAM(S): at 21:33

## 2021-05-31 RX ADMIN — SERTRALINE 100 MILLIGRAM(S): 25 TABLET, FILM COATED ORAL at 11:27

## 2021-05-31 NOTE — PROGRESS NOTE ADULT - SUBJECTIVE AND OBJECTIVE BOX
Patient a 60 y/o male, domiciled with son, no past Psychiatric hx, no prior hx of Depression with SI, denied drug abuse, (on Suboxone), admitted due to Respiratory failure with Multiple medical Issues.     Patient in bed,  alert, oriented to time and place, has a trach tube but able to communicate talking/writing at times. He also smiled at times endorsing that he had a good sleep last night with good appetite and has increased self esteem as things are improving. He endorses that he is not depressed or suicidal, never tired to commit suicide, smokes 1 PPD and also drank ETOH, mostly Vodka, last drink was in 2006. He feels that his memory is also OK at this time. NO internal pre-occupation noted, no A/V/ H or Paranoid beliefs noted. Unable to get any collateral info listed Wife/son--@ [phone]    05/31/2021: Patient was seen today AM, in bed, speaking more clearly and endorsing that he feels much better than before, and endorsed that he was never suicidal. He is domiciled with his son, endorses that he is improving as time passes ands endorses that we can trust him off the 1:1 for now and he would call nursing help if needed.     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 Speech improved, and speaks well and able to understand. He smiles on approach, good eye contact. Mood is OK as things are improving with day-to-day. Thoughts are linear, and has no S/H/I/P, he is also AAOX3 with limited I+J. Memory seems intact.    Labs:                             11.7   4.51  )-----------( 232      ( 31 May 2021 07:11 )              37.3     05-31    139  |  101  |  7.5<L>  ----------------------------<  88  3.6   |  28.0  |  0.51    Ca    8.7      31 May 2021 07:11  Phos  2.7     05-31  Mg     2.0     05-31                 < from: 12 Lead ECG (05.15.21 @ 23:36) >    Ventricular Rate 133 BPM    Atrial Rate 266 BPM    QRS Duration 72 ms    Q-T Interval 284 ms    QTC Calculation(Bazett) 422 ms    P Axis 220 degrees    R Axis -38 degrees    T Axis 17 degrees    Diagnosis:     Adjustment D/O                            Depressive D/O      Diagnosis Line Atrial flutter with 2:1 A-V conduction  Left axis deviation  Low voltage QRS  Cannot rule out Anteroseptal infarct , age undetermined  ST & T wave abnormality, consider inferior ischemia  Abnormal ECG    Confirmed by BRAULIO BARAJAS ([id number]) on 5/16/2021 6:45:30 PM    < end of copied text >

## 2021-05-31 NOTE — PROGRESS NOTE ADULT - ASSESSMENT
Patient a 62 y/o male, domiciled with son, no past Psychiatric hx, no prior hx of Depression with SI, denied drug abuse, (on Suboxone), admitted due to Respiratory failure with Multiple medical Issues.      Patient was seen today AM, in bed, speaking more clearly and endorsing that he feels much better than before, and endorsed that he was never suicidal. He is domiciled with his son, endorses that he is improving as time passes ands endorses that we can trust him off the 1:1 for now and he would call nursing help if needed.     Plan: Patient is alert, oriented to time and place, with intact Memory, endorses that he is not suicidal and no prior hx of SI/depression,           To continue with Zoloft 100 mg daily          F/U PRN          Discontinue with 1:1 for now          RN Informed      Thank You

## 2021-05-31 NOTE — PROGRESS NOTE ADULT - SUBJECTIVE AND OBJECTIVE BOX
CHIEF COMPLAINT/INTERVAL HISTORY:    Patient is a 61y old  Male who presents with a chief complaint of Respiratory Failure (31 May 2021 12:55)    SUBJECTIVE & OBJECTIVE: Pt seen and examined at bedside. No overnight events reported. Patient awake, alert and not in acute distress. Denies any pain; has not received suboxone in 3 days; will d/c.    ROS: No chest pain, palpitations, SOB, light headedness, dizziness, headache, nausea/vomiting, fevers/chills, abdominal pain, dysuria or increased urinary frequency.    ICU Vital Signs Last 24 Hrs  T(C): 36.4 (31 May 2021 13:00), Max: 37.3 (30 May 2021 16:00)  T(F): 97.5 (31 May 2021 13:00), Max: 99.1 (30 May 2021 16:00)  HR: 77 (31 May 2021 13:00) (62 - 77)  BP: 118/70 (31 May 2021 13:00) (96/58 - 118/70)  BP(mean): 86 (31 May 2021 13:00) (70 - 86)  RR: 18 (31 May 2021 13:00) (18 - 20)  SpO2: 97% (31 May 2021 13:00) (93% - 98%)    MEDICATIONS  (STANDING):  apixaban 5 milliGRAM(s) Oral every 12 hours  chlorhexidine 4% Liquid 1 Application(s) Topical <User Schedule>  doxazosin 4 milliGRAM(s) Oral at bedtime  gabapentin 300 milliGRAM(s) Oral at bedtime  multivitamin 1 Tablet(s) Oral daily  nicotine - 21 mG/24Hr(s) Patch 1 patch Transdermal daily  oxymetazoline 0.05% Nasal Spray 2 Spray(s) Left Nostril two times a day  pantoprazole  Injectable 40 milliGRAM(s) IV Push daily  polyethylene glycol 3350 17 Gram(s) Oral daily  sertraline 100 milliGRAM(s) Oral daily    MEDICATIONS  (PRN):  acetaminophen   Tablet .. 650 milliGRAM(s) Oral every 6 hours PRN Temp greater or equal to 38C (100.4F), Mild Pain (1 - 3)  albuterol/ipratropium for Nebulization 3 milliLiter(s) Nebulizer every 6 hours PRN Shortness of Breath and/or Wheezing  ALPRAZolam 0.25 milliGRAM(s) Oral every 12 hours PRN anxiety  lidocaine 2% Viscous 15 milliLiter(s) Mucosal every 4 hours PRN oral pain      LABS:                        11.7   4.51  )-----------( 232      ( 31 May 2021 07:11 )             37.3     05-31    139  |  101  |  7.5<L>  ----------------------------<  88  3.6   |  28.0  |  0.51    Ca    8.7      31 May 2021 07:11  Phos  2.7     05-31  Mg     2.0     05-31      PTT - ( 31 May 2021 07:11 )  PTT:75.2 sec       PHYSICAL EXAM:    GENERAL: middle aged male, chronically ill appearing, not in acute distress  HEAD:  Atraumatic, Normocephalic  EYES: EOMI, PERRLA, conjunctiva and sclera clear  ENMT: Moist mucous membranes   NECK: Supple, trach in situ without active bleeding  NERVOUS SYSTEM:  awake, alert, able to follow commands  CHEST/LUNG: coarse breath sounds  HEART: Regular rate and rhythm; + S1/S2  ABDOMEN: Soft, Nontender, Nondistended; Bowel sounds present  EXTREMITIES:  no pedal edema

## 2021-05-31 NOTE — PROGRESS NOTE ADULT - PROBLEM SELECTOR PLAN 1
S/p bedside percutaneous tracheostomy.  Wound well appearing. Small amount of bloody ooziness surrounding trach site.   No evidence of active bleed.   Primary team believes patient may have manipulated tracheostomy causing trauma to area. Patient is now on a 1:1 constant observation. Likely oozy in setting of anticoagulation on heparin. If no active bleed, OK to continue heparin as per Dr. Enrique. If new evidence of active bleeding, recommend pausing heparin gtt.   Sutures remain in place.  Will continue to follow  D/W Dr Reid. S/p bedside percutaneous tracheostomy.  Okay to resume Eliquis   Sutures remain in place.  Continue care as per primary team  Thoracic surgery to follow   Discussed plan with Dr Reid.

## 2021-05-31 NOTE — PROGRESS NOTE ADULT - ASSESSMENT
62 y/o male with PMH of COPD (not on home O2), active smoker 1.5 ppd, aflutter and PE on Eliquis, pulmonary nodule who came to the ED on 5/15 complaining of shortness of breath and  cough, found to be in aflutter with RVR along with acute hypoxic respiratory failure due to large left pleural effusion and pulmonary edema requiring intubation. Ultimately no improvement in HR with  Cardizem and worsening shock state after intubation therefore was successfully cardioverted at 150 J. Also found to have right segmental PE and left peroneal DVT. Left chest tube was placed (05/16) with 1400 cc serosanguineous fluid.  He was extubated on 5/17 and reintubated on 5/19 because of increase secretion and patient could not manage it.  Bronchoscopy done to remove thick secretion.  Chest tube removed. Goal of care discussion had with patient and step daughter; patient is full code, agreed to trach and PEG tube placement. S/p tracheostomy (05/25/). Patient passed MBS and diet was advanced; no PEG placement.     #Acute respiratory failure secondary to Multifocal PNA complicated by parapneumonic effusion and acute PE, grade 3 pulmonary HTN in the setting of COPD/PE; now s/p trachestomy on 5/25  - currently remains hypoxic on 40% trach collar  - prolonged hospitalization complicated by reintubation on 5/19 due to increased secretions.   - s/p bronchoscopy on 5/24 with BAL; prelim results with normal la nena reported; no AFB, few yeast   - s/p trach on 5/25 complicated with oozing from trach site  - pleural fluid cytology negative for malignant cells  - RVP and COVID 19 negative  - left chest tube removed  - completed course of abx  - switch to eliquis as discussed with CT surgery  - TTE with enlarged RV and decreased RV systolic function on 5/16  - continue trach care and suctioning as needed    - continue bronchodilators as needed  - repeat CXR reviewed; unchanged  - chest PT  - thoracic surgery recommendations appreciated; sutures to be removed on 6/2  - pulmonary consult appreciated; initiated [facility] Trippir trails on 5/30  - f/u further CT surgery and pulm recommendations    # Chronic Pain Syndrome  - pain controlled; denies any acute pain. Has not received suboxone > 3 days due to hypotension.  - no withdrawal symptoms therefore d/c suboxone   - d/c robaxin  - change tylenol to PRN  - pain mgmt recommendations appreciated  - continue gabapentin at bedtime  - will recall pain management if pain reoccurs and BP remains soft    # Dysphagia  - completed modified barium  - advanced to pureed with thin liquids  - f/u further speech recommendations to advance diet    # Severe protein calorie malnutrition  - nutritionist consult appreciated     # History of PE  - d/c heparin gtt, restart eliquis as discussed with CT surgery Jaden  - CT surgery to comment on when eliquis can be safely resumed    # Aflutter   - S/p cardioversion on admission  - rate controlled  - Soft BP; hold cardizem  - telemonitoring     # Tobacco Abuse  - nicotine patch  - previously counseled on cessation    # Anxiety/Depression   -denies suicidal thought currently  -seen by psych; d/c constant obs  - continue zoloft  - xanax PRN  - psych recommendations appreciated    # Acute Right Peroneal Vein DVT  -switch to eliquis    Plan discussed with patient, Dr. Sanchez, RN    DVT ppx - Eliquis    Dispo - Sutures to be removed on 6/2. Pulm consult appreciated; neeraj humphrey. PT evaluation for placement. Downgrade from SDU to any monitored bed.

## 2021-05-31 NOTE — PROGRESS NOTE ADULT - SUBJECTIVE AND OBJECTIVE BOX
Subjective:    VITAL SIGNS  Vital Signs Last 24 Hrs  T(C): 36.3 (21 @ 15:25), Max: 36.9 (21 @ 20:07)  T(F): 97.4 (21 @ 15:25), Max: 98.4 (21 @ 20:07)  HR: 73 (21 @ 15:25) (62 - 77)  BP: 118/71 (21 @ 15:25) (96/58 - 118/71)  RR: 18 (21 @ 15:25) (18 - 20)  SpO2: 96% (21 @ 15:25) (93% - 98%)  on (O2)              Telemetry:    LVEF:     MEDICATIONS  acetaminophen   Tablet .. 650 milliGRAM(s) Oral every 6 hours PRN  albuterol/ipratropium for Nebulization 3 milliLiter(s) Nebulizer every 6 hours PRN  ALPRAZolam 0.25 milliGRAM(s) Oral every 12 hours PRN  apixaban 5 milliGRAM(s) Oral every 12 hours  chlorhexidine 4% Liquid 1 Application(s) Topical <User Schedule>  doxazosin 4 milliGRAM(s) Oral at bedtime  gabapentin 300 milliGRAM(s) Oral at bedtime  lidocaine 2% Viscous 15 milliLiter(s) Mucosal every 4 hours PRN  multivitamin 1 Tablet(s) Oral daily  nicotine - 21 mG/24Hr(s) Patch 1 patch Transdermal daily  oxymetazoline 0.05% Nasal Spray 2 Spray(s) Left Nostril two times a day  pantoprazole  Injectable 40 milliGRAM(s) IV Push daily  polyethylene glycol 3350 17 Gram(s) Oral daily  sertraline 100 milliGRAM(s) Oral daily      PHYSICAL EXAM  General: well nourished, well developed, no acute distress  Neurology: alert and oriented x 3, nonfocal, no gross deficits  Respiratory: clear to auscultation bilaterally  CV: regular rate and rhythm, normal S1, S2  Abdomen: soft, nontender, nondistended, positive bowel sounds, last bowel movement   Extremities: warm, well perfused. no edema. + DP pulses  Incisions: midline sternal incision, + mepilex, c/d/i. sternum stable.  Chest tubes:   Epicardial Wires:    > EPM (settings) / isolated    I&O's Detail    30 May 2021 07:01  -  31 May 2021 07:00  --------------------------------------------------------  IN:    Heparin Infusion: 112 mL  Total IN: 112 mL    OUT:    Indwelling Catheter - Urethral (mL): 500 mL    Intermittent Catheterization - Urethral (mL): 550 mL  Total OUT: 1050 mL    Total NET: -938 mL      31 May 2021 07:01  -  31 May 2021 16:02  --------------------------------------------------------  IN:    Heparin Infusion: 24 mL  Total IN: 24 mL    OUT:  Total OUT: 0 mL    Total NET: 24 mL          Weights:  Daily     Daily Weight in k.6 (31 May 2021 04:30)  Admit Wt: Drug Dosing Weight  Height (cm): 182.9 (29 May 2021 07:48)  Weight (kg): 79.6 (29 May 2021 07:48)  BMI (kg/m2): 23.8 (29 May 2021 07:48)  BSA (m2): 2.02 (29 May 2021 07:48)    LABS  -    139  |  101  |  7.5<L>  ----------------------------<  88  3.6   |  28.0  |  0.51    Ca    8.7      31 May 2021 07:11  Phos  2.7       Mg     2.0                                        11.7   4.51  )-----------( 232      ( 31 May 2021 07:11 )             37.3          PTT - ( 31 May 2021 07:11 )  PTT:75.2 sec        CAPILLARY BLOOD GLUCOSE               Today's CXR:    Today's EKG:    PAST MEDICAL & SURGICAL HISTORY:  Poor historian    COPD (chronic obstructive pulmonary disease)    Pulmonary embolism    Atrial flutter    H/O solitary pulmonary nodule    Depression    No significant past surgical history          Subjective: Pt. trached, lying in bed, NAD noted    VITAL SIGNS  Vital Signs Last 24 Hrs  T(C): 36.3 (21 @ 15:25), Max: 36.9 (21 @ 20:07)  T(F): 97.4 (21 @ 15:25), Max: 98.4 (21 @ 20:07)  HR: 73 (21 @ 15:25) (62 - 77)  BP: 118/71 (21 @ 15:25) (96/58 - 118/71)  RR: 18 (21 @ 15:25) (18 - 20)  SpO2: 96% (21 @ 15:25) (93% - 98%)  on (O2)              Telemetry:  Sinus rhythm      MEDICATIONS  acetaminophen   Tablet .. 650 milliGRAM(s) Oral every 6 hours PRN  albuterol/ipratropium for Nebulization 3 milliLiter(s) Nebulizer every 6 hours PRN  ALPRAZolam 0.25 milliGRAM(s) Oral every 12 hours PRN  apixaban 5 milliGRAM(s) Oral every 12 hours  chlorhexidine 4% Liquid 1 Application(s) Topical <User Schedule>  doxazosin 4 milliGRAM(s) Oral at bedtime  gabapentin 300 milliGRAM(s) Oral at bedtime  lidocaine 2% Viscous 15 milliLiter(s) Mucosal every 4 hours PRN  multivitamin 1 Tablet(s) Oral daily  nicotine - 21 mG/24Hr(s) Patch 1 patch Transdermal daily  oxymetazoline 0.05% Nasal Spray 2 Spray(s) Left Nostril two times a day  pantoprazole  Injectable 40 milliGRAM(s) IV Push daily  polyethylene glycol 3350 17 Gram(s) Oral daily  sertraline 100 milliGRAM(s) Oral daily      PHYSICAL EXAM  General: well nourished, well developed, no acute distress  Neurology: alert and oriented x 3, nonfocal, no gross deficits  Respiratory: clear to auscultation bilaterally, trached  CV: regular rate and rhythm, normal S1, S2  Abdomen: soft, nontender, nondistended, positive bowel sounds  Extremities: warm, well perfused. no edema. + DP pulses        I&O's Detail    30 May 2021 07:01  -  31 May 2021 07:00  --------------------------------------------------------  IN:    Heparin Infusion: 112 mL  Total IN: 112 mL    OUT:    Indwelling Catheter - Urethral (mL): 500 mL    Intermittent Catheterization - Urethral (mL): 550 mL  Total OUT: 1050 mL    Total NET: -938 mL      31 May 2021 07:01  -  31 May 2021 16:02  --------------------------------------------------------  IN:    Heparin Infusion: 24 mL  Total IN: 24 mL    OUT:  Total OUT: 0 mL    Total NET: 24 mL          Weights:  Daily     Daily Weight in k.6 (31 May 2021 04:30)  Admit Wt: Drug Dosing Weight  Height (cm): 182.9 (29 May 2021 07:48)  Weight (kg): 79.6 (29 May 2021 07:48)  BMI (kg/m2): 23.8 (29 May 2021 07:48)  BSA (m2): 2.02 (29 May 2021 07:48)    LABS      139  |  101  |  7.5<L>  ----------------------------<  88  3.6   |  28.0  |  0.51    Ca    8.7      31 May 2021 07:11  Phos  2.7       Mg     2.0                                        11.7   4.51  )-----------( 232      ( 31 May 2021 07:11 )             37.3          PTT - ( 31 May 2021 07:11 )  PTT:75.2 sec        CAPILLARY BLOOD GLUCOSE               Today's CXR:< from: Xray Chest 1 View- PORTABLE-Routine (Xray Chest 1 View- PORTABLE-Routine in AM.) (21 @ 05:31) >  EXAM:  XR CHEST PORTABLE ROUTINE 1V                          PROCEDURE DATE:  2021          INTERPRETATION:  Clinical Information: Pneumonia    Technique: AP chest image.    Comparison: 2021    Findings/  Impression: The heart is unremarkable. Trace left pleural effusion. No consolidations. Old right-sided rib fractures            JOSEFINA MANNING MD; Attending Interventional Radiologist  This document has been electronically signed. May 30 2021  8:07PM    < end of copied text >      Today's EKG:    PAST MEDICAL & SURGICAL HISTORY:  Poor historian    COPD (chronic obstructive pulmonary disease)    Pulmonary embolism    Atrial flutter    H/O solitary pulmonary nodule    Depression    No significant past surgical history

## 2021-06-01 LAB
ANION GAP SERPL CALC-SCNC: 14 MMOL/L — SIGNIFICANT CHANGE UP (ref 5–17)
APTT BLD: 42.2 SEC — HIGH (ref 27.5–35.5)
BASOPHILS # BLD AUTO: 0.03 K/UL — SIGNIFICANT CHANGE UP (ref 0–0.2)
BASOPHILS NFR BLD AUTO: 0.7 % — SIGNIFICANT CHANGE UP (ref 0–2)
BUN SERPL-MCNC: 8 MG/DL — SIGNIFICANT CHANGE UP (ref 8–20)
CALCIUM SERPL-MCNC: 9 MG/DL — SIGNIFICANT CHANGE UP (ref 8.6–10.2)
CHLORIDE SERPL-SCNC: 98 MMOL/L — SIGNIFICANT CHANGE UP (ref 98–107)
CO2 SERPL-SCNC: 25 MMOL/L — SIGNIFICANT CHANGE UP (ref 22–29)
CREAT SERPL-MCNC: 0.5 MG/DL — SIGNIFICANT CHANGE UP (ref 0.5–1.3)
EOSINOPHIL # BLD AUTO: 0.09 K/UL — SIGNIFICANT CHANGE UP (ref 0–0.5)
EOSINOPHIL NFR BLD AUTO: 2 % — SIGNIFICANT CHANGE UP (ref 0–6)
GLUCOSE SERPL-MCNC: 71 MG/DL — SIGNIFICANT CHANGE UP (ref 70–99)
HCT VFR BLD CALC: 41.3 % — SIGNIFICANT CHANGE UP (ref 39–50)
HGB BLD-MCNC: 13 G/DL — SIGNIFICANT CHANGE UP (ref 13–17)
IMM GRANULOCYTES NFR BLD AUTO: 0.2 % — SIGNIFICANT CHANGE UP (ref 0–1.5)
LYMPHOCYTES # BLD AUTO: 1.34 K/UL — SIGNIFICANT CHANGE UP (ref 1–3.3)
LYMPHOCYTES # BLD AUTO: 30.1 % — SIGNIFICANT CHANGE UP (ref 13–44)
MAGNESIUM SERPL-MCNC: 1.9 MG/DL — SIGNIFICANT CHANGE UP (ref 1.6–2.6)
MCHC RBC-ENTMCNC: 31.5 GM/DL — LOW (ref 32–36)
MCHC RBC-ENTMCNC: 31.8 PG — SIGNIFICANT CHANGE UP (ref 27–34)
MCV RBC AUTO: 101 FL — HIGH (ref 80–100)
MONOCYTES # BLD AUTO: 0.42 K/UL — SIGNIFICANT CHANGE UP (ref 0–0.9)
MONOCYTES NFR BLD AUTO: 9.4 % — SIGNIFICANT CHANGE UP (ref 2–14)
NEUTROPHILS # BLD AUTO: 2.56 K/UL — SIGNIFICANT CHANGE UP (ref 1.8–7.4)
NEUTROPHILS NFR BLD AUTO: 57.6 % — SIGNIFICANT CHANGE UP (ref 43–77)
PHOSPHATE SERPL-MCNC: 3.2 MG/DL — SIGNIFICANT CHANGE UP (ref 2.4–4.7)
PLATELET # BLD AUTO: 224 K/UL — SIGNIFICANT CHANGE UP (ref 150–400)
POTASSIUM SERPL-MCNC: 3.6 MMOL/L — SIGNIFICANT CHANGE UP (ref 3.5–5.3)
POTASSIUM SERPL-SCNC: 3.6 MMOL/L — SIGNIFICANT CHANGE UP (ref 3.5–5.3)
RBC # BLD: 4.09 M/UL — LOW (ref 4.2–5.8)
RBC # FLD: 15.9 % — HIGH (ref 10.3–14.5)
SARS-COV-2 RNA SPEC QL NAA+PROBE: SIGNIFICANT CHANGE UP
SODIUM SERPL-SCNC: 137 MMOL/L — SIGNIFICANT CHANGE UP (ref 135–145)
WBC # BLD: 4.45 K/UL — SIGNIFICANT CHANGE UP (ref 3.8–10.5)
WBC # FLD AUTO: 4.45 K/UL — SIGNIFICANT CHANGE UP (ref 3.8–10.5)

## 2021-06-01 PROCEDURE — 99232 SBSQ HOSP IP/OBS MODERATE 35: CPT

## 2021-06-01 PROCEDURE — 99233 SBSQ HOSP IP/OBS HIGH 50: CPT

## 2021-06-01 RX ORDER — ONDANSETRON 8 MG/1
4 TABLET, FILM COATED ORAL ONCE
Refills: 0 | Status: COMPLETED | OUTPATIENT
Start: 2021-06-01 | End: 2021-06-01

## 2021-06-01 RX ORDER — OXYCODONE AND ACETAMINOPHEN 5; 325 MG/1; MG/1
1 TABLET ORAL ONCE
Refills: 0 | Status: DISCONTINUED | OUTPATIENT
Start: 2021-06-01 | End: 2021-06-01

## 2021-06-01 RX ORDER — ALPRAZOLAM 0.25 MG
0.5 TABLET ORAL ONCE
Refills: 0 | Status: DISCONTINUED | OUTPATIENT
Start: 2021-06-01 | End: 2021-06-01

## 2021-06-01 RX ADMIN — Medication 0.25 MILLIGRAM(S): at 05:58

## 2021-06-01 RX ADMIN — PANTOPRAZOLE SODIUM 40 MILLIGRAM(S): 20 TABLET, DELAYED RELEASE ORAL at 11:50

## 2021-06-01 RX ADMIN — GABAPENTIN 300 MILLIGRAM(S): 400 CAPSULE ORAL at 21:23

## 2021-06-01 RX ADMIN — OXYCODONE AND ACETAMINOPHEN 1 TABLET(S): 5; 325 TABLET ORAL at 20:33

## 2021-06-01 RX ADMIN — APIXABAN 5 MILLIGRAM(S): 2.5 TABLET, FILM COATED ORAL at 23:25

## 2021-06-01 RX ADMIN — Medication 0.5 MILLIGRAM(S): at 21:23

## 2021-06-01 RX ADMIN — Medication 4 MILLIGRAM(S): at 21:23

## 2021-06-01 RX ADMIN — OXYCODONE AND ACETAMINOPHEN 1 TABLET(S): 5; 325 TABLET ORAL at 21:25

## 2021-06-01 RX ADMIN — APIXABAN 5 MILLIGRAM(S): 2.5 TABLET, FILM COATED ORAL at 11:50

## 2021-06-01 RX ADMIN — Medication 1 TABLET(S): at 11:50

## 2021-06-01 RX ADMIN — SERTRALINE 100 MILLIGRAM(S): 25 TABLET, FILM COATED ORAL at 11:50

## 2021-06-01 RX ADMIN — CHLORHEXIDINE GLUCONATE 1 APPLICATION(S): 213 SOLUTION TOPICAL at 05:58

## 2021-06-01 RX ADMIN — Medication 1 PATCH: at 19:00

## 2021-06-01 RX ADMIN — POLYETHYLENE GLYCOL 3350 17 GRAM(S): 17 POWDER, FOR SOLUTION ORAL at 11:51

## 2021-06-01 RX ADMIN — Medication 1 PATCH: at 11:52

## 2021-06-01 RX ADMIN — ONDANSETRON 4 MILLIGRAM(S): 8 TABLET, FILM COATED ORAL at 05:58

## 2021-06-01 RX ADMIN — Medication 1 PATCH: at 11:51

## 2021-06-01 RX ADMIN — Medication 1 PATCH: at 07:00

## 2021-06-01 NOTE — PROGRESS NOTE ADULT - ASSESSMENT
62 y/o male with PMH of COPD (not on home O2), active smoker 1.5 ppd, aflutter and PE on Eliquis, pulmonary nodule who came to the ED on 5/15 complaining of shortness of breath and  cough, found to be in aflutter with RVR along with acute hypoxic respiratory failure due to large left pleural effusion and pulmonary edema requiring intubation. Ultimately no improvement in HR with  Cardizem and worsening shock state after intubation therefore was successfully cardioverted at 150 J. Also found to have right segmental PE and left peroneal DVT. Left chest tube was placed (05/16) with 1400 cc serosanguineous fluid.  He was extubated on 5/17 and reintubated on 5/19 because of increase secretion and patient could not manage it.  Bronchoscopy done to remove thick secretion.  Chest tube removed. Goal of care discussion had with patient and step daughter; patient is full code, agreed to trach and PEG tube placement. S/p tracheostomy (05/25/). Patient passed MBS and diet was advanced.    #Acute respiratory failure secondary to Multifocal PNA complicated by parapneumonic effusion and acute PE, grade 3 pulmonary HTN in the setting of COPD/PE; now s/p trachestomy on 5/25  - currently remains hypoxic on 40% trach collar  - prolonged hospitalization complicated by reintubation on 5/19 due to increased secretions.   - s/p bronchoscopy on 5/24 with BAL; prelim results with normal la nena reported; no AFB, few yeast   - s/p trach on 5/25 complicated with oozing from trach site  - pleural fluid cytology negative for malignant cells  - RVP and COVID 19 negative  - left chest tube removed  - completed course of abx  - switched to eliquis as discussed with CT surgery  - TTE with enlarged RV and decreased RV systolic function on 5/16  - continue trach care and suctioning as needed    - continue bronchodilators as needed  - repeat CXR reviewed; unchanged  - chest PT  - thoracic surgery recommendations appreciated; sutures to be removed on 6/2  - pulmonary consult appreciated; initiated [facility] trails on 5/30  - f/u further CT surgery and pulm recommendations    # Chronic Pain Syndrome  - pain controlled; denies any acute pain.   - no withdrawal symptoms off suboxone   - pain mgmt recommendations appreciated, but not requiring suboxone or robaxin  - continue gabapentin at bedtime  - will recall pain management if pain reoccurs and BP remains soft    # Dysphagia  - completed modified barium  - advanced to mechanical soft with thin liquids  -speech recommendations appreciated    # Severe protein calorie malnutrition  - nutritionist consult appreciated     # History of PE  - continue eliquis    # Aflutter   - S/p cardioversion on admission  - rate controlled; BP stable off cardize,  - telemonitoring     # Tobacco Abuse  - nicotine patch  - previously counseled on cessation    # Anxiety/Depression   -denies suicidal thought currently  -remains stable off constant obs  - continue zoloft  - xanax PRN  - psych recommendations appreciated    # Acute Right Peroneal Vein DVT  -continue eliquis    #Urinary Retention  -failed voiding trial  -bocanegra re-inserted on 5/30  -continue cadura    Plan discussed with patient, RN    DVT ppx - Eliquis    Dispo - Sutures to be removed on 6/2. Pulm consult appreciated; neeraj humphrey. PT evaluation for placement. SW on board to arrange for placement.

## 2021-06-01 NOTE — PROGRESS NOTE ADULT - ASSESSMENT
61yr man hx of COPD, PE, Aflutter, opiate abuse,  depression admitted with acute respiratory failure in the setting of   HF, Aflutter, PNA , pleural effusion      Problem/Recommendation - 1:  Acute Respiratory Failure  on trach collar   Cont trach care     Problem/Recommendation - 2:  ·  Problem: COPD (chronic obstructive pulmonary disease). Recommendation: on nebs.      Problem/Recommendation - 3:  ·  Problem: Pleural effusion. Recommendation: s/p CT    Problem/Recommendation 4  Depression  Reported patient has been grieving over wife's death approx 1 year ago  Psychiatry consult noted- no suicidal ideations  Zoloft 100mg/day  Emotional support  Recommend eventual bereavement counseling when discharge     Problem/Recommendation - 4:  ·  Problem: Encounter for palliative care. Recommendation:   Palliative Care consulted to assist with GOC and establishment of appropriate surrogate.   Karey Bocanegra is HCP - Form completed and placed in chart  Patient initially intubated - weaned to trach collar.  No PEG - on modified diet.  Patient with depression, lost his wife 1yr ago.  Appreciate  input.  Would recommend eventual bereavement counseling when returns home  Palliative Care to sign off. Please reconsult PRN

## 2021-06-01 NOTE — PROGRESS NOTE ADULT - SUBJECTIVE AND OBJECTIVE BOX
CC:  follow up GOC  INTERVAL HPI/OVERNIGHT EVENTS:  on trach collar  PRESENT SYMPTOMS: SOURCE:  Patient/Family/Team    PAIN SCALE:  0 = none  1 = mild   2 = moderate  3 = severe    Pain: 1    Dyspnea:  [ ] YES [x ] NO  Anxiety:  [ ] YES [ x] NO  Fatigue: [ x] YES [ ] NO  Nausea: [ ] YES [ x] NO  Loss of Appetite: [ ] YES [ ] NO  na- npo  Other symptoms: __________    MEDICATIONS  (STANDING):  apixaban 5 milliGRAM(s) Oral every 12 hours  chlorhexidine 4% Liquid 1 Application(s) Topical <User Schedule>  doxazosin 4 milliGRAM(s) Oral at bedtime  gabapentin 300 milliGRAM(s) Oral at bedtime  multivitamin 1 Tablet(s) Oral daily  nicotine - 21 mG/24Hr(s) Patch 1 patch Transdermal daily  oxymetazoline 0.05% Nasal Spray 2 Spray(s) Left Nostril two times a day  pantoprazole  Injectable 40 milliGRAM(s) IV Push daily  polyethylene glycol 3350 17 Gram(s) Oral daily  sertraline 100 milliGRAM(s) Oral daily    MEDICATIONS  (PRN):  acetaminophen   Tablet .. 650 milliGRAM(s) Oral every 6 hours PRN Temp greater or equal to 38C (100.4F), Mild Pain (1 - 3)  albuterol/ipratropium for Nebulization 3 milliLiter(s) Nebulizer every 6 hours PRN Shortness of Breath and/or Wheezing  ALPRAZolam 0.25 milliGRAM(s) Oral every 12 hours PRN anxiety  lidocaine 2% Viscous 15 milliLiter(s) Mucosal every 4 hours PRN oral pain      Allergies    No Known Allergies    Intolerances    Karnofsky Performance Score/Palliative Performance Status Version 2:  50  %    Vital Signs Last 24 Hrs  T(C): 36.7 (01 Jun 2021 10:26), Max: 36.7 (31 May 2021 18:03)  T(F): 98.1 (01 Jun 2021 10:26), Max: 98.1 (31 May 2021 18:03)  HR: 97 (01 Jun 2021 10:26) (72 - 97)  BP: 119/74 (01 Jun 2021 10:26) (118/71 - 126/69)  BP(mean): --  RR: 18 (01 Jun 2021 10:26) (18 - 18)  SpO2: 96% (01 Jun 2021 14:02) (92% - 99%)    PHYSICAL EXAM:    General: awake alert sitting in chair - NAD    HEENT: [ x] normal  + trach collar    Lungs: [x ] comfortable [ ] tachypnea/labored breathing  [ ] excessive secretions    CV: [ x] normal  [ ] tachycardia    GI: [x ] normal  [ ] distended  [ ] tender  [ ] no BS               [ ] PEG/NG/OG tube    : [ x] normal  [ ] incontinent  [ ] oliguria/anuria  [ ] bocanegra    MSK: [ ] normal  [ x] weakness  [ ] edema             [ ] ambulatory  [ ] bedbound/wheelchair bound    Skin: [x ] normal  [ ] pressure ulcers- Stage_____  [ ] no rash    LABS:                        13.0   4.45  )-----------( 224      ( 01 Jun 2021 05:59 )             41.3     06-01    137  |  98  |  8.0  ----------------------------<  71  3.6   |  25.0  |  0.50    Ca    9.0      01 Jun 2021 05:59  Phos  3.2     06-01  Mg     1.9     06-01      PTT - ( 01 Jun 2021 05:59 )  PTT:42.2 sec    I&O's Summary    31 May 2021 07:01  -  01 Jun 2021 07:00  --------------------------------------------------------  IN: 24 mL / OUT: 575 mL / NET: -551 mL        RADIOLOGY & ADDITIONAL STUDIES:  < from: Xray Chest 1 View- PORTABLE-Routine (Xray Chest 1 View- PORTABLE-Routine in AM.) (05.30.21 @ 05:31) >   EXAM:  XR CHEST PORTABLE ROUTINE 1V                          PROCEDURE DATE:  05/30/2021          INTERPRETATION:  Clinical Information: Pneumonia    Technique: AP chest image.    Comparison: 05/27/2021    Findings/  Impression: The heart is unremarkable. Trace left pleural effusion. No consolidations. Old right-sided rib fractures    < end of copied text >

## 2021-06-01 NOTE — PROGRESS NOTE ADULT - SUBJECTIVE AND OBJECTIVE BOX
CHIEF COMPLAINT/INTERVAL HISTORY:    Patient is a 61y old  Male who presents with a chief complaint of Respiratory Failure (01 Jun 2021 14:02)    SUBJECTIVE & OBJECTIVE: Pt seen and examined at bedside. No overnight events. Patient reports feeling better today; requesting advanced diet. Seen by psych; diet advanced to mechanical soft. PT follow up.     ROS: No chest pain, palpitations, SOB, light headedness, dizziness, headache, nausea/vomiting, fevers/chills, abdominal pain, dysuria or increased urinary frequency.    ICU Vital Signs Last 24 Hrs  T(C): 36.7 (01 Jun 2021 10:26), Max: 36.7 (31 May 2021 18:03)  T(F): 98.1 (01 Jun 2021 10:26), Max: 98.1 (31 May 2021 18:03)  HR: 97 (01 Jun 2021 10:26) (72 - 97)  BP: 119/74 (01 Jun 2021 10:26) (118/76 - 126/69)  RR: 18 (01 Jun 2021 10:26) (18 - 18)  SpO2: 97% (01 Jun 2021 17:17) (92% - 99%)    MEDICATIONS  (STANDING):  apixaban 5 milliGRAM(s) Oral every 12 hours  chlorhexidine 4% Liquid 1 Application(s) Topical <User Schedule>  doxazosin 4 milliGRAM(s) Oral at bedtime  gabapentin 300 milliGRAM(s) Oral at bedtime  multivitamin 1 Tablet(s) Oral daily  nicotine - 21 mG/24Hr(s) Patch 1 patch Transdermal daily  oxymetazoline 0.05% Nasal Spray 2 Spray(s) Left Nostril two times a day  pantoprazole  Injectable 40 milliGRAM(s) IV Push daily  polyethylene glycol 3350 17 Gram(s) Oral daily  sertraline 100 milliGRAM(s) Oral daily    MEDICATIONS  (PRN):  acetaminophen   Tablet .. 650 milliGRAM(s) Oral every 6 hours PRN Temp greater or equal to 38C (100.4F), Mild Pain (1 - 3)  albuterol/ipratropium for Nebulization 3 milliLiter(s) Nebulizer every 6 hours PRN Shortness of Breath and/or Wheezing  ALPRAZolam 0.25 milliGRAM(s) Oral every 12 hours PRN anxiety  lidocaine 2% Viscous 15 milliLiter(s) Mucosal every 4 hours PRN oral pain      LABS:                        13.0   4.45  )-----------( 224      ( 01 Jun 2021 05:59 )             41.3     06-01    137  |  98  |  8.0  ----------------------------<  71  3.6   |  25.0  |  0.50    Ca    9.0      01 Jun 2021 05:59  Phos  3.2     06-01  Mg     1.9     06-01      PTT - ( 01 Jun 2021 05:59 )  PTT:42.2 sec

## 2021-06-02 LAB
ANION GAP SERPL CALC-SCNC: 12 MMOL/L — SIGNIFICANT CHANGE UP (ref 5–17)
APPEARANCE UR: ABNORMAL
BACTERIA # UR AUTO: ABNORMAL
BASOPHILS # BLD AUTO: 0.02 K/UL — SIGNIFICANT CHANGE UP (ref 0–0.2)
BASOPHILS NFR BLD AUTO: 0.5 % — SIGNIFICANT CHANGE UP (ref 0–2)
BILIRUB UR-MCNC: ABNORMAL
BUN SERPL-MCNC: 8.9 MG/DL — SIGNIFICANT CHANGE UP (ref 8–20)
CALCIUM SERPL-MCNC: 8.7 MG/DL — SIGNIFICANT CHANGE UP (ref 8.6–10.2)
CHLORIDE SERPL-SCNC: 99 MMOL/L — SIGNIFICANT CHANGE UP (ref 98–107)
CO2 SERPL-SCNC: 28 MMOL/L — SIGNIFICANT CHANGE UP (ref 22–29)
COLOR SPEC: ABNORMAL
CREAT SERPL-MCNC: 0.55 MG/DL — SIGNIFICANT CHANGE UP (ref 0.5–1.3)
DIFF PNL FLD: ABNORMAL
EOSINOPHIL # BLD AUTO: 0.09 K/UL — SIGNIFICANT CHANGE UP (ref 0–0.5)
EOSINOPHIL NFR BLD AUTO: 2.3 % — SIGNIFICANT CHANGE UP (ref 0–6)
EPI CELLS # UR: SIGNIFICANT CHANGE UP
GLUCOSE SERPL-MCNC: 73 MG/DL — SIGNIFICANT CHANGE UP (ref 70–99)
GLUCOSE UR QL: NEGATIVE MG/DL — SIGNIFICANT CHANGE UP
HCT VFR BLD CALC: 38.9 % — LOW (ref 39–50)
HGB BLD-MCNC: 12.3 G/DL — LOW (ref 13–17)
IMM GRANULOCYTES NFR BLD AUTO: 0.3 % — SIGNIFICANT CHANGE UP (ref 0–1.5)
KETONES UR-MCNC: ABNORMAL
LEUKOCYTE ESTERASE UR-ACNC: ABNORMAL
LYMPHOCYTES # BLD AUTO: 1.18 K/UL — SIGNIFICANT CHANGE UP (ref 1–3.3)
LYMPHOCYTES # BLD AUTO: 29.6 % — SIGNIFICANT CHANGE UP (ref 13–44)
MAGNESIUM SERPL-MCNC: 1.8 MG/DL — SIGNIFICANT CHANGE UP (ref 1.6–2.6)
MCHC RBC-ENTMCNC: 31.6 GM/DL — LOW (ref 32–36)
MCHC RBC-ENTMCNC: 31.9 PG — SIGNIFICANT CHANGE UP (ref 27–34)
MCV RBC AUTO: 100.8 FL — HIGH (ref 80–100)
MONOCYTES # BLD AUTO: 0.41 K/UL — SIGNIFICANT CHANGE UP (ref 0–0.9)
MONOCYTES NFR BLD AUTO: 10.3 % — SIGNIFICANT CHANGE UP (ref 2–14)
NEUTROPHILS # BLD AUTO: 2.27 K/UL — SIGNIFICANT CHANGE UP (ref 1.8–7.4)
NEUTROPHILS NFR BLD AUTO: 57 % — SIGNIFICANT CHANGE UP (ref 43–77)
NITRITE UR-MCNC: NEGATIVE — SIGNIFICANT CHANGE UP
PH UR: 7 — SIGNIFICANT CHANGE UP (ref 5–8)
PHOSPHATE SERPL-MCNC: 3 MG/DL — SIGNIFICANT CHANGE UP (ref 2.4–4.7)
PLATELET # BLD AUTO: 206 K/UL — SIGNIFICANT CHANGE UP (ref 150–400)
POTASSIUM SERPL-MCNC: 3.2 MMOL/L — LOW (ref 3.5–5.3)
POTASSIUM SERPL-SCNC: 3.2 MMOL/L — LOW (ref 3.5–5.3)
PROT UR-MCNC: 500 MG/DL
RBC # BLD: 3.86 M/UL — LOW (ref 4.2–5.8)
RBC # FLD: 15.9 % — HIGH (ref 10.3–14.5)
RBC CASTS # UR COMP ASSIST: >50 /HPF (ref 0–4)
SODIUM SERPL-SCNC: 139 MMOL/L — SIGNIFICANT CHANGE UP (ref 135–145)
SP GR SPEC: 1.02 — SIGNIFICANT CHANGE UP (ref 1.01–1.02)
UROBILINOGEN FLD QL: 12 MG/DL
WBC # BLD: 3.98 K/UL — SIGNIFICANT CHANGE UP (ref 3.8–10.5)
WBC # FLD AUTO: 3.98 K/UL — SIGNIFICANT CHANGE UP (ref 3.8–10.5)
WBC UR QL: SIGNIFICANT CHANGE UP

## 2021-06-02 PROCEDURE — 99231 SBSQ HOSP IP/OBS SF/LOW 25: CPT

## 2021-06-02 PROCEDURE — 99233 SBSQ HOSP IP/OBS HIGH 50: CPT

## 2021-06-02 RX ORDER — POTASSIUM CHLORIDE 20 MEQ
40 PACKET (EA) ORAL ONCE
Refills: 0 | Status: COMPLETED | OUTPATIENT
Start: 2021-06-02 | End: 2021-06-02

## 2021-06-02 RX ORDER — ALPRAZOLAM 0.25 MG
0.25 TABLET ORAL ONCE
Refills: 0 | Status: DISCONTINUED | OUTPATIENT
Start: 2021-06-02 | End: 2021-06-02

## 2021-06-02 RX ADMIN — GABAPENTIN 300 MILLIGRAM(S): 400 CAPSULE ORAL at 21:33

## 2021-06-02 RX ADMIN — Medication 0.25 MILLIGRAM(S): at 21:59

## 2021-06-02 RX ADMIN — Medication 1 PATCH: at 12:16

## 2021-06-02 RX ADMIN — Medication 40 MILLIEQUIVALENT(S): at 09:30

## 2021-06-02 RX ADMIN — Medication 0.25 MILLIGRAM(S): at 20:27

## 2021-06-02 RX ADMIN — SERTRALINE 100 MILLIGRAM(S): 25 TABLET, FILM COATED ORAL at 12:19

## 2021-06-02 RX ADMIN — APIXABAN 5 MILLIGRAM(S): 2.5 TABLET, FILM COATED ORAL at 23:49

## 2021-06-02 RX ADMIN — Medication 1 PATCH: at 12:19

## 2021-06-02 RX ADMIN — CHLORHEXIDINE GLUCONATE 1 APPLICATION(S): 213 SOLUTION TOPICAL at 06:10

## 2021-06-02 RX ADMIN — Medication 1 TABLET(S): at 12:19

## 2021-06-02 RX ADMIN — APIXABAN 5 MILLIGRAM(S): 2.5 TABLET, FILM COATED ORAL at 12:19

## 2021-06-02 RX ADMIN — Medication 4 MILLIGRAM(S): at 21:33

## 2021-06-02 RX ADMIN — Medication 1 PATCH: at 19:32

## 2021-06-02 RX ADMIN — Medication 1 PATCH: at 08:55

## 2021-06-02 RX ADMIN — PANTOPRAZOLE SODIUM 40 MILLIGRAM(S): 20 TABLET, DELAYED RELEASE ORAL at 12:19

## 2021-06-02 NOTE — PROGRESS NOTE ADULT - PROBLEM SELECTOR PLAN 1
S/p bedside percutaneous tracheostomy (5/25)  Sutures removed today (06/02)  Supplemental O2 as needed  Continue care as per primary team    Discussed plan with Dr Reid.

## 2021-06-02 NOTE — PROGRESS NOTE ADULT - ASSESSMENT
62 y/o male with PMH of COPD (not on home O2), active smoker 1.5 ppd, aflutter and PE on Eliquis, pulmonary nodule who came to the ED on 5/15 complaining of shortness of breath and  cough, found to be in aflutter with RVR along with acute hypoxic respiratory failure due to large left pleural effusion and pulmonary edema requiring intubation. Ultimately no improvement in HR with  Cardizem and worsening shock state after intubation therefore was successfully cardioverted at 150 J. Also found to have right segmental PE and left peroneal DVT. Left chest tube was placed (05/16) with 1400 cc serosanguineous fluid.  He was extubated on 5/17 and reintubated on 5/19 because of increase secretion and patient could not manage it.  Bronchoscopy done to remove thick secretion.  Chest tube removed. Goal of care discussion had with patient and step daughter; patient is full code, agreed to trach and PEG tube placement. S/p tracheostomy (05/25/). Patient passed MBS and diet was advanced.    #Acute respiratory failure secondary to Multifocal PNA complicated by parapneumonic effusion and acute PE, grade 3 pulmonary HTN in the setting of COPD/PE; now s/p trachestomy on 5/25  - currently remains hypoxic on 40% trach collar  - prolonged hospitalization complicated by reintubation on 5/19 due to increased secretions.   - s/p bronchoscopy on 5/24 with BAL; prelim results with normal la nena reported; no AFB, few yeast   - s/p trach on 5/25 complicated with oozing from trach site  - pleural fluid cytology negative for malignant cells  - RVP and COVID 19 negative  - left chest tube removed  - completed course of abx  - switched to eliquis as discussed with CT surgery  - TTE with enlarged RV and decreased RV systolic function on 5/16  - continue trach care and suctioning as needed    - continue bronchodilators as needed  - chest PT  - thoracic surgery recommendations appreciated; sutures removed today  - pulmonary consult appreciated; initiated Layo hitchcock on 5/30  - repeat CXR unchanged; clinically patient improved and remains stable  - pending placement     # Chronic Pain Syndrome  - pain controlled; denies any acute pain.   - no withdrawal symptoms off suboxone   - pain mgmt recommendations appreciated, but not requiring suboxone or robaxin  - continue gabapentin at bedtime  - will recall pain management if pain reoccurs and BP remains soft    # Dysphagia  - completed modified barium  - advanced to mechanical soft with thin liquids  -speech recommendations appreciated    # Severe protein calorie malnutrition  - nutritionist consult appreciated     # History of PE  - continue eliquis    # Aflutter   - S/p cardioversion on admission  - rate controlled; BP stable off cardizem  - telemonitoring     # Tobacco Abuse  - nicotine patch  - previously counseled on cessation    # Anxiety/Depression   -denies suicidal thought currently  -remains stable off constant obs  -continue zoloft  - xanax PRN  - psych recommendations appreciated    # Acute Right Peroneal Vein DVT  -continue eliquis    #Urinary Retention  -failed voiding trial  -bocanegra re-inserted on 5/30  -maintain bocanegra  -continue cadura    Plan discussed with patient, SW, medicine PA, RN    DVT ppx - Eliquis    Dispo - Medically stable for placement.

## 2021-06-02 NOTE — PROGRESS NOTE ADULT - SUBJECTIVE AND OBJECTIVE BOX
CHIEF COMPLAINT/INTERVAL HISTORY:    Patient is a 61y old  Male who presents with a chief complaint of Respiratory Failure (02 Jun 2021 14:51)    SUBJECTIVE & OBJECTIVE: Pt seen and examined at bedside. No overnight events. Patient complaining of discomfort at trach site after removal of sutures otherwise no new complaints. SW working on placement.     ROS: No chest pain, palpitations, SOB, light headedness, dizziness, headache, nausea/vomiting, fevers/chills, abdominal pain, dysuria or increased urinary frequency.    ICU Vital Signs Last 24 Hrs  T(C): 36.7 (02 Jun 2021 17:08), Max: 36.8 (02 Jun 2021 09:51)  T(F): 98.1 (02 Jun 2021 17:08), Max: 98.3 (02 Jun 2021 09:51)  HR: 88 (02 Jun 2021 17:08) (67 - 95)  BP: 134/73 (02 Jun 2021 17:08) (116/66 - 134/73)  RR: 18 (02 Jun 2021 17:08) (18 - 18)  SpO2: 94% (02 Jun 2021 17:08) (93% - 99%)    MEDICATIONS  (STANDING):  apixaban 5 milliGRAM(s) Oral every 12 hours  chlorhexidine 4% Liquid 1 Application(s) Topical <User Schedule>  doxazosin 4 milliGRAM(s) Oral at bedtime  gabapentin 300 milliGRAM(s) Oral at bedtime  multivitamin 1 Tablet(s) Oral daily  nicotine - 21 mG/24Hr(s) Patch 1 patch Transdermal daily  oxymetazoline 0.05% Nasal Spray 2 Spray(s) Left Nostril two times a day  pantoprazole  Injectable 40 milliGRAM(s) IV Push daily  sertraline 100 milliGRAM(s) Oral daily    MEDICATIONS  (PRN):  acetaminophen   Tablet .. 650 milliGRAM(s) Oral every 6 hours PRN Temp greater or equal to 38C (100.4F), Mild Pain (1 - 3)  albuterol/ipratropium for Nebulization 3 milliLiter(s) Nebulizer every 6 hours PRN Shortness of Breath and/or Wheezing  ALPRAZolam 0.25 milliGRAM(s) Oral every 12 hours PRN anxiety  lidocaine 2% Viscous 15 milliLiter(s) Mucosal every 4 hours PRN oral pain      LABS:                        12.3   3.98  )-----------( 206      ( 02 Jun 2021 07:50 )             38.9     06-02    139  |  99  |  8.9  ----------------------------<  73  3.2<L>   |  28.0  |  0.55    Ca    8.7      02 Jun 2021 07:50  Phos  3.0     06-02  Mg     1.8     06-02      PTT - ( 01 Jun 2021 05:59 )  PTT:42.2 sec       PHYSICAL EXAM:    GENERAL: middle aged male, chronically ill appearing, not in acute distress  HEAD:  Atraumatic, Normocephalic  EYES: EOMI, PERRLA, conjunctiva and sclera clear  ENMT: Moist mucous membranes   NECK: Supple, trach in situ without active bleeding  NERVOUS SYSTEM:  awake, alert, able to follow commands  CHEST/LUNG: coarse breath sounds  HEART: Regular rate and rhythm; + S1/S2  ABDOMEN: Soft, Nontender, Nondistended; Bowel sounds present  EXTREMITIES:  no pedal edema

## 2021-06-02 NOTE — CHART NOTE - NSCHARTNOTEFT_GEN_A_CORE
Source: Patient [ ]  Family [ ]   other [x ]EMR    [facility]: [facility] soft with thin liquids    PO intake:  < 50% [ ]   50-75%  [ ]   %  [ x]  other :    Source for PO intake [ ] Patient [ ] family [ x] chart [ ] staff [ ] other    Enteral /Parenteral Nutrition:     Current Weight: (5/24) 184.9 lbs, 173.2# 5/31  (5/23) 173.9 lbs  (5/21) 179.4 lbs  (5/20) 177.9 lbs  (5/19) 173.9 lbs  (5/18) 172.4 lbs    no edema      % Weight Change     Pertinent Medications: MEDICATIONS  (STANDING):  apixaban 5 milliGRAM(s) Oral every 12 hours  chlorhexidine 4% Liquid 1 Application(s) Topical <User Schedule>  doxazosin 4 milliGRAM(s) Oral at bedtime  gabapentin 300 milliGRAM(s) Oral at bedtime  multivitamin 1 Tablet(s) Oral daily  nicotine - 21 mG/24Hr(s) Patch 1 patch Transdermal daily  oxymetazoline 0.05% Nasal Spray 2 Spray(s) Left Nostril two times a day  pantoprazole  Injectable 40 milliGRAM(s) IV Push daily  sertraline 100 milliGRAM(s) Oral daily    MEDICATIONS  (PRN):  acetaminophen   Tablet .. 650 milliGRAM(s) Oral every 6 hours PRN Temp greater or equal to 38C (100.4F), Mild Pain (1 - 3)  albuterol/ipratropium for Nebulization 3 milliLiter(s) Nebulizer every 6 hours PRN Shortness of Breath and/or Wheezing  ALPRAZolam 0.25 milliGRAM(s) Oral every 12 hours PRN anxiety  lidocaine 2% Viscous 15 milliLiter(s) Mucosal every 4 hours PRN oral pain    Pertinent Labs: CBC Full  -  ( 02 Jun 2021 07:50 )  WBC Count : 3.98 K/uL  RBC Count : 3.86 M/uL  Hemoglobin : 12.3 g/dL  Hematocrit : 38.9 %  Platelet Count - Automated : 206 K/uL  Mean Cell Volume : 100.8 fl  Mean Cell Hemoglobin : 31.9 pg  Mean Cell Hemoglobin Concentration : 31.6 gm/dL  Auto Neutrophil # : 2.27 K/uL  Auto Lymphocyte # : 1.18 K/uL  Auto Monocyte # : 0.41 K/uL  Auto Eosinophil # : 0.09 K/uL  Auto Basophil # : 0.02 K/uL  Auto Neutrophil % : 57.0 %  Auto Lymphocyte % : 29.6 %  Auto Monocyte % : 10.3 %  Auto Eosinophil % : 2.3 %  Auto Basophil % : 0.5 %    06-02 Na139 mmol/L Glu 73 mg/dL K+ 3.2 mmol/L<L> Cr  0.55 mg/dL BUN 8.9 mg/dL Phos 3.0 mg/dL Alb n/a   PAB n/a             Skin:     Nutrition focused physical exam conducted - found signs of malnutrition [ ]absent [x ]present    Subcutaneous fat loss: [ ] Orbital fat pads region, [x ]Buccal fat region, [ ]Triceps region,  [x ]Ribs region    Muscle wasting: [x ]Temples region, [x ]Clavicle region, [ x]Shoulder region, [ ]Scapula region, [ ]Interosseous region,  [ ]thigh region, [ ]Calf region    Estimated Needs:   [x ] no change since previous assessment  [ ] recalculated:     Current Nutrition Diagnosis: Pt remains at high nutrition risk secondary to malnutrition (severe, acute) related to inability to meet sufficient protein-energy in setting of COPD, depression, now with LLL pneumonia with parapneumonic effusion, pulmonary embolism, acute respiratory failure requiring intubation as evidenced by meeting <50% nutrient needs >5 days, mild muscle loss of temples, moderate muscle loss of clavicles and shoulders, moderate fat loss of buccal pads and rib region, and 1+ edema. Pt now on dysphasia diet taking diet fair to good as per nursing.  Last BM 5/23. Plan is LEANNA however appealing.     Recommendations:     1) Continue MVI  2) Obtain daily weights to monitor trends.       Monitoring and Evaluation:   [x ] PO intake [x ] Tolerance to diet prescription [X] Weights  [X] Follow up per protocol [X] Labs: Source: Patient [ ]  Family [ ]   other [x ]EMR    [facility]    PO intake:  < 50% [ ]   50-75%  [ ]   %  [ x]  other :    Source for PO intake [ ] Patient [ ] family [ x] chart [ ] staff [ ] other    Enteral /Parenteral Nutrition:     Current Weight: (5/24) 184.9 lbs, 173.2# 5/31  (5/23) 173.9 lbs  (5/21) 179.4 lbs  (5/20) 177.9 lbs  (5/19) 173.9 lbs  (5/18) 172.4 lbs    no edema      % Weight Change     Pertinent Medications: MEDICATIONS  (STANDING):  apixaban 5 milliGRAM(s) Oral every 12 hours  chlorhexidine 4% Liquid 1 Application(s) Topical <User Schedule>  doxazosin 4 milliGRAM(s) Oral at bedtime  gabapentin 300 milliGRAM(s) Oral at bedtime  multivitamin 1 Tablet(s) Oral daily  nicotine - 21 mG/24Hr(s) Patch 1 patch Transdermal daily  oxymetazoline 0.05% Nasal Spray 2 Spray(s) Left Nostril two times a day  pantoprazole  Injectable 40 milliGRAM(s) IV Push daily  sertraline 100 milliGRAM(s) Oral daily    MEDICATIONS  (PRN):  acetaminophen   Tablet .. 650 milliGRAM(s) Oral every 6 hours PRN Temp greater or equal to 38C (100.4F), Mild Pain (1 - 3)  albuterol/ipratropium for Nebulization 3 milliLiter(s) Nebulizer every 6 hours PRN Shortness of Breath and/or Wheezing  ALPRAZolam 0.25 milliGRAM(s) Oral every 12 hours PRN anxiety  lidocaine 2% Viscous 15 milliLiter(s) Mucosal every 4 hours PRN oral pain    Pertinent Labs: CBC Full  -  ( 02 Jun 2021 07:50 )  WBC Count : 3.98 K/uL  RBC Count : 3.86 M/uL  Hemoglobin : 12.3 g/dL  Hematocrit : 38.9 %  Platelet Count - Automated : 206 K/uL  Mean Cell Volume : 100.8 fl  Mean Cell Hemoglobin : 31.9 pg  Mean Cell Hemoglobin Concentration : 31.6 gm/dL  Auto Neutrophil # : 2.27 K/uL  Auto Lymphocyte # : 1.18 K/uL  Auto Monocyte # : 0.41 K/uL  Auto Eosinophil # : 0.09 K/uL  Auto Basophil # : 0.02 K/uL  Auto Neutrophil % : 57.0 %  Auto Lymphocyte % : 29.6 %  Auto Monocyte % : 10.3 %  Auto Eosinophil % : 2.3 %  Auto Basophil % : 0.5 %    06-02 Na139 mmol/L Glu 73 mg/dL K+ 3.2 mmol/L<L> Cr  0.55 mg/dL BUN 8.9 mg/dL Phos 3.0 mg/dL Alb n/a   PAB n/a             Skin:     Nutrition focused physical exam conducted - found signs of malnutrition [ ]absent [x ]present    Subcutaneous fat loss: [ ] Orbital fat pads region, [x ]Buccal fat region, [ ]Triceps region,  [x ]Ribs region    Muscle wasting: [x ]Temples region, [x ]Clavicle region, [ x]Shoulder region, [ ]Scapula region, [ ]Interosseous region,  [ ]thigh region, [ ]Calf region    Estimated Needs:   [x ] no change since previous assessment  [ ] recalculated:     Current Nutrition Diagnosis: Pt remains at high nutrition risk secondary to malnutrition (severe, acute) related to inability to meet sufficient protein-energy in setting of COPD, depression, now with LLL pneumonia with parapneumonic effusion, pulmonary embolism, acute respiratory failure requiring intubation as evidenced by meeting <50% nutrient needs >5 days, mild muscle loss of temples, moderate muscle loss of clavicles and shoulders, moderate fat loss of buccal pads and rib region, and 1+ edema. Pt now on dysphasia diet taking diet fair to good as per nursing.  Last BM 5/23. Plan is LEANNA however appealing. Palliative following. No Peg wanted.     Recommendations:   1)Continue Diet as ordered  2) Continue MVI  3) Obtain daily weights to monitor trends.       Monitoring and Evaluation:   [x ] PO intake [x ] Tolerance to diet prescription [X] Weights  [X] Follow up per protocol [X] Labs:

## 2021-06-02 NOTE — PROGRESS NOTE ADULT - SUBJECTIVE AND OBJECTIVE BOX
Subjective: Patient lying in bed, no acute distress noted, denies chest pain, pressure, palpitation, dizziness. Tracheostomy with Passy Brian valve        V/S  T(C): 36.8 (06-02-21 @ 09:51), Max: 36.8 (06-02-21 @ 09:51)  HR: 95 (06-02-21 @ 09:51) (67 - 95)  BP: 117/68 (06-02-21 @ 09:51) (116/66 - 122/70)  RR: 18 (06-02-21 @ 09:51) (18 - 18)  SpO2: 93% (06-02-21 @ 09:51) (93% - 99%) on 35% O2 via trach collar    MEDICATIONS  (STANDING):  apixaban 5 milliGRAM(s) Oral every 12 hours  chlorhexidine 4% Liquid 1 Application(s) Topical <User Schedule>  doxazosin 4 milliGRAM(s) Oral at bedtime  gabapentin 300 milliGRAM(s) Oral at bedtime  multivitamin 1 Tablet(s) Oral daily  nicotine - 21 mG/24Hr(s) Patch 1 patch Transdermal daily  oxymetazoline 0.05% Nasal Spray 2 Spray(s) Left Nostril two times a day  pantoprazole  Injectable 40 milliGRAM(s) IV Push daily  sertraline 100 milliGRAM(s) Oral daily      06-02    139  |  99  |  8.9  ----------------------------<  73  3.2<L>   |  28.0  |  0.55    Ca    8.7      02 Jun 2021 07:50  Phos  3.0     06-02  Mg     1.8     06-02                                 12.3   3.98  )-----------( 206      ( 02 Jun 2021 07:50 )             38.9        PTT - ( 01 Jun 2021 05:59 )  PTT:42.2 sec    PHYSICAL EXAM  General: well nourished, well developed, no acute distress  Neurology: alert and oriented x 3, nonfocal, no gross deficits  Respiratory: clear to auscultation bilaterally, trach with Passy-[place] valve. Sutures removed, site dry and intact  CV: regular rate and rhythm, normal S1, S2  Abdomen: soft, nontender, nondistended, positive bowel sounds  Extremities: warm, well perfused. no edema. + DP pulses      PAST MEDICAL & SURGICAL HISTORY:  Poor historian    COPD (chronic obstructive pulmonary disease)    Pulmonary embolism    Atrial flutter    H/O solitary pulmonary nodule    Depression    No significant past surgical history

## 2021-06-03 LAB
ANION GAP SERPL CALC-SCNC: 14 MMOL/L — SIGNIFICANT CHANGE UP (ref 5–17)
APTT BLD: 40.2 SEC — HIGH (ref 27.5–35.5)
BASOPHILS # BLD AUTO: 0.02 K/UL — SIGNIFICANT CHANGE UP (ref 0–0.2)
BASOPHILS NFR BLD AUTO: 0.5 % — SIGNIFICANT CHANGE UP (ref 0–2)
BUN SERPL-MCNC: 5.5 MG/DL — LOW (ref 8–20)
CALCIUM SERPL-MCNC: 8.7 MG/DL — SIGNIFICANT CHANGE UP (ref 8.6–10.2)
CHLORIDE SERPL-SCNC: 99 MMOL/L — SIGNIFICANT CHANGE UP (ref 98–107)
CO2 SERPL-SCNC: 24 MMOL/L — SIGNIFICANT CHANGE UP (ref 22–29)
CREAT SERPL-MCNC: 0.5 MG/DL — SIGNIFICANT CHANGE UP (ref 0.5–1.3)
EOSINOPHIL # BLD AUTO: 0.06 K/UL — SIGNIFICANT CHANGE UP (ref 0–0.5)
EOSINOPHIL NFR BLD AUTO: 1.4 % — SIGNIFICANT CHANGE UP (ref 0–6)
GLUCOSE SERPL-MCNC: 87 MG/DL — SIGNIFICANT CHANGE UP (ref 70–99)
HCT VFR BLD CALC: 36.2 % — LOW (ref 39–50)
HCT VFR BLD CALC: 37.7 % — LOW (ref 39–50)
HGB BLD-MCNC: 11.7 G/DL — LOW (ref 13–17)
HGB BLD-MCNC: 12.4 G/DL — LOW (ref 13–17)
IMM GRANULOCYTES NFR BLD AUTO: 0.5 % — SIGNIFICANT CHANGE UP (ref 0–1.5)
INR BLD: 1.91 RATIO — HIGH (ref 0.88–1.16)
LYMPHOCYTES # BLD AUTO: 1.06 K/UL — SIGNIFICANT CHANGE UP (ref 1–3.3)
LYMPHOCYTES # BLD AUTO: 25.1 % — SIGNIFICANT CHANGE UP (ref 13–44)
MAGNESIUM SERPL-MCNC: 1.7 MG/DL — SIGNIFICANT CHANGE UP (ref 1.6–2.6)
MCHC RBC-ENTMCNC: 32 PG — SIGNIFICANT CHANGE UP (ref 27–34)
MCHC RBC-ENTMCNC: 32.3 GM/DL — SIGNIFICANT CHANGE UP (ref 32–36)
MCV RBC AUTO: 98.9 FL — SIGNIFICANT CHANGE UP (ref 80–100)
MONOCYTES # BLD AUTO: 0.38 K/UL — SIGNIFICANT CHANGE UP (ref 0–0.9)
MONOCYTES NFR BLD AUTO: 9 % — SIGNIFICANT CHANGE UP (ref 2–14)
NEUTROPHILS # BLD AUTO: 2.68 K/UL — SIGNIFICANT CHANGE UP (ref 1.8–7.4)
NEUTROPHILS NFR BLD AUTO: 63.5 % — SIGNIFICANT CHANGE UP (ref 43–77)
PHOSPHATE SERPL-MCNC: 2.9 MG/DL — SIGNIFICANT CHANGE UP (ref 2.4–4.7)
PLATELET # BLD AUTO: 217 K/UL — SIGNIFICANT CHANGE UP (ref 150–400)
POTASSIUM SERPL-MCNC: 3.3 MMOL/L — LOW (ref 3.5–5.3)
POTASSIUM SERPL-SCNC: 3.3 MMOL/L — LOW (ref 3.5–5.3)
PROTHROM AB SERPL-ACNC: 21.5 SEC — HIGH (ref 10.6–13.6)
RBC # BLD: 3.66 M/UL — LOW (ref 4.2–5.8)
RBC # FLD: 15.9 % — HIGH (ref 10.3–14.5)
SODIUM SERPL-SCNC: 137 MMOL/L — SIGNIFICANT CHANGE UP (ref 135–145)
WBC # BLD: 4.22 K/UL — SIGNIFICANT CHANGE UP (ref 3.8–10.5)
WBC # FLD AUTO: 4.22 K/UL — SIGNIFICANT CHANGE UP (ref 3.8–10.5)

## 2021-06-03 PROCEDURE — 99233 SBSQ HOSP IP/OBS HIGH 50: CPT

## 2021-06-03 PROCEDURE — 99221 1ST HOSP IP/OBS SF/LOW 40: CPT

## 2021-06-03 PROCEDURE — 99231 SBSQ HOSP IP/OBS SF/LOW 25: CPT

## 2021-06-03 RX ORDER — METHOCARBAMOL 500 MG/1
500 TABLET, FILM COATED ORAL THREE TIMES A DAY
Refills: 0 | Status: DISCONTINUED | OUTPATIENT
Start: 2021-06-03 | End: 2021-06-04

## 2021-06-03 RX ORDER — OXYCODONE AND ACETAMINOPHEN 5; 325 MG/1; MG/1
1 TABLET ORAL ONCE
Refills: 0 | Status: DISCONTINUED | OUTPATIENT
Start: 2021-06-03 | End: 2021-06-03

## 2021-06-03 RX ORDER — BUPRENORPHINE AND NALOXONE 2; .5 MG/1; MG/1
2 TABLET SUBLINGUAL
Refills: 0 | Status: DISCONTINUED | OUTPATIENT
Start: 2021-06-03 | End: 2021-06-04

## 2021-06-03 RX ORDER — ACETAMINOPHEN 500 MG
1000 TABLET ORAL ONCE
Refills: 0 | Status: COMPLETED | OUTPATIENT
Start: 2021-06-03 | End: 2021-06-03

## 2021-06-03 RX ORDER — POTASSIUM CHLORIDE 20 MEQ
40 PACKET (EA) ORAL EVERY 4 HOURS
Refills: 0 | Status: COMPLETED | OUTPATIENT
Start: 2021-06-03 | End: 2021-06-03

## 2021-06-03 RX ADMIN — PANTOPRAZOLE SODIUM 40 MILLIGRAM(S): 20 TABLET, DELAYED RELEASE ORAL at 13:58

## 2021-06-03 RX ADMIN — SERTRALINE 100 MILLIGRAM(S): 25 TABLET, FILM COATED ORAL at 13:56

## 2021-06-03 RX ADMIN — Medication 400 MILLIGRAM(S): at 04:09

## 2021-06-03 RX ADMIN — GABAPENTIN 300 MILLIGRAM(S): 400 CAPSULE ORAL at 21:22

## 2021-06-03 RX ADMIN — APIXABAN 5 MILLIGRAM(S): 2.5 TABLET, FILM COATED ORAL at 13:56

## 2021-06-03 RX ADMIN — OXYMETAZOLINE HYDROCHLORIDE 2 SPRAY(S): 0.5 SPRAY NASAL at 05:21

## 2021-06-03 RX ADMIN — Medication 4 MILLIGRAM(S): at 21:22

## 2021-06-03 RX ADMIN — Medication 0.25 MILLIGRAM(S): at 22:51

## 2021-06-03 RX ADMIN — Medication 1 TABLET(S): at 13:56

## 2021-06-03 RX ADMIN — Medication 1 PATCH: at 12:19

## 2021-06-03 RX ADMIN — METHOCARBAMOL 500 MILLIGRAM(S): 500 TABLET, FILM COATED ORAL at 21:21

## 2021-06-03 RX ADMIN — Medication 40 MILLIEQUIVALENT(S): at 09:31

## 2021-06-03 RX ADMIN — Medication 1 PATCH: at 07:30

## 2021-06-03 RX ADMIN — OXYCODONE AND ACETAMINOPHEN 1 TABLET(S): 5; 325 TABLET ORAL at 11:05

## 2021-06-03 RX ADMIN — CHLORHEXIDINE GLUCONATE 1 APPLICATION(S): 213 SOLUTION TOPICAL at 05:21

## 2021-06-03 RX ADMIN — Medication 40 MILLIEQUIVALENT(S): at 13:56

## 2021-06-03 RX ADMIN — OXYCODONE AND ACETAMINOPHEN 1 TABLET(S): 5; 325 TABLET ORAL at 11:49

## 2021-06-03 RX ADMIN — Medication 1000 MILLIGRAM(S): at 05:00

## 2021-06-03 RX ADMIN — Medication 0.25 MILLIGRAM(S): at 09:31

## 2021-06-03 RX ADMIN — BUPRENORPHINE AND NALOXONE 2 TABLET(S): 2; .5 TABLET SUBLINGUAL at 21:22

## 2021-06-03 RX ADMIN — Medication 650 MILLIGRAM(S): at 09:31

## 2021-06-03 RX ADMIN — Medication 1 PATCH: at 19:20

## 2021-06-03 RX ADMIN — Medication 650 MILLIGRAM(S): at 11:10

## 2021-06-03 RX ADMIN — Medication 1 PATCH: at 13:55

## 2021-06-03 NOTE — PROGRESS NOTE ADULT - SUBJECTIVE AND OBJECTIVE BOX
Subjective: "my back is in pain" NAD lying on side in bed.     Vital Signs:  Vital Signs Last 24 Hrs  T(C): 36.9 (06-03-21 @ 16:25), Max: 36.9 (06-03-21 @ 16:25)  T(F): 98.5 (06-03-21 @ 16:25), Max: 98.5 (06-03-21 @ 16:25)  HR: 88 (06-03-21 @ 16:25) (84 - 89)  BP: 126/78 (06-03-21 @ 16:25) (101/62 - 126/78)  RR: 18 (06-03-21 @ 16:25) (18 - 18)  SpO2: 93% (06-03-21 @ 16:25) (91% - 94%) on ATC      Relevant labs, radiology and Medications reviewed    Pertinent Physical Exam  Gen: WN/WD NAD  Neuro: A+Ox3, nonfocal  Pulm: rhonchi  CV: RRR  LE: no edema  Trach: PMV on and working, cuff down, site c/d/i healing well.     CXR: < from: Xray Chest 1 View- PORTABLE-Routine (Xray Chest 1 View- PORTABLE-Routine in AM.) (05.30.21 @ 05:31) >      INTERPRETATION:  Clinical Information: Pneumonia    Technique: AP chest image.    Comparison: 05/27/2021    Findings/  Impression: The heart is unremarkable. Trace left pleural effusion. No consolidations. Old right-sided rib fractures    < end of copied text >

## 2021-06-03 NOTE — CONSULT NOTE ADULT - SUBJECTIVE AND OBJECTIVE BOX
HPI:  60 yo m pmhx COPD, Aflutter on diltiazem (planned for ablation), PE (2018) on Eliquis, Pulmonary Nodule, Depression on Zoloft. Pt has had a complicated hospital course which has resulted in a tracheostomy. Urology was consulted because he failed a trial of voiding and his bocanegra was reinserted and shortly after he developed hematuria. Pt offers no acute complaints. Denies urinary urgency, abdominal pain, chest pain, shortness of breath, nausea, vomiting, diarrhea, headache. Pt reports "too frequent" BMs. He is non-ambulatory due generalized weakness (likely related to depression and malnutrition) which pre-dates this admission.       PAST MEDICAL & SURGICAL HISTORY:    COPD (chronic obstructive pulmonary disease)    Pulmonary embolism    Atrial flutter    H/O solitary pulmonary nodule    Depression    No significant past surgical history        Home Medications:  diazePAM 5 mg oral tablet: 1 tab(s) orally 3 times a day, As Needed (17 May 2021 12:15)  dilTIAZem 120 mg/24 hours oral capsule, extended release: 1 cap(s) orally once a day (17 May 2021 12:15)  Spiriva Respimat 10 ACT 2.5 mcg/inh inhalation aerosol: 2 puff(s) inhaled once a day (17 May 2021 12:15)  Suboxone 8 mg-2 mg sublingual film: 3 film(s) sublingual once a day (17 May 2021 12:15)  Zoloft 100 mg oral tablet: 1 tab(s) orally once a day (17 May 2021 12:15)      Review of Systems: All negative except where noted in HPI    MEDICATIONS  (STANDING):  apixaban 5 milliGRAM(s) Oral every 12 hours  buprenorphine 2 mG/naloxone 0.5 mG SL  Tablet 2 Tablet(s) SubLingual <User Schedule>  chlorhexidine 4% Liquid 1 Application(s) Topical <User Schedule>  doxazosin 4 milliGRAM(s) Oral at bedtime  gabapentin 300 milliGRAM(s) Oral at bedtime  multivitamin 1 Tablet(s) Oral daily  nicotine - 21 mG/24Hr(s) Patch 1 patch Transdermal daily  oxymetazoline 0.05% Nasal Spray 2 Spray(s) Left Nostril two times a day  pantoprazole  Injectable 40 milliGRAM(s) IV Push daily  sertraline 100 milliGRAM(s) Oral daily    MEDICATIONS  (PRN):  acetaminophen   Tablet .. 650 milliGRAM(s) Oral every 6 hours PRN Temp greater or equal to 38C (100.4F), Mild Pain (1 - 3)  albuterol/ipratropium for Nebulization 3 milliLiter(s) Nebulizer every 6 hours PRN Shortness of Breath and/or Wheezing  ALPRAZolam 0.25 milliGRAM(s) Oral every 12 hours PRN anxiety  lidocaine 2% Viscous 15 milliLiter(s) Mucosal every 4 hours PRN oral pain      SOCIAL HISTORY:      Vital Signs Last 24 Hrs  T(C): 36.4 (2021 10:25), Max: 36.7 (2021 17:08)  T(F): 97.5 (2021 10:25), Max: 98.1 (2021 17:08)  HR: 88 (2021 10:25) (84 - 89)  BP: 101/62 (2021 10:25) (101/62 - 134/73)  BP(mean): --  RR: 18 (2021 04:55) (18 - 18)  SpO2: 94% (2021 10:25) (91% - 94%)    Physical Exam:    General: NAD  HEENT: PERRL, EOMI  Neck: No JVD, FROM without pain  Pulm: Respirations non labored, no accessory muscle use  Abdomen: Soft, NT/ND, without rebound or guarding, no palpable bladder or urinary urgency on deep palpation  Neuro: Alert and oriented x3  GI: Bocanegra in place. Fruit punch colored hematuria with no visible clots    LABS:                        11.7   4.22  )-----------( 217      ( 2021 06:21 )             36.2     06-03    137  |  99  |  5.5<L>  ----------------------------<  87  3.3<L>   |  24.0  |  0.50    Ca    8.7      2021 06:21  Phos  2.9     06-03  Mg     1.7     06-03        Urinalysis Basic - ( 2021 21:00 )    Color: Red / Appearance: Bloody / S.020 / pH: x  Gluc: x / Ketone: Large  / Bili: Small / Urobili: 12 mg/dL   Blood: x / Protein: 500 mg/dL / Nitrite: Negative   Leuk Esterase: Trace / RBC: >50 /HPF / WBC 3-5   Sq Epi: x / Non Sq Epi: Occasional / Bacteria: Occasional      Impression and Plan:  61M with complex hospital course now with urinary retention and hematuria likely secondary to traumatic bocanegra insertion  -Recommend temporarily holding eliquis if able   -Bladder scans qshift to monitor for clot retention  -No need for CBI at present time  -Promote regular BMs to encourage voiding when bocanegra is removed next. At that time sitting pt on the edge of the bed with feet on floor may help encourage urination.  -Continue bocanegra for now    Plan to be discussed with Dr. Woodall HPI:  60 yo m pmhx COPD, Aflutter on diltiazem (planned for ablation), PE (2018) on Eliquis, Pulmonary Nodule, Depression on Zoloft. Pt has had a complicated hospital course which has resulted in a tracheostomy. Urology was consulted because he failed a trial of voiding requiring bocanegra reinsertio and shortly after he developed hematuria. Pt offers no acute complaints. Denies urinary urgency, abdominal pain, chest pain, shortness of breath, nausea, vomiting, diarrhea, headache. Pt reports "too frequent" BMs. He is non-ambulatory due generalized weakness (likely related to depression and malnutrition) which pre-dates this admission.       PAST MEDICAL & SURGICAL HISTORY:    COPD (chronic obstructive pulmonary disease)    Pulmonary embolism    Atrial flutter    H/O solitary pulmonary nodule    Depression    No significant past surgical history        Home Medications:  diazePAM 5 mg oral tablet: 1 tab(s) orally 3 times a day, As Needed (17 May 2021 12:15)  dilTIAZem 120 mg/24 hours oral capsule, extended release: 1 cap(s) orally once a day (17 May 2021 12:15)  Spiriva Respimat 10 ACT 2.5 mcg/inh inhalation aerosol: 2 puff(s) inhaled once a day (17 May 2021 12:15)  Suboxone 8 mg-2 mg sublingual film: 3 film(s) sublingual once a day (17 May 2021 12:15)  Zoloft 100 mg oral tablet: 1 tab(s) orally once a day (17 May 2021 12:15)      Review of Systems: All negative except where noted in HPI    MEDICATIONS  (STANDING):  apixaban 5 milliGRAM(s) Oral every 12 hours  buprenorphine 2 mG/naloxone 0.5 mG SL  Tablet 2 Tablet(s) SubLingual <User Schedule>  chlorhexidine 4% Liquid 1 Application(s) Topical <User Schedule>  doxazosin 4 milliGRAM(s) Oral at bedtime  gabapentin 300 milliGRAM(s) Oral at bedtime  multivitamin 1 Tablet(s) Oral daily  nicotine - 21 mG/24Hr(s) Patch 1 patch Transdermal daily  oxymetazoline 0.05% Nasal Spray 2 Spray(s) Left Nostril two times a day  pantoprazole  Injectable 40 milliGRAM(s) IV Push daily  sertraline 100 milliGRAM(s) Oral daily    MEDICATIONS  (PRN):  acetaminophen   Tablet .. 650 milliGRAM(s) Oral every 6 hours PRN Temp greater or equal to 38C (100.4F), Mild Pain (1 - 3)  albuterol/ipratropium for Nebulization 3 milliLiter(s) Nebulizer every 6 hours PRN Shortness of Breath and/or Wheezing  ALPRAZolam 0.25 milliGRAM(s) Oral every 12 hours PRN anxiety  lidocaine 2% Viscous 15 milliLiter(s) Mucosal every 4 hours PRN oral pain      SOCIAL HISTORY:      Vital Signs Last 24 Hrs  T(C): 36.4 (2021 10:25), Max: 36.7 (2021 17:08)  T(F): 97.5 (2021 10:25), Max: 98.1 (2021 17:08)  HR: 88 (2021 10:25) (84 - 89)  BP: 101/62 (2021 10:25) (101/62 - 134/73)  BP(mean): --  RR: 18 (2021 04:55) (18 - 18)  SpO2: 94% (2021 10:25) (91% - 94%)    Physical Exam:    General: NAD  HEENT: PERRL, EOMI  Neck: No JVD, FROM without pain  Pulm: Respirations non labored, no accessory muscle use  Abdomen: Soft, NT/ND, without rebound or guarding, no palpable bladder or urinary urgency on deep palpation  Neuro: Alert and oriented x3  GI: Bocanegra in place. Fruit punch colored hematuria with no visible clots    LABS:                        11.7   4.22  )-----------( 217      ( 2021 06:21 )             36.2     06-03    137  |  99  |  5.5<L>  ----------------------------<  87  3.3<L>   |  24.0  |  0.50    Ca    8.7      2021 06:21  Phos  2.9     06-03  Mg     1.7     06-03        Urinalysis Basic - ( 2021 21:00 )    Color: Red / Appearance: Bloody / S.020 / pH: x  Gluc: x / Ketone: Large  / Bili: Small / Urobili: 12 mg/dL   Blood: x / Protein: 500 mg/dL / Nitrite: Negative   Leuk Esterase: Trace / RBC: >50 /HPF / WBC 3-5   Sq Epi: x / Non Sq Epi: Occasional / Bacteria: Occasional      Impression and Plan:  61M with complex hospital course now with urinary retention and hematuria likely secondary to traumatic bocanegra insertion  -Recommend temporarily holding eliquis  -Bladder scans qshift to monitor for clot retention  -No need for CBI at present time  -Promote regular BMs to encourage voiding when bocanegra is removed next. At that time sitting pt on the edge of the bed with feet on floor may help encourage urination.  -Continue bocanegra for now    Plan to be discussed with Dr. Woodall

## 2021-06-03 NOTE — PROGRESS NOTE ADULT - SUBJECTIVE AND OBJECTIVE BOX
Interval Hx:  Patient seen during rounds   c/o of neck and back pain  suboxone and muscle relaxants previously held for oversedation  no longer c/o of sedation      T(C): 36.4 (21 @ 10:25), Max: 36.7 (21 @ 17:08)  HR: 88 (21 @ 10:25) (84 - 89)  BP: 101/62 (21 @ 10:25) (101/62 - 134/73)  RR: 18 (21 @ 04:55) (18 - 18)  SpO2: 94% (21 @ 10:25) (91% - 94%)      21 @ 07:01  -  21 @ 07:00  --------------------------------------------------------  IN: 0 mL / OUT: 425 mL / NET: -425 mL        acetaminophen   Tablet .. 650 milliGRAM(s) Oral every 6 hours PRN  albuterol/ipratropium for Nebulization 3 milliLiter(s) Nebulizer every 6 hours PRN  ALPRAZolam 0.25 milliGRAM(s) Oral every 12 hours PRN  apixaban 5 milliGRAM(s) Oral every 12 hours  chlorhexidine 4% Liquid 1 Application(s) Topical <User Schedule>  doxazosin 4 milliGRAM(s) Oral at bedtime  gabapentin 300 milliGRAM(s) Oral at bedtime  lidocaine 2% Viscous 15 milliLiter(s) Mucosal every 4 hours PRN  multivitamin 1 Tablet(s) Oral daily  nicotine - 21 mG/24Hr(s) Patch 1 patch Transdermal daily  oxymetazoline 0.05% Nasal Spray 2 Spray(s) Left Nostril two times a day  pantoprazole  Injectable 40 milliGRAM(s) IV Push daily  potassium chloride    Tablet ER 40 milliEquivalent(s) Oral every 4 hours  sertraline 100 milliGRAM(s) Oral daily                          11.7   4.22  )-----------( 217      ( 2021 06:21 )             36.2     0603    137  |  99  |  5.5<L>  ----------------------------<  87  3.3<L>   |  24.0  |  0.50    Ca    8.7      2021 06:21  Phos  2.9     06-03  Mg     1.7     06-03        Urinalysis Basic - ( 2021 21:00 )    Color: Red / Appearance: Bloody / S.020 / pH: x  Gluc: x / Ketone: Large  / Bili: Small / Urobili: 12 mg/dL   Blood: x / Protein: 500 mg/dL / Nitrite: Negative   Leuk Esterase: Trace / RBC: >50 /HPF / WBC 3-5   Sq Epi: x / Non Sq Epi: Occasional / Bacteria: Occasional        Pain Service   [phone]

## 2021-06-03 NOTE — CONSULT NOTE ADULT - CONSULT REQUESTED DATE/TIME
18-May-2021 15:58
03-Jun-2021 16:36
17-May-2021 10:54
24-May-2021 10:18
27-May-2021 14:42
30-May-2021
29-May-2021 12:54

## 2021-06-03 NOTE — PROGRESS NOTE ADULT - ASSESSMENT
60 y/o male with PMH of COPD (not on home O2), active smoker 1.5 ppd, aflutter and PE on Eliquis, S/p tracheostomy (05/25/).   on chronic suboxone 8mg tid    will start on suboxone 4mg BID HOLD for sedation.  change acetaminophen PO 975mg [id number] standing. HOLD for liver function test dysfunction  cont gabapentin PO 300mg qhs standing. HOLD for sedation  If no surgical contraindication, cont ketorolac IV 30mg [id number] standing x 4 doses. Can use celebrex 200mg PO q12h, with food thereafter.  Start robaxin 500mg TID PRN back spasm. HOLD for sedation

## 2021-06-03 NOTE — PROGRESS NOTE ADULT - ASSESSMENT
61 year old male with a PMH of  smoking p/w respiratory failure, PNA, A flutter, CHF and left pleural effusion.  Left pigtail inserted by MICU on 5/15/21.  On 5/18 Thoracic surgery notified to monitor left chest tube,, chest tube clamped.  5/19 cytology obtained from left pigtail & removed, CXR stable. Pt reintubated and consult requested for tracheostomy.  5/23 CPAP trials during day, vent rest overnite ENT called for oropharynx bleeding  5/24 Bronch last evening by MICU ENT evaluated >no acute source bleeding, old blood left bronchus  For Trach Tuesday 5/25 bedside percutaneous tracheostomy  6/3 tolerating trach collar

## 2021-06-03 NOTE — PROGRESS NOTE ADULT - SUBJECTIVE AND OBJECTIVE BOX
CHIEF COMPLAINT/INTERVAL HISTORY:    Patient is a 61y old  Male who presents with a chief complaint of Respiratory Failure (2021 16:34)    SUBJECTIVE & OBJECTIVE: Pt seen and examined at bedside. No overnight events. Patient complaining of back pain; pain management resumed lower dose of suboxone. Larson with new hematuria. Hold eliquis.  consulted.    ROS: No chest pain, palpitations, SOB, light headedness, dizziness, headache, nausea/vomiting, fevers/chills, abdominal pain, dysuria or increased urinary frequency.    ICU Vital Signs Last 24 Hrs  T(C): 36.9 (2021 16:25), Max: 36.9 (2021 16:25)  T(F): 98.5 (2021 16:25), Max: 98.5 (2021 16:25)  HR: 88 (2021 16:25) (84 - 89)  BP: 126/78 (2021 16:25) (101/62 - 126/78)  RR: 18 (2021 16:25) (18 - 18)  SpO2: 93% (2021 16:25) (91% - 94%)    MEDICATIONS  (STANDING):  buprenorphine 2 mG/naloxone 0.5 mG SL  Tablet 2 Tablet(s) SubLingual <User Schedule>  chlorhexidine 4% Liquid 1 Application(s) Topical <User Schedule>  doxazosin 4 milliGRAM(s) Oral at bedtime  gabapentin 300 milliGRAM(s) Oral at bedtime  multivitamin 1 Tablet(s) Oral daily  nicotine - 21 mG/24Hr(s) Patch 1 patch Transdermal daily  oxymetazoline 0.05% Nasal Spray 2 Spray(s) Left Nostril two times a day  pantoprazole  Injectable 40 milliGRAM(s) IV Push daily  sertraline 100 milliGRAM(s) Oral daily    MEDICATIONS  (PRN):  acetaminophen   Tablet .. 650 milliGRAM(s) Oral every 6 hours PRN Temp greater or equal to 38C (100.4F), Mild Pain (1 - 3)  albuterol/ipratropium for Nebulization 3 milliLiter(s) Nebulizer every 6 hours PRN Shortness of Breath and/or Wheezing  ALPRAZolam 0.25 milliGRAM(s) Oral every 12 hours PRN anxiety  lidocaine 2% Viscous 15 milliLiter(s) Mucosal every 4 hours PRN oral pain  methocarbamol 500 milliGRAM(s) Oral three times a day PRN back spasm      LABS:                        11.7   4.22  )-----------( 217      ( 2021 06:21 )             36.2     06-03    137  |  99  |  5.5<L>  ----------------------------<  87  3.3<L>   |  24.0  |  0.50    Ca    8.7      2021 06:21  Phos  2.9     06-03  Mg     1.7     06-03    Urinalysis Basic - ( 2021 21:00 )    Color: Red / Appearance: Bloody / S.020 / pH: x  Gluc: x / Ketone: Large  / Bili: Small / Urobili: 12 mg/dL   Blood: x / Protein: 500 mg/dL / Nitrite: Negative   Leuk Esterase: Trace / RBC: >50 /HPF / WBC 3-5   Sq Epi: x / Non Sq Epi: Occasional / Bacteria: Occasional      PHYSICAL EXAM:    GENERAL: middle aged male, chronically ill appearing, not in acute distress  HEAD:  Atraumatic, Normocephalic  EYES: EOMI, PERRLA, conjunctiva and sclera clear  ENMT: Moist mucous membranes   NECK: Supple, trach in situ without active bleeding  NERVOUS SYSTEM:  awake, alert, able to follow commands  CHEST/LUNG: coarse breath sounds  HEART: Regular rate and rhythm; + S1/S2  ABDOMEN: Soft, Nontender, Nondistended; Bowel sounds present  EXTREMITIES:  no pedal edema

## 2021-06-03 NOTE — CONSULT NOTE ADULT - REASON FOR ADMISSION
Respiratory Failure

## 2021-06-03 NOTE — PROGRESS NOTE ADULT - ASSESSMENT
62 y/o male with PMH of COPD (not on home O2), active smoker 1.5 ppd, aflutter and PE on Eliquis, pulmonary nodule who came to the ED on 5/15 complaining of shortness of breath and  cough, found to be in aflutter with RVR along with acute hypoxic respiratory failure due to large left pleural effusion and pulmonary edema requiring intubation. Ultimately no improvement in HR with  Cardizem and worsening shock state after intubation therefore was successfully cardioverted at 150 J. Also found to have right segmental PE and left peroneal DVT. Left chest tube was placed (05/16) with 1400 cc serosanguineous fluid.  He was extubated on 5/17 and reintubated on 5/19 because of increase secretion and patient could not manage it.  Bronchoscopy done to remove thick secretion.  Chest tube removed. Goal of care discussion had with patient and step daughter; patient is full code, agreed to trach and PEG tube placement. S/p tracheostomy (05/25/). Patient passed MBS and diet was advanced.    #Acute respiratory failure secondary to Multifocal PNA complicated by parapneumonic effusion and acute PE, grade 3 pulmonary HTN in the setting of COPD/PE; now s/p trachestomy on 5/25  - currently remains hypoxic on 35% trach collar  - prolonged hospitalization complicated by reintubation on 5/19 due to increased secretions.   - s/p bronchoscopy on 5/24 with BAL; prelim results with normal la nena reported; no AFB, few yeast   - s/p trach on 5/25 complicated with oozing from trach site  - pleural fluid cytology negative for malignant cells  - RVP and COVID 19 negative  - left chest tube removed  - completed course of abx  - switched to eliquis as discussed with CT surgery; will hold due to hematuria  - TTE with enlarged RV and decreased RV systolic function on 5/16  - continue trach care and suctioning as needed    - continue bronchodilators as needed  - chest PT  - thoracic surgery recommendations appreciated; sutures removed 6/2. Recalled to discuss changing trach to fenestrated, cuffless   - pulmonary consult appreciated; initiated Layo Torres trails on 5/30. Recommend changing to 6 in fenestrated, cuffless shiley  - repeat CXR unchanged; clinically patient improved and remains stable  - pending placement     # Chronic Pain Syndrome  - pain controlled; denies any acute pain.   - restarted on lower dose of suboxone per pain management  - add PRN robaxin  - continue gabapentin at bedtime    # Dysphagia  - completed modified barium  - advanced to mechanical soft with thin liquids  - speech recommendations appreciated    # Severe protein calorie malnutrition  - nutritionist consult appreciated     # History of PE  - hold eliquis due to hematuria    # Aflutter   - S/p cardioversion on admission  - rate controlled; BP stable off cardizem  - telemonitoring     # Tobacco Abuse  - nicotine patch  - previously counseled on cessation    # Anxiety/Depression   -denies suicidal thought currently  -remains stable off constant obs  -continue zoloft  - xanax PRN  - psych recommendations appreciated    # Acute Right Peroneal Vein DVT  -hold Eliquis as above    #Urinary Retention, complicated by gross hematuria  -failed voiding trial  -bocanegra re-inserted on 5/30  -gross hematuria noted today  -hold eliquis  -repeat H/H and check INR  -continue cadura  -no indication for CBI at this time  - consult appreciated    Plan discussed with patient, medicine PA, CT surgery NP, Dr. Arias, RN    DVT ppx - SCDS, hold eliquis due to hematuria    Dispo - Repeat labs due to hematuria.  consulted. CT surgery to assess for trach change prior to eventual discharge.

## 2021-06-03 NOTE — PROGRESS NOTE ADULT - PROBLEM SELECTOR PLAN 1
S/p bedside percutaneous tracheostomy (5/25)  Supplemental O2 as needed  Continue care as per primary team    Will discuss with thoracic team in AM regarding downsizing trach in preparation for discharge  Discussed plan with Dr Ty

## 2021-06-04 ENCOUNTER — TRANSCRIPTION ENCOUNTER (OUTPATIENT)
Age: 61
End: 2021-06-04

## 2021-06-04 LAB
ALBUMIN SERPL ELPH-MCNC: 3 G/DL — LOW (ref 3.3–5.2)
ALP SERPL-CCNC: 132 U/L — HIGH (ref 40–120)
ALT FLD-CCNC: 32 U/L — SIGNIFICANT CHANGE UP
ANION GAP SERPL CALC-SCNC: 12 MMOL/L — SIGNIFICANT CHANGE UP (ref 5–17)
AST SERPL-CCNC: 29 U/L — SIGNIFICANT CHANGE UP
BILIRUB DIRECT SERPL-MCNC: 0.5 MG/DL — HIGH (ref 0–0.3)
BILIRUB INDIRECT FLD-MCNC: 0.4 MG/DL — SIGNIFICANT CHANGE UP (ref 0.2–1)
BILIRUB SERPL-MCNC: 0.9 MG/DL — SIGNIFICANT CHANGE UP (ref 0.4–2)
BUN SERPL-MCNC: 5.1 MG/DL — LOW (ref 8–20)
CALCIUM SERPL-MCNC: 8.7 MG/DL — SIGNIFICANT CHANGE UP (ref 8.6–10.2)
CHLORIDE SERPL-SCNC: 100 MMOL/L — SIGNIFICANT CHANGE UP (ref 98–107)
CO2 SERPL-SCNC: 26 MMOL/L — SIGNIFICANT CHANGE UP (ref 22–29)
CREAT SERPL-MCNC: 0.51 MG/DL — SIGNIFICANT CHANGE UP (ref 0.5–1.3)
GLUCOSE SERPL-MCNC: 78 MG/DL — SIGNIFICANT CHANGE UP (ref 70–99)
HCT VFR BLD CALC: 38.5 % — LOW (ref 39–50)
HGB BLD-MCNC: 12.1 G/DL — LOW (ref 13–17)
MAGNESIUM SERPL-MCNC: 1.8 MG/DL — SIGNIFICANT CHANGE UP (ref 1.8–2.6)
MCHC RBC-ENTMCNC: 31.4 GM/DL — LOW (ref 32–36)
MCHC RBC-ENTMCNC: 31.4 PG — SIGNIFICANT CHANGE UP (ref 27–34)
MCV RBC AUTO: 100 FL — SIGNIFICANT CHANGE UP (ref 80–100)
PHOSPHATE SERPL-MCNC: 3.4 MG/DL — SIGNIFICANT CHANGE UP (ref 2.4–4.7)
PLATELET # BLD AUTO: 209 K/UL — SIGNIFICANT CHANGE UP (ref 150–400)
POTASSIUM SERPL-MCNC: 3.9 MMOL/L — SIGNIFICANT CHANGE UP (ref 3.5–5.3)
POTASSIUM SERPL-SCNC: 3.9 MMOL/L — SIGNIFICANT CHANGE UP (ref 3.5–5.3)
PROT SERPL-MCNC: 6.6 G/DL — SIGNIFICANT CHANGE UP (ref 6.6–8.7)
RBC # BLD: 3.85 M/UL — LOW (ref 4.2–5.8)
RBC # FLD: 16 % — HIGH (ref 10.3–14.5)
SODIUM SERPL-SCNC: 138 MMOL/L — SIGNIFICANT CHANGE UP (ref 135–145)
WBC # BLD: 4.65 K/UL — SIGNIFICANT CHANGE UP (ref 3.8–10.5)
WBC # FLD AUTO: 4.65 K/UL — SIGNIFICANT CHANGE UP (ref 3.8–10.5)

## 2021-06-04 PROCEDURE — 99232 SBSQ HOSP IP/OBS MODERATE 35: CPT

## 2021-06-04 PROCEDURE — 99231 SBSQ HOSP IP/OBS SF/LOW 25: CPT

## 2021-06-04 RX ORDER — DIAZEPAM 5 MG
1 TABLET ORAL
Qty: 0 | Refills: 0 | DISCHARGE

## 2021-06-04 RX ORDER — NICOTINE POLACRILEX 2 MG
1 GUM BUCCAL
Qty: 0 | Refills: 0 | DISCHARGE
Start: 2021-06-04

## 2021-06-04 RX ORDER — LIDOCAINE 4 G/100G
15 CREAM TOPICAL
Qty: 0 | Refills: 0 | DISCHARGE
Start: 2021-06-04

## 2021-06-04 RX ORDER — PANTOPRAZOLE SODIUM 20 MG/1
1 TABLET, DELAYED RELEASE ORAL
Qty: 30 | Refills: 0
Start: 2021-06-04 | End: 2021-07-03

## 2021-06-04 RX ORDER — IPRATROPIUM/ALBUTEROL SULFATE 18-103MCG
3 AEROSOL WITH ADAPTER (GRAM) INHALATION
Qty: 0 | Refills: 0 | DISCHARGE
Start: 2021-06-04

## 2021-06-04 RX ORDER — TIOTROPIUM BROMIDE 18 UG/1
2 CAPSULE ORAL; RESPIRATORY (INHALATION)
Qty: 0 | Refills: 0 | DISCHARGE

## 2021-06-04 RX ORDER — DOXAZOSIN MESYLATE 4 MG
1 TABLET ORAL
Qty: 0 | Refills: 0 | DISCHARGE
Start: 2021-06-04

## 2021-06-04 RX ORDER — METHOCARBAMOL 500 MG/1
500 TABLET, FILM COATED ORAL THREE TIMES A DAY
Refills: 0 | Status: DISCONTINUED | OUTPATIENT
Start: 2021-06-04 | End: 2021-06-05

## 2021-06-04 RX ORDER — METHOCARBAMOL 500 MG/1
1 TABLET, FILM COATED ORAL
Qty: 0 | Refills: 0 | DISCHARGE
Start: 2021-06-04

## 2021-06-04 RX ORDER — GABAPENTIN 400 MG/1
1 CAPSULE ORAL
Qty: 0 | Refills: 0 | DISCHARGE
Start: 2021-06-04

## 2021-06-04 RX ORDER — ALPRAZOLAM 0.25 MG
1 TABLET ORAL
Qty: 0 | Refills: 0 | DISCHARGE
Start: 2021-06-04

## 2021-06-04 RX ORDER — ACETAMINOPHEN 500 MG
1000 TABLET ORAL ONCE
Refills: 0 | Status: COMPLETED | OUTPATIENT
Start: 2021-06-04 | End: 2021-06-04

## 2021-06-04 RX ORDER — BUPRENORPHINE AND NALOXONE 2; .5 MG/1; MG/1
1 TABLET SUBLINGUAL
Refills: 0 | Status: DISCONTINUED | OUTPATIENT
Start: 2021-06-04 | End: 2021-06-05

## 2021-06-04 RX ORDER — APIXABAN 2.5 MG/1
5 TABLET, FILM COATED ORAL EVERY 12 HOURS
Refills: 0 | Status: DISCONTINUED | OUTPATIENT
Start: 2021-06-04 | End: 2021-06-05

## 2021-06-04 RX ORDER — DILTIAZEM HCL 120 MG
1 CAPSULE, EXT RELEASE 24 HR ORAL
Qty: 0 | Refills: 0 | DISCHARGE

## 2021-06-04 RX ADMIN — Medication 1 PATCH: at 19:16

## 2021-06-04 RX ADMIN — METHOCARBAMOL 500 MILLIGRAM(S): 500 TABLET, FILM COATED ORAL at 14:58

## 2021-06-04 RX ADMIN — Medication 1 PATCH: at 08:29

## 2021-06-04 RX ADMIN — METHOCARBAMOL 500 MILLIGRAM(S): 500 TABLET, FILM COATED ORAL at 09:03

## 2021-06-04 RX ADMIN — SERTRALINE 100 MILLIGRAM(S): 25 TABLET, FILM COATED ORAL at 12:25

## 2021-06-04 RX ADMIN — Medication 4 MILLIGRAM(S): at 21:27

## 2021-06-04 RX ADMIN — CHLORHEXIDINE GLUCONATE 1 APPLICATION(S): 213 SOLUTION TOPICAL at 05:06

## 2021-06-04 RX ADMIN — OXYMETAZOLINE HYDROCHLORIDE 2 SPRAY(S): 0.5 SPRAY NASAL at 05:06

## 2021-06-04 RX ADMIN — Medication 0.25 MILLIGRAM(S): at 22:36

## 2021-06-04 RX ADMIN — Medication 1 TABLET(S): at 12:25

## 2021-06-04 RX ADMIN — Medication 1 PATCH: at 12:24

## 2021-06-04 RX ADMIN — APIXABAN 5 MILLIGRAM(S): 2.5 TABLET, FILM COATED ORAL at 14:59

## 2021-06-04 RX ADMIN — BUPRENORPHINE AND NALOXONE 1 TABLET(S): 2; .5 TABLET SUBLINGUAL at 21:27

## 2021-06-04 RX ADMIN — GABAPENTIN 300 MILLIGRAM(S): 400 CAPSULE ORAL at 21:27

## 2021-06-04 RX ADMIN — PANTOPRAZOLE SODIUM 40 MILLIGRAM(S): 20 TABLET, DELAYED RELEASE ORAL at 12:25

## 2021-06-04 RX ADMIN — BUPRENORPHINE AND NALOXONE 1 TABLET(S): 2; .5 TABLET SUBLINGUAL at 14:59

## 2021-06-04 RX ADMIN — BUPRENORPHINE AND NALOXONE 2 TABLET(S): 2; .5 TABLET SUBLINGUAL at 10:08

## 2021-06-04 RX ADMIN — Medication 400 MILLIGRAM(S): at 18:12

## 2021-06-04 RX ADMIN — METHOCARBAMOL 500 MILLIGRAM(S): 500 TABLET, FILM COATED ORAL at 22:36

## 2021-06-04 NOTE — DISCHARGE NOTE PROVIDER - PROVIDER TOKENS
PROVIDER:[TOKEN:[[id number]]],PROVIDER:[TOKEN:[[id number]]],PROVIDER:[TOKEN:[[id number]]]

## 2021-06-04 NOTE — PROGRESS NOTE ADULT - ASSESSMENT
62 yo male with hematuria, poss traumatic bocanegra insertion, urinary retention  - cont bocanegra cath for now  - flush q shift with water or NS  - oob to chair as permitted  - PT

## 2021-06-04 NOTE — DISCHARGE NOTE PROVIDER - HOSPITAL COURSE
62 y/o male with PMH of COPD (not on home O2), active smoker 1.5 ppd, aflutter and PE on Eliquis, pulmonary nodule who came to the ED on 5/15 complaining of shortness of breath and  cough, found to be in aflutter with RVR along with acute hypoxic respiratory failure due to large left pleural effusion and pulmonary edema requiring intubation. Ultimately no improvement in HR with  Cardizem and worsening shock state after intubation therefore was successfully cardioverted at 150 J. Also found to have right segmental PE and left peroneal DVT. Left chest tube was placed (05/16) with 1400 cc serosanguineous fluid.  He was extubated on 5/17 and reintubated on 5/19 because of increase secretion and patient could not manage it.  Bronchoscopy done to remove thick secretion.  Chest tube removed. Goal of care discussion had with patient and step daughter; patient is full code, agreed to trach and PEG tube placement. S/p tracheostomy (05/25/). Patient passed MBS and diet was advanced.    #Acute respiratory failure secondary to Multifocal PNA complicated by parapneumonic effusion and acute PE, grade 3 pulmonary HTN in the setting of COPD/PE; now s/p tracheostomy on 5/25  - currently remains hypoxic on 35% trach collar but stable  - prolonged hospitalization complicated by reintubation on 5/19 due to increased secretions.   - s/p bronchoscopy on 5/24 with BAL; prelim results with normal la nena reported; no AFB, few yeast   - s/p trach on 5/25 complicated with oozing from trach site  - pleural fluid cytology negative for malignant cells  - RVP and COVID 19 negative  - left chest tube removed  - completed course of abx  - switched to eliquis as discussed with CT surgery   - TTE with enlarged RV and decreased RV systolic function on 5/16  - continue trach care and suctioning as needed    - continue bronchodilators as needed  - chest PT  - thoracic surgery recommendations appreciated; sutures removed 6/2. Per Dr. Ty, too soon to downsize trach today. Can be discharged to [facility] and trach can be downsized next week.  - pulmonary consult appreciated; initiated Layo hitchcock on 5/30.    - repeat CXR unchanged; clinically patient improved and remains stable  - medically stable for discharge to [facility]    # Chronic Pain Syndrome  - pain controlled; denies any acute pain.   - suboxone and robaxin adjusted per pain management  - continue gabapentin at bedtime    # Dysphagia  - completed modified barium  - advanced to mechanical soft with thin liquids  - speech recommendations appreciated    # Severe protein calorie malnutrition  - nutritionist consult appreciated     # History of PE  - hold eliquis due to hematuria    # Aflutter   - S/p cardioversion on admission  - rate controlled; BP stable off cardizem  - telemonitoring     # Tobacco Abuse  - nicotine patch  - previously counseled on cessation    # Anxiety/Depression   -denies suicidal thought currently  -remains stable off constant obs  -continue zoloft  -xanax PRN  -psych recommendations appreciated    # Acute Right Peroneal Vein DVT  -resume eliquis    #Urinary Retention, complicated by gross hematuria  -failed voiding trial  -bocanegra re-inserted on 5/30  -gross hematuria improved;  AYO Richardson cleared to resume eliquis.   -Hb remains stable  -continue cadura  - consult appreciated    PHYSICAL EXAM:    GENERAL: middle aged male, chronically ill appearing, not in acute distress  HEAD:  Atraumatic, Normocephalic  EYES: EOMI, PERRLA, conjunctiva and sclera clear  ENMT: Moist mucous membranes   NECK: Supple, trach in situ without active bleeding  NERVOUS SYSTEM:  awake, alert, able to follow commands  CHEST/LUNG: coarse breath sounds  HEART: Regular rate and rhythm; + S1/S2  ABDOMEN: Soft, Nontender, Nondistended; Bowel sounds present  EXTREMITIES:  no pedal edema    Medically stable for discharge to [facility]. Time spent on discharge 50 minutes.

## 2021-06-04 NOTE — PROGRESS NOTE ADULT - ASSESSMENT
60 y/o male with PMH of COPD (not on home O2), active smoker 1.5 ppd, aflutter and PE on Eliquis, S/p tracheostomy (05/25/).   on chronic suboxone 8mg tid     suboxone 8mg TID HOLD for sedation.  change acetaminophen PO 975mg [id number] standing. HOLD for liver function test dysfunction  cont gabapentin PO 300mg qhs standing. HOLD for sedation  If no surgical contraindication, cont ketorolac IV 30mg [id number] standing x 4 doses. Can use celebrex 200mg PO q12h, with food thereafter.  robaxin 500mg TID . HOLD for sedation

## 2021-06-04 NOTE — PROGRESS NOTE ADULT - PROBLEM SELECTOR PROBLEM 1
Acute respiratory failure with hypoxia
Pleural effusion
Acute respiratory failure with hypoxia

## 2021-06-04 NOTE — CHART NOTE - NSCHARTNOTESELECT_GEN_ALL_CORE
BRONCHOSCOPY/Event Note
Event Note
Nutrition Services
Event Note
Nutrition Services
Nutrition Services

## 2021-06-04 NOTE — DISCHARGE NOTE PROVIDER - DETAILS OF MALNUTRITION DIAGNOSIS/DIAGNOSES
This patient has been assessed with a concern for Malnutrition and was treated during this hospitalization for the following Nutrition diagnosis/diagnoses:     -  05/17/2021: Severe protein-calorie malnutrition

## 2021-06-04 NOTE — CHART NOTE - NSCHARTNOTEFT_GEN_A_CORE
I called CT Surgery service to come change the Trach on this patient. I was told the CT surgery MD feels it is too soon to change the Trach but maybe on Monday ( it will be a better day to consider changing the trach. I will notify Dr Logan of same. I called CT Surgery service to come change the Trach on this patient. I was told the CT surgery MD feels it is too soon to change the Trach but maybe on Monday ( 6/7/21) it will be a better day to consider changing the trach. I will notify Dr Logan of same.

## 2021-06-04 NOTE — DISCHARGE NOTE PROVIDER - CARE PROVIDERS DIRECT ADDRESSES
[email or web address]

## 2021-06-04 NOTE — DISCHARGE NOTE PROVIDER - NSDCCPCAREPLAN_GEN_ALL_CORE_FT
PRINCIPAL DISCHARGE DIAGNOSIS  Diagnosis: Acute respiratory failure with hypoxia  Assessment and Plan of Treatment: s/p trach; sutures removed on 6/3  trach to be downsized next week  continue bronchodilators  chest PT   outpatient follow up with pulmonary and CT surgery      SECONDARY DISCHARGE DIAGNOSES  Diagnosis: Hematuria  Assessment and Plan of Treatment: hemoglobin stable  hematuria improved  continue flushing bocanegra every 8 hours  continue cadura  resume eliquis  TOV next week    Diagnosis: Chronic pain syndrome  Assessment and Plan of Treatment: continue analgesic regimen as directed by pain mangement  follow up with outpatient pain management team    
Statement Selected

## 2021-06-04 NOTE — PROGRESS NOTE ADULT - SUBJECTIVE AND OBJECTIVE BOX
Subjective:61yMale pt with multiple medical issues failed voiding trials, hematuria after bocanegra cath.  Pt resting comfortably in bed. Bocanegra cath in place, draining red/ethan urine, no clots, clear.    Bocanegra: red/ethan    Vital Signs Last 24 Hrs  T(C): 36.6 (04 Jun 2021 04:30), Max: 36.9 (03 Jun 2021 16:25)  T(F): 97.9 (04 Jun 2021 04:30), Max: 98.5 (03 Jun 2021 16:25)  HR: 81 (04 Jun 2021 04:30) (81 - 88)  BP: 115/69 (04 Jun 2021 04:30) (101/62 - 126/78)  BP(mean): --  RR: 18 (04 Jun 2021 04:30) (18 - 18)  SpO2: 96% (04 Jun 2021 09:12) (90% - 96%)  I&O's Detail    03 Jun 2021 07:01  -  04 Jun 2021 07:00  --------------------------------------------------------  IN:  Total IN: 0 mL    OUT:    Indwelling Catheter - Urethral (mL): 550 mL  Total OUT: 550 mL    Total NET: -550 mL          Labs:                        12.1   4.65  )-----------( 209      ( 04 Jun 2021 08:02 )             38.5     06-04    138  |  100  |  5.1<L>  ----------------------------<  78  3.9   |  26.0  |  0.51    Ca    8.7      04 Jun 2021 08:02  Phos  3.4     06-04  Mg     1.8     06-04    TPro  6.6  /  Alb  3.0<L>  /  TBili  0.9  /  DBili  0.5<H>  /  AST  29  /  ALT  32  /  AlkPhos  132<H>  06-04    PT/INR - ( 03 Jun 2021 19:14 )   PT: 21.5 sec;   INR: 1.91 ratio         PTT - ( 03 Jun 2021 19:14 )  PTT:40.2 sec

## 2021-06-04 NOTE — DISCHARGE NOTE PROVIDER - NSDCMRMEDTOKEN_GEN_ALL_CORE_FT
ALPRAZolam 0.25 mg oral tablet: 1 tab(s) orally every 12 hours, As needed, anxiety  doxazosin 4 mg oral tablet: 1 tab(s) orally once a day (at bedtime)  Eliquis 5 mg oral tablet: 1 tab(s) orally 2 times a day   gabapentin 300 mg oral capsule: 1 cap(s) orally once a day (at bedtime)  ipratropium-albuterol 0.5 mg-2.5 mg/3 mL inhalation solution: 3 milliliter(s) inhaled every 6 hours, As needed, Shortness of Breath and/or Wheezing  lidocaine 2% mucous membrane solution: 15 milliliter(s) mucous membrane every 4 hours, As needed, oral pain  methocarbamol 500 mg oral tablet: 1 tab(s) orally 3 times a day  Multiple Vitamins oral tablet: 1 tab(s) orally once a day  nicotine 21 mg/24 hr transdermal film, extended release: 1 patch transdermal once a day  Suboxone 8 mg-2 mg sublingual film: 3 film(s) sublingual once a day  Zoloft 100 mg oral tablet: 1 tab(s) orally once a day

## 2021-06-04 NOTE — PROGRESS NOTE ADULT - ASSESSMENT
62 y/o male with PMH of COPD (not on home O2), active smoker 1.5 ppd, aflutter and PE on Eliquis, pulmonary nodule who came to the ED on 5/15 complaining of shortness of breath and  cough, found to be in aflutter with RVR along with acute hypoxic respiratory failure due to large left pleural effusion and pulmonary edema requiring intubation. Ultimately no improvement in HR with  Cardizem and worsening shock state after intubation therefore was successfully cardioverted at 150 J. Also found to have right segmental PE and left peroneal DVT. Left chest tube was placed (05/16) with 1400 cc serosanguineous fluid.  He was extubated on 5/17 and reintubated on 5/19 because of increase secretion and patient could not manage it.  Bronchoscopy done to remove thick secretion.  Chest tube removed. Goal of care discussion had with patient and step daughter; patient is full code, agreed to trach and PEG tube placement. S/p tracheostomy (05/25). Patient passed MBS and diet was advanced.    #Acute respiratory failure secondary to Multifocal PNA complicated by parapneumonic effusion and acute PE, grade 3 pulmonary HTN in the setting of COPD/PE; now s/p trachestomy on 5/25  - currently remains hypoxic on 35% trach collar  - prolonged hospitalization complicated by reintubation on 5/19 due to increased secretions.   - s/p bronchoscopy on 5/24 with BAL; prelim results with normal la nena reported; no AFB, few yeast   - s/p trach on 5/25 complicated with oozing from trach site; which has improved  - pleural fluid cytology negative for malignant cells  - RVP and COVID 19 negative  - left chest tube removed  - completed course of abx  - continue eliquis  - TTE with enlarged RV and decreased RV systolic function on 5/16  - continue trach care and suctioning as needed    - continue bronchodilators as needed  - chest PT  - thoracic surgery recommendations appreciated; sutures removed 6/2. Trach to be downsized as outpatient as discussed with Dr. Ty today.  - pulmonary consult appreciated; initiated Layo hitchcock on 5/30.    - repeat CXR unchanged; clinically patient improved and remains stable  - medically stable for discharge to [facility]    # Chronic Pain Syndrome  - uncontrolled back pain today; suboxone increased back to home dose per pain management  - robaxin changed to ATC  - continue gabapentin at bedtime  - pain management recommendations appreciated    # Dysphagia  - completed modified barium  - advanced to mechanical soft with thin liquids  - speech recommendations appreciated    # Severe protein calorie malnutrition  - nutritionist consult appreciated     # History of PE  - resume eliquis; hematuria improved    # Aflutter   - S/p cardioversion on admission  - rate controlled; BP stable off cardizem  - telemonitoring   - resume eliquis    # Tobacco Abuse  - nicotine patch  - previously counseled on cessation    # Anxiety/Depression   -denies suicidal thought currently  -remains stable off constant obs  -continue zoloft  - xanax PRN  - psych recommendations appreciated    # Acute Right Peroneal Vein DVT  -Eliquis as above    #Urinary Retention, complicated by gross hematuria  -failed voiding trial  -bocanegra re-inserted on 5/30  -gross hematuria improved; cleared to resume eliquis per   -continue cadura  - recommendations appreciated    Plan discussed with patient, medicine PA, CT surgery NP, Dr Ty,  PA, RN     DVT ppx - Eliquis    Dispo - Hematuria improved; Hb stable. Resumed eliquis and cleared for discharge per  and CT surgery. Trach to be downsized as outpatient as discussed with Dr. Ty. Patient could not be discharged to [facility] today, therefore transport has been arranged for 12 pm on 6/5.

## 2021-06-04 NOTE — PROGRESS NOTE ADULT - SUBJECTIVE AND OBJECTIVE BOX
Interval Hx:  Patient seen during rounds   c/o pain in back and neck  was able to tolerate the lower suboxone dose  will increase suboxone and rec making robaxin standing        T(C): 36.7 (21 @ 10:59), Max: 36.9 (21 @ 16:25)  HR: 81 (21 @ 10:59) (81 - 88)  BP: 107/62 (21 @ 10:59) (103/67 - 126/78)  RR: 18 (21 @ 10:59) (18 - 18)  SpO2: 95% (21 @ 10:59) (90% - 96%)      21 @ 07:01  -  21 @ 07:00  --------------------------------------------------------  IN: 0 mL / OUT: 550 mL / NET: -550 mL        acetaminophen   Tablet .. 650 milliGRAM(s) Oral every 6 hours PRN  albuterol/ipratropium for Nebulization 3 milliLiter(s) Nebulizer every 6 hours PRN  ALPRAZolam 0.25 milliGRAM(s) Oral every 12 hours PRN  apixaban 5 milliGRAM(s) Oral every 12 hours  buprenorphine 8 mG/naloxone 2 mG SL  Tablet 1 Tablet(s) SubLingual <User Schedule>  chlorhexidine 4% Liquid 1 Application(s) Topical <User Schedule>  doxazosin 4 milliGRAM(s) Oral at bedtime  gabapentin 300 milliGRAM(s) Oral at bedtime  lidocaine 2% Viscous 15 milliLiter(s) Mucosal every 4 hours PRN  methocarbamol 500 milliGRAM(s) Oral three times a day  multivitamin 1 Tablet(s) Oral daily  nicotine - 21 mG/24Hr(s) Patch 1 patch Transdermal daily  oxymetazoline 0.05% Nasal Spray 2 Spray(s) Left Nostril two times a day  pantoprazole  Injectable 40 milliGRAM(s) IV Push daily  sertraline 100 milliGRAM(s) Oral daily                          12.1   4.65  )-----------(  08:02 )             38.5     06-04    138  |  100  |  5.1<L>  ----------------------------<  78  3.9   |  26.0  |  0.51    Ca    8.7      2021 08:02  Phos  3.4     06-04  Mg     1.8     06-04    TPro  6.6  /  Alb  3.0<L>  /  TBili  0.9  /  DBili  0.5<H>  /  AST  29  /  ALT  32  /  AlkPhos  132<H>  06-04    PT/INR - ( 2021 19:14 )   PT: 21.5 sec;   INR: 1.91 ratio         PTT - ( 2021 19:14 )  PTT:40.2 sec  Urinalysis Basic - ( 2021 21:00 )    Color: Red / Appearance: Bloody / S.020 / pH: x  Gluc: x / Ketone: Large  / Bili: Small / Urobili: 12 mg/dL   Blood: x / Protein: 500 mg/dL / Nitrite: Negative   Leuk Esterase: Trace / RBC: >50 /HPF / WBC 3-5   Sq Epi: x / Non Sq Epi: Occasional / Bacteria: Occasional        Pain Service   [phone]

## 2021-06-04 NOTE — PROGRESS NOTE ADULT - PROBLEM SELECTOR PLAN 1
S/p bedside percutaneous tracheostomy (5/25)  Supplemental O2 as needed  Continue care as per primary team    Per Dr. Ty, too early to downsize tracheostomy tube. Possibly early next week.    D/w Dr. Ty.

## 2021-06-04 NOTE — PROGRESS NOTE ADULT - SUBJECTIVE AND OBJECTIVE BOX
Subjective - patient seen and evaluated bedside. Sitting in bed. C/o lower back pain.  Denies CP, SOB, HA, dizziness, n/v/d    Review of Systems: negative x 10 systems except as noted above    Brief summary:  61yMale s/p tracheostomy.     Significant/[id number] events: tolerating passe-miur valve and PO diet.      PAST MEDICAL & SURGICAL HISTORY:  Poor historian    COPD (chronic obstructive pulmonary disease)    Pulmonary embolism    Atrial flutter    H/O solitary pulmonary nodule    Depression    No significant past surgical history    No significant past surgical history          acetaminophen   Tablet .. 650 milliGRAM(s) Oral every 6 hours PRN  albuterol/ipratropium for Nebulization 3 milliLiter(s) Nebulizer every 6 hours PRN  ALPRAZolam 0.25 milliGRAM(s) Oral every 12 hours PRN  apixaban 5 milliGRAM(s) Oral every 12 hours  buprenorphine 8 mG/naloxone 2 mG SL  Tablet 1 Tablet(s) SubLingual <User Schedule>  chlorhexidine 4% Liquid 1 Application(s) Topical <User Schedule>  doxazosin 4 milliGRAM(s) Oral at bedtime  gabapentin 300 milliGRAM(s) Oral at bedtime  lidocaine 2% Viscous 15 milliLiter(s) Mucosal every 4 hours PRN  methocarbamol 500 milliGRAM(s) Oral three times a day  multivitamin 1 Tablet(s) Oral daily  nicotine - 21 mG/24Hr(s) Patch 1 patch Transdermal daily  oxymetazoline 0.05% Nasal Spray 2 Spray(s) Left Nostril two times a day  pantoprazole  Injectable 40 milliGRAM(s) IV Push daily  sertraline 100 milliGRAM(s) Oral daily  MEDICATIONS  (PRN):  acetaminophen   Tablet .. 650 milliGRAM(s) Oral every 6 hours PRN Temp greater or equal to 38C (100.4F), Mild Pain (1 - 3)  albuterol/ipratropium for Nebulization 3 milliLiter(s) Nebulizer every 6 hours PRN Shortness of Breath and/or Wheezing  ALPRAZolam 0.25 milliGRAM(s) Oral every 12 hours PRN anxiety  lidocaine 2% Viscous 15 milliLiter(s) Mucosal every 4 hours PRN oral pain      Daily     Daily                               12.1   4.65  )-----------( 209      ( 04 Jun 2021 08:02 )             38.5   06-04    138  |  100  |  5.1<L>  ----------------------------<  78  3.9   |  26.0  |  0.51    Ca    8.7      04 Jun 2021 08:02  Phos  3.4     06-04  Mg     1.8     06-04    TPro  6.6  /  Alb  3.0<L>  /  TBili  0.9  /  DBili  0.5<H>  /  AST  29  /  ALT  32  /  AlkPhos  132<H>  06-04      PT/INR - ( 03 Jun 2021 19:14 )   PT: 21.5 sec;   INR: 1.91 ratio         PTT - ( 03 Jun 2021 19:14 )  PTT:40.2 sec      Objective:  T(C): 36.7 (06-04-21 @ 10:59), Max: 36.9 (06-03-21 @ 16:25)  HR: 81 (06-04-21 @ 10:59) (81 - 88)  BP: 107/62 (06-04-21 @ 10:59) (103/67 - 126/78)  RR: 18 (06-04-21 @ 10:59) (18 - 18)  SpO2: 95% (06-04-21 @ 10:59) (90% - 96%)  Wt(kg): --CAPILLARY BLOOD GLUCOSE      I&O's Summary    03 Jun 2021 07:01  -  04 Jun 2021 07:00  --------------------------------------------------------  IN: 0 mL / OUT: 550 mL / NET: -550 mL        Physical Exam  Neuro: A+O x 3, non-focal, speech clear and intact  Pulm: CTA, equal bilaterally, trachesostomy site c/d/i, intact, no evidence of bleeding, skin breakdown, infection.   CV: RRR,  +S1S2  Abd: soft, NT, ND, +BS  Ext: +DP Pulses b/l,  no edema      Imaging:  CXR:  < from: Xray Chest 1 View- PORTABLE-Routine (Xray Chest 1 View- PORTABLE-Routine in AM.) (05.30.21 @ 05:31) >      INTERPRETATION:  Clinical Information: Pneumonia    Technique: AP chest image.    Comparison: 05/27/2021    Findings/  Impression: The heart is unremarkable. Trace left pleural effusion. No consolidations. Old right-sided rib fractures    < end of copied text >

## 2021-06-04 NOTE — PROGRESS NOTE ADULT - SUBJECTIVE AND OBJECTIVE BOX
CHIEF COMPLAINT/INTERVAL HISTORY:    Patient is a 61y old  Male who presents with a chief complaint of Respiratory Failure (2021 15:17)    SUBJECTIVE & OBJECTIVE: Pt seen and examined at bedside. No overnight events. Complaining of back pain today; seen by pain management and restarted on home dose of suboxone.    ROS: No chest pain, palpitations, SOB, light headedness, dizziness, headache, nausea/vomiting, fevers/chills, abdominal pain, dysuria or increased urinary frequency.    ICU Vital Signs Last 24 Hrs  T(C): 36.7 (2021 17:06), Max: 36.7 (2021 10:59)  T(F): 98 (2021 17:06), Max: 98.1 (2021 10:59)  HR: 79 (2021 17:06) (79 - 87)  BP: 115/67 (2021 17:06) (103/67 - 115/69)  RR: 18 (2021 17:06) (18 - 18)  SpO2: 95% (2021 17:06) (90% - 96%)    MEDICATIONS  (STANDING):  apixaban 5 milliGRAM(s) Oral every 12 hours  buprenorphine 8 mG/naloxone 2 mG SL  Tablet 1 Tablet(s) SubLingual <User Schedule>  chlorhexidine 4% Liquid 1 Application(s) Topical <User Schedule>  doxazosin 4 milliGRAM(s) Oral at bedtime  gabapentin 300 milliGRAM(s) Oral at bedtime  methocarbamol 500 milliGRAM(s) Oral three times a day  multivitamin 1 Tablet(s) Oral daily  nicotine - 21 mG/24Hr(s) Patch 1 patch Transdermal daily  oxymetazoline 0.05% Nasal Spray 2 Spray(s) Left Nostril two times a day  pantoprazole  Injectable 40 milliGRAM(s) IV Push daily  sertraline 100 milliGRAM(s) Oral daily    MEDICATIONS  (PRN):  acetaminophen   Tablet .. 650 milliGRAM(s) Oral every 6 hours PRN Temp greater or equal to 38C (100.4F), Mild Pain (1 - 3)  albuterol/ipratropium for Nebulization 3 milliLiter(s) Nebulizer every 6 hours PRN Shortness of Breath and/or Wheezing  ALPRAZolam 0.25 milliGRAM(s) Oral every 12 hours PRN anxiety  lidocaine 2% Viscous 15 milliLiter(s) Mucosal every 4 hours PRN oral pain      LABS:                        12.1   4.65  )-----------( 209      ( 2021 08:02 )             38.5     06-04    138  |  100  |  5.1<L>  ----------------------------<  78  3.9   |  26.0  |  0.51    Ca    8.7      2021 08:02  Phos  3.4     06-04  Mg     1.8     06-04    TPro  6.6  /  Alb  3.0<L>  /  TBili  0.9  /  DBili  0.5<H>  /  AST  29  /  ALT  32  /  AlkPhos  132<H>  06-04    PT/INR - ( 2021 19:14 )   PT: 21.5 sec;   INR: 1.91 ratio         PTT - ( 2021 19:14 )  PTT:40.2 sec  Urinalysis Basic - ( 2021 21:00 )    Color: Red / Appearance: Bloody / S.020 / pH: x  Gluc: x / Ketone: Large  / Bili: Small / Urobili: 12 mg/dL   Blood: x / Protein: 500 mg/dL / Nitrite: Negative   Leuk Esterase: Trace / RBC: >50 /HPF / WBC 3-5   Sq Epi: x / Non Sq Epi: Occasional / Bacteria: Occasional       PHYSICAL EXAM:    GENERAL: middle aged male, chronically ill appearing, not in acute distress  HEAD:  Atraumatic, Normocephalic  EYES: EOMI, PERRLA, conjunctiva and sclera clear  ENMT: Moist mucous membranes   NECK: Supple, trach in situ without active bleeding  NERVOUS SYSTEM:  AAO X 4; able to follow commands  CHEST/LUNG: coarse breath sounds  HEART: Regular rate and rhythm; + S1/S2  ABDOMEN: Soft, Nontender, Nondistended; Bowel sounds present  EXTREMITIES:  no pedal edema

## 2021-06-04 NOTE — DISCHARGE NOTE PROVIDER - CARE PROVIDER_API CALL
Vikram Ty)  Surgery; Thoracic Surgery  [address] [facility]  [address] [place] [address]  Phone: [phone]  Fax: [phone]  Follow Up Time:     Vega Remy)  Critical Care Medicine; Pulmonary Disease; Sleep Medicine  [address] [facility], [address]  [place] [address]  Phone: [phone]  Fax: [phone]  Follow Up Time:     Gold Woodall)  Urology  [address] [facility] [facility] [address]  [place] [address]  Phone: [phone]  Fax: [phone]  Follow Up Time:

## 2021-06-05 ENCOUNTER — TRANSCRIPTION ENCOUNTER (OUTPATIENT)
Age: 61
End: 2021-06-05

## 2021-06-05 VITALS
RESPIRATION RATE: 18 BRPM | OXYGEN SATURATION: 90 % | SYSTOLIC BLOOD PRESSURE: 92 MMHG | DIASTOLIC BLOOD PRESSURE: 57 MMHG | TEMPERATURE: 98 F | HEART RATE: 76 BPM

## 2021-06-05 LAB
HCT VFR BLD CALC: 38.5 % — LOW (ref 39–50)
HGB BLD-MCNC: 12.1 G/DL — LOW (ref 13–17)
MCHC RBC-ENTMCNC: 31.4 GM/DL — LOW (ref 32–36)
MCHC RBC-ENTMCNC: 31.8 PG — SIGNIFICANT CHANGE UP (ref 27–34)
MCV RBC AUTO: 101 FL — HIGH (ref 80–100)
PLATELET # BLD AUTO: 193 K/UL — SIGNIFICANT CHANGE UP (ref 150–400)
RBC # BLD: 3.81 M/UL — LOW (ref 4.2–5.8)
RBC # FLD: 15.8 % — HIGH (ref 10.3–14.5)
WBC # BLD: 5.35 K/UL — SIGNIFICANT CHANGE UP (ref 3.8–10.5)
WBC # FLD AUTO: 5.35 K/UL — SIGNIFICANT CHANGE UP (ref 3.8–10.5)

## 2021-06-05 PROCEDURE — 99239 HOSP IP/OBS DSCHRG MGMT >30: CPT

## 2021-06-05 RX ADMIN — Medication 1 PATCH: at 12:09

## 2021-06-05 RX ADMIN — Medication 1 PATCH: at 07:04

## 2021-06-05 RX ADMIN — CHLORHEXIDINE GLUCONATE 1 APPLICATION(S): 213 SOLUTION TOPICAL at 05:26

## 2021-06-05 RX ADMIN — Medication 1 PATCH: at 12:14

## 2021-06-05 RX ADMIN — BUPRENORPHINE AND NALOXONE 1 TABLET(S): 2; .5 TABLET SUBLINGUAL at 12:14

## 2021-06-05 RX ADMIN — SERTRALINE 100 MILLIGRAM(S): 25 TABLET, FILM COATED ORAL at 12:14

## 2021-06-05 RX ADMIN — APIXABAN 5 MILLIGRAM(S): 2.5 TABLET, FILM COATED ORAL at 05:26

## 2021-06-05 RX ADMIN — BUPRENORPHINE AND NALOXONE 1 TABLET(S): 2; .5 TABLET SUBLINGUAL at 07:47

## 2021-06-05 RX ADMIN — METHOCARBAMOL 500 MILLIGRAM(S): 500 TABLET, FILM COATED ORAL at 05:26

## 2021-06-05 RX ADMIN — METHOCARBAMOL 500 MILLIGRAM(S): 500 TABLET, FILM COATED ORAL at 12:14

## 2021-06-05 RX ADMIN — Medication 1 TABLET(S): at 12:14

## 2021-06-05 RX ADMIN — PANTOPRAZOLE SODIUM 40 MILLIGRAM(S): 20 TABLET, DELAYED RELEASE ORAL at 12:14

## 2021-06-05 NOTE — PROGRESS NOTE ADULT - ASSESSMENT
60 y/o male with PMH of COPD (not on home O2), active smoker 1.5 ppd, aflutter and PE on Eliquis, pulmonary nodule who came to the ED on 5/15 complaining of shortness of breath and  cough, found to be in aflutter with RVR along with acute hypoxic respiratory failure due to large left pleural effusion and pulmonary edema requiring intubation. Ultimately no improvement in HR with  Cardizem and worsening shock state after intubation therefore was successfully cardioverted at 150 J. Also found to have right segmental PE and left peroneal DVT. Left chest tube was placed (05/16) with 1400 cc serosanguineous fluid.  He was extubated on 5/17 and reintubated on 5/19 because of increase secretion and patient could not manage it.  Bronchoscopy done to remove thick secretion.  Chest tube removed. Goal of care discussion had with patient and step daughter; patient is full code, agreed to trach and PEG tube placement. S/p tracheostomy (05/25). Patient passed MBS and diet was advanced.    #Acute respiratory failure secondary to Multifocal PNA complicated by parapneumonic effusion and acute PE, grade 3 pulmonary HTN in the setting of COPD/PE; now s/p trachestomy on 5/25  - currently remains hypoxic on 35% trach collar  - s/p bronchoscopy on 5/24 with BAL; prelim results with normal la nena reported; no AFB, few yeast   - s/p trach on 5/25 complicated with oozing from trach site; which has improved  - pleural fluid cytology negative for malignant cells  - RVP and COVID 19 negative  - completed course of abx  - continue eliquis  - TTE with enlarged RV and decreased RV systolic function on 5/16  - continue trach care and suctioning as needed, pulmonologist f/u at [facility] for weaning off trach and further downsizing of trach  - continue bronchodilators as needed  - medically stable for discharge to [facility]    # Chronic Pain Syndrome  - uncontrolled back pain today; suboxone increased back to home dose per pain management  -c/w  robaxin   - continue gabapentin at bedtime  - pain management recommendations appreciated    # Dysphagia  - completed modified barium  - advanced to mechanical soft with thin liquids  - speech recommendations appreciated    # Severe protein calorie malnutrition  - nutritionist consult appreciated     # History of PE  - c/w eliquis; hematuria improved    # Aflutter   - S/p cardioversion on admission  - rate controlled; BP stable off cardizem  - telemonitoring   - resume eliquis    # Tobacco Abuse  - nicotine patch  - previously counseled on cessation    # Anxiety/Depression   -denies suicidal thought currently  -remains stable off constant obs  -continue zoloft  - xanax PRN  - psych recommendations appreciated    # Acute Right Peroneal Vein DVT  -Eliquis as above    #Urinary Retention, complicated by gross hematuria  -failed voiding trial  -bocanegra re-inserted on 5/30  -gross hematuria improved; cleared to resume eliquis per   -continue cadura  - recommendations appreciated      DVT ppx - Eliquis  Dispo - Hematuria improved; Hb stable. Resumed eliquis and cleared for discharge per  and CT surgery. Trach to be downsized as outpatient as discussed with Dr. Ty. Stable for transfer to [facility]

## 2021-06-05 NOTE — PROGRESS NOTE ADULT - NUTRITIONAL ASSESSMENT
This patient has been assessed with a concern for Malnutrition and has been determined to have a diagnosis/diagnoses of Severe protein-calorie malnutrition.      
This patient has been assessed with a concern for Malnutrition and has been determined to have a diagnosis/diagnoses of Severe protein-calorie malnutrition.    This patient is being managed with:   Diet Dysphagia 2 Mechanical Soft-Thin Liquids-  Entered: Jun 1 2021 12:35PM    
This patient has been assessed with a concern for Malnutrition and has been determined to have a diagnosis/diagnoses of Severe protein-calorie malnutrition.    This patient is being managed with:   Diet Dysphagia 2 Mechanical Soft-Thin Liquids-  Entered: Jun 1 2021 12:35PM    
This patient has been assessed with a concern for Malnutrition and has been determined to have a diagnosis/diagnoses of Severe protein-calorie malnutrition.    This patient is being managed with:   Diet Dysphagia 1 Pureed-Thin Liquids-  Entered: May 28 2021  3:26PM    
This patient has been assessed with a concern for Malnutrition and has been determined to have a diagnosis/diagnoses of Severe protein-calorie malnutrition.    This patient is being managed with:   Diet NPO after Midnight-     NPO Start Date: 24-May-2021   NPO Start Time: 23:59  Entered: May 24 2021  6:30PM    Diet NPO after Midnight-     NPO Start Date: 24-May-2021   NPO Start Time: 23:59  Except Medications  Entered: May 24 2021  7:46AM    Diet NPO with Tube Feed-  Tube Feeding Modality: Orogastric  Jevity 1.5 Venu (JEVITY1.5)  Total Volume for 24 Hours (mL): 1760  Continuous  Starting Tube Feed Rate {mL per Hour}: 20  Increase Tube Feed Rate by (mL): 10     Every 4 hours  Until Goal Tube Feed Rate (mL per Hour): 80  Tube Feed Duration (in Hours): 22  Tube Feed Start Time: 13:15  Bolus   Total Volume per Flush (mL): 125   Frequency: Every 6 Hours  Entered: May 21 2021 10:39AM    
This patient has been assessed with a concern for Malnutrition and has been determined to have a diagnosis/diagnoses of Severe protein-calorie malnutrition.    This patient is being managed with:   Diet Dysphagia 2 Mechanical Soft-Thin Liquids-  Entered: Jun 1 2021 12:35PM    
This patient has been assessed with a concern for Malnutrition and has been determined to have a diagnosis/diagnoses of Severe protein-calorie malnutrition.    This patient is being managed with:   Diet NPO-  Entered: May 18 2021  5:46PM    
This patient has been assessed with a concern for Malnutrition and has been determined to have a diagnosis/diagnoses of Severe protein-calorie malnutrition.    This patient is being managed with:   Diet Dysphagia 1 Pureed-Thin Liquids-  Entered: May 28 2021  3:26PM    
This patient has been assessed with a concern for Malnutrition and has been determined to have a diagnosis/diagnoses of Severe protein-calorie malnutrition.    This patient is being managed with:   Diet Dysphagia 1 Pureed-Thin Liquids-  Entered: May 28 2021  3:26PM    
This patient has been assessed with a concern for Malnutrition and has been determined to have a diagnosis/diagnoses of Severe protein-calorie malnutrition.    This patient is being managed with:   Diet Dysphagia 2 Mechanical Soft-Thin Liquids-  Entered: Jun 1 2021 12:35PM    
This patient has been assessed with a concern for Malnutrition and has been determined to have a diagnosis/diagnoses of Severe protein-calorie malnutrition.    This patient is being managed with:   Diet NPO after Midnight-     NPO Start Date: 24-May-2021   NPO Start Time: 23:59  Except Medications  Entered: May 24 2021  7:46AM    Diet NPO with Tube Feed-  Tube Feeding Modality: Orogastric  Jevity 1.5 Venu (JEVITY1.5)  Total Volume for 24 Hours (mL): 1760  Continuous  Starting Tube Feed Rate {mL per Hour}: 20  Increase Tube Feed Rate by (mL): 10     Every 4 hours  Until Goal Tube Feed Rate (mL per Hour): 80  Tube Feed Duration (in Hours): 22  Tube Feed Start Time: 13:15  Bolus   Total Volume per Flush (mL): 125   Frequency: Every 6 Hours  Entered: May 21 2021 10:39AM    
This patient has been assessed with a concern for Malnutrition and has been determined to have a diagnosis/diagnoses of Severe protein-calorie malnutrition.    This patient is being managed with:   Diet NPO with Tube Feed-  Tube Feeding Modality: Orogastric  Jevity 1.5 Venu (JEVITY1.5)  Total Volume for 24 Hours (mL): 1760  Continuous  Starting Tube Feed Rate {mL per Hour}: 20  Increase Tube Feed Rate by (mL): 10     Every 4 hours  Until Goal Tube Feed Rate (mL per Hour): 80  Tube Feed Duration (in Hours): 22  Tube Feed Start Time: 13:15  Bolus   Total Volume per Flush (mL): 125   Frequency: Every 6 Hours  Entered: May 21 2021 10:39AM    
This patient has been assessed with a concern for Malnutrition and has been determined to have a diagnosis/diagnoses of Severe protein-calorie malnutrition.    This patient is being managed with:   Diet Dysphagia 2 Mechanical Soft-Thin Liquids-  Entered: Jun 1 2021 12:35PM    
This patient has been assessed with a concern for Malnutrition and has been determined to have a diagnosis/diagnoses of Severe protein-calorie malnutrition.    This patient is being managed with:   Diet NPO with Tube Feed-  Tube Feeding Modality: Orogastric  Jevity 1.5 Venu (JEVITY1.5)  Total Volume for 24 Hours (mL): 1760  Continuous  Starting Tube Feed Rate {mL per Hour}: 20  Increase Tube Feed Rate by (mL): 10     Every 4 hours  Until Goal Tube Feed Rate (mL per Hour): 80  Tube Feed Duration (in Hours): 22  Tube Feed Start Time: 13:15  Bolus   Total Volume per Flush (mL): 125   Frequency: Every 6 Hours  Entered: May 21 2021 10:39AM    
This patient has been assessed with a concern for Malnutrition and has been determined to have a diagnosis/diagnoses of Severe protein-calorie malnutrition.      
This patient has been assessed with a concern for Malnutrition and has been determined to have a diagnosis/diagnoses of Severe protein-calorie malnutrition.    This patient is being managed with:   Diet Dysphagia 1 Pureed-Thin Liquids-  Entered: May 28 2021  3:26PM    
This patient has been assessed with a concern for Malnutrition and has been determined to have a diagnosis/diagnoses of Severe protein-calorie malnutrition.    This patient is being managed with:   Diet NPO after Midnight-     NPO Start Date: 24-May-2021   NPO Start Time: 23:59  Except Medications  Entered: May 24 2021  7:46AM    Diet NPO with Tube Feed-  Tube Feeding Modality: Orogastric  Jevity 1.5 Venu (JEVITY1.5)  Total Volume for 24 Hours (mL): 1760  Continuous  Starting Tube Feed Rate {mL per Hour}: 20  Increase Tube Feed Rate by (mL): 10     Every 4 hours  Until Goal Tube Feed Rate (mL per Hour): 80  Tube Feed Duration (in Hours): 22  Tube Feed Start Time: 13:15  Bolus   Total Volume per Flush (mL): 125   Frequency: Every 6 Hours  Entered: May 21 2021 10:39AM    
This patient has been assessed with a concern for Malnutrition and has been determined to have a diagnosis/diagnoses of Severe protein-calorie malnutrition.    This patient is being managed with:   Diet NPO with Tube Feed-  Tube Feeding Modality: Orogastric  Jevity 1.5 Venu (JEVITY1.5)  Total Volume for 24 Hours (mL): 1760  Continuous  Starting Tube Feed Rate {mL per Hour}: 20  Increase Tube Feed Rate by (mL): 10     Every 4 hours  Until Goal Tube Feed Rate (mL per Hour): 80  Tube Feed Duration (in Hours): 22  Tube Feed Start Time: 13:15  Bolus   Total Volume per Flush (mL): 125   Frequency: Every 6 Hours  Entered: May 21 2021 10:39AM    
This patient has been assessed with a concern for Malnutrition and has been determined to have a diagnosis/diagnoses of Severe protein-calorie malnutrition.    This patient is being managed with:   Diet NPO with Tube Feed-  Tube Feeding Modality: Orogastric  Jevity 1.5 Venu (JEVITY1.5)  Total Volume for 24 Hours (mL): 1760  Continuous  Starting Tube Feed Rate {mL per Hour}: 20  Increase Tube Feed Rate by (mL): 10     Every 4 hours  Until Goal Tube Feed Rate (mL per Hour): 80  Tube Feed Duration (in Hours): 22  Tube Feed Start Time: 13:15  Bolus   Total Volume per Flush (mL): 125   Frequency: Every 6 Hours  Entered: May 21 2021 10:39AM    
This patient has been assessed with a concern for Malnutrition and has been determined to have a diagnosis/diagnoses of Severe protein-calorie malnutrition.      
This patient has been assessed with a concern for Malnutrition and has been determined to have a diagnosis/diagnoses of Severe protein-calorie malnutrition.    This patient is being managed with:   Diet Dysphagia 2 Mechanical Soft-Thin Liquids-  Entered: Jun 1 2021 12:35PM    
This patient has been assessed with a concern for Malnutrition and has been determined to have a diagnosis/diagnoses of Severe protein-calorie malnutrition.    This patient is being managed with:   Diet NPO with Tube Feed-  Tube Feeding Modality: Orogastric  Jevity 1.5 Venu (JEVITY1.5)  Total Volume for 24 Hours (mL): 1760  Continuous  Starting Tube Feed Rate {mL per Hour}: 20  Increase Tube Feed Rate by (mL): 10     Every 4 hours  Until Goal Tube Feed Rate (mL per Hour): 80  Tube Feed Duration (in Hours): 22  Tube Feed Start Time: 13:15  Bolus   Total Volume per Flush (mL): 125   Frequency: Every 6 Hours  Supplement Feeding Modality:  Orogastric  Ensure Clear Cans or Servings Per Day:  1       Frequency:  Three Times a day  Entered: May 20 2021  1:10PM    
This patient has been assessed with a concern for Malnutrition and has been determined to have a diagnosis/diagnoses of Severe protein-calorie malnutrition.    This patient is being managed with:   Diet NPO with Tube Feed-  Tube Feeding Modality: Orogastric  Jevity 1.5 Venu (JEVITY1.5)  Total Volume for 24 Hours (mL): 1760  Continuous  Starting Tube Feed Rate {mL per Hour}: 20  Increase Tube Feed Rate by (mL): 10     Every 4 hours  Until Goal Tube Feed Rate (mL per Hour): 80  Tube Feed Duration (in Hours): 22  Tube Feed Start Time: 13:15  Bolus   Total Volume per Flush (mL): 125   Frequency: Every 6 Hours  Entered: May 21 2021 10:39AM    
This patient has been assessed with a concern for Malnutrition and has been determined to have a diagnosis/diagnoses of Severe protein-calorie malnutrition.      
This patient has been assessed with a concern for Malnutrition and has been determined to have a diagnosis/diagnoses of Severe protein-calorie malnutrition.    This patient is being managed with:   Diet NPO with Tube Feed-  Tube Feeding Modality: Orogastric  Jevity 1.5 Venu (JEVITY1.5)  Total Volume for 24 Hours (mL): 1760  Continuous  Starting Tube Feed Rate {mL per Hour}: 20  Increase Tube Feed Rate by (mL): 10     Every 4 hours  Until Goal Tube Feed Rate (mL per Hour): 80  Tube Feed Duration (in Hours): 22  Tube Feed Start Time: 13:15  Bolus   Total Volume per Flush (mL): 125   Frequency: Every 6 Hours  Entered: May 21 2021 10:39AM

## 2021-06-05 NOTE — PROGRESS NOTE ADULT - REASON FOR ADMISSION
Respiratory Failure

## 2021-06-05 NOTE — PROGRESS NOTE ADULT - PROVIDER SPECIALTY LIST ADULT
Pain Medicine
Palliative Care
Palliative Care
Thoracic Surgery
Hospitalist
Hospitalist
MICU
Critical Care
Hospitalist
Pain Medicine
Palliative Care
Palliative Care
Psychiatry
Thoracic Surgery
Critical Care
Hospitalist
MICU
Thoracic Surgery
Thoracic Surgery
Critical Care
Hospitalist
MICU
MICU
Palliative Care
Thoracic Surgery
Hospitalist
Thoracic Surgery
Critical Care
MICU
Thoracic Surgery
Pain Medicine
Thoracic Surgery
Urology
Thoracic Surgery

## 2021-06-05 NOTE — DISCHARGE NOTE NURSING/CASE MANAGEMENT/SOCIAL WORK - PATIENT PORTAL LINK FT
You can access the FollowMyHealth Patient Portal offered by [facility] by registering at the following website: [email or web address]. By joining [employer]’s FollowMyHealth portal, you will also be able to view your health information using other applications (apps) compatible with our system.

## 2021-06-05 NOTE — PROGRESS NOTE ADULT - NSICDXPILOT_GEN_ALL_CORE
[place] [place] [employer]

## 2021-06-05 NOTE — PROGRESS NOTE ADULT - SUBJECTIVE AND OBJECTIVE BOX
[facility] Division of Hospital Medicine    Chief Complaint:  respiratory failure     SUBJECTIVE: reports feeling ok, endorses mild-mod low back pain, controlled w/ pain medication    OVERNIGHT EVENTS: none reported     Patient denies chest pain, SOB, abd pain, N/V, fever, chills, dysuria or any other complaints. All remainder ROS negative.     MEDICATIONS  (STANDING):  apixaban 5 milliGRAM(s) Oral every 12 hours  buprenorphine 8 mG/naloxone 2 mG SL  Tablet 1 Tablet(s) SubLingual <User Schedule>  chlorhexidine 4% Liquid 1 Application(s) Topical <User Schedule>  doxazosin 4 milliGRAM(s) Oral at bedtime  gabapentin 300 milliGRAM(s) Oral at bedtime  methocarbamol 500 milliGRAM(s) Oral three times a day  multivitamin 1 Tablet(s) Oral daily  nicotine - 21 mG/24Hr(s) Patch 1 patch Transdermal daily  oxymetazoline 0.05% Nasal Spray 2 Spray(s) Left Nostril two times a day  pantoprazole  Injectable 40 milliGRAM(s) IV Push daily  sertraline 100 milliGRAM(s) Oral daily    MEDICATIONS  (PRN):  acetaminophen   Tablet .. 650 milliGRAM(s) Oral every 6 hours PRN Temp greater or equal to 38C (100.4F), Mild Pain (1 - 3)  albuterol/ipratropium for Nebulization 3 milliLiter(s) Nebulizer every 6 hours PRN Shortness of Breath and/or Wheezing  ALPRAZolam 0.25 milliGRAM(s) Oral every 12 hours PRN anxiety  lidocaine 2% Viscous 15 milliLiter(s) Mucosal every 4 hours PRN oral pain        I&O's Summary    04 Jun 2021 07:01  -  05 Jun 2021 07:00  --------------------------------------------------------  IN: 0 mL / OUT: 550 mL / NET: -550 mL        PHYSICAL EXAM:  Vital Signs Last 24 Hrs  T(C): 36.4 (05 Jun 2021 04:30), Max: 36.7 (04 Jun 2021 10:59)  T(F): 97.6 (05 Jun 2021 04:30), Max: 98.1 (04 Jun 2021 10:59)  HR: 78 (05 Jun 2021 04:30) (70 - 81)  BP: 94/52 (05 Jun 2021 04:30) (92/54 - 115/67)  BP(mean): --  RR: 18 (05 Jun 2021 04:30) (18 - 18)  SpO2: 91% (05 Jun 2021 04:30) (91% - 95%)        CONSTITUTIONAL: NAD, well-developed, well-groomed  ENMT: Moist oral mucosa, no pharyngeal injection or exudates; normal dentition; No JVD, Trach colar in place  RESPIRATORY: Normal respiratory effort; lungs are clear to auscultation bilaterally, diminished on bases  CARDIOVASCULAR: Regular rate and rhythm, normal S1 and S2, no murmur/rub/gallop; No lower extremity edema; Peripheral pulses are 2+ bilaterally  ABDOMEN: Nontender to palpation, normoactive bowel sounds, no rebound/guarding; No hepatosplenomegaly  MUSCLOSKELETAL:  no clubbing or cyanosis of digits; no joint swelling or tenderness to palpation  PSYCH: A+O to person, place, and time; affect appropriate  NEUROLOGY: CN 2-12 are intact and symmetric; no gross sensory deficits; was observed moving all 4 ext against gravity cooperating with exam.   SKIN: No rashes; no palpable lesions    LABS:                        12.1   5.35  )-----------( 193      ( 05 Jun 2021 08:05 )             38.5     06-04    138  |  100  |  5.1<L>  ----------------------------<  78  3.9   |  26.0  |  0.51    Ca    8.7      04 Jun 2021 08:02  Phos  3.4     06-04  Mg     1.8     06-04    TPro  6.6  /  Alb  3.0<L>  /  TBili  0.9  /  DBili  0.5<H>  /  AST  29  /  ALT  32  /  AlkPhos  132<H>  06-04    PT/INR - ( 03 Jun 2021 19:14 )   PT: 21.5 sec;   INR: 1.91 ratio         PTT - ( 03 Jun 2021 19:14 )  PTT:40.2 sec          CAPILLARY BLOOD GLUCOSE            RADIOLOGY & ADDITIONAL TESTS:  Results Reviewed:   Imaging Personally Reviewed:  Electrocardiogram Personally Reviewed:

## 2021-06-14 ENCOUNTER — APPOINTMENT (OUTPATIENT)
Dept: PULMONOLOGY | Facility: CLINIC | Age: 61
End: 2021-06-14

## 2021-06-16 LAB
CULTURE RESULTS: SIGNIFICANT CHANGE UP
SPECIMEN SOURCE: SIGNIFICANT CHANGE UP

## 2021-06-17 LAB
CULTURE RESULTS: SIGNIFICANT CHANGE UP
SPECIMEN SOURCE: SIGNIFICANT CHANGE UP

## 2021-06-22 PROCEDURE — 97116 GAIT TRAINING THERAPY: CPT

## 2021-06-22 PROCEDURE — 94003 VENT MGMT INPAT SUBQ DAY: CPT

## 2021-06-22 PROCEDURE — 94640 AIRWAY INHALATION TREATMENT: CPT

## 2021-06-22 PROCEDURE — 87102 FUNGUS ISOLATION CULTURE: CPT

## 2021-06-22 PROCEDURE — 80048 BASIC METABOLIC PNL TOTAL CA: CPT

## 2021-06-22 PROCEDURE — 82330 ASSAY OF CALCIUM: CPT

## 2021-06-22 PROCEDURE — 82945 GLUCOSE OTHER FLUID: CPT

## 2021-06-22 PROCEDURE — 84481 FREE ASSAY (FT-3): CPT

## 2021-06-22 PROCEDURE — 85018 HEMOGLOBIN: CPT

## 2021-06-22 PROCEDURE — 84145 PROCALCITONIN (PCT): CPT

## 2021-06-22 PROCEDURE — 97530 THERAPEUTIC ACTIVITIES: CPT

## 2021-06-22 PROCEDURE — 83036 HEMOGLOBIN GLYCOSYLATED A1C: CPT

## 2021-06-22 PROCEDURE — 82962 GLUCOSE BLOOD TEST: CPT

## 2021-06-22 PROCEDURE — 94002 VENT MGMT INPAT INIT DAY: CPT

## 2021-06-22 PROCEDURE — 71275 CT ANGIOGRAPHY CHEST: CPT

## 2021-06-22 PROCEDURE — 89051 BODY FLUID CELL COUNT: CPT

## 2021-06-22 PROCEDURE — 84132 ASSAY OF SERUM POTASSIUM: CPT

## 2021-06-22 PROCEDURE — 85025 COMPLETE CBC W/AUTO DIFF WBC: CPT

## 2021-06-22 PROCEDURE — 86769 SARS-COV-2 COVID-19 ANTIBODY: CPT

## 2021-06-22 PROCEDURE — P9047: CPT

## 2021-06-22 PROCEDURE — 74177 CT ABD & PELVIS W/CONTRAST: CPT

## 2021-06-22 PROCEDURE — 84484 ASSAY OF TROPONIN QUANT: CPT

## 2021-06-22 PROCEDURE — 83735 ASSAY OF MAGNESIUM: CPT

## 2021-06-22 PROCEDURE — 82803 BLOOD GASES ANY COMBINATION: CPT

## 2021-06-22 PROCEDURE — 86901 BLOOD TYPING SEROLOGIC RH(D): CPT

## 2021-06-22 PROCEDURE — 36600 WITHDRAWAL OF ARTERIAL BLOOD: CPT

## 2021-06-22 PROCEDURE — 74230 X-RAY XM SWLNG FUNCJ C+: CPT

## 2021-06-22 PROCEDURE — 84439 ASSAY OF FREE THYROXINE: CPT

## 2021-06-22 PROCEDURE — 94799 UNLISTED PULMONARY SVC/PX: CPT

## 2021-06-22 PROCEDURE — 85730 THROMBOPLASTIN TIME PARTIAL: CPT

## 2021-06-22 PROCEDURE — 87077 CULTURE AEROBIC IDENTIFY: CPT

## 2021-06-22 PROCEDURE — 80053 COMPREHEN METABOLIC PANEL: CPT

## 2021-06-22 PROCEDURE — 87070 CULTURE OTHR SPECIMN AEROBIC: CPT

## 2021-06-22 PROCEDURE — 87637 SARSCOV2&INF A&B&RSV AMP PRB: CPT

## 2021-06-22 PROCEDURE — 93005 ELECTROCARDIOGRAM TRACING: CPT

## 2021-06-22 PROCEDURE — 31500 INSERT EMERGENCY AIRWAY: CPT

## 2021-06-22 PROCEDURE — 87205 SMEAR GRAM STAIN: CPT

## 2021-06-22 PROCEDURE — 97110 THERAPEUTIC EXERCISES: CPT

## 2021-06-22 PROCEDURE — 87015 SPECIMEN INFECT AGNT CONCNTJ: CPT

## 2021-06-22 PROCEDURE — C8929: CPT

## 2021-06-22 PROCEDURE — 83986 ASSAY PH BODY FLUID NOS: CPT

## 2021-06-22 PROCEDURE — 87635 SARS-COV-2 COVID-19 AMP PRB: CPT

## 2021-06-22 PROCEDURE — 87206 SMEAR FLUORESCENT/ACID STAI: CPT

## 2021-06-22 PROCEDURE — 83615 LACTATE (LD) (LDH) ENZYME: CPT

## 2021-06-22 PROCEDURE — 82947 ASSAY GLUCOSE BLOOD QUANT: CPT

## 2021-06-22 PROCEDURE — 80076 HEPATIC FUNCTION PANEL: CPT

## 2021-06-22 PROCEDURE — 99291 CRITICAL CARE FIRST HOUR: CPT | Mod: 25

## 2021-06-22 PROCEDURE — 87449 NOS EACH ORGANISM AG IA: CPT

## 2021-06-22 PROCEDURE — 82435 ASSAY OF BLOOD CHLORIDE: CPT

## 2021-06-22 PROCEDURE — 80307 DRUG TEST PRSMV CHEM ANLYZR: CPT

## 2021-06-22 PROCEDURE — 87116 MYCOBACTERIA CULTURE: CPT

## 2021-06-22 PROCEDURE — 93970 EXTREMITY STUDY: CPT

## 2021-06-22 PROCEDURE — 92610 EVALUATE SWALLOWING FUNCTION: CPT

## 2021-06-22 PROCEDURE — 0225U NFCT DS DNA&RNA 21 SARSCOV2: CPT

## 2021-06-22 PROCEDURE — U0003: CPT

## 2021-06-22 PROCEDURE — 84157 ASSAY OF PROTEIN OTHER: CPT

## 2021-06-22 PROCEDURE — 81001 URINALYSIS AUTO W/SCOPE: CPT

## 2021-06-22 PROCEDURE — 36415 COLL VENOUS BLD VENIPUNCTURE: CPT

## 2021-06-22 PROCEDURE — 83605 ASSAY OF LACTIC ACID: CPT

## 2021-06-22 PROCEDURE — 86900 BLOOD TYPING SEROLOGIC ABO: CPT

## 2021-06-22 PROCEDURE — 93930 UPPER EXTREMITY STUDY: CPT

## 2021-06-22 PROCEDURE — 82042 OTHER SOURCE ALBUMIN QUAN EA: CPT

## 2021-06-22 PROCEDURE — U0005: CPT

## 2021-06-22 PROCEDURE — 88305 TISSUE EXAM BY PATHOLOGIST: CPT

## 2021-06-22 PROCEDURE — 92526 ORAL FUNCTION THERAPY: CPT

## 2021-06-22 PROCEDURE — 85014 HEMATOCRIT: CPT

## 2021-06-22 PROCEDURE — 88112 CYTOPATH CELL ENHANCE TECH: CPT

## 2021-06-22 PROCEDURE — 71045 X-RAY EXAM CHEST 1 VIEW: CPT

## 2021-06-22 PROCEDURE — 86850 RBC ANTIBODY SCREEN: CPT

## 2021-06-22 PROCEDURE — 87075 CULTR BACTERIA EXCEPT BLOOD: CPT

## 2021-06-22 PROCEDURE — 84100 ASSAY OF PHOSPHORUS: CPT

## 2021-06-22 PROCEDURE — 85027 COMPLETE CBC AUTOMATED: CPT

## 2021-06-22 PROCEDURE — 70450 CT HEAD/BRAIN W/O DYE: CPT

## 2021-06-22 PROCEDURE — 86803 HEPATITIS C AB TEST: CPT

## 2021-06-22 PROCEDURE — 87186 SC STD MICRODIL/AGAR DIL: CPT

## 2021-06-22 PROCEDURE — 83880 ASSAY OF NATRIURETIC PEPTIDE: CPT

## 2021-06-22 PROCEDURE — 85610 PROTHROMBIN TIME: CPT

## 2021-06-22 PROCEDURE — 84295 ASSAY OF SERUM SODIUM: CPT

## 2021-06-22 PROCEDURE — 87040 BLOOD CULTURE FOR BACTERIA: CPT

## 2021-06-22 PROCEDURE — 84443 ASSAY THYROID STIM HORMONE: CPT

## 2021-06-22 PROCEDURE — 87086 URINE CULTURE/COLONY COUNT: CPT

## 2021-06-22 PROCEDURE — 94760 N-INVAS EAR/PLS OXIMETRY 1: CPT

## 2021-06-24 LAB
CULTURE RESULTS: SIGNIFICANT CHANGE UP
SPECIMEN SOURCE: SIGNIFICANT CHANGE UP

## 2021-07-14 LAB
CULTURE RESULTS: SIGNIFICANT CHANGE UP
SPECIMEN SOURCE: SIGNIFICANT CHANGE UP

## 2021-07-19 PROBLEM — M79.89 LEFT LEG SWELLING: Status: ACTIVE | Noted: 2021-07-19

## 2021-07-20 ENCOUNTER — APPOINTMENT (OUTPATIENT)
Dept: DISASTER EMERGENCY | Facility: CLINIC | Age: 61
End: 2021-07-20

## 2021-07-21 LAB — SARS-COV-2 N GENE NPH QL NAA+PROBE: NOT DETECTED

## 2021-08-01 ENCOUNTER — INPATIENT (INPATIENT)
Facility: HOSPITAL | Age: 61
LOS: 3 days | Discharge: HOME | DRG: 918 | End: 2021-08-05
Attending: FAMILY MEDICINE | Admitting: HOSPITALIST
Payer: MEDICAID

## 2021-08-01 ENCOUNTER — OUTPATIENT (OUTPATIENT)
Dept: OUTPATIENT SERVICES | Facility: HOSPITAL | Age: 61
LOS: 1 days | End: 2021-08-01
Payer: MEDICAID

## 2021-08-01 VITALS — HEIGHT: 72 IN

## 2021-08-01 DIAGNOSIS — T07.XXXA UNSPECIFIED MULTIPLE INJURIES, INITIAL ENCOUNTER: ICD-10-CM
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
## 2021-08-01 DIAGNOSIS — E86.0 DEHYDRATION: ICD-10-CM

## 2021-08-01 LAB
ALBUMIN SERPL ELPH-MCNC: 3.5 G/DL — SIGNIFICANT CHANGE UP (ref 3.3–5.2)
ALP SERPL-CCNC: 84 U/L — SIGNIFICANT CHANGE UP (ref 40–120)
ALT FLD-CCNC: 5 U/L — SIGNIFICANT CHANGE UP
ANION GAP SERPL CALC-SCNC: 12 MMOL/L — SIGNIFICANT CHANGE UP (ref 5–17)
APTT BLD: 36 SEC — HIGH (ref 27.5–35.5)
AST SERPL-CCNC: 16 U/L — SIGNIFICANT CHANGE UP
BASOPHILS # BLD AUTO: 0.01 K/UL — SIGNIFICANT CHANGE UP (ref 0–0.2)
BASOPHILS NFR BLD AUTO: 0.2 % — SIGNIFICANT CHANGE UP (ref 0–2)
BILIRUB SERPL-MCNC: 0.7 MG/DL — SIGNIFICANT CHANGE UP (ref 0.4–2)
BLD GP AB SCN SERPL QL: SIGNIFICANT CHANGE UP
BUN SERPL-MCNC: 4.1 MG/DL — LOW (ref 8–20)
CALCIUM SERPL-MCNC: 9 MG/DL — SIGNIFICANT CHANGE UP (ref 8.6–10.2)
CHLORIDE SERPL-SCNC: 100 MMOL/L — SIGNIFICANT CHANGE UP (ref 98–107)
CO2 SERPL-SCNC: 26 MMOL/L — SIGNIFICANT CHANGE UP (ref 22–29)
CREAT SERPL-MCNC: 0.81 MG/DL — SIGNIFICANT CHANGE UP (ref 0.5–1.3)
EOSINOPHIL # BLD AUTO: 0.04 K/UL — SIGNIFICANT CHANGE UP (ref 0–0.5)
EOSINOPHIL NFR BLD AUTO: 0.7 % — SIGNIFICANT CHANGE UP (ref 0–6)
ETHANOL SERPL-MCNC: <10 MG/DL — SIGNIFICANT CHANGE UP (ref 0–9)
GLUCOSE SERPL-MCNC: 119 MG/DL — HIGH (ref 70–99)
HCT VFR BLD CALC: 36.3 % — LOW (ref 39–50)
HGB BLD-MCNC: 11.7 G/DL — LOW (ref 13–17)
IMM GRANULOCYTES NFR BLD AUTO: 1 % — SIGNIFICANT CHANGE UP (ref 0–1.5)
INR BLD: 1.24 RATIO — HIGH (ref 0.88–1.16)
LIDOCAIN IGE QN: 7 U/L — LOW (ref 22–51)
LYMPHOCYTES # BLD AUTO: 1.03 K/UL — SIGNIFICANT CHANGE UP (ref 1–3.3)
LYMPHOCYTES # BLD AUTO: 17 % — SIGNIFICANT CHANGE UP (ref 13–44)
MCHC RBC-ENTMCNC: 30.6 PG — SIGNIFICANT CHANGE UP (ref 27–34)
MCHC RBC-ENTMCNC: 32.2 GM/DL — SIGNIFICANT CHANGE UP (ref 32–36)
MCV RBC AUTO: 95 FL — SIGNIFICANT CHANGE UP (ref 80–100)
MONOCYTES # BLD AUTO: 0.51 K/UL — SIGNIFICANT CHANGE UP (ref 0–0.9)
MONOCYTES NFR BLD AUTO: 8.4 % — SIGNIFICANT CHANGE UP (ref 2–14)
NEUTROPHILS # BLD AUTO: 4.4 K/UL — SIGNIFICANT CHANGE UP (ref 1.8–7.4)
NEUTROPHILS NFR BLD AUTO: 72.7 % — SIGNIFICANT CHANGE UP (ref 43–77)
PLATELET # BLD AUTO: 198 K/UL — SIGNIFICANT CHANGE UP (ref 150–400)
POTASSIUM SERPL-MCNC: 3.1 MMOL/L — LOW (ref 3.5–5.3)
POTASSIUM SERPL-SCNC: 3.1 MMOL/L — LOW (ref 3.5–5.3)
PROT SERPL-MCNC: 6.4 G/DL — LOW (ref 6.6–8.7)
PROTHROM AB SERPL-ACNC: 14.2 SEC — HIGH (ref 10.6–13.6)
RBC # BLD: 3.82 M/UL — LOW (ref 4.2–5.8)
RBC # FLD: 14.5 % — SIGNIFICANT CHANGE UP (ref 10.3–14.5)
SARS-COV-2 RNA SPEC QL NAA+PROBE: SIGNIFICANT CHANGE UP
SODIUM SERPL-SCNC: 138 MMOL/L — SIGNIFICANT CHANGE UP (ref 135–145)
WBC # BLD: 6.05 K/UL — SIGNIFICANT CHANGE UP (ref 3.8–10.5)
WBC # FLD AUTO: 6.05 K/UL — SIGNIFICANT CHANGE UP (ref 3.8–10.5)

## 2021-08-01 PROCEDURE — 99223 1ST HOSP IP/OBS HIGH 75: CPT

## 2021-08-01 PROCEDURE — 71275 CT ANGIOGRAPHY CHEST: CPT | Mod: 26

## 2021-08-01 PROCEDURE — 72170 X-RAY EXAM OF PELVIS: CPT | Mod: 26

## 2021-08-01 PROCEDURE — 99291 CRITICAL CARE FIRST HOUR: CPT

## 2021-08-01 PROCEDURE — 99221 1ST HOSP IP/OBS SF/LOW 40: CPT

## 2021-08-01 PROCEDURE — 71045 X-RAY EXAM CHEST 1 VIEW: CPT | Mod: 26

## 2021-08-01 PROCEDURE — 73030 X-RAY EXAM OF SHOULDER: CPT | Mod: 26,RT

## 2021-08-01 PROCEDURE — 70450 CT HEAD/BRAIN W/O DYE: CPT | Mod: 26,MA

## 2021-08-01 PROCEDURE — 93970 EXTREMITY STUDY: CPT | Mod: 26

## 2021-08-01 PROCEDURE — 72125 CT NECK SPINE W/O DYE: CPT | Mod: 26,MA

## 2021-08-01 RX ORDER — ACETAMINOPHEN 500 MG
650 TABLET ORAL EVERY 6 HOURS
Refills: 0 | Status: DISCONTINUED | OUTPATIENT
Start: 2021-08-01 | End: 2021-08-05

## 2021-08-01 RX ORDER — POTASSIUM CHLORIDE 20 MEQ
10 PACKET (EA) ORAL
Refills: 0 | Status: COMPLETED | OUTPATIENT
Start: 2021-08-01 | End: 2021-08-01

## 2021-08-01 RX ORDER — ONDANSETRON 8 MG/1
4 TABLET, FILM COATED ORAL EVERY 8 HOURS
Refills: 0 | Status: DISCONTINUED | OUTPATIENT
Start: 2021-08-01 | End: 2021-08-05

## 2021-08-01 RX ORDER — SODIUM CHLORIDE 9 MG/ML
2000 INJECTION INTRAMUSCULAR; INTRAVENOUS; SUBCUTANEOUS ONCE
Refills: 0 | Status: COMPLETED | OUTPATIENT
Start: 2021-08-01 | End: 2021-08-01

## 2021-08-01 RX ORDER — HEPARIN SODIUM 5000 [USP'U]/ML
5000 INJECTION INTRAVENOUS; SUBCUTANEOUS EVERY 12 HOURS
Refills: 0 | Status: DISCONTINUED | OUTPATIENT
Start: 2021-08-01 | End: 2021-08-02

## 2021-08-01 RX ORDER — LANOLIN ALCOHOL/MO/W.PET/CERES
3 CREAM (GRAM) TOPICAL AT BEDTIME
Refills: 0 | Status: DISCONTINUED | OUTPATIENT
Start: 2021-08-01 | End: 2021-08-05

## 2021-08-01 RX ORDER — BUPRENORPHINE AND NALOXONE 2; .5 MG/1; MG/1
3 TABLET SUBLINGUAL DAILY
Refills: 0 | Status: DISCONTINUED | OUTPATIENT
Start: 2021-08-01 | End: 2021-08-01

## 2021-08-01 RX ORDER — POTASSIUM CHLORIDE 20 MEQ
40 PACKET (EA) ORAL ONCE
Refills: 0 | Status: COMPLETED | OUTPATIENT
Start: 2021-08-01 | End: 2021-08-01

## 2021-08-01 RX ORDER — SERTRALINE 25 MG/1
1 TABLET, FILM COATED ORAL
Qty: 0 | Refills: 0 | DISCHARGE

## 2021-08-01 RX ORDER — SODIUM CHLORIDE 9 MG/ML
1000 INJECTION INTRAMUSCULAR; INTRAVENOUS; SUBCUTANEOUS ONCE
Refills: 0 | Status: COMPLETED | OUTPATIENT
Start: 2021-08-01 | End: 2021-08-01

## 2021-08-01 RX ORDER — BUPRENORPHINE AND NALOXONE 2; .5 MG/1; MG/1
3 TABLET SUBLINGUAL DAILY
Refills: 0 | Status: DISCONTINUED | OUTPATIENT
Start: 2021-08-01 | End: 2021-08-02

## 2021-08-01 RX ADMIN — SODIUM CHLORIDE 2000 MILLILITER(S): 9 INJECTION INTRAMUSCULAR; INTRAVENOUS; SUBCUTANEOUS at 11:35

## 2021-08-01 RX ADMIN — Medication 100 MILLIEQUIVALENT(S): at 11:40

## 2021-08-01 RX ADMIN — Medication 10 MILLIEQUIVALENT(S): at 12:53

## 2021-08-01 RX ADMIN — Medication 40 MILLIEQUIVALENT(S): at 11:34

## 2021-08-01 RX ADMIN — SODIUM CHLORIDE 1000 MILLILITER(S): 9 INJECTION INTRAMUSCULAR; INTRAVENOUS; SUBCUTANEOUS at 11:43

## 2021-08-01 RX ADMIN — Medication 100 MILLIEQUIVALENT(S): at 13:56

## 2021-08-01 RX ADMIN — SODIUM CHLORIDE 1000 MILLILITER(S): 9 INJECTION INTRAMUSCULAR; INTRAVENOUS; SUBCUTANEOUS at 10:16

## 2021-08-01 RX ADMIN — Medication 100 MILLIEQUIVALENT(S): at 12:53

## 2021-08-01 NOTE — ED PROVIDER NOTE - CLINICAL SUMMARY MEDICAL DECISION MAKING FREE TEXT BOX
pt found down on ground, unsure how long with likey LOC. poor historian, trauma b activated prior to arrival. imaging, labs, ekg, reassess pt found down on ground, denies loc.  trauma b activated prior to arrival. pt only c/o is feeling generalized weakness/problems walking due to feeling fatigue. imaging, labs, ekg, reassess

## 2021-08-01 NOTE — PROGRESS NOTE ADULT - SUBJECTIVE AND OBJECTIVE BOX
Patient is a 61y old  Male who presents with a chief complaint of syncope    61 year old male with pmh of dvt and pe, copd, coming to hospital after being found in basement by grandson. patient initially a code trauma however cleared by surgery. patient stating not sure why in  hospital. states has some weakness however nothing else. states would like to sleep at this time. states only medications he takes is meclezine and suboxone. [facility] uses [email or web address] pharmacy attempted to call no answer     PAST MEDICAL & SURGICAL HISTORY:  Poor historian  COPD (chronic obstructive pulmonary disease)  Pulmonary embolism  Atrial flutter  H/O solitary pulmonary nodule  Depression    Past Surgical History  s/p trach    Family History  Mother Ovarian Cancer    Social History  Lives at home by self  current 1/2 ppd smoker x 40 years  no etoh use   no illicit drug use    MEDICATIONS  (STANDING):  heparin   Injectable 5000 Unit(s) SubCutaneous every 12 hours    MEDICATIONS  (PRN):  acetaminophen   Tablet .. 650 milliGRAM(s) Oral every 6 hours PRN Temp greater or equal to 38.5C (101.3F), Mild Pain (1 - 3)  aluminum hydroxide/magnesium hydroxide/simethicone Suspension 30 milliLiter(s) Oral every 4 hours PRN Dyspepsia  melatonin 3 milliGRAM(s) Oral at bedtime PRN Insomnia  ondansetron Injectable 4 milliGRAM(s) IV Push every 8 hours PRN Nausea and/or Vomiting    Review of Systems:  CONSTITUTIONAL: No weakness, fevers or chills  EYES/ENT: No visual changes;  No vertigo or throat pain   NECK: No pain or stiffness  RESPIRATORY: No cough, wheezing, hemoptysis; No shortness of breath,   CARDIOVASCULAR: No chest pain or palpitations  GASTROINTESTINAL: No abdominal or epigastric pain. No nausea, vomiting, or hematemesis; No diarrhea or constipation.   GENITOURINARY: No dysuria, frequency or hematuria  NEUROLOGICAL: No numbness or weakness  SKIN: +forehead abrasion +abrasion to knees    Vital Signs Last 24 Hrs  T(F): 99 (01 Aug 2021 11:15), Max: 99 (01 Aug 2021 11:15)  HR: 84 (01 Aug 2021 11:15) (84 - 85)  BP: 144/72 (01 Aug 2021 11:15) (132/61 - 144/72)  RR: 23 (01 Aug 2021 11:15) (23 - 24)  SpO2: 96% (01 Aug 2021 11:15) (94% - 96%)    Physical Exam:  Constitutional: NAD, awake and alert, well-developed  Neck: Soft and supple, No LAD, No JVD  Respiratory: cta b/l no wheezing no rhonchi  Cardiovascular: +s1/s2 no edema b/l le  Gastrointestinal: soft nt nd bs+  Vascular: 2+ peripheral pulses  Neurological: A/O x 3, no focal deficits (patient very sleepy during interview however knows location date, year and president)  Musculoskeletal: 4/5 strength b/l upper and lower extremities  : Contraindicated  Breasts: Contraindicated  Rectal: Contraindicated    LABS:                        11.7   6.05  )-----------( 198      ( 01 Aug 2021 09:48 )             36.3     08-01    138  |  100  |  4.1  ----------------------------<  119  3.1   |  26.0  |  0.81    Ca    9.0      01 Aug 2021 09:48    TPro  6.4  /  Alb  3.5  /  TBili  0.7  /  DBili  x   /  AST  16  /  ALT  5   /  AlkPhos  84  08-01    eGFR if Non African American: 97 mL/min/1.73M2 (08-01-21 @ 09:48)  eGFR if : 113 mL/min/1.73M2 (08-01-21 @ 09:48)    PT/INR - ( 01 Aug 2021 09:48 )   PT: 14.2 sec;   INR: 1.24 ratio    PTT - ( 01 Aug 2021 09:48 )  PTT:36.0 sec    CARDIAC MARKERS ( 01 Aug 2021 10:07 )  x     / x     / x     / x     / 1.9 ng/mL  CARDIAC MARKERS ( 01 Aug 2021 09:48 )  x     / <0.01 ng/mL / 480 U/L / x     / x        11:52 - VBG - pH:       | pCO2:       | pO2:       | Lactate: 1.80   09:49 - VBG - pH:       | pCO2:       | pO2:       | Lactate: 4.30     Glucose  POCT Blood Glucose.: 115 mg/dL (01 Aug 2021 09:48)    COVID-19 PCR: NotDetec (08-01-21 @ 13:40)    RADIOLOGY & ADDITIONAL TESTS:  < from: Xray Shoulder 2 Views, Right (08.01.21 @ 12:13) >  Impression: The heart is unremarkable. The lungs are clear. Multiple chronic right-sided rib fractures. No acute fracture or dislocation of the right shoulder.  < end of copied text >    < from: Xray Pelvis AP only (08.01.21 @ 12:13) >  IMPRESSION:  CT head: No acute intracranial pathology.  CT C-spine: No acute cervical spine fracture.   < end of copied text >    < from: Xray Chest 1 View AP/PA. (08.01.21 @ 10:13) >  Findings/  Impression: The heart is unremarkable. The lungs are clear. Multiple chronic right-sided rib fractures. No acute fracture or dislocation of the right shoulder.  < end of copied text >          Care Discussed with Consultants/Other Providers:  Cardiology Patient is a 61y old  Male who presents with a chief complaint of syncope    61 year old male with pmh of dvt and pe, copd, coming to hospital after being found in basement by grandson. patient initially a code trauma however cleared by surgery. patient stating not sure why in  hospital. states has some weakness however nothing else. states would like to sleep at this time. states only medications he takes is meclezine and suboxone. [facility] uses [facility] pharmacy attempted to call no answer     PAST MEDICAL & SURGICAL HISTORY:  Poor historian  COPD (chronic obstructive pulmonary disease)  Pulmonary embolism  Atrial flutter  H/O solitary pulmonary nodule  Depression    Past Surgical History  s/p trach    Family History  Mother Ovarian Cancer    Social History  Lives at home by self  current 1/2 ppd smoker x 40 years  no etoh use   no illicit drug use    MEDICATIONS  (STANDING):  buprenorphine 8 mG/naloxone 2 mG SL Film 3 Film(s) SubLingual daily  heparin   Injectable 5000 Unit(s) SubCutaneous every 12 hours    MEDICATIONS  (PRN):  acetaminophen   Tablet .. 650 milliGRAM(s) Oral every 6 hours PRN Temp greater or equal to 38.5C (101.3F), Mild Pain (1 - 3)  aluminum hydroxide/magnesium hydroxide/simethicone Suspension 30 milliLiter(s) Oral every 4 hours PRN Dyspepsia  melatonin 3 milliGRAM(s) Oral at bedtime PRN Insomnia  ondansetron Injectable 4 milliGRAM(s) IV Push every 8 hours PRN Nausea and/or Vomiting      Review of Systems:  CONSTITUTIONAL: No weakness, fevers or chills  EYES/ENT: No visual changes;  No vertigo or throat pain   NECK: No pain or stiffness  RESPIRATORY: No cough, wheezing, hemoptysis; No shortness of breath,   CARDIOVASCULAR: No chest pain or palpitations  GASTROINTESTINAL: No abdominal or epigastric pain. No nausea, vomiting, or hematemesis; No diarrhea or constipation.   GENITOURINARY: No dysuria, frequency or hematuria  NEUROLOGICAL: No numbness or weakness  SKIN: +forehead abrasion +abrasion to knees    Vital Signs Last 24 Hrs  T(F): 99 (01 Aug 2021 11:15), Max: 99 (01 Aug 2021 11:15)  HR: 84 (01 Aug 2021 11:15) (84 - 85)  BP: 144/72 (01 Aug 2021 11:15) (132/61 - 144/72)  RR: 23 (01 Aug 2021 11:15) (23 - 24)  SpO2: 96% (01 Aug 2021 11:15) (94% - 96%)    Physical Exam:  Constitutional: NAD, awake and alert, well-developed  Neck: Soft and supple, No LAD, No JVD  Respiratory: cta b/l no wheezing no rhonchi  Cardiovascular: +s1/s2 no edema b/l le  Gastrointestinal: soft nt nd bs+  Vascular: 2+ peripheral pulses  Neurological: A/O x 3, no focal deficits (patient very sleepy during interview however knows location date, year and president)  Musculoskeletal: 4/5 strength b/l upper and lower extremities  : Contraindicated  Breasts: Contraindicated  Rectal: Contraindicated    LABS:                        11.7   6.05  )-----------( 198      ( 01 Aug 2021 09:48 )             36.3     08-01    138  |  100  |  4.1  ----------------------------<  119  3.1   |  26.0  |  0.81    Ca    9.0      01 Aug 2021 09:48    TPro  6.4  /  Alb  3.5  /  TBili  0.7  /  DBili  x   /  AST  16  /  ALT  5   /  AlkPhos  84  08-01    eGFR if Non African American: 97 mL/min/1.73M2 (08-01-21 @ 09:48)  eGFR if : 113 mL/min/1.73M2 (08-01-21 @ 09:48)    PT/INR - ( 01 Aug 2021 09:48 )   PT: 14.2 sec;   INR: 1.24 ratio    PTT - ( 01 Aug 2021 09:48 )  PTT:36.0 sec    CARDIAC MARKERS ( 01 Aug 2021 10:07 )  x     / x     / x     / x     / 1.9 ng/mL  CARDIAC MARKERS ( 01 Aug 2021 09:48 )  x     / <0.01 ng/mL / 480 U/L / x     / x        11:52 - VBG - pH:       | pCO2:       | pO2:       | Lactate: 1.80   09:49 - VBG - pH:       | pCO2:       | pO2:       | Lactate: 4.30     Glucose  POCT Blood Glucose.: 115 mg/dL (01 Aug 2021 09:48)    COVID-19 PCR: NotDetec (08-01-21 @ 13:40)    RADIOLOGY & ADDITIONAL TESTS:  < from: Xray Shoulder 2 Views, Right (08.01.21 @ 12:13) >  Impression: The heart is unremarkable. The lungs are clear. Multiple chronic right-sided rib fractures. No acute fracture or dislocation of the right shoulder.  < end of copied text >    < from: Xray Pelvis AP only (08.01.21 @ 12:13) >  IMPRESSION:  CT head: No acute intracranial pathology.  CT C-spine: No acute cervical spine fracture.   < end of copied text >    < from: Xray Chest 1 View AP/PA. (08.01.21 @ 10:13) >  Findings/  Impression: The heart is unremarkable. The lungs are clear. Multiple chronic right-sided rib fractures. No acute fracture or dislocation of the right shoulder.  < end of copied text >          Care Discussed with Consultants/Other Providers:  Cardiology

## 2021-08-01 NOTE — H&P ADULT - NSHPLABSRESULTS_GEN_ALL_CORE
IMPRESSION:    CT head: No acute intracranial pathology.    CT C-spine: No acute cervical spine fracture. Consider MRI as clinically warranted.

## 2021-08-01 NOTE — H&P ADULT - ASSESSMENT
61y M, pmhx dvt/pe on eliquis, COPD, per EMS< patient was found down in the basement for approx 20-40 minutes. Per ems, vitals stable en route to the hospital, and AAOx2-3. Imaging was found to be stable.      Patient can be referred to medicine for syncope work up  Trauma cleared  Tertiary complete

## 2021-08-01 NOTE — ED PROVIDER NOTE - OBJECTIVE STATEMENT
60 y/o male with PMH of COPD (not on home O2), active smoker 1.5 ppd, aflutter and PE on Eliquis, pulmonary nodule, recent admission to icu in may for large effusion, requiring intubation, got peg and trach with chest tube, d/loreta to [facility], home for 3 weeks (has been off sicne returning home per ems report from family), pt found down face down on ground this morning, "for 40 minutes" per family unsure how was known that long. Pt on eliquis  for afib. pt limited historian, c/o some chest discomfort, headache.     limited ros by historian. 60 y/o male with PMH of COPD (not on home O2), active smoker 1.5 ppd, aflutter and PE on Eliquis, pulmonary nodule, recent admission to icu in may for large effusion, requiring intubation, got peg and trach with chest tube, d/loreta to [facility], home for 3 weeks (has been off sicne returning home per ems report from family), pt found down face down on ground this morning, "for 40 minutes" per family unsure how was known that long. Pt on eliquis  for afib. pt states only symptoms are generalized weakness and feeling like he cant walk, no neuro symptoms, no cp/sob/headache.     ROS: No fever/chills. No eye pain/changes in vision, No ear pain/sore throat/dysphagia, No chest pain/palpitations. No SOB/cough/. No abdominal pain, N/V/D, no black/bloody bm. No dysuria/frequency/discharge, No headache. No Dizziness.    No rashes or breaks in skin. No numbness/tingling/weakness.

## 2021-08-01 NOTE — H&P ADULT - ATTENDING COMMENTS
Agree with above assessment.  The patient was seen and examined by me.  The patient arrived as a level B trauma following a reported fall from standing.   The patient was found by son at home to be on the ground.  The patient on exam HEENT NC/AT PERRL EOMI no raccoon eyes, no jones signs, trachea midline, no tenderness, chest b/l air entry, abdomen soft and non tender, pelvis non tender without crepitus, extremities with multiple abrasions, no gross deformity or angulation.  No trauma intervention planned, patient to be admitted to medicine for syncope work up.

## 2021-08-01 NOTE — PROGRESS NOTE ADULT - ASSESSMENT
61 year old male with pmh of dvt and pe, copd, coming to hospital after being found in basement by grandson. patient initially a code trauma however cleared by surgery. patient stating not sure why in  hospital. states has some weakness however nothing else. states would like to sleep at this time. states only medications he takes is meclezine and suboxone. states uses [facility] pharmacy attempted to call no answer     please call [facility] pharmacy in am to verify patient medications. patient aaox3 however stating only taking meclezine and suboxone.    #syncope   - in patient with failure to thrive  - tele   - admit to medicine   - echo   - cardio consult   - check utox     #hx of pe and dvt   - not on eliquis?  - check ct angio pe protocol and u/s doppler lower extremity    #copd   - not in exacerbation   - not on any medications at home     #?hx of drug use vs pain  - istop [id number] suboxone noted     #DVT Prophylaxis  - heparin SC   IMPROVE VTE Individual Risk Assessment  RISK                                                                Points  [  ] Previous VTE                                                  3  [  ] Thrombophilia                                               2  [  ] Lower limb paralysis                                      2        (unable to hold up >15 seconds)    [  ] Current Cancer                                              2         (within 6 months)  [  ] Immobilization > 24 hrs                                1  [  ] ICU/CCU stay > 24 hours                              1  [ x ] Age > 60                                                      1    IMPROVE VTE Score ____1_____    IMPROVE Score 0-1: Low Risk, No VTE prophylaxis required for most patients, encourage ambulation.   IMPROVE Score 2-3: At risk, pharmacologic VTE prophylaxis is indicated for most patients (in the absence of a contraindication)  IMPROVE Score > or = 4: High Risk, pharmacologic VTE prophylaxis is indicated for most patients (in the absence of a contraindication)    patient with recent complicated admission now not on eliquis per patient. currently aaox3 however sleepy check utox. patient does not want me to call family at this time for information and does not want to give any information to his family at this time. recent d/c from [facility] may ultimately require placement    61 year old male with pmh of dvt and pe, copd, coming to hospital after being found in basement by grandson. patient initially a code trauma however cleared by surgery. patient stating not sure why in  hospital. states has some weakness however nothing else. states would like to sleep at this time. states only medications he takes is meclezine and suboxone. states uses [facility] pharmacy attempted to call no answer     please call [facility] pharmacy in am to verify patient medications. patient aaox3 however stating only taking meclezine and suboxone.    #syncope   - in patient with failure to thrive  - tele   - admit to medicine   - echo   - cardio consult   - check utox     #hx of pe and dvt   - not on eliquis?  - check ct angio pe protocol and u/s doppler lower extremity    #copd   - not in exacerbation   - not on any medications at home     #?hx of drug use vs pain  - istop [id number] suboxone noted     #DVT Prophylaxis  - heparin SC   IMPROVE VTE Individual Risk Assessment  RISK                                                                Points  [  ] Previous VTE                                                  3  [  ] Thrombophilia                                               2  [  ] Lower limb paralysis                                      2        (unable to hold up >15 seconds)    [  ] Current Cancer                                              2         (within 6 months)  [  ] Immobilization > 24 hrs                                1  [  ] ICU/CCU stay > 24 hours                              1  [ x ] Age > 60                                                      1    IMPROVE VTE Score ____1_____    IMPROVE Score 0-1: Low Risk, No VTE prophylaxis required for most patients, encourage ambulation.   IMPROVE Score 2-3: At risk, pharmacologic VTE prophylaxis is indicated for most patients (in the absence of a contraindication)  IMPROVE Score > or = 4: High Risk, pharmacologic VTE prophylaxis is indicated for most patients (in the absence of a contraindication)    patient with recent complicated admission now not on eliquis per patient. currently aaox3 however sleepy check utox. patient does not want me to call family at this time for information and does not want to give any information to his family at this time. recent d/c from [facility] may ultimately require placement     71 minutes spent on patient admission coordinating care, performing history and physical and discussing case with consultants

## 2021-08-01 NOTE — ED PROVIDER NOTE - PHYSICAL EXAMINATION
Gen: No acute distress, non toxic  HEENT: Mucous membranes moist, pink conjunctivae, EOMI. superficial abrasion/erythema to left frontal head.  Neck: no midline ttp  CV: RRR, nl s1/s2.  Resp: CTAB, normal rate and effort  GI: Abdomen soft, NT, ND. No rebound, no guarding. ventral surgical scar with reducible hernia.   : No CVAT  Neuro: Awake and alert, moves extremities.   MSK: No spine or joint tenderness to palpation  Skin: No rashes. intact and perfused. Gen: No acute distress, non toxic  HEENT: Mucous membranes dry, pink conjunctivae, EOMI. superficial abrasion/erythema to left frontal head.  Neck: no midline ttp  CV: RRR, nl s1/s2.  Resp: CTAB, normal rate and effort  GI: Abdomen soft, NT, ND. No rebound, no guarding. ventral surgical scar with reducible hernia.   : No CVAT  Neuro: Awake and alert, moves extremities though with generalized weakness/shakiness, oriented x3. cn 2-12 wnl. nl sensation, can hold legs/arms above bed but shakey/effort.   MSK: No spine or joint tenderness to palpation  Skin: No rashes. intact and perfused.

## 2021-08-01 NOTE — CHART NOTE - NSCHARTNOTEFT_GEN_A_CORE
Tertiary Trauma Survey (TTS)    Date of TTS: 08-01-21 @ 12:58                             Admit Date: 08-01-21 @ 09:39      Trauma Activation:      Subjective / 24 hour events: No acute events since the trauma activation.  Patient alert, GCS remains 15.      Vital Signs Last 24 Hrs  T(C): 37.2 (01 Aug 2021 11:15), Max: 37.2 (01 Aug 2021 11:15)  T(F): 99 (01 Aug 2021 11:15), Max: 99 (01 Aug 2021 11:15)  HR: 84 (01 Aug 2021 11:15) (84 - 85)  BP: 144/72 (01 Aug 2021 11:15) (132/61 - 144/72)  BP(mean): --  RR: 23 (01 Aug 2021 11:15) (23 - 24)  SpO2: 96% (01 Aug 2021 11:15) (94% - 96%)    Physical Exam:    Neuro: [ x] non focal neurological exam [ ] Focal Neurological deficits noted to be:     HEENT: [ ] Normo-cephalic/atraumatic  [ x] abnormalities noted to be: R frontal scalp abrasion     Pulm/Chest:  [ x] CTA b/l  [ ] chest wall non tender  [ ] abnormalities noted to be:    Cardiac: [ x] S1S2, sinus rhythm  [ ] abnormalities noted to be:     GI / Abdomen: [x ] Soft, non-tender, non-distended [ ] abnormalities noted to be:  Previous laparotomy scar, palpable reducible ventral hernia    Musculoskeletal / Extremities: [ ]normal active ROM  [x ]  abnormalities noted to be:  Bilateral Knee Abrasions    Integumentary: [ ] Skin intact [ ] Warm [ ] Dry [ x]abnormalities noted to be:  Other than above, contusions over R shoulder, no deformities, no point tenderness, no step off.  L thigh contusion, no deformities palpated.      Vascular: [ ] 2+ palpable distal pulses  [ ] GERARD:       [x ] abnormalities noted to be:  Decreased pulses bilaterally, stasis dermatitis in Bilateral lower extremities    List Injuries Identified to Date:  Superficial abrasions/contusions of skin    List Operative and Interventional Radiological Procedures:     Consults (Date):  [  ] Neurosurgery   [  ] Orthopedics  [  ] Plastics  [  ] Urology  [  ] PM&R  [  ] Social Work    RADIOLOGICAL FINDINGS REVIEW:  CXR:    Pelvis Films: No injuries or deformities/fractures     C-Spine Films: No injuries or deformities/fractures     T/L/S Spine Films:    Extremity Films:    Head CT: No injuries or deformities/fractures. No intracranial pathology     C-Spine CT: No injuries or deformities/fractures     Neck CT: No injuries or deformities/fractures     Chest CT:    ABD/Pelvis CT:    Other:    Dispo: Medicine for Syncope work up

## 2021-08-01 NOTE — H&P ADULT - PROBLEM SELECTOR PLAN 2
local wound care  pain control  trauma stable, no acute traumatic injury or intervention needed  patient to be admitted to the medical service for syncope work up

## 2021-08-01 NOTE — ED PROVIDER NOTE - CARE PLAN
Principal Discharge DX:	Dehydration  Secondary Diagnosis:	Hypokalemia  Secondary Diagnosis:	Weakness

## 2021-08-01 NOTE — H&P ADULT - HISTORY OF PRESENT ILLNESS
61yM, pmhx dvt/pe on eliquis, COPD, per EMS< patient was found down in the basement for approx 20-40 minutes. Per ems, vitals stable en route to the hospital, and AAOx2-3. Primary survey intact. Secondary survey pertinent findings include right shoulder abrasion, b/l knee abrasions, forehead abrasions. GCS 15.   Denies cp, sob, n/v/d, abd pain, HA, vision changes.  61y M, pmhx dvt/pe on eliquis, COPD, per EMS< patient was found down in the basement for approx 20-40 minutes. Per ems, vitals stable en route to the hospital, and AAOx2-3. Primary survey intact. Secondary survey pertinent findings include right shoulder abrasion, b/l knee abrasions, forehead abrasions. GCS 15.   Denies cp, sob, n/v/d, abd pain, HA, vision changes.

## 2021-08-01 NOTE — ED PROVIDER NOTE - PROGRESS NOTE DETAILS
Meghan: pt cleared by trauma, neg imaging, sats ~94%, hx copd. has been taking eliquis. lactate 4.3, improved with fluids, hypokalemic with replacement, pt still very dry appaering, somewhat lethargic, progressive failure to thrive recently. no focal neuro symtoms. no fever/acute infectious symptoms.  admitted for further medicine work up.

## 2021-08-01 NOTE — ED ADULT NURSE NOTE - CHIEF COMPLAINT QUOTE
pt awake and alert, BIBA from home s/p unwitnessed fall on Eliquis with +head trauma. code trauma B activated

## 2021-08-01 NOTE — H&P ADULT - NSHPPHYSICALEXAM_GEN_ALL_CORE
Constitutional: Well-developed well nourished Male in no acute distress  HEENT: R frontal forehead abrasion, Head is normocephalic , maxillofacial structures stable, no blood or discharge from nares or oral cavity, no jones sign / racoon eyes, EOMI b/l, pupils [ 3]mm round and reactive to light b/l, no active drainage or redness  Neck: cervical collar in place, trachea midline  Respiratory: Breath sounds CTA b/l respirations are unlabored, no accessory muscle use, no conversational dyspnea  Cardiovascular: Regular rate & rhythm, +S1, S2  Chest: L chest wall TTP, Chest wall is non-tender to palpation, no subQ emphysema or crepitus palpated  Gastrointestinal: Abdomen soft, non-tender, non-distended, no rebound tenderness / guarding, no ecchymosis or external signs of abdominal trauma  Extremities: b/l knee abrasions, R shoulder abrasion, moving all extremities spontaneously, no point tenderness or deformity noted to upper or lower extremities b/l  Pelvis: stable  Vascular: 2+ radial, femoral, and DP pulses b/l  Neurological: GCS: 15 (4/5/6). A&O x 3; no gross sensory / motor / coordination deficits  Musculoskeletal: 5/5 strength of upper and lower extremities b/l  Back: no C/T/LS spine tenderness to palpation, no step-offs or signs of external trauma to the back

## 2021-08-01 NOTE — ED PROVIDER NOTE - PMH
Atrial flutter    COPD (chronic obstructive pulmonary disease)    Depression    H/O solitary pulmonary nodule    Poor historian    Pulmonary embolism

## 2021-08-01 NOTE — ED ADULT NURSE NOTE - NS ED NURSE REPORT GIVEN TO FT
Bedside report given to on coming nurse Karen Michael/ CDU. Understands pmh, medications given and plan of care for patient. Patient in stable condition, vital signs updated, has no complaints at this time and has been updated on care plan. Explained to patient that it is change of shift and new nurse is taking over, pt verbalized understanding.

## 2021-08-02 LAB
AMPHET UR-MCNC: NEGATIVE — SIGNIFICANT CHANGE UP
ANION GAP SERPL CALC-SCNC: 11 MMOL/L — SIGNIFICANT CHANGE UP (ref 5–17)
APPEARANCE UR: CLEAR — SIGNIFICANT CHANGE UP
BACTERIA # UR AUTO: ABNORMAL
BARBITURATES UR SCN-MCNC: NEGATIVE — SIGNIFICANT CHANGE UP
BASOPHILS # BLD AUTO: 0.03 K/UL — SIGNIFICANT CHANGE UP (ref 0–0.2)
BASOPHILS NFR BLD AUTO: 0.6 % — SIGNIFICANT CHANGE UP (ref 0–2)
BENZODIAZ UR-MCNC: POSITIVE
BILIRUB UR-MCNC: NEGATIVE — SIGNIFICANT CHANGE UP
BUN SERPL-MCNC: 3.7 MG/DL — LOW (ref 8–20)
CALCIUM SERPL-MCNC: 8 MG/DL — LOW (ref 8.6–10.2)
CHLORIDE SERPL-SCNC: 107 MMOL/L — SIGNIFICANT CHANGE UP (ref 98–107)
CK MB CFR SERPL CALC: 4.4 NG/ML — SIGNIFICANT CHANGE UP (ref 0–6.7)
CK SERPL-CCNC: 883 U/L — HIGH (ref 30–200)
CO2 SERPL-SCNC: 24 MMOL/L — SIGNIFICANT CHANGE UP (ref 22–29)
COCAINE METAB.OTHER UR-MCNC: NEGATIVE — SIGNIFICANT CHANGE UP
COLOR SPEC: YELLOW — SIGNIFICANT CHANGE UP
COVID-19 SPIKE DOMAIN AB INTERP: NEGATIVE — SIGNIFICANT CHANGE UP
COVID-19 SPIKE DOMAIN ANTIBODY RESULT: 0.4 U/ML — SIGNIFICANT CHANGE UP
CREAT SERPL-MCNC: 0.57 MG/DL — SIGNIFICANT CHANGE UP (ref 0.5–1.3)
DIFF PNL FLD: ABNORMAL
EOSINOPHIL # BLD AUTO: 0.15 K/UL — SIGNIFICANT CHANGE UP (ref 0–0.5)
EOSINOPHIL NFR BLD AUTO: 2.8 % — SIGNIFICANT CHANGE UP (ref 0–6)
EPI CELLS # UR: SIGNIFICANT CHANGE UP
GLUCOSE BLDC GLUCOMTR-MCNC: 85 MG/DL — SIGNIFICANT CHANGE UP (ref 70–99)
GLUCOSE SERPL-MCNC: 60 MG/DL — LOW (ref 70–99)
GLUCOSE UR QL: NEGATIVE — SIGNIFICANT CHANGE UP
HCT VFR BLD CALC: 33.7 % — LOW (ref 39–50)
HGB BLD-MCNC: 11.1 G/DL — LOW (ref 13–17)
IMM GRANULOCYTES NFR BLD AUTO: 0.4 % — SIGNIFICANT CHANGE UP (ref 0–1.5)
KETONES UR-MCNC: ABNORMAL
LEUKOCYTE ESTERASE UR-ACNC: ABNORMAL
LYMPHOCYTES # BLD AUTO: 1.5 K/UL — SIGNIFICANT CHANGE UP (ref 1–3.3)
LYMPHOCYTES # BLD AUTO: 27.6 % — SIGNIFICANT CHANGE UP (ref 13–44)
MCHC RBC-ENTMCNC: 31.4 PG — SIGNIFICANT CHANGE UP (ref 27–34)
MCHC RBC-ENTMCNC: 32.9 GM/DL — SIGNIFICANT CHANGE UP (ref 32–36)
MCV RBC AUTO: 95.5 FL — SIGNIFICANT CHANGE UP (ref 80–100)
METHADONE UR-MCNC: NEGATIVE — SIGNIFICANT CHANGE UP
MONOCYTES # BLD AUTO: 0.44 K/UL — SIGNIFICANT CHANGE UP (ref 0–0.9)
MONOCYTES NFR BLD AUTO: 8.1 % — SIGNIFICANT CHANGE UP (ref 2–14)
NEUTROPHILS # BLD AUTO: 3.29 K/UL — SIGNIFICANT CHANGE UP (ref 1.8–7.4)
NEUTROPHILS NFR BLD AUTO: 60.5 % — SIGNIFICANT CHANGE UP (ref 43–77)
NITRITE UR-MCNC: POSITIVE
OPIATES UR-MCNC: NEGATIVE — SIGNIFICANT CHANGE UP
PCP SPEC-MCNC: SIGNIFICANT CHANGE UP
PCP UR-MCNC: NEGATIVE — SIGNIFICANT CHANGE UP
PH UR: 6.5 — SIGNIFICANT CHANGE UP (ref 5–8)
PLATELET # BLD AUTO: 171 K/UL — SIGNIFICANT CHANGE UP (ref 150–400)
POTASSIUM SERPL-MCNC: 3.2 MMOL/L — LOW (ref 3.5–5.3)
POTASSIUM SERPL-SCNC: 3.2 MMOL/L — LOW (ref 3.5–5.3)
PROT UR-MCNC: 15
RBC # BLD: 3.53 M/UL — LOW (ref 4.2–5.8)
RBC # FLD: 15.1 % — HIGH (ref 10.3–14.5)
RBC CASTS # UR COMP ASSIST: SIGNIFICANT CHANGE UP /HPF (ref 0–4)
SARS-COV-2 IGG+IGM SERPL QL IA: 0.4 U/ML — SIGNIFICANT CHANGE UP
SARS-COV-2 IGG+IGM SERPL QL IA: NEGATIVE — SIGNIFICANT CHANGE UP
SODIUM SERPL-SCNC: 142 MMOL/L — SIGNIFICANT CHANGE UP (ref 135–145)
SP GR SPEC: 1 — LOW (ref 1.01–1.02)
THC UR QL: NEGATIVE — SIGNIFICANT CHANGE UP
TROPONIN T SERPL-MCNC: <0.01 NG/ML — SIGNIFICANT CHANGE UP (ref 0–0.06)
UROBILINOGEN FLD QL: 1
WBC # BLD: 5.43 K/UL — SIGNIFICANT CHANGE UP (ref 3.8–10.5)
WBC # FLD AUTO: 5.43 K/UL — SIGNIFICANT CHANGE UP (ref 3.8–10.5)
WBC UR QL: ABNORMAL

## 2021-08-02 PROCEDURE — 99233 SBSQ HOSP IP/OBS HIGH 50: CPT

## 2021-08-02 PROCEDURE — 93308 TTE F-UP OR LMTD: CPT | Mod: 26

## 2021-08-02 RX ORDER — SENNA PLUS 8.6 MG/1
2 TABLET ORAL AT BEDTIME
Refills: 0 | Status: DISCONTINUED | OUTPATIENT
Start: 2021-08-02 | End: 2021-08-05

## 2021-08-02 RX ORDER — NICOTINE POLACRILEX 2 MG
1 GUM BUCCAL DAILY
Refills: 0 | Status: DISCONTINUED | OUTPATIENT
Start: 2021-08-02 | End: 2021-08-05

## 2021-08-02 RX ORDER — BUPRENORPHINE AND NALOXONE 2; .5 MG/1; MG/1
1 TABLET SUBLINGUAL
Refills: 0 | Status: DISCONTINUED | OUTPATIENT
Start: 2021-08-02 | End: 2021-08-05

## 2021-08-02 RX ORDER — TAMSULOSIN HYDROCHLORIDE 0.4 MG/1
0.4 CAPSULE ORAL AT BEDTIME
Refills: 0 | Status: DISCONTINUED | OUTPATIENT
Start: 2021-08-02 | End: 2021-08-05

## 2021-08-02 RX ORDER — GABAPENTIN 400 MG/1
300 CAPSULE ORAL AT BEDTIME
Refills: 0 | Status: DISCONTINUED | OUTPATIENT
Start: 2021-08-02 | End: 2021-08-05

## 2021-08-02 RX ORDER — POTASSIUM CHLORIDE 20 MEQ
40 PACKET (EA) ORAL ONCE
Refills: 0 | Status: COMPLETED | OUTPATIENT
Start: 2021-08-02 | End: 2021-08-02

## 2021-08-02 RX ORDER — SERTRALINE 25 MG/1
100 TABLET, FILM COATED ORAL DAILY
Refills: 0 | Status: DISCONTINUED | OUTPATIENT
Start: 2021-08-02 | End: 2021-08-05

## 2021-08-02 RX ORDER — POLYETHYLENE GLYCOL 3350 17 G/17G
17 POWDER, FOR SOLUTION ORAL DAILY
Refills: 0 | Status: DISCONTINUED | OUTPATIENT
Start: 2021-08-02 | End: 2021-08-05

## 2021-08-02 RX ORDER — SACCHAROMYCES BOULARDII 250 MG
250 POWDER IN PACKET (EA) ORAL
Refills: 0 | Status: DISCONTINUED | OUTPATIENT
Start: 2021-08-02 | End: 2021-08-05

## 2021-08-02 RX ORDER — METHOCARBAMOL 500 MG/1
500 TABLET, FILM COATED ORAL THREE TIMES A DAY
Refills: 0 | Status: DISCONTINUED | OUTPATIENT
Start: 2021-08-02 | End: 2021-08-05

## 2021-08-02 RX ORDER — ALPRAZOLAM 0.25 MG
0.25 TABLET ORAL EVERY 8 HOURS
Refills: 0 | Status: DISCONTINUED | OUTPATIENT
Start: 2021-08-02 | End: 2021-08-05

## 2021-08-02 RX ORDER — CEFTRIAXONE 500 MG/1
1000 INJECTION, POWDER, FOR SOLUTION INTRAMUSCULAR; INTRAVENOUS EVERY 24 HOURS
Refills: 0 | Status: COMPLETED | OUTPATIENT
Start: 2021-08-02 | End: 2021-08-04

## 2021-08-02 RX ORDER — APIXABAN 2.5 MG/1
5 TABLET, FILM COATED ORAL
Refills: 0 | Status: DISCONTINUED | OUTPATIENT
Start: 2021-08-02 | End: 2021-08-05

## 2021-08-02 RX ORDER — SODIUM CHLORIDE 9 MG/ML
1000 INJECTION INTRAMUSCULAR; INTRAVENOUS; SUBCUTANEOUS
Refills: 0 | Status: DISCONTINUED | OUTPATIENT
Start: 2021-08-02 | End: 2021-08-05

## 2021-08-02 RX ORDER — PANTOPRAZOLE SODIUM 20 MG/1
40 TABLET, DELAYED RELEASE ORAL DAILY
Refills: 0 | Status: DISCONTINUED | OUTPATIENT
Start: 2021-08-02 | End: 2021-08-05

## 2021-08-02 RX ADMIN — BUPRENORPHINE AND NALOXONE 1 TABLET(S): 2; .5 TABLET SUBLINGUAL at 21:56

## 2021-08-02 RX ADMIN — HEPARIN SODIUM 5000 UNIT(S): 5000 INJECTION INTRAVENOUS; SUBCUTANEOUS at 05:24

## 2021-08-02 RX ADMIN — Medication 250 MILLIGRAM(S): at 16:11

## 2021-08-02 RX ADMIN — APIXABAN 5 MILLIGRAM(S): 2.5 TABLET, FILM COATED ORAL at 16:11

## 2021-08-02 RX ADMIN — GABAPENTIN 300 MILLIGRAM(S): 400 CAPSULE ORAL at 21:56

## 2021-08-02 RX ADMIN — SENNA PLUS 2 TABLET(S): 8.6 TABLET ORAL at 21:56

## 2021-08-02 RX ADMIN — SODIUM CHLORIDE 125 MILLILITER(S): 9 INJECTION INTRAMUSCULAR; INTRAVENOUS; SUBCUTANEOUS at 22:15

## 2021-08-02 RX ADMIN — PANTOPRAZOLE SODIUM 40 MILLIGRAM(S): 20 TABLET, DELAYED RELEASE ORAL at 09:30

## 2021-08-02 RX ADMIN — METHOCARBAMOL 500 MILLIGRAM(S): 500 TABLET, FILM COATED ORAL at 21:56

## 2021-08-02 RX ADMIN — SODIUM CHLORIDE 125 MILLILITER(S): 9 INJECTION INTRAMUSCULAR; INTRAVENOUS; SUBCUTANEOUS at 13:48

## 2021-08-02 RX ADMIN — Medication 40 MILLIEQUIVALENT(S): at 13:48

## 2021-08-02 RX ADMIN — BUPRENORPHINE AND NALOXONE 1 TABLET(S): 2; .5 TABLET SUBLINGUAL at 13:48

## 2021-08-02 RX ADMIN — Medication 1 PATCH: at 13:49

## 2021-08-02 RX ADMIN — TAMSULOSIN HYDROCHLORIDE 0.4 MILLIGRAM(S): 0.4 CAPSULE ORAL at 21:56

## 2021-08-02 RX ADMIN — SERTRALINE 100 MILLIGRAM(S): 25 TABLET, FILM COATED ORAL at 13:49

## 2021-08-02 RX ADMIN — CEFTRIAXONE 100 MILLIGRAM(S): 500 INJECTION, POWDER, FOR SOLUTION INTRAMUSCULAR; INTRAVENOUS at 06:12

## 2021-08-02 NOTE — PHYSICAL THERAPY INITIAL EVALUATION ADULT - PERTINENT HX OF CURRENT PROBLEM, REHAB EVAL
61 years old male with history of A. Flutter s/p cardioversion on Eliquis, DVT/PE on Eliquis, COPD, Smoking, Chronic Pain Syndrome, Anxiety, Depression and BPH presented as Code Trauma after he was found on basement floor by family. Cleared by ACS team. Admitted for further work up.

## 2021-08-02 NOTE — PROGRESS NOTE ADULT - ASSESSMENT
INCOMPLETE 61 years old male with history of A. Flutter s/p cardioversion on Eliquis, DVT/PE on Eliquis, COPD, Smoking, Chronic Pain Syndrome, Anxiety, Depression and BPH presented as Code Trauma after he was found on basement floor by family. Cleared by ACS team. Admitted for further work up.     1) Syncope  - Likely due to medication overdose (As per iStop, he takes Alprazolam 1 mg TID + Diazepam 5 mg BID), has gotten multiple prescriptions in last 3 weeks. Called his Pharm[facility] ([phone]) multiple times but there was no answer. Left voicemail but haven't got any call back. Unclear how much he was taking at home. His son was in ER, who told the nurse that patient was taking Suboxone along with Alprazolam and Diazepam. Patient does not want us to discuss with his son or other family members about his condition or treatment plan.   - Psych Consult   - SW Consult     2) Fall   - Likely due to above  - Cleared by ACS team    3) A. Flutter  - s/p cardioversion during previous admission  - Plan for ablation as an outpatient as per patient  - Currently in NSR  - Resume Eliquis   - Cardio was consulted     4) Rhabdomyolysis   - IVF  - Monitor CPK    5) History of PE/DVT  - Resume Eliquis    6) Chronic Pain Syndrome  - Change Suboxone to 8 mg / 2 mg TID (takes at home)  - Resume Gabapentin and Robaxin    7) UTI  - Urine culture  - Continue Rocephin    8) Urinary Retention  - Start Flomax  - Bladder Scan every 6 hours     9) Depression / Anxiety  - Resume Zoloft     DVT Prophylaxis --  Eliquis will be resumed.    Dispo: Unclear. PT Vonnie.        61 years old male with history of A. Flutter s/p cardioversion on Eliquis, DVT/PE on Eliquis, COPD, Smoking, Chronic Pain Syndrome, Anxiety, Depression and BPH presented as Code Trauma after he was found on basement floor by family. Cleared by ACS team. Admitted for further work up.     1) Syncope  - Likely due to medication overdose (As per iStop, he takes Alprazolam 1 mg TID + Diazepam 5 mg BID), has gotten multiple prescriptions in last 3 weeks. Called his Pharmacy [facility] ([phone]) multiple times but there was no answer. Left voicemail but haven't got any call back. Unclear how much he was taking at home. His son was in ER, who told the nurse that patient was taking Suboxone along with Alprazolam and Diazepam. Patient does not want us to discuss with his son or other family members about his condition or treatment plan.   - Psych Consult   - SW Consult     2) Fall   - Likely due to above  - Cleared by ACS team    3) A. Flutter  - s/p cardioversion during previous admission  - Plan for ablation as an outpatient as per patient  - Currently in NSR  - Resume Eliquis   - Cardio was consulted     4) Rhabdomyolysis   - IVF  - Monitor CPK    5) History of PE/DVT  - Resume Eliquis    6) Chronic Pain Syndrome  - Change Suboxone to 8 mg / 2 mg TID (takes at home)  - Resume Gabapentin and Robaxin    7) UTI  - Urine culture  - Continue Rocephin    8) Urinary Retention  - Start Flomax  - Bladder Scan every 6 hours     9) Depression / Anxiety  - Resume Zoloft     10) Hypokalemia  - Replete     DVT Prophylaxis --  Eliquis will be resumed.    Dispo: Unclear. PT Vonnie.

## 2021-08-02 NOTE — PHYSICAL THERAPY INITIAL EVALUATION ADULT - ADDITIONAL COMMENTS
Pt. lives in a house with his son with 3 SEFERINO with one rail and a full flight with one rail down to the basement where pt. reports he needs to go. Pt. reports he was independent PTA and does not own DME.

## 2021-08-02 NOTE — CONSULT NOTE ADULT - SUBJECTIVE AND OBJECTIVE BOX
[facility]                              [address] [address]                                                 PHONE: [phone]                                                 FAX: [phone]  ------------------------------------------------------------------------------------------------    Chief complaint: Fall, syncope, dizziness    61y Male with past medical history as under  was found down in the basement for approx. 20-40 minutes. As per EMS, vitals stable en route to the hospital, and AAOx2-3. As per him, he was fixing tiles at home when he felt very weak and couldn't get up. That's why he was on the floor.    At the time of evaluation, pt reports feeling well. He does not report any complaints at this time. He reports he was in rehab for 3 weeks and then went home.   He was admitted in 5/15 with aflutter with RVR along with acute hypoxic respiratory failure due to large left pleural effusion and pulmonary edema, found to have right segmental PE and left peroneal DVT  s/p Left chest tube was placed (05/16) with 1400 cc serosanguineous fluid.     PAST MEDICAL & SURGICAL HISTORY:  Poor historian    COPD (chronic obstructive pulmonary disease)    Pulmonary embolism    Atrial flutter    H/O solitary pulmonary nodule    Depression    No significant past surgical history        No Known Allergies      Review of Systems:  Positive for fall  Rest of the systems were reviewed and was negative.     Family history:   No pertinent family history in first degree relative of early CAD, sudden cardiac death or  congenital heart disease    Social History:  No smoking   No alcohol  No other drug use    Vital Signs Last 24 Hrs  T(C): 36.7 (02 Aug 2021 07:35), Max: 37.2 (01 Aug 2021 11:15)  T(F): 98 (02 Aug 2021 07:35), Max: 99 (01 Aug 2021 11:15)  HR: 82 (02 Aug 2021 07:35) (73 - 84)  BP: 155/79 (02 Aug 2021 07:35) (129/72 - 155/79)  BP(mean): 91 (02 Aug 2021 04:57) (91 - 91)  RR: 20 (02 Aug 2021 07:35) (20 - 23)  SpO2: 96% (02 Aug 2021 07:35) (90% - 96%)    PHYSICAL EXAM:  Constitutional: Appears well; alert and co-operative  HEENT:     Head: Normocephalic and atraumatic  Eyes: Conjunctiva normal, No scleral icterus  Neck: Supple, No JVD  Mouth/Throat: Mucous membranes are normal. Mucosa are not pale or dry.  Cardiovascular: regular S1, S2  Respiratory: Lungs clear to auscultation; no crepitations, no wheeze  Gastrointestinal:  Soft, Non-tender, + BS	  Musculoskeletal: Normal range of motion. No edema  Skin: Warm and dry. No cyanosis . No diaphoresis.  Neurologic: Alert oriented to time place and person. Normal strength and no tremor.  Psychiatric: Normal mood and affect, Speech is normal and behavior is normal.        LABS:                        11.1   5.43  )-----------( 171      ( 02 Aug 2021 07:49 )             33.7     08-02    142  |  107  |  3.7<L>  ----------------------------<  60<L>  3.2<L>   |  24.0  |  0.57    Ca    8.0<L>      02 Aug 2021 07:49    TPro  6.4<L>  /  Alb  3.5  /  TBili  0.7  /  DBili  x   /  AST  16  /  ALT  5   /  AlkPhos  84  08-01    CARDIAC MARKERS ( 02 Aug 2021 09:26 )  x     / <0.01 ng/mL / 883 U/L / x     / 4.4 ng/mL  CARDIAC MARKERS ( 01 Aug 2021 10:07 )  x     / x     / x     / x     / 1.9 ng/mL  CARDIAC MARKERS ( 01 Aug 2021 09:48 )  x     / <0.01 ng/mL / 480 U/L / x     / x          PT/INR - ( 01 Aug 2021 09:48 )   PT: 14.2 sec;   INR: 1.24 ratio         PTT - ( 01 Aug 2021 09:48 )  PTT:36.0 sec    RADIOLOGY & ADDITIONAL STUDIES: (reviewed)  CXR was independently visualized/reviewed and demonstrated: clear lungs    CARDIOLOGY TESTING:(reviewed)     ECG (Independent visualization): NSR    ECHOCARDIOGRAM :  < from: TTE Echo Complete w/ Contrast w/ Doppler (05.16.21 @ 12:06) >   1. Endocardial visualization was enhanced with intravenous echo contrast.   2. There is mild left ventricular hypertrophy.   3. Normal left atrial size.   4. Left ventricular ejection fraction, by visual estimation, is 55 to 60%.   5. Mildly enlarged right atrium.   6. Moderately enlarged right ventricle. Moderately reduced RV systolic function.   7. Mild tricuspid regurgitation.   8. Estimated pulmonary artery systolic pressure is 48 mmHg- mild pulmonary hypertension.   9. Trivial pericardial effusion.  10. Large pleural effusion in the left lateral region.    TELEMETRY independently visualized/reviewed and demonstrated : NSR    MEDICATIONS:(reviewed)  Home Medications:  Home Medications:  meclizine 25 mg oral tablet: 1 tab(s) orally 3 times a day, As Needed (01 Aug 2021 18:10)  Suboxone 8 mg-2 mg sublingual film: 3 film(s) sublingual once a day (01 Aug 2021 18:10)      MEDICATIONS  (STANDING):  buprenorphine 8 mG/naloxone 2 mG SL  Tablet 3 Tablet(s) SubLingual daily  cefTRIAXone   IVPB 1000 milliGRAM(s) IV Intermittent every 24 hours  heparin   Injectable 5000 Unit(s) SubCutaneous every 12 hours  pantoprazole    Tablet 40 milliGRAM(s) Oral daily  saccharomyces boulardii 250 milliGRAM(s) Oral two times a day  senna 2 Tablet(s) Oral at bedtime  tamsulosin 0.4 milliGRAM(s) Oral at bedtime    MEDICATIONS  (PRN):  acetaminophen   Tablet .. 650 milliGRAM(s) Oral every 6 hours PRN Temp greater or equal to 38.5C (101.3F), Mild Pain (1 - 3)  aluminum hydroxide/magnesium hydroxide/simethicone Suspension 30 milliLiter(s) Oral every 4 hours PRN Dyspepsia  melatonin 3 milliGRAM(s) Oral at bedtime PRN Insomnia  ondansetron Injectable 4 milliGRAM(s) IV Push every 8 hours PRN Nausea and/or Vomiting  polyethylene glycol 3350 17 Gram(s) Oral daily PRN Constipation      ASSESSMENT AND PLAN:    61y Male with past medical history as under was found down in the basement for approx. 20-40 minutes. As per EMS, vitals stable en route to the hospital, and AAOx2-3. As per him, he was fixing tiles at home when he felt very weak and couldn't get up. That's why he was on the floor.    At the time of evaluation, pt reports feeling well. He does not report any complaints at this time. He reports he was in rehab for 3 weeks and then went home.   He was admitted in 5/15 with aflutter with RVR along with acute hypoxic respiratory failure due to large left pleural effusion and pulmonary edema, found to have right segmental PE and left peroneal DVT  s/p Left chest tube was placed (05/16) with 1400 cc serosanguineous fluid.     AF/AFL  - Remains in NSR  - resume Eliquis  - Pt reports he was due to see Dr. Pappas for AFL ablation as outpt. d/w EP. No inpt evaluation needed at this time  - Prior Echo with preserved LV function. Will repeat limited Echo to reassess LV function post DVT/PE  - Telemetry monitoring    Plan discussed with Dr. Hobbs

## 2021-08-02 NOTE — PROVIDER CONTACT NOTE (OTHER) - ASSESSMENT
Bladder scanned for volume of 334. Pt straight cathed around midnight for volume approximately 450ml.

## 2021-08-02 NOTE — PROGRESS NOTE ADULT - SUBJECTIVE AND OBJECTIVE BOX
Dehydration    HPI:  61y M, pmhx dvt/pe on eliquis, COPD, per EMS< patient was found down in the basement for approx 20-40 minutes. Per ems, vitals stable en route to the hospital, and AAOx2-3. Primary survey intact. Secondary survey pertinent findings include right shoulder abrasion, b/l knee abrasions, forehead abrasions. GCS 15.   Denies cp, sob, n/v/d, abd pain, HA, vision changes.      Interval History:  Patient was seen and examined at bedside.   Complaining of pain in knees.   As per him, he was fixing tiles at home when he felt very weak and couldn't get up. That's why he was on the floor.    Denies chest pain, palpitations, shortness of breath, headache, dizziness, visual symptoms, nausea, vomiting or abdominal pain.    ROS:  As per interval history otherwise unremarkable.    PHYSICAL EXAM:  Vital Signs   T(C): 36.7 (02 Aug 2021 07:35), Max: 37.2 (01 Aug 2021 11:15)  T(F): 98 (02 Aug 2021 07:35), Max: 99 (01 Aug 2021 11:15)  HR: 82 (02 Aug 2021 07:35) (73 - 85)  BP: 155/79 (02 Aug 2021 07:35) (129/72 - 155/79)  BP(mean): 91 (02 Aug 2021 04:57) (91 - 91)  RR: 20 (02 Aug 2021 07:35) (20 - 24)  SpO2: 96% (02 Aug 2021 07:35) (90% - 96%)  General: Middle aged male lying in bed comfortably. No acute distress  HEENT: PERRLA. EOMI. Clear conjunctivae. Moist mucus membrane. Abrasion on forehead.   Neck: Supple.    Chest: CTA bilaterally. No wheezing, rales or rhonchi.   Heart: Normal S1 & S2. RRR.   Abdomen: Soft. Non-tender. Non-distended. + BS. Old healed scars.   Ext: 1+ pedal edema. No calf tenderness. Abrasions on knees. Chronic skin changes on lower extremities. Dry scaly skin on feet. Poor hygiene.    Neuro: AAO x 2-3. Moves all four extremities but Motor 4/5 in lower extremities. No speech disorder  Skin: Warm and Dry  Psychiatry: Normal mood and affect    I&O's Summary    01 Aug 2021 07:01  -  02 Aug 2021 07:00  --------------------------------------------------------  IN: 0 mL / OUT: 500 mL / NET: -500 mL    MEDICATIONS  (STANDING):  buprenorphine 8 mG/naloxone 2 mG SL  Tablet 3 Tablet(s) SubLingual daily  cefTRIAXone   IVPB 1000 milliGRAM(s) IV Intermittent every 24 hours  heparin   Injectable 5000 Unit(s) SubCutaneous every 12 hours  pantoprazole    Tablet 40 milliGRAM(s) Oral daily  saccharomyces boulardii 250 milliGRAM(s) Oral two times a day  senna 2 Tablet(s) Oral at bedtime  tamsulosin 0.4 milliGRAM(s) Oral at bedtime    MEDICATIONS  (PRN):  acetaminophen   Tablet .. 650 milliGRAM(s) Oral every 6 hours PRN Temp greater or equal to 38.5C (101.3F), Mild Pain (1 - 3)  aluminum hydroxide/magnesium hydroxide/simethicone Suspension 30 milliLiter(s) Oral every 4 hours PRN Dyspepsia  melatonin 3 milliGRAM(s) Oral at bedtime PRN Insomnia  ondansetron Injectable 4 milliGRAM(s) IV Push every 8 hours PRN Nausea and/or Vomiting  polyethylene glycol 3350 17 Gram(s) Oral daily PRN Constipation    LABS:  CAPILLARY BLOOD GLUCOSE  POCT Blood Glucose.: 115 mg/dL (01 Aug 2021 09:48)                          11.1   5.43  )-----------( 171      ( 02 Aug 2021 07:49 )             33.7     08-01    138  |  100  |  4.1<L>  ----------------------------<  119<H>  3.1<L>   |  26.0  |  0.81    Ca    9.0      01 Aug 2021 09:48    TPro  6.4<L>  /  Alb  3.5  /  TBili  0.7  /  DBili  x   /  AST  16  /  ALT  5   /  AlkPhos  84  08    PT/INR - ( 01 Aug 2021 09:48 )   PT: 14.2 sec;   INR: 1.24 ratio         PTT - ( 01 Aug 2021 09:48 )  PTT:36.0 sec  Urinalysis Basic - ( 02 Aug 2021 00:22 )    Color: Yellow / Appearance: Clear / S.005 / pH: x  Gluc: x / Ketone: Trace  / Bili: Negative / Urobili: 1   Blood: x / Protein: 15 / Nitrite: Positive   Leuk Esterase: Moderate / RBC: 0-2 /HPF / WBC 26-50   Sq Epi: x / Non Sq Epi: Occasional / Bacteria: Many    RADIOLOGY & ADDITIONAL STUDIES:  Reviewed

## 2021-08-03 LAB
ANION GAP SERPL CALC-SCNC: 9 MMOL/L — SIGNIFICANT CHANGE UP (ref 5–17)
BASOPHILS # BLD AUTO: 0.02 K/UL — SIGNIFICANT CHANGE UP (ref 0–0.2)
BASOPHILS NFR BLD AUTO: 0.3 % — SIGNIFICANT CHANGE UP (ref 0–2)
BUN SERPL-MCNC: <3 MG/DL — LOW (ref 8–20)
CALCIUM SERPL-MCNC: 8.1 MG/DL — LOW (ref 8.6–10.2)
CHLORIDE SERPL-SCNC: 107 MMOL/L — SIGNIFICANT CHANGE UP (ref 98–107)
CO2 SERPL-SCNC: 28 MMOL/L — SIGNIFICANT CHANGE UP (ref 22–29)
CREAT SERPL-MCNC: 0.59 MG/DL — SIGNIFICANT CHANGE UP (ref 0.5–1.3)
EOSINOPHIL # BLD AUTO: 0.2 K/UL — SIGNIFICANT CHANGE UP (ref 0–0.5)
EOSINOPHIL NFR BLD AUTO: 3.5 % — SIGNIFICANT CHANGE UP (ref 0–6)
GLUCOSE SERPL-MCNC: 77 MG/DL — SIGNIFICANT CHANGE UP (ref 70–99)
HCT VFR BLD CALC: 36.4 % — LOW (ref 39–50)
HGB BLD-MCNC: 11.7 G/DL — LOW (ref 13–17)
IMM GRANULOCYTES NFR BLD AUTO: 0 % — SIGNIFICANT CHANGE UP (ref 0–1.5)
LYMPHOCYTES # BLD AUTO: 1.77 K/UL — SIGNIFICANT CHANGE UP (ref 1–3.3)
LYMPHOCYTES # BLD AUTO: 30.7 % — SIGNIFICANT CHANGE UP (ref 13–44)
MAGNESIUM SERPL-MCNC: 1.9 MG/DL — SIGNIFICANT CHANGE UP (ref 1.6–2.6)
MCHC RBC-ENTMCNC: 31.4 PG — SIGNIFICANT CHANGE UP (ref 27–34)
MCHC RBC-ENTMCNC: 32.1 GM/DL — SIGNIFICANT CHANGE UP (ref 32–36)
MCV RBC AUTO: 97.6 FL — SIGNIFICANT CHANGE UP (ref 80–100)
MONOCYTES # BLD AUTO: 0.51 K/UL — SIGNIFICANT CHANGE UP (ref 0–0.9)
MONOCYTES NFR BLD AUTO: 8.8 % — SIGNIFICANT CHANGE UP (ref 2–14)
NEUTROPHILS # BLD AUTO: 3.27 K/UL — SIGNIFICANT CHANGE UP (ref 1.8–7.4)
NEUTROPHILS NFR BLD AUTO: 56.7 % — SIGNIFICANT CHANGE UP (ref 43–77)
PLATELET # BLD AUTO: 154 K/UL — SIGNIFICANT CHANGE UP (ref 150–400)
POTASSIUM SERPL-MCNC: 3.5 MMOL/L — SIGNIFICANT CHANGE UP (ref 3.5–5.3)
POTASSIUM SERPL-SCNC: 3.5 MMOL/L — SIGNIFICANT CHANGE UP (ref 3.5–5.3)
RBC # BLD: 3.73 M/UL — LOW (ref 4.2–5.8)
RBC # FLD: 14.8 % — HIGH (ref 10.3–14.5)
SODIUM SERPL-SCNC: 144 MMOL/L — SIGNIFICANT CHANGE UP (ref 135–145)
WBC # BLD: 5.77 K/UL — SIGNIFICANT CHANGE UP (ref 3.8–10.5)
WBC # FLD AUTO: 5.77 K/UL — SIGNIFICANT CHANGE UP (ref 3.8–10.5)

## 2021-08-03 PROCEDURE — 99233 SBSQ HOSP IP/OBS HIGH 50: CPT

## 2021-08-03 RX ORDER — MAGNESIUM OXIDE 400 MG ORAL TABLET 241.3 MG
400 TABLET ORAL
Refills: 0 | Status: COMPLETED | OUTPATIENT
Start: 2021-08-03 | End: 2021-08-05

## 2021-08-03 RX ORDER — ASCORBIC ACID 60 MG
500 TABLET,CHEWABLE ORAL DAILY
Refills: 0 | Status: DISCONTINUED | OUTPATIENT
Start: 2021-08-03 | End: 2021-08-05

## 2021-08-03 RX ORDER — POTASSIUM CHLORIDE 20 MEQ
40 PACKET (EA) ORAL ONCE
Refills: 0 | Status: COMPLETED | OUTPATIENT
Start: 2021-08-03 | End: 2021-08-03

## 2021-08-03 RX ADMIN — MAGNESIUM OXIDE 400 MG ORAL TABLET 400 MILLIGRAM(S): 241.3 TABLET ORAL at 18:13

## 2021-08-03 RX ADMIN — METHOCARBAMOL 500 MILLIGRAM(S): 500 TABLET, FILM COATED ORAL at 13:26

## 2021-08-03 RX ADMIN — Medication 1 PATCH: at 00:08

## 2021-08-03 RX ADMIN — Medication 1 PATCH: at 12:59

## 2021-08-03 RX ADMIN — TAMSULOSIN HYDROCHLORIDE 0.4 MILLIGRAM(S): 0.4 CAPSULE ORAL at 21:31

## 2021-08-03 RX ADMIN — Medication 250 MILLIGRAM(S): at 06:04

## 2021-08-03 RX ADMIN — SODIUM CHLORIDE 125 MILLILITER(S): 9 INJECTION INTRAMUSCULAR; INTRAVENOUS; SUBCUTANEOUS at 06:04

## 2021-08-03 RX ADMIN — Medication 500 MILLIGRAM(S): at 18:13

## 2021-08-03 RX ADMIN — Medication 1 PATCH: at 19:17

## 2021-08-03 RX ADMIN — SODIUM CHLORIDE 75 MILLILITER(S): 9 INJECTION INTRAMUSCULAR; INTRAVENOUS; SUBCUTANEOUS at 18:13

## 2021-08-03 RX ADMIN — METHOCARBAMOL 500 MILLIGRAM(S): 500 TABLET, FILM COATED ORAL at 06:04

## 2021-08-03 RX ADMIN — SENNA PLUS 2 TABLET(S): 8.6 TABLET ORAL at 21:31

## 2021-08-03 RX ADMIN — BUPRENORPHINE AND NALOXONE 1 TABLET(S): 2; .5 TABLET SUBLINGUAL at 21:31

## 2021-08-03 RX ADMIN — APIXABAN 5 MILLIGRAM(S): 2.5 TABLET, FILM COATED ORAL at 17:25

## 2021-08-03 RX ADMIN — Medication 250 MILLIGRAM(S): at 17:25

## 2021-08-03 RX ADMIN — Medication 40 MILLIEQUIVALENT(S): at 18:13

## 2021-08-03 RX ADMIN — SERTRALINE 100 MILLIGRAM(S): 25 TABLET, FILM COATED ORAL at 11:15

## 2021-08-03 RX ADMIN — Medication 1 TABLET(S): at 11:15

## 2021-08-03 RX ADMIN — BUPRENORPHINE AND NALOXONE 1 TABLET(S): 2; .5 TABLET SUBLINGUAL at 06:06

## 2021-08-03 RX ADMIN — CEFTRIAXONE 100 MILLIGRAM(S): 500 INJECTION, POWDER, FOR SOLUTION INTRAMUSCULAR; INTRAVENOUS at 06:04

## 2021-08-03 RX ADMIN — BUPRENORPHINE AND NALOXONE 1 TABLET(S): 2; .5 TABLET SUBLINGUAL at 13:26

## 2021-08-03 RX ADMIN — Medication 1 PATCH: at 08:14

## 2021-08-03 RX ADMIN — METHOCARBAMOL 500 MILLIGRAM(S): 500 TABLET, FILM COATED ORAL at 21:31

## 2021-08-03 RX ADMIN — GABAPENTIN 300 MILLIGRAM(S): 400 CAPSULE ORAL at 21:31

## 2021-08-03 RX ADMIN — APIXABAN 5 MILLIGRAM(S): 2.5 TABLET, FILM COATED ORAL at 06:03

## 2021-08-03 RX ADMIN — Medication 1 PATCH: at 11:15

## 2021-08-03 RX ADMIN — PANTOPRAZOLE SODIUM 40 MILLIGRAM(S): 20 TABLET, DELAYED RELEASE ORAL at 11:15

## 2021-08-03 NOTE — PROGRESS NOTE ADULT - ASSESSMENT
61 years old male with history of A. Flutter s/p cardioversion on Eliquis, DVT/PE on Eliquis, COPD, Smoking, Chronic Pain Syndrome, Anxiety, Depression and BPH presented as Code Trauma after he was found on basement floor by family. Cleared by ACS team. Admitted for further work up.     1) Syncope  - Likely due to medication overdose (As per iStop, he takes Alprazolam 1 mg TID + Diazepam 5 mg BID), has gotten multiple prescriptions in last 3 weeks. Called his Pharmacy [facility] ([phone]) multiple times but there was no answer. Left voicemail but haven't got any call back. Unclear how much he was taking at home. He claims that he was taking as per recommendations and he got those extra refills as his son took away all his medications.   His son was in ER, who told the nurse that patient was taking Suboxone along with Alprazolam and Diazepam. Patient does not want us to discuss with his son or any other family members about his condition or treatment plan.   - Psych Consult pending   - SW Consult     2) Fall   - Likely due to above  - Cleared by ACS team    3) A. Flutter  - s/p cardioversion during previous admission  - Plan for ablation as an outpatient as per patient  - Currently in NSR  - Resumed Eliquis   - Cardio consult noted     4) Rhabdomyolysis   - IVF  - Monitor CPK    5) History of PE/DVT  - Resumed Eliquis    6) Chronic Pain Syndrome  - Changed Suboxone to 8 mg / 2 mg TID (takes at home)  - Resumed Gabapentin and Robaxin    7) UTI  - Urine culture in progress   - Continue Rocephin    8) Urinary Retention  - Started Flomax  - Bladder Scan every 6 hours     9) Depression / Anxiety  - Resumed Zoloft     10) Hypokalemia  - Repleted     DVT Prophylaxis --  Patient is on Eliquis.     Dispo: [facility] in 24 hours.

## 2021-08-03 NOTE — DIETITIAN INITIAL EVALUATION ADULT. - ORAL INTAKE PTA/DIET HISTORY
Pt known to this department from previous admission May 2021. Pt was initially intubated with NGT feeds, advanced diet with good PO intake. Unclear accuracy of weights, admit May 183lbs, weight during admission 174lbs, current admit weight 191lbs. RD bedscale 186lbs. Pt unarousable at this time for interview.

## 2021-08-03 NOTE — DIETITIAN INITIAL EVALUATION ADULT. - OTHER INFO
61y M, PMHx DVT/PE on eliquis, COPD, per EMS< patient was found down in the basement for approx 20-40 minutes, vitals stable en route to the hospital, and AAOx2-3. Secondary survey pertinent findings include right shoulder abrasion, b/l knee abrasions, forehead abrasions.

## 2021-08-03 NOTE — PROGRESS NOTE ADULT - SUBJECTIVE AND OBJECTIVE BOX
Dehydration    HPI:  61y M, pmhx dvt/pe on eliquis, COPD, per EMS< patient was found down in the basement for approx 20-40 minutes. Per ems, vitals stable en route to the hospital, and AAOx2-3. Primary survey intact. Secondary survey pertinent findings include right shoulder abrasion, b/l knee abrasions, forehead abrasions. GCS 15.   Denies cp, sob, n/v/d, abd pain, HA, vision changes.      Interval History:  Patient was seen and examined at bedside around 10:30 am.   More alert and awake today. Able to provide all the information.   Denies chest pain, palpitations, shortness of breath, headache, dizziness, visual symptoms, nausea, vomiting or abdominal pain.   Has some difficulty urinating but is able to void on his own.     ROS:  As per interval history otherwise unremarkable.    PHYSICAL EXAM:  Vital Signs   T(C): 36.8 (03 Aug 2021 16:58), Max: 36.8 (03 Aug 2021 04:38)  T(F): 98.2 (03 Aug 2021 16:58), Max: 98.3 (03 Aug 2021 04:38)  HR: 72 (03 Aug 2021 16:58) (64 - 72)  BP: 158/73 (03 Aug 2021 16:58) (110/67 - 158/73)  RR: 18 (03 Aug 2021 16:58) (18 - 18)  SpO2: 98% (03 Aug 2021 16:58) (96% - 98%)  General: Middle aged male sitting in bed comfortably. No acute distress  HEENT: PERRLA. EOMI. Clear conjunctivae. Moist mucus membrane. Abrasion on forehead.   Neck: Supple.    Chest: CTA bilaterally. No wheezing, rales or rhonchi.   Heart: Normal S1 & S2. RRR.   Abdomen: Soft. Non-tender. Non-distended. + BS. Old healed scars.   Ext: 1+ pedal edema. No calf tenderness. Abrasions on knees. Chronic skin changes on lower extremities. Dry scaly skin on feet. Poor hygiene.    Neuro: AAO x 2-3. Moves all four extremities but Motor 4/5 in lower extremities. No speech disorder  Skin: Warm and Dry  Psychiatry: Normal mood and affect    I&O's Summary    02 Aug 2021 07:01  -  03 Aug 2021 07:00  --------------------------------------------------------  IN: 0 mL / OUT: 1740 mL / NET: -1740 mL    03 Aug 2021 07:01  -  03 Aug 2021 17:29  --------------------------------------------------------  IN: 0 mL / OUT: 190 mL / NET: -190 mL    MEDICATIONS  (STANDING):  apixaban 5 milliGRAM(s) Oral two times a day  ascorbic acid 500 milliGRAM(s) Oral daily  buprenorphine 8 mG/naloxone 2 mG SL  Tablet 1 Tablet(s) SubLingual <User Schedule>  cefTRIAXone   IVPB 1000 milliGRAM(s) IV Intermittent every 24 hours  gabapentin 300 milliGRAM(s) Oral at bedtime  magnesium oxide 400 milliGRAM(s) Oral three times a day with meals  methocarbamol 500 milliGRAM(s) Oral three times a day  multivitamin 1 Tablet(s) Oral daily  nicotine -  14 mG/24Hr(s) Patch 1 patch Transdermal daily  pantoprazole    Tablet 40 milliGRAM(s) Oral daily  potassium chloride    Tablet ER 40 milliEquivalent(s) Oral once  saccharomyces boulardii 250 milliGRAM(s) Oral two times a day  senna 2 Tablet(s) Oral at bedtime  sertraline 100 milliGRAM(s) Oral daily  sodium chloride 0.9%. 1000 milliLiter(s) (75 mL/Hr) IV Continuous <Continuous>  tamsulosin 0.4 milliGRAM(s) Oral at bedtime    MEDICATIONS  (PRN):  acetaminophen   Tablet .. 650 milliGRAM(s) Oral every 6 hours PRN Temp greater or equal to 38.5C (101.3F), Mild Pain (1 - 3)  ALPRAZolam 0.25 milliGRAM(s) Oral every 8 hours PRN Anxiety  aluminum hydroxide/magnesium hydroxide/simethicone Suspension 30 milliLiter(s) Oral every 4 hours PRN Dyspepsia  melatonin 3 milliGRAM(s) Oral at bedtime PRN Insomnia  ondansetron Injectable 4 milliGRAM(s) IV Push every 8 hours PRN Nausea and/or Vomiting  polyethylene glycol 3350 17 Gram(s) Oral daily PRN Constipation    LABS:                        11.7   5.77  )-----------( 154      ( 03 Aug 2021 07:35 )             36.4     08-03    144  |  107  |  <3.0<L>  ----------------------------<  77  3.5   |  28.0  |  0.59    Ca    8.1<L>      03 Aug 2021 07:35  Mg     1.9     08-03    CARDIAC MARKERS ( 02 Aug 2021 09:26 )  x     / <0.01 ng/mL / 883 U/L / x     / 4.4 ng/mL    RADIOLOGY & ADDITIONAL STUDIES:  Reviewed     TTE Echo Limited or F/U (08.02.21 @ 11:58)    1. Normal global left ventricular systolic function.   2. Left ventricular ejection fraction, by visual estimation, is 65 to 70%.   3. Normal right ventricle size (basal diameter 3.8 cm) with normal right ventricle systolic function (S' 14.9 cm/s).   4. Mild tricuspid regurgitation.   5. No aortic valve stenosis.   6. Estimated pulmonary artery systolic pressure is 36.6 mmHg assuming a right atrialpressure of 3 mmHg, which is consistent with borderline pulmonary hypertension.   7. Trivial pericardial effusion.   8. Recommend clinical correlation with the above findings.

## 2021-08-03 NOTE — DIETITIAN INITIAL EVALUATION ADULT. - PERTINENT LABORATORY DATA
08-03 Na144 mmol/L Glu 77 mg/dL K+ 3.5 mmol/L Cr  0.59 mg/dL BUN <3.0 mg/dL<L> Phos n/a   Alb n/a   PAB n/a

## 2021-08-03 NOTE — PROGRESS NOTE ADULT - SUBJECTIVE AND OBJECTIVE BOX
[facility]                              [address] [address]                                                 PHONE: [phone]                                                 FAX: [phone]  ------------------------------------------------------------------------------------------------    Chief complaint: Fall, syncope, dizziness    61y Male with past medical history as under  was found down in the basement for approx. 20-40 minutes. As per EMS, vitals stable en route to the hospital, and AAOx2-3. As per him, he was fixing tiles at home when he felt very weak and couldn't get up. That's why he was on the floor.    At the time of evaluation, pt reports feeling well. He does not report any complaints at this time. He reports he was in rehab for 3 weeks and then went home.   He was admitted in 5/15 with aflutter with RVR along with acute hypoxic respiratory failure due to large left pleural effusion and pulmonary edema, found to have right segmental PE and left peroneal DVT  s/p Left chest tube was placed (05/16) with 1400 cc serosanguineous fluid.       RECENT EVENTS    Tele NSR no events    PAST MEDICAL & SURGICAL HISTORY:  Poor historian    COPD (chronic obstructive pulmonary disease)    Pulmonary embolism    Atrial flutter    H/O solitary pulmonary nodule    Depression    No significant past surgical history        No Known Allergies      Review of Systems:  Positive for fall  Rest of the systems were reviewed and was negative.     Family history:   No pertinent family history in first degree relative of early CAD, sudden cardiac death or  congenital heart disease    Social History:  No smoking   No alcohol  No other drug use    Vital Signs Last 24 Hrs  T(C): 36.7 (02 Aug 2021 07:35), Max: 37.2 (01 Aug 2021 11:15)  T(F): 98 (02 Aug 2021 07:35), Max: 99 (01 Aug 2021 11:15)  HR: 82 (02 Aug 2021 07:35) (73 - 84)  BP: 155/79 (02 Aug 2021 07:35) (129/72 - 155/79)  BP(mean): 91 (02 Aug 2021 04:57) (91 - 91)  RR: 20 (02 Aug 2021 07:35) (20 - 23)  SpO2: 96% (02 Aug 2021 07:35) (90% - 96%)    PHYSICAL EXAM:  Constitutional: Appears well; alert and co-operative  HEENT:     Head: Normocephalic and atraumatic  Eyes: Conjunctiva normal, No scleral icterus  Neck: Supple, No JVD  Mouth/Throat: Mucous membranes are normal. Mucosa are not pale or dry.  Cardiovascular: regular S1, S2  Respiratory: Lungs clear to auscultation; no crepitations, no wheeze  Gastrointestinal:  Soft, Non-tender, + BS	  Musculoskeletal: Normal range of motion. No edema  Skin: Warm and dry. No cyanosis . No diaphoresis.  Neurologic: Alert oriented to time place and person. Normal strength and no tremor.  Psychiatric: Normal mood and affect, Speech is normal and behavior is normal.        LABS:                        11.1   5.43  )-----------( 171      ( 02 Aug 2021 07:49 )             33.7     08-02    142  |  107  |  3.7<L>  ----------------------------<  60<L>  3.2<L>   |  24.0  |  0.57    Ca    8.0<L>      02 Aug 2021 07:49    TPro  6.4<L>  /  Alb  3.5  /  TBili  0.7  /  DBili  x   /  AST  16  /  ALT  5   /  AlkPhos  84  08-01    CARDIAC MARKERS ( 02 Aug 2021 09:26 )  x     / <0.01 ng/mL / 883 U/L / x     / 4.4 ng/mL  CARDIAC MARKERS ( 01 Aug 2021 10:07 )  x     / x     / x     / x     / 1.9 ng/mL  CARDIAC MARKERS ( 01 Aug 2021 09:48 )  x     / <0.01 ng/mL / 480 U/L / x     / x          PT/INR - ( 01 Aug 2021 09:48 )   PT: 14.2 sec;   INR: 1.24 ratio         PTT - ( 01 Aug 2021 09:48 )  PTT:36.0 sec    RADIOLOGY & ADDITIONAL STUDIES: (reviewed)  CXR was independently visualized/reviewed and demonstrated: clear lungs    CARDIOLOGY TESTING:(reviewed)     ECG (Independent visualization): NSR    ECHOCARDIOGRAM :  < from: TTE Echo Complete w/ Contrast w/ Doppler (05.16.21 @ 12:06) >   1. Endocardial visualization was enhanced with intravenous echo contrast.   2. There is mild left ventricular hypertrophy.   3. Normal left atrial size.   4. Left ventricular ejection fraction, by visual estimation, is 55 to 60%.   5. Mildly enlarged right atrium.   6. Moderately enlarged right ventricle. Moderately reduced RV systolic function.   7. Mild tricuspid regurgitation.   8. Estimated pulmonary artery systolic pressure is 48 mmHg- mild pulmonary hypertension.   9. Trivial pericardial effusion.  10. Large pleural effusion in the left lateral region.    TELEMETRY independently visualized/reviewed and demonstrated : NSR    MEDICATIONS:(reviewed)  Home Medications:  Home Medications:  meclizine 25 mg oral tablet: 1 tab(s) orally 3 times a day, As Needed (01 Aug 2021 18:10)  Suboxone 8 mg-2 mg sublingual film: 3 film(s) sublingual once a day (01 Aug 2021 18:10)      MEDICATIONS  (STANDING):  buprenorphine 8 mG/naloxone 2 mG SL  Tablet 3 Tablet(s) SubLingual daily  cefTRIAXone   IVPB 1000 milliGRAM(s) IV Intermittent every 24 hours  heparin   Injectable 5000 Unit(s) SubCutaneous every 12 hours  pantoprazole    Tablet 40 milliGRAM(s) Oral daily  saccharomyces boulardii 250 milliGRAM(s) Oral two times a day  senna 2 Tablet(s) Oral at bedtime  tamsulosin 0.4 milliGRAM(s) Oral at bedtime    MEDICATIONS  (PRN):  acetaminophen   Tablet .. 650 milliGRAM(s) Oral every 6 hours PRN Temp greater or equal to 38.5C (101.3F), Mild Pain (1 - 3)  aluminum hydroxide/magnesium hydroxide/simethicone Suspension 30 milliLiter(s) Oral every 4 hours PRN Dyspepsia  melatonin 3 milliGRAM(s) Oral at bedtime PRN Insomnia  ondansetron Injectable 4 milliGRAM(s) IV Push every 8 hours PRN Nausea and/or Vomiting  polyethylene glycol 3350 17 Gram(s) Oral daily PRN Constipation      ASSESSMENT AND PLAN:    61y Male with past medical history as under was found down in the basement for approx. 20-40 minutes. As per EMS, vitals stable en route to the hospital, and AAOx2-3. As per him, he was fixing tiles at home when he felt very weak and couldn't get up. That's why he was on the floor.    At the time of evaluation, pt reports feeling well. He does not report any complaints at this time. He reports he was in rehab for 3 weeks and then went home.   He was admitted in 5/15 with aflutter with RVR along with acute hypoxic respiratory failure due to large left pleural effusion and pulmonary edema, found to have right segmental PE and left peroneal DVT  s/p Left chest tube was placed (05/16) with 1400 cc serosanguineous fluid.     AF/AFL    - No events slowly improving  - Remains in NSR  - resume Eliquis  - Pt reports he was due to see Dr. Pappas for AFL ablation as outpt. d/w EP. No inpt evaluation needed at this time  - Prior Echo with preserved LV function.   - Telemetry monitoring

## 2021-08-04 ENCOUNTER — TRANSCRIPTION ENCOUNTER (OUTPATIENT)
Age: 61
End: 2021-08-04

## 2021-08-04 LAB
ANION GAP SERPL CALC-SCNC: 6 MMOL/L — SIGNIFICANT CHANGE UP (ref 5–17)
BUN SERPL-MCNC: 3.8 MG/DL — LOW (ref 8–20)
CALCIUM SERPL-MCNC: 8.1 MG/DL — LOW (ref 8.6–10.2)
CHLORIDE SERPL-SCNC: 109 MMOL/L — HIGH (ref 98–107)
CK MB CFR SERPL CALC: 3.1 NG/ML — SIGNIFICANT CHANGE UP (ref 0–6.7)
CK SERPL-CCNC: 1613 U/L — HIGH (ref 30–200)
CO2 SERPL-SCNC: 28 MMOL/L — SIGNIFICANT CHANGE UP (ref 22–29)
CREAT SERPL-MCNC: 0.58 MG/DL — SIGNIFICANT CHANGE UP (ref 0.5–1.3)
CULTURE RESULTS: SIGNIFICANT CHANGE UP
GLUCOSE SERPL-MCNC: 81 MG/DL — SIGNIFICANT CHANGE UP (ref 70–99)
MAGNESIUM SERPL-MCNC: 1.8 MG/DL — SIGNIFICANT CHANGE UP (ref 1.8–2.6)
POTASSIUM SERPL-MCNC: 3.6 MMOL/L — SIGNIFICANT CHANGE UP (ref 3.5–5.3)
POTASSIUM SERPL-SCNC: 3.6 MMOL/L — SIGNIFICANT CHANGE UP (ref 3.5–5.3)
SODIUM SERPL-SCNC: 143 MMOL/L — SIGNIFICANT CHANGE UP (ref 135–145)
SPECIMEN SOURCE: SIGNIFICANT CHANGE UP

## 2021-08-04 PROCEDURE — 99232 SBSQ HOSP IP/OBS MODERATE 35: CPT

## 2021-08-04 RX ORDER — POTASSIUM CHLORIDE 20 MEQ
40 PACKET (EA) ORAL ONCE
Refills: 0 | Status: COMPLETED | OUTPATIENT
Start: 2021-08-04 | End: 2021-08-04

## 2021-08-04 RX ORDER — METOPROLOL TARTRATE 50 MG
12.5 TABLET ORAL EVERY 12 HOURS
Refills: 0 | Status: DISCONTINUED | OUTPATIENT
Start: 2021-08-04 | End: 2021-08-05

## 2021-08-04 RX ADMIN — TAMSULOSIN HYDROCHLORIDE 0.4 MILLIGRAM(S): 0.4 CAPSULE ORAL at 21:40

## 2021-08-04 RX ADMIN — Medication 500 MILLIGRAM(S): at 11:39

## 2021-08-04 RX ADMIN — Medication 250 MILLIGRAM(S): at 18:15

## 2021-08-04 RX ADMIN — BUPRENORPHINE AND NALOXONE 1 TABLET(S): 2; .5 TABLET SUBLINGUAL at 21:40

## 2021-08-04 RX ADMIN — SODIUM CHLORIDE 75 MILLILITER(S): 9 INJECTION INTRAMUSCULAR; INTRAVENOUS; SUBCUTANEOUS at 11:40

## 2021-08-04 RX ADMIN — BUPRENORPHINE AND NALOXONE 1 TABLET(S): 2; .5 TABLET SUBLINGUAL at 14:10

## 2021-08-04 RX ADMIN — SERTRALINE 100 MILLIGRAM(S): 25 TABLET, FILM COATED ORAL at 11:39

## 2021-08-04 RX ADMIN — METHOCARBAMOL 500 MILLIGRAM(S): 500 TABLET, FILM COATED ORAL at 05:04

## 2021-08-04 RX ADMIN — APIXABAN 5 MILLIGRAM(S): 2.5 TABLET, FILM COATED ORAL at 05:04

## 2021-08-04 RX ADMIN — Medication 250 MILLIGRAM(S): at 05:04

## 2021-08-04 RX ADMIN — MAGNESIUM OXIDE 400 MG ORAL TABLET 400 MILLIGRAM(S): 241.3 TABLET ORAL at 08:57

## 2021-08-04 RX ADMIN — MAGNESIUM OXIDE 400 MG ORAL TABLET 400 MILLIGRAM(S): 241.3 TABLET ORAL at 18:15

## 2021-08-04 RX ADMIN — METHOCARBAMOL 500 MILLIGRAM(S): 500 TABLET, FILM COATED ORAL at 15:44

## 2021-08-04 RX ADMIN — Medication 1 PATCH: at 11:38

## 2021-08-04 RX ADMIN — BUPRENORPHINE AND NALOXONE 1 TABLET(S): 2; .5 TABLET SUBLINGUAL at 05:04

## 2021-08-04 RX ADMIN — METHOCARBAMOL 500 MILLIGRAM(S): 500 TABLET, FILM COATED ORAL at 21:40

## 2021-08-04 RX ADMIN — MAGNESIUM OXIDE 400 MG ORAL TABLET 400 MILLIGRAM(S): 241.3 TABLET ORAL at 11:39

## 2021-08-04 RX ADMIN — Medication 40 MILLIEQUIVALENT(S): at 18:15

## 2021-08-04 RX ADMIN — CEFTRIAXONE 100 MILLIGRAM(S): 500 INJECTION, POWDER, FOR SOLUTION INTRAMUSCULAR; INTRAVENOUS at 05:03

## 2021-08-04 RX ADMIN — Medication 1 PATCH: at 11:39

## 2021-08-04 RX ADMIN — SENNA PLUS 2 TABLET(S): 8.6 TABLET ORAL at 21:40

## 2021-08-04 RX ADMIN — Medication 1 PATCH: at 19:55

## 2021-08-04 RX ADMIN — PANTOPRAZOLE SODIUM 40 MILLIGRAM(S): 20 TABLET, DELAYED RELEASE ORAL at 11:39

## 2021-08-04 RX ADMIN — APIXABAN 5 MILLIGRAM(S): 2.5 TABLET, FILM COATED ORAL at 18:15

## 2021-08-04 RX ADMIN — GABAPENTIN 300 MILLIGRAM(S): 400 CAPSULE ORAL at 21:40

## 2021-08-04 RX ADMIN — Medication 1 TABLET(S): at 11:39

## 2021-08-04 RX ADMIN — Medication 12.5 MILLIGRAM(S): at 18:16

## 2021-08-04 NOTE — DISCHARGE NOTE PROVIDER - NSDCMRMEDTOKEN_GEN_ALL_CORE_FT
meclizine 25 mg oral tablet: 1 tab(s) orally 3 times a day, As Needed  Suboxone 8 mg-2 mg sublingual film: 3 film(s) sublingual once a day   apixaban 5 mg oral tablet: 1 tab(s) orally 2 times a day  ascorbic acid 500 mg oral tablet: 1 tab(s) orally once a day  buprenorphine-naloxone 8 mg-2 mg sublingual tablet: 1 tab(s) sublingual 3 times a day  gabapentin 300 mg oral capsule: 1 cap(s) orally once a day (at bedtime)  meclizine 25 mg oral tablet: 1 tab(s) orally 3 times a day, As Needed  methocarbamol 500 mg oral tablet: 1 tab(s) orally 3 times a day  Metoprolol Succinate ER 25 mg oral tablet, extended release: 1 tab(s) orally once a day   Multiple Vitamins oral tablet: 1 tab(s) orally once a day  sertraline 100 mg oral tablet: 1 tab(s) orally once a day  tamsulosin 0.4 mg oral capsule: 1 cap(s) orally once a day (at bedtime)

## 2021-08-04 NOTE — PROGRESS NOTE ADULT - ASSESSMENT
61 years old male with history of A. Flutter s/p cardioversion on Eliquis, DVT/PE on Eliquis, COPD, Smoking, Chronic Pain Syndrome, Anxiety, Depression and BPH presented as Code Trauma after he was found on basement floor by family. Cleared by ACS team. Admitted for further work up.     1) Syncope  - Likely due to medication overdose (As per [facility], he takes Alprazolam 1 mg TID + Diazepam 5 mg BID), has gotten multiple prescriptions in last 3 weeks. Called his Pharmacy [facility] ([phone]) multiple times but there was no answer. Left voicemail but haven't got any call back. Unclear how much he was taking at home. He claims that he was taking as per recommendations and he got those extra refills as his son took away all his medications.   His son was in ER, who told the nurse that patient was taking Suboxone along with Alprazolam and Diazepam. Patient does not want us to discuss with his son or any other family members about his condition or treatment plan.   - Psych Consult pending   - SW following     2) Fall   - Likely due to above  - Cleared by ACS team    3) A. Flutter  - s/p cardioversion during previous admission  - Plan for ablation as an outpatient as per patient  - Currently in NSR  - Resumed Eliquis   - Cardio follow up noted     4) Rhabdomyolysis   - IVF  - Monitor CPK    5) History of PE/DVT  - Resumed Eliquis    6) Chronic Pain Syndrome  - Changed Suboxone to 8 mg / 2 mg TID (takes at home)  - Resumed Gabapentin and Robaxin    7) UTI  - Urine culture in progress   - Finished Rocephin    8) Urinary Retention  - Started Flomax  - Able to void on his own    9) Depression / Anxiety  - Resumed Zoloft     10) Hypokalemia  - Repleted     DVT Prophylaxis --  Patient is on Eliquis.     Dispo: [facility] once arranged.

## 2021-08-04 NOTE — PROGRESS NOTE ADULT - SUBJECTIVE AND OBJECTIVE BOX
[facility]                              [address] [address]                                                 PHONE: [phone]                                                 FAX: [phone]  ------------------------------------------------------------------------------------------------    Chief complaint: Fall, syncope, dizziness    61y Male with past medical history as under  was found down in the basement for approx. 20-40 minutes. As per EMS, vitals stable en route to the hospital, and AAOx2-3. As per him, he was fixing tiles at home when he felt very weak and couldn't get up. That's why he was on the floor.    At the time of evaluation, pt reports feeling well. He does not report any complaints at this time. He reports he was in rehab for 3 weeks and then went home.   He was admitted in 5/15 with aflutter with RVR along with acute hypoxic respiratory failure due to large left pleural effusion and pulmonary edema, found to have right segmental PE and left peroneal DVT  s/p Left chest tube was placed (05/16) with 1400 cc serosanguineous fluid.       RECENT EVENTS    Tele NSR no events    PAST MEDICAL & SURGICAL HISTORY:  Poor historian    COPD (chronic obstructive pulmonary disease)    Pulmonary embolism    Atrial flutter    H/O solitary pulmonary nodule    Depression    No significant past surgical history        No Known Allergies      Review of Systems:  Positive for fall  Rest of the systems were reviewed and was negative.     Family history:   No pertinent family history in first degree relative of early CAD, sudden cardiac death or  congenital heart disease    Social History:  No smoking   No alcohol  No other drug use    Vital Signs Last 24 Hrs  T(C): 36.7 (02 Aug 2021 07:35), Max: 37.2 (01 Aug 2021 11:15)  T(F): 98 (02 Aug 2021 07:35), Max: 99 (01 Aug 2021 11:15)  HR: 82 (02 Aug 2021 07:35) (73 - 84)  BP: 155/79 (02 Aug 2021 07:35) (129/72 - 155/79)  BP(mean): 91 (02 Aug 2021 04:57) (91 - 91)  RR: 20 (02 Aug 2021 07:35) (20 - 23)  SpO2: 96% (02 Aug 2021 07:35) (90% - 96%)    PHYSICAL EXAM:  Constitutional: Appears well; alert and co-operative  HEENT:     Head: Normocephalic and atraumatic  Eyes: Conjunctiva normal, No scleral icterus  Neck: Supple, No JVD  Mouth/Throat: Mucous membranes are normal. Mucosa are not pale or dry.  Cardiovascular: regular S1, S2  Respiratory: Lungs clear to auscultation; no crepitations, no wheeze  Gastrointestinal:  Soft, Non-tender, + BS	  Musculoskeletal: Normal range of motion. No edema  Skin: Warm and dry. No cyanosis . No diaphoresis.  Neurologic: Alert oriented to time place and person. Normal strength and no tremor.  Psychiatric: Normal mood and affect, Speech is normal and behavior is normal.        LABS:                        11.1   5.43  )-----------( 171      ( 02 Aug 2021 07:49 )             33.7     08-02    142  |  107  |  3.7<L>  ----------------------------<  60<L>  3.2<L>   |  24.0  |  0.57    Ca    8.0<L>      02 Aug 2021 07:49    TPro  6.4<L>  /  Alb  3.5  /  TBili  0.7  /  DBili  x   /  AST  16  /  ALT  5   /  AlkPhos  84  08-01    CARDIAC MARKERS ( 02 Aug 2021 09:26 )  x     / <0.01 ng/mL / 883 U/L / x     / 4.4 ng/mL  CARDIAC MARKERS ( 01 Aug 2021 10:07 )  x     / x     / x     / x     / 1.9 ng/mL  CARDIAC MARKERS ( 01 Aug 2021 09:48 )  x     / <0.01 ng/mL / 480 U/L / x     / x          PT/INR - ( 01 Aug 2021 09:48 )   PT: 14.2 sec;   INR: 1.24 ratio         PTT - ( 01 Aug 2021 09:48 )  PTT:36.0 sec    RADIOLOGY & ADDITIONAL STUDIES: (reviewed)  CXR was independently visualized/reviewed and demonstrated: clear lungs    CARDIOLOGY TESTING:(reviewed)     ECG (Independent visualization): NSR    ECHOCARDIOGRAM :    < from: TTE Echo Limited or F/U (08.02.21 @ 11:58) >  Summary:   1. Normal global left ventricular systolic function.   2. Left ventricular ejection fraction, by visual estimation, is 65 to 70%.   3. Normal right ventricle size (basal diameter 3.8 cm) with normal right ventricle systolic function (S' 14.9 cm/s).   4. Mild tricuspid regurgitation.   5. No aortic valve stenosis.   6. Estimated pulmonary artery systolic pressure is 36.6 mmHg assuming a right atrialpressure of 3 mmHg, which is consistent with borderline pulmonary hypertension.   7. Trivial pericardial effusion.   8. Recommend clinical correlation with the above findings.    < end of copied text >        TELEMETRY independently visualized/reviewed and demonstrated : NSR    MEDICATIONS:(reviewed)  Home Medications:  Home Medications:  meclizine 25 mg oral tablet: 1 tab(s) orally 3 times a day, As Needed (01 Aug 2021 18:10)  Suboxone 8 mg-2 mg sublingual film: 3 film(s) sublingual once a day (01 Aug 2021 18:10)      MEDICATIONS  (STANDING):  buprenorphine 8 mG/naloxone 2 mG SL  Tablet 3 Tablet(s) SubLingual daily  cefTRIAXone   IVPB 1000 milliGRAM(s) IV Intermittent every 24 hours  heparin   Injectable 5000 Unit(s) SubCutaneous every 12 hours  pantoprazole    Tablet 40 milliGRAM(s) Oral daily  saccharomyces boulardii 250 milliGRAM(s) Oral two times a day  senna 2 Tablet(s) Oral at bedtime  tamsulosin 0.4 milliGRAM(s) Oral at bedtime    MEDICATIONS  (PRN):  acetaminophen   Tablet .. 650 milliGRAM(s) Oral every 6 hours PRN Temp greater or equal to 38.5C (101.3F), Mild Pain (1 - 3)  aluminum hydroxide/magnesium hydroxide/simethicone Suspension 30 milliLiter(s) Oral every 4 hours PRN Dyspepsia  melatonin 3 milliGRAM(s) Oral at bedtime PRN Insomnia  ondansetron Injectable 4 milliGRAM(s) IV Push every 8 hours PRN Nausea and/or Vomiting  polyethylene glycol 3350 17 Gram(s) Oral daily PRN Constipation      ASSESSMENT AND PLAN:    61y Male with past medical history as under was found down in the basement for approx. 20-40 minutes. As per EMS, vitals stable en route to the hospital, and AAOx2-3. As per him, he was fixing tiles at home when he felt very weak and couldn't get up. That's why he was on the floor.    At the time of evaluation, pt reports feeling well. He does not report any complaints at this time. He reports he was in rehab for 3 weeks and then went home.   He was admitted in 5/15 with aflutter with RVR along with acute hypoxic respiratory failure due to large left pleural effusion and pulmonary edema, found to have right segmental PE and left peroneal DVT  s/p Left chest tube was placed (05/16) with 1400 cc serosanguineous fluid.     AF/AFL    - No events slowly improving  - Remains in NSR  - resume Eliquis  - Pt reports he was due to see Dr. Pappas for AFL ablation as outpt. d/w EP. No inpt evaluation needed at this time  -No further cardiac intervention

## 2021-08-04 NOTE — PROGRESS NOTE ADULT - SUBJECTIVE AND OBJECTIVE BOX
Dehydration    HPI:  61y M, pmhx dvt/pe on eliquis, COPD, per EMS< patient was found down in the basement for approx 20-40 minutes. Per ems, vitals stable en route to the hospital, and AAOx2-3. Primary survey intact. Secondary survey pertinent findings include right shoulder abrasion, b/l knee abrasions, forehead abrasions. GCS 15.   Denies cp, sob, n/v/d, abd pain, HA, vision changes.      Interval History:  Patient was seen and examined at bedside around 11 am.   Feels better.   Denies chest pain, palpitations, shortness of breath, headache, dizziness, visual symptoms, nausea, vomiting or abdominal pain.   Has some difficulty urinating but is able to void on his own.     ROS:  As per interval history otherwise unremarkable.    PHYSICAL EXAM:  Vital Signs   T(C): 36.7 (04 Aug 2021 16:42), Max: 36.7 (04 Aug 2021 16:42)  T(F): 98 (04 Aug 2021 16:42), Max: 98 (04 Aug 2021 16:42)  HR: 80 (04 Aug 2021 16:42) (66 - 80)  BP: 144/89 (04 Aug 2021 16:42) (126/67 - 144/89)  RR: 18 (04 Aug 2021 16:42) (18 - 18)  SpO2: 92% (04 Aug 2021 16:42) (92% - 97%)  General: Middle aged male sitting in bed comfortably. No acute distress  HEENT: PERRLA. EOMI. Clear conjunctivae. Moist mucus membrane. Abrasion on forehead.   Neck: Supple.    Chest: CTA bilaterally. No wheezing, rales or rhonchi.   Heart: Normal S1 & S2. RRR.   Abdomen: Soft. Non-tender. Non-distended. + BS. Old healed scars.   Ext: 1+ pedal edema. No calf tenderness. Abrasions on knees and elbows. Chronic skin changes on lower extremities. Dry scaly skin on feet. Poor hygiene.    Neuro: AAO x 2-3. Moves all four extremities but Motor 4/5 in lower extremities. No speech disorder  Skin: Warm and Dry  Psychiatry: Normal mood and affect    I&O's Summary    03 Aug 2021 07:01  -  04 Aug 2021 07:00  --------------------------------------------------------  IN: 750 mL / OUT: 890 mL / NET: -140 mL    MEDICATIONS  (STANDING):  apixaban 5 milliGRAM(s) Oral two times a day  ascorbic acid 500 milliGRAM(s) Oral daily  buprenorphine 8 mG/naloxone 2 mG SL  Tablet 1 Tablet(s) SubLingual <User Schedule>  gabapentin 300 milliGRAM(s) Oral at bedtime  magnesium oxide 400 milliGRAM(s) Oral three times a day with meals  methocarbamol 500 milliGRAM(s) Oral three times a day  multivitamin 1 Tablet(s) Oral daily  nicotine -  14 mG/24Hr(s) Patch 1 patch Transdermal daily  pantoprazole    Tablet 40 milliGRAM(s) Oral daily  potassium chloride    Tablet ER 40 milliEquivalent(s) Oral once  saccharomyces boulardii 250 milliGRAM(s) Oral two times a day  senna 2 Tablet(s) Oral at bedtime  sertraline 100 milliGRAM(s) Oral daily  sodium chloride 0.9%. 1000 milliLiter(s) (75 mL/Hr) IV Continuous <Continuous>  tamsulosin 0.4 milliGRAM(s) Oral at bedtime    MEDICATIONS  (PRN):  acetaminophen   Tablet .. 650 milliGRAM(s) Oral every 6 hours PRN Temp greater or equal to 38.5C (101.3F), Mild Pain (1 - 3)  ALPRAZolam 0.25 milliGRAM(s) Oral every 8 hours PRN Anxiety  aluminum hydroxide/magnesium hydroxide/simethicone Suspension 30 milliLiter(s) Oral every 4 hours PRN Dyspepsia  melatonin 3 milliGRAM(s) Oral at bedtime PRN Insomnia  ondansetron Injectable 4 milliGRAM(s) IV Push every 8 hours PRN Nausea and/or Vomiting  polyethylene glycol 3350 17 Gram(s) Oral daily PRN Constipation    LABS:                        11.7   5.77  )-----------( 154      ( 03 Aug 2021 07:35 )             36.4     08-04    143  |  109<H>  |  3.8<L>  ----------------------------<  81  3.6   |  28.0  |  0.58    Ca    8.1<L>      04 Aug 2021 07:54  Mg     1.8     08-04    CARDIAC MARKERS ( 04 Aug 2021 07:54 )  x     / x     / 1613 U/L / x     / 3.1 ng/mL    RADIOLOGY & ADDITIONAL STUDIES:  Reviewed     TTE Echo Limited or F/U (08.02.21 @ 11:58)    1. Normal global left ventricular systolic function.   2. Left ventricular ejection fraction, by visual estimation, is 65 to 70%.   3. Normal right ventricle size (basal diameter 3.8 cm) with normal right ventricle systolic function (S' 14.9 cm/s).   4. Mild tricuspid regurgitation.   5. No aortic valve stenosis.   6. Estimated pulmonary artery systolic pressure is 36.6 mmHg assuming a right atrialpressure of 3 mmHg, which is consistent with borderline pulmonary hypertension.   7. Trivial pericardial effusion.   8. Recommend clinical correlation with the above findings.

## 2021-08-04 NOTE — DISCHARGE NOTE PROVIDER - NSDCCPCAREPLAN_GEN_ALL_CORE_FT
PRINCIPAL DISCHARGE DIAGNOSIS  Diagnosis: Syncope  Assessment and Plan of Treatment: likely related to medication overdose, Cleared by Trauma team.  Psych consulted.      SECONDARY DISCHARGE DIAGNOSES  Diagnosis: Hypokalemia  Assessment and Plan of Treatment: supplemnted and resolved    Diagnosis: Weakness  Assessment and Plan of Treatment:     Diagnosis: Atrial flutter  Assessment and Plan of Treatment: s/p cardioversion during previous admission.  Plan for ablation as an outpatient as per patient. Currently in NSR. Resumed Eliquis. Cardio was consulted    Diagnosis: Rhabdomyolysis  Assessment and Plan of Treatment: IVF given and CPK monitored until improved    Diagnosis: Pulmonary embolism  Assessment and Plan of Treatment: hx of PE/DVT- continue eliquis     PRINCIPAL DISCHARGE DIAGNOSIS  Diagnosis: Syncope  Assessment and Plan of Treatment: likely related to medication overdose.  Counselled to take medications as prescribed and stop benzodiazepines.      SECONDARY DISCHARGE DIAGNOSES  Diagnosis: Atrial flutter  Assessment and Plan of Treatment: s/p cardioversion during previous admission.  Plan for ablation as an outpatient as per patient. Currently in NSR.   Continue Eliquis.  Follow up with Cardio as scheduled.    Diagnosis: Rhabdomyolysis  Assessment and Plan of Treatment: Resolving.  Encouraged for oral intake.   Follow up with PMD in 1 week.

## 2021-08-04 NOTE — DISCHARGE NOTE PROVIDER - CARE PROVIDERS DIRECT ADDRESSES
,[email or web address],DirectAddress_Unknown

## 2021-08-04 NOTE — DISCHARGE NOTE PROVIDER - PROVIDER TOKENS
PROVIDER:[TOKEN:[[id number]],FOLLOWUP:[1 week]] PROVIDER:[TOKEN:[[id number]],FOLLOWUP:[1 week]],PROVIDER:[TOKEN:[[id number]]]

## 2021-08-04 NOTE — DISCHARGE NOTE PROVIDER - CARE PROVIDER_API CALL
Steven Pappas)  Cardiology; Internal Medicine  [address] [facility], [address] [place] [address]  Phone: [phone]  Fax: [phone]  Follow Up Time: 1 week   Steven Pappas)  Cardiology; Internal Medicine  [address] [facility], [address]  [place] [address]  Phone: [phone]  Fax: [phone]  Follow Up Time: 1 week    SHASHI WADE  [id number]  [address] [place] [address]  Phone: ()-  Fax: ()-  Follow Up Time:

## 2021-08-04 NOTE — DISCHARGE NOTE PROVIDER - HOSPITAL COURSE
61 years old male with history of A. Flutter s/p cardioversion on Eliquis, DVT/PE on Eliquis, COPD, Smoking, Chronic Pain Syndrome, Anxiety, Depression and BPH presented as Code Trauma after he was found on basement floor by family. Cleared by ACS team. Admitted for further work up. Evaluated for syncope- likely due to medication overdose (As per iStop, he takes Alprazolam 1 mg TID + Diazepam 5 mg BID), has gotten multiple prescriptions in last 3 weeks. Called his Pharmacy [facility] ([phone]) multiple times but there was no answer. Left voicemail but haven't got any call back. Unclear how much he was taking at home. He claims that he was taking as per recommendations and he got those extra refills as his son took away all his medications.  His son was in ER, who told the nurse that patient was taking Suboxone along with Alprazolam and Diazepam. Patient does not want us to discuss with his son or any other family members about his condition or treatment plan. Psych and SW were consulted.  Pt with hx of A. Flutter-s/p cardioversion during previous admission.  Plan for ablation as an outpatient as per patient. Currently in NSR. Resumed Eliquis. Cardio was consulted- no further work -up at this time, follow up with Dr. Pappas for ablation.  Pt was given IVF and CPK monitored for rhabdomyolysis. Pt with hx of Chronic Pain Syndrome- Changed Suboxone to 8 mg / 2 mg TID (takes at home). Resumed Gabapentin and Robaxin.  Pt was treated for UTI with Rocephin and Urine culture was followed.  Pt was started on flomax for urinary retention and bladder scan performed every 6 hrs. PT evaluated pt and recommended LEANNA.              61 years old male with history of A. Flutter s/p cardioversion on Eliquis, DVT/PE on Eliquis, COPD, Smoking, Chronic Pain Syndrome, Anxiety, Depression and BPH presented as Code Trauma after he was found on basement floor by family. Cleared by ACS team. Admitted for further work up. Evaluated for syncope- likely due to medication overdose (As per iStop, he takes Alprazolam 1 mg TID + Diazepam 5 mg BID), has gotten multiple prescriptions in last 3 weeks. Called his Pharmacy [facility] ([phone]) multiple times but there was no answer. Left voicemail but never got any call back. Unclear how much he was taking at home. He claims that he was taking as per recommendations and he got those extra refills as his son took away all his medications.  His son was in ER, who told the nurse that patient was taking Suboxone along with Alprazolam and Diazepam. Patient does not want us to discuss with his son or any other family members about his condition or treatment plan. Psych and SW were consulted.    He was monitored closely. His symptoms have improved and he is back to baseline. He is counselled extensively not to use extra Benzodiazepines and stop completely.   During hospitalization, he was treated for Rhabdomyolysis.   PT initially recommended LEANNA but now he is back to baseline and is stable for discharge home.     Time Spent: 38 minutes

## 2021-08-04 NOTE — DISCHARGE NOTE PROVIDER - NSDCFUSCHEDAPPT_GEN_ALL_CORE_FT
CHIP LOPEZ ; 08/06/2021 ; NPP Rad Cat [address] CHIP Hernandez ; 08/06/2021 ; NPP Rad Us [address] CHIP Choudhury ; 08/12/2021 ; [facility]

## 2021-08-05 ENCOUNTER — TRANSCRIPTION ENCOUNTER (OUTPATIENT)
Age: 61
End: 2021-08-05

## 2021-08-05 VITALS
TEMPERATURE: 99 F | RESPIRATION RATE: 18 BRPM | HEART RATE: 81 BPM | DIASTOLIC BLOOD PRESSURE: 63 MMHG | OXYGEN SATURATION: 96 % | SYSTOLIC BLOOD PRESSURE: 141 MMHG

## 2021-08-05 LAB
CK MB CFR SERPL CALC: 3 NG/ML — SIGNIFICANT CHANGE UP (ref 0–6.7)
CK SERPL-CCNC: 1221 U/L — HIGH (ref 30–200)

## 2021-08-05 PROCEDURE — 99285 EMERGENCY DEPT VISIT HI MDM: CPT

## 2021-08-05 PROCEDURE — 86900 BLOOD TYPING SEROLOGIC ABO: CPT

## 2021-08-05 PROCEDURE — 84484 ASSAY OF TROPONIN QUANT: CPT

## 2021-08-05 PROCEDURE — 73030 X-RAY EXAM OF SHOULDER: CPT

## 2021-08-05 PROCEDURE — 86850 RBC ANTIBODY SCREEN: CPT

## 2021-08-05 PROCEDURE — 83605 ASSAY OF LACTIC ACID: CPT

## 2021-08-05 PROCEDURE — 71045 X-RAY EXAM CHEST 1 VIEW: CPT

## 2021-08-05 PROCEDURE — 82553 CREATINE MB FRACTION: CPT

## 2021-08-05 PROCEDURE — 82962 GLUCOSE BLOOD TEST: CPT

## 2021-08-05 PROCEDURE — 96374 THER/PROPH/DIAG INJ IV PUSH: CPT

## 2021-08-05 PROCEDURE — 93308 TTE F-UP OR LMTD: CPT

## 2021-08-05 PROCEDURE — 83735 ASSAY OF MAGNESIUM: CPT

## 2021-08-05 PROCEDURE — 96372 THER/PROPH/DIAG INJ SC/IM: CPT

## 2021-08-05 PROCEDURE — 82550 ASSAY OF CK (CPK): CPT

## 2021-08-05 PROCEDURE — U0003: CPT

## 2021-08-05 PROCEDURE — 36415 COLL VENOUS BLD VENIPUNCTURE: CPT

## 2021-08-05 PROCEDURE — 83690 ASSAY OF LIPASE: CPT

## 2021-08-05 PROCEDURE — 71275 CT ANGIOGRAPHY CHEST: CPT

## 2021-08-05 PROCEDURE — 99239 HOSP IP/OBS DSCHRG MGMT >30: CPT

## 2021-08-05 PROCEDURE — 80053 COMPREHEN METABOLIC PANEL: CPT

## 2021-08-05 PROCEDURE — 80048 BASIC METABOLIC PNL TOTAL CA: CPT

## 2021-08-05 PROCEDURE — 70450 CT HEAD/BRAIN W/O DYE: CPT | Mod: MA

## 2021-08-05 PROCEDURE — 72125 CT NECK SPINE W/O DYE: CPT | Mod: MA

## 2021-08-05 PROCEDURE — 93005 ELECTROCARDIOGRAM TRACING: CPT

## 2021-08-05 PROCEDURE — 86901 BLOOD TYPING SEROLOGIC RH(D): CPT

## 2021-08-05 PROCEDURE — 86769 SARS-COV-2 COVID-19 ANTIBODY: CPT

## 2021-08-05 PROCEDURE — U0005: CPT

## 2021-08-05 PROCEDURE — 85730 THROMBOPLASTIN TIME PARTIAL: CPT

## 2021-08-05 PROCEDURE — 87086 URINE CULTURE/COLONY COUNT: CPT

## 2021-08-05 PROCEDURE — 93970 EXTREMITY STUDY: CPT

## 2021-08-05 PROCEDURE — 72170 X-RAY EXAM OF PELVIS: CPT

## 2021-08-05 PROCEDURE — 85025 COMPLETE CBC W/AUTO DIFF WBC: CPT

## 2021-08-05 PROCEDURE — 85610 PROTHROMBIN TIME: CPT

## 2021-08-05 PROCEDURE — 97116 GAIT TRAINING THERAPY: CPT

## 2021-08-05 PROCEDURE — 81001 URINALYSIS AUTO W/SCOPE: CPT

## 2021-08-05 PROCEDURE — 80307 DRUG TEST PRSMV CHEM ANLYZR: CPT

## 2021-08-05 RX ORDER — SERTRALINE 25 MG/1
1 TABLET, FILM COATED ORAL
Qty: 0 | Refills: 0 | DISCHARGE
Start: 2021-08-05

## 2021-08-05 RX ORDER — TAMSULOSIN HYDROCHLORIDE 0.4 MG/1
1 CAPSULE ORAL
Qty: 30 | Refills: 0
Start: 2021-08-05 | End: 2021-09-03

## 2021-08-05 RX ORDER — BUPRENORPHINE AND NALOXONE 2; .5 MG/1; MG/1
3 TABLET SUBLINGUAL
Qty: 0 | Refills: 0 | DISCHARGE

## 2021-08-05 RX ORDER — METHOCARBAMOL 500 MG/1
1 TABLET, FILM COATED ORAL
Qty: 0 | Refills: 0 | DISCHARGE
Start: 2021-08-05

## 2021-08-05 RX ORDER — BUPRENORPHINE AND NALOXONE 2; .5 MG/1; MG/1
1 TABLET SUBLINGUAL
Qty: 0 | Refills: 0 | DISCHARGE
Start: 2021-08-05

## 2021-08-05 RX ORDER — ASCORBIC ACID 60 MG
1 TABLET,CHEWABLE ORAL
Qty: 30 | Refills: 0
Start: 2021-08-05 | End: 2021-09-03

## 2021-08-05 RX ORDER — GABAPENTIN 400 MG/1
1 CAPSULE ORAL
Qty: 0 | Refills: 0 | DISCHARGE
Start: 2021-08-05

## 2021-08-05 RX ORDER — METOPROLOL TARTRATE 50 MG
1 TABLET ORAL
Qty: 30 | Refills: 0
Start: 2021-08-05 | End: 2021-09-03

## 2021-08-05 RX ORDER — APIXABAN 2.5 MG/1
1 TABLET, FILM COATED ORAL
Qty: 0 | Refills: 0 | DISCHARGE
Start: 2021-08-05

## 2021-08-05 RX ADMIN — BUPRENORPHINE AND NALOXONE 1 TABLET(S): 2; .5 TABLET SUBLINGUAL at 05:15

## 2021-08-05 RX ADMIN — Medication 250 MILLIGRAM(S): at 05:15

## 2021-08-05 RX ADMIN — Medication 1 PATCH: at 07:49

## 2021-08-05 RX ADMIN — MAGNESIUM OXIDE 400 MG ORAL TABLET 400 MILLIGRAM(S): 241.3 TABLET ORAL at 09:46

## 2021-08-05 RX ADMIN — PANTOPRAZOLE SODIUM 40 MILLIGRAM(S): 20 TABLET, DELAYED RELEASE ORAL at 11:39

## 2021-08-05 RX ADMIN — SODIUM CHLORIDE 75 MILLILITER(S): 9 INJECTION INTRAMUSCULAR; INTRAVENOUS; SUBCUTANEOUS at 05:16

## 2021-08-05 RX ADMIN — BUPRENORPHINE AND NALOXONE 1 TABLET(S): 2; .5 TABLET SUBLINGUAL at 14:53

## 2021-08-05 RX ADMIN — Medication 12.5 MILLIGRAM(S): at 05:15

## 2021-08-05 RX ADMIN — Medication 1 PATCH: at 11:41

## 2021-08-05 RX ADMIN — MAGNESIUM OXIDE 400 MG ORAL TABLET 400 MILLIGRAM(S): 241.3 TABLET ORAL at 11:39

## 2021-08-05 RX ADMIN — Medication 30 MILLILITER(S): at 13:10

## 2021-08-05 RX ADMIN — APIXABAN 5 MILLIGRAM(S): 2.5 TABLET, FILM COATED ORAL at 05:16

## 2021-08-05 RX ADMIN — METHOCARBAMOL 500 MILLIGRAM(S): 500 TABLET, FILM COATED ORAL at 05:16

## 2021-08-05 RX ADMIN — Medication 1 PATCH: at 11:40

## 2021-08-05 RX ADMIN — Medication 500 MILLIGRAM(S): at 11:39

## 2021-08-05 RX ADMIN — SERTRALINE 100 MILLIGRAM(S): 25 TABLET, FILM COATED ORAL at 11:39

## 2021-08-05 RX ADMIN — Medication 1 TABLET(S): at 11:39

## 2021-08-05 NOTE — DISCHARGE NOTE NURSING/CASE MANAGEMENT/SOCIAL WORK - NSDCPETBCESMAN_GEN_ALL_CORE
December 15, 2020         Patient: Nhi Britt   YOB: 2017   Date of Visit: 12/15/2020           To Whom it May Concern:    Nhi Britt was seen in my clinic on 12/15/2020. She may return to  once feeling better unless covid test is positive in which case may return to  once cleared by health department. She may have her head elevated slightly while sleeping to help with the cough.     If you have any questions or concerns, please don't hesitate to call.        Sincerely,           Tate Finley M.D.  Electronically Signed      If you are a smoker, it is important for your health to stop smoking. Please be aware that second hand smoke is also harmful.

## 2021-08-05 NOTE — PROGRESS NOTE ADULT - SUBJECTIVE AND OBJECTIVE BOX
Dehydration    HPI:  61y M, pmhx dvt/pe on eliquis, COPD, per EMS< patient was found down in the basement for approx 20-40 minutes. Per ems, vitals stable en route to the hospital, and AAOx2-3. Primary survey intact. Secondary survey pertinent findings include right shoulder abrasion, b/l knee abrasions, forehead abrasions. GCS 15.   Denies cp, sob, n/v/d, abd pain, HA, vision changes.      Interval History:  Patient was seen and examined at bedside around 10 am.   Doing well. Back to baseline.   Ambulating without difficulty.   Denies chest pain, palpitations, shortness of breath, headache, dizziness, visual symptoms, nausea, vomiting or abdominal pain.     ROS:  As per interval history otherwise unremarkable.    PHYSICAL EXAM:  Vital Signs  T(C): 36.6 (05 Aug 2021 10:36), Max: 36.7 (04 Aug 2021 16:42)  T(F): 97.8 (05 Aug 2021 10:36), Max: 98.1 (04 Aug 2021 20:30)  HR: 93 (05 Aug 2021 10:36) (66 - 93)  BP: 136/84 (05 Aug 2021 10:36) (134/70 - 144/89)  RR: 18 (05 Aug 2021 10:36) (18 - 18)  SpO2: 96% (05 Aug 2021 10:36) (92% - 96%)  General: Middle aged male sitting in bed comfortably. No acute distress  HEENT: PERRLA. EOMI. Clear conjunctivae. Moist mucus membrane. Abrasion on forehead.   Neck: Supple.    Chest: CTA bilaterally. No wheezing, rales or rhonchi.   Heart: Normal S1 & S2. RRR.   Abdomen: Soft. Non-tender. Non-distended. + BS. Old healed scars.   Ext: 1+ pedal edema. No calf tenderness. Abrasions on knees and elbows. Chronic skin changes on lower extremities. Dry scaly skin on feet. Poor hygiene. ? lipoma on left calf.   Neuro: AAO x 3. No focal deficit. Ambulating without difficulty. No speech disorder  Skin: Warm and Dry  Psychiatry: Normal mood and affect    I&O's Summary    04 Aug 2021 07:01  -  05 Aug 2021 07:00  --------------------------------------------------------  IN: 1200 mL / OUT: 650 mL / NET: 550 mL    MEDICATIONS  (STANDING):  apixaban 5 milliGRAM(s) Oral two times a day  ascorbic acid 500 milliGRAM(s) Oral daily  buprenorphine 8 mG/naloxone 2 mG SL  Tablet 1 Tablet(s) SubLingual <User Schedule>  gabapentin 300 milliGRAM(s) Oral at bedtime  methocarbamol 500 milliGRAM(s) Oral three times a day  metoprolol tartrate 12.5 milliGRAM(s) Oral every 12 hours  multivitamin 1 Tablet(s) Oral daily  nicotine -  14 mG/24Hr(s) Patch 1 patch Transdermal daily  pantoprazole    Tablet 40 milliGRAM(s) Oral daily  saccharomyces boulardii 250 milliGRAM(s) Oral two times a day  senna 2 Tablet(s) Oral at bedtime  sertraline 100 milliGRAM(s) Oral daily  sodium chloride 0.9%. 1000 milliLiter(s) (150 mL/Hr) IV Continuous <Continuous>  tamsulosin 0.4 milliGRAM(s) Oral at bedtime    MEDICATIONS  (PRN):  acetaminophen   Tablet .. 650 milliGRAM(s) Oral every 6 hours PRN Temp greater or equal to 38.5C (101.3F), Mild Pain (1 - 3)  ALPRAZolam 0.25 milliGRAM(s) Oral every 8 hours PRN Anxiety  aluminum hydroxide/magnesium hydroxide/simethicone Suspension 30 milliLiter(s) Oral every 4 hours PRN Dyspepsia  melatonin 3 milliGRAM(s) Oral at bedtime PRN Insomnia  ondansetron Injectable 4 milliGRAM(s) IV Push every 8 hours PRN Nausea and/or Vomiting  polyethylene glycol 3350 17 Gram(s) Oral daily PRN Constipation      LABS:    08-04    143  |  109<H>  |  3.8<L>  ----------------------------<  81  3.6   |  28.0  |  0.58    Ca    8.1<L>      04 Aug 2021 07:54  Mg     1.8     08-04    CARDIAC MARKERS ( 05 Aug 2021 09:29 )  x     / x     / 1221 U/L / x     / 3.0 ng/mL  CARDIAC MARKERS ( 04 Aug 2021 07:54 )  x     / x     / 1613 U/L / x     / 3.1 ng/mL    Blood Culture: 08-03 @ 21:56  Organism --  Gram Stain Blood -- Gram Stain --  Specimen Source Clean Catch Clean Catch (Midstream)  Culture-Blood --    RADIOLOGY & ADDITIONAL STUDIES:  Reviewed     TTE Echo Limited or F/U (08.02.21 @ 11:58)    1. Normal global left ventricular systolic function.   2. Left ventricular ejection fraction, by visual estimation, is 65 to 70%.   3. Normal right ventricle size (basal diameter 3.8 cm) with normal right ventricle systolic function (S' 14.9 cm/s).   4. Mild tricuspid regurgitation.   5. No aortic valve stenosis.   6. Estimated pulmonary artery systolic pressure is 36.6 mmHg assuming a right atrialpressure of 3 mmHg, which is consistent with borderline pulmonary hypertension.   7. Trivial pericardial effusion.   8. Recommend clinical correlation with the above findings.

## 2021-08-05 NOTE — DISCHARGE NOTE NURSING/CASE MANAGEMENT/SOCIAL WORK - PATIENT PORTAL LINK FT
You can access the FollowMyHealth Patient Portal offered by [facility] by registering at the following website: [email or web address]. By joining [facility]’s FollowMyHealth portal, you will also be able to view your health information using other applications (apps) compatible with our system.

## 2021-08-05 NOTE — PROGRESS NOTE ADULT - ASSESSMENT
61 years old male with history of A. Flutter s/p cardioversion on Eliquis, DVT/PE on Eliquis, COPD, Smoking, Chronic Pain Syndrome, Anxiety, Depression and BPH presented as Code Trauma after he was found on basement floor by family. Cleared by ACS team. Admitted for further work up.     1) Syncope  - Likely due to medication overdose (As per iStop, he takes Alprazolam 1 mg TID + Diazepam 5 mg BID), has gotten multiple prescriptions in last 3 weeks. Called his Pharmacy [facility] ([phone]) multiple times but there was no answer. Left voicemail but haven't got any call back. Unclear how much he was taking at home. He claims that he was taking as per recommendations and he got those extra refills as his son took away all his medications.   His son was in ER, who told the nurse that patient was taking Suboxone along with Alprazolam and Diazepam. Patient does not want us to discuss with his son or any other family members about his condition or treatment plan.   - Psych was consulted but he still needs to be seen. Patient is back to baseline. He is counselled extensively to take medications as prescribed and stop Benzodiazepines. Follow up with Psych as an outpatient.   - SW following     2) Fall   - Likely due to above  - Cleared by ACS team    3) A. Flutter  - s/p cardioversion during previous admission  - Plan for ablation as an outpatient as per patient  - Currently in NSR  - Resumed Eliquis   - Cardio follow up noted     4) Rhabdomyolysis   - IVF  - Monitor CPK  - Resolving     5) History of PE/DVT  - Resumed Eliquis    6) Chronic Pain Syndrome  - Changed Suboxone to 8 mg / 2 mg TID (takes at home)  - Resumed Gabapentin and Robaxin    7) UTI  - Urine culture negative   - Finished Rocephin    8) Urinary Retention  - Started Flomax  - Able to void on his own    9) Depression / Anxiety  - Resumed Zoloft     10) Hypokalemia  - Repleted     DVT Prophylaxis --  Patient is on Eliquis.     Dispo: D/C home with assist.

## 2021-08-06 ENCOUNTER — APPOINTMENT (OUTPATIENT)
Dept: ULTRASOUND IMAGING | Facility: CLINIC | Age: 61
End: 2021-08-06

## 2021-08-06 ENCOUNTER — APPOINTMENT (OUTPATIENT)
Dept: CT IMAGING | Facility: CLINIC | Age: 61
End: 2021-08-06

## 2021-08-12 ENCOUNTER — APPOINTMENT (OUTPATIENT)
Dept: ELECTROPHYSIOLOGY | Facility: CLINIC | Age: 61
End: 2021-08-12
Payer: MEDICAID

## 2021-08-12 VITALS
HEIGHT: 72 IN | BODY MASS INDEX: 24.24 KG/M2 | OXYGEN SATURATION: 94 % | WEIGHT: 179 LBS | HEART RATE: 89 BPM | RESPIRATION RATE: 18 BRPM | TEMPERATURE: 97.5 F | DIASTOLIC BLOOD PRESSURE: 60 MMHG | SYSTOLIC BLOOD PRESSURE: 100 MMHG

## 2021-08-12 PROCEDURE — 93000 ELECTROCARDIOGRAM COMPLETE: CPT

## 2021-08-12 PROCEDURE — 99214 OFFICE O/P EST MOD 30 MIN: CPT

## 2021-08-12 NOTE — PHYSICAL EXAM
[Well Developed] : well developed [Normal S1, S2] : normal S1, S2 [Clear Lung Fields] : clear lung fields [No Edema] : no edema [Moves all extremities] : moves all extremities [Alert and Oriented] : alert and oriented

## 2021-08-18 DIAGNOSIS — Z71.89 OTHER SPECIFIED COUNSELING: ICD-10-CM

## 2021-09-08 ENCOUNTER — OUTPATIENT (OUTPATIENT)
Dept: OUTPATIENT SERVICES | Facility: HOSPITAL | Age: 61
LOS: 1 days | End: 2021-09-08
Payer: MEDICAID

## 2021-09-08 VITALS
WEIGHT: 184.31 LBS | RESPIRATION RATE: 18 BRPM | SYSTOLIC BLOOD PRESSURE: 140 MMHG | HEART RATE: 81 BPM | TEMPERATURE: 97 F | DIASTOLIC BLOOD PRESSURE: 74 MMHG | HEIGHT: 72 IN

## 2021-09-08 DIAGNOSIS — Z98.890 OTHER SPECIFIED POSTPROCEDURAL STATES: Chronic | ICD-10-CM

## 2021-09-08 DIAGNOSIS — Z29.9 ENCOUNTER FOR PROPHYLACTIC MEASURES, UNSPECIFIED: ICD-10-CM

## 2021-09-08 DIAGNOSIS — Z01.818 ENCOUNTER FOR OTHER PREPROCEDURAL EXAMINATION: ICD-10-CM

## 2021-09-08 DIAGNOSIS — Z71.89 OTHER SPECIFIED COUNSELING: ICD-10-CM

## 2021-09-08 DIAGNOSIS — Z90.49 ACQUIRED ABSENCE OF OTHER SPECIFIED PARTS OF DIGESTIVE TRACT: Chronic | ICD-10-CM

## 2021-09-08 DIAGNOSIS — I48.92 UNSPECIFIED ATRIAL FLUTTER: ICD-10-CM

## 2021-09-08 LAB
ANION GAP SERPL CALC-SCNC: 8 MMOL/L — SIGNIFICANT CHANGE UP (ref 5–17)
APTT BLD: 35.5 SEC — SIGNIFICANT CHANGE UP (ref 27.5–35.5)
BASOPHILS # BLD AUTO: 0.03 K/UL — SIGNIFICANT CHANGE UP (ref 0–0.2)
BASOPHILS NFR BLD AUTO: 0.5 % — SIGNIFICANT CHANGE UP (ref 0–2)
BLD GP AB SCN SERPL QL: SIGNIFICANT CHANGE UP
BUN SERPL-MCNC: 9.8 MG/DL — SIGNIFICANT CHANGE UP (ref 8–20)
CALCIUM SERPL-MCNC: 9.3 MG/DL — SIGNIFICANT CHANGE UP (ref 8.6–10.2)
CHLORIDE SERPL-SCNC: 107 MMOL/L — SIGNIFICANT CHANGE UP (ref 98–107)
CO2 SERPL-SCNC: 28 MMOL/L — SIGNIFICANT CHANGE UP (ref 22–29)
CREAT SERPL-MCNC: 0.78 MG/DL — SIGNIFICANT CHANGE UP (ref 0.5–1.3)
EOSINOPHIL # BLD AUTO: 0.14 K/UL — SIGNIFICANT CHANGE UP (ref 0–0.5)
EOSINOPHIL NFR BLD AUTO: 2.4 % — SIGNIFICANT CHANGE UP (ref 0–6)
GLUCOSE SERPL-MCNC: 76 MG/DL — SIGNIFICANT CHANGE UP (ref 70–99)
HCT VFR BLD CALC: 39.4 % — SIGNIFICANT CHANGE UP (ref 39–50)
HGB BLD-MCNC: 13.2 G/DL — SIGNIFICANT CHANGE UP (ref 13–17)
IMM GRANULOCYTES NFR BLD AUTO: 0 % — SIGNIFICANT CHANGE UP (ref 0–1.5)
INR BLD: 1.23 RATIO — HIGH (ref 0.88–1.16)
LYMPHOCYTES # BLD AUTO: 2.44 K/UL — SIGNIFICANT CHANGE UP (ref 1–3.3)
LYMPHOCYTES # BLD AUTO: 41.4 % — SIGNIFICANT CHANGE UP (ref 13–44)
MAGNESIUM SERPL-MCNC: 2 MG/DL — SIGNIFICANT CHANGE UP (ref 1.6–2.6)
MCHC RBC-ENTMCNC: 32.7 PG — SIGNIFICANT CHANGE UP (ref 27–34)
MCHC RBC-ENTMCNC: 33.5 GM/DL — SIGNIFICANT CHANGE UP (ref 32–36)
MCV RBC AUTO: 97.5 FL — SIGNIFICANT CHANGE UP (ref 80–100)
MONOCYTES # BLD AUTO: 0.41 K/UL — SIGNIFICANT CHANGE UP (ref 0–0.9)
MONOCYTES NFR BLD AUTO: 7 % — SIGNIFICANT CHANGE UP (ref 2–14)
NEUTROPHILS # BLD AUTO: 2.87 K/UL — SIGNIFICANT CHANGE UP (ref 1.8–7.4)
NEUTROPHILS NFR BLD AUTO: 48.7 % — SIGNIFICANT CHANGE UP (ref 43–77)
PLATELET # BLD AUTO: 174 K/UL — SIGNIFICANT CHANGE UP (ref 150–400)
POTASSIUM SERPL-MCNC: 4.5 MMOL/L — SIGNIFICANT CHANGE UP (ref 3.5–5.3)
POTASSIUM SERPL-SCNC: 4.5 MMOL/L — SIGNIFICANT CHANGE UP (ref 3.5–5.3)
PROTHROM AB SERPL-ACNC: 14.1 SEC — HIGH (ref 10.6–13.6)
RBC # BLD: 4.04 M/UL — LOW (ref 4.2–5.8)
RBC # FLD: 14.6 % — HIGH (ref 10.3–14.5)
SODIUM SERPL-SCNC: 143 MMOL/L — SIGNIFICANT CHANGE UP (ref 135–145)
T3 SERPL-MCNC: 124 NG/DL — SIGNIFICANT CHANGE UP (ref 80–200)
T4 AB SER-ACNC: 5.7 UG/DL — SIGNIFICANT CHANGE UP (ref 4.5–12)
TSH SERPL-MCNC: 1.96 UIU/ML — SIGNIFICANT CHANGE UP (ref 0.27–4.2)
WBC # BLD: 5.89 K/UL — SIGNIFICANT CHANGE UP (ref 3.8–10.5)
WBC # FLD AUTO: 5.89 K/UL — SIGNIFICANT CHANGE UP (ref 3.8–10.5)

## 2021-09-08 PROCEDURE — 93010 ELECTROCARDIOGRAM REPORT: CPT

## 2021-09-08 PROCEDURE — G0463: CPT

## 2021-09-08 PROCEDURE — 93005 ELECTROCARDIOGRAM TRACING: CPT

## 2021-09-08 RX ORDER — PANTOPRAZOLE SODIUM 20 MG/1
0 TABLET, DELAYED RELEASE ORAL
Qty: 0 | Refills: 0 | DISCHARGE

## 2021-09-08 RX ORDER — METOPROLOL TARTRATE 50 MG
0 TABLET ORAL
Qty: 0 | Refills: 0 | DISCHARGE

## 2021-09-08 RX ORDER — DIAZEPAM 5 MG
0 TABLET ORAL
Qty: 0 | Refills: 0 | DISCHARGE

## 2021-09-08 RX ORDER — MECLIZINE HCL 12.5 MG
1 TABLET ORAL
Qty: 0 | Refills: 0 | DISCHARGE

## 2021-09-08 RX ORDER — MECLIZINE HCL 12.5 MG
0 TABLET ORAL
Qty: 0 | Refills: 0 | DISCHARGE

## 2021-09-08 RX ORDER — SERTRALINE 25 MG/1
0 TABLET, FILM COATED ORAL
Qty: 0 | Refills: 0 | DISCHARGE

## 2021-09-08 RX ORDER — APIXABAN 2.5 MG/1
0 TABLET, FILM COATED ORAL
Qty: 0 | Refills: 0 | DISCHARGE

## 2021-09-08 RX ORDER — DOXAZOSIN MESYLATE 4 MG
0 TABLET ORAL
Qty: 0 | Refills: 0 | DISCHARGE

## 2021-09-08 RX ORDER — TAMSULOSIN HYDROCHLORIDE 0.4 MG/1
0 CAPSULE ORAL
Qty: 0 | Refills: 0 | DISCHARGE

## 2021-09-08 RX ORDER — OMEPRAZOLE 10 MG/1
0 CAPSULE, DELAYED RELEASE ORAL
Qty: 0 | Refills: 0 | DISCHARGE

## 2021-09-08 RX ORDER — METHOCARBAMOL 500 MG/1
0 TABLET, FILM COATED ORAL
Qty: 0 | Refills: 0 | DISCHARGE

## 2021-09-08 RX ORDER — DILTIAZEM HCL 120 MG
0 CAPSULE, EXT RELEASE 24 HR ORAL
Qty: 0 | Refills: 0 | DISCHARGE

## 2021-09-08 NOTE — H&P PST ADULT - HISTORY OF PRESENT ILLNESS
62 Y/o male  with history of PE 2020 (on Eliquis), COPD, tobacco use (quit 6 mo ago), seen today pre-op for Aflutter ablation/carto/CARTO/JADA ON TABLE/ILR/MDT/ANES He has had sustained atrial flutter with variable rates, and in this setting has had progressive symptoms of palpitation, dizziness, and dyspnea. HE does have pulmonary disease and a PE last year, and his symptoms may be multifactorial, however, I suspect they are in large part related to his ongoing arrhythmia. We discussed the arrhythmia and associated risks and treatment options. We reviewed options for medical management as well as catheter ablation- given the opportunity for long-term success with ablation of atrial flutter, and his long history of arrhythmia and progressive symptoms, I do believe this is his best management option at this time. We discussed ablation and associated risks and benefits, including procedure related risks such as bleeding, vascular injury, cardiac perforation and stroke. He expressed understanding and does want to proceed. I did also explain that pts with atrial flutter are at increased risk of other arrhythmias including atrial fibrillation event after successful ablation of atrial flutter, and that further monitoring is warranted prior to stopping anticoagulation. As he has been on eliquis for a recent PE, will plan to continue anticoagulation for the near future    To summarize his history, he was seen in 2021 and noted chest pain and exertional dyspnea. ECG at that time reveal atrial flutter with controlled rates. He had been on anticoagulation with Eliquis from his prior PE. Subsequent TTE revealed preserved LV function, and stress test revealed normal perfusion. On followup in 3/2021, he remained in atrial flutter, with more rapid rates. He was started on Cardizem 120mg qd.     EK/15/21, atiral flutter 80 bpm, variable conduyction, narrow QRS   Stress Test: 3/1/21, normal study, no ischemia or infarct, LVEF 67%   Echo: 21, LVEF 55-60%, borderline pulm HTN, trivial pericardial effusion, LA normal 3.6cm   62 Y/o male poor historian  with history of PE 2020 (on Eliquis), COPD, tobacco use (quit 6 mo ago), seen today pre-op for Aflutter ablation/carto/CARTO/JADA ON TABLE/ILR/MDT/ANES. Pt with longstanding hx of cardiac arrhythmia, pt has had sustained atrial flutter with variable rates, and in this setting has had progressive symptoms of palpitation, dizziness, and dyspnea. HE does have pulmonary disease and a PE last year. Pt  As  has been on Eliquis for a recent PE. Pt report he was referred by Dr. Mannie aguirre to progression of symptoms of chest pain, exertional dyspnea.      he was seen in 2021 and noted chest pain and exertional dyspnea. ECG at that time reveal atrial flutter with controlled rates. He had been on anticoagulation with Eliquis from his prior PE. Subsequent TTE revealed preserved LV function, and stress test revealed normal perfusion. On followup in 3/2021, he remained in atrial flutter, with more rapid rates. He was started on Cardizem 120mg qd.     EK/15/21, atiral flutter 80 bpm, variable conduyction, narrow QRS   Stress Test: 3/1/21, normal study, no ischemia or infarct, LVEF 67%   Echo: 21, LVEF 55-60%, borderline pulm HTN, trivial pericardial effusion, LA normal 3.6cm   60 Y/o male poor historian with history of PE 2020 (on Eliquis), COPD, tobacco use 1PPD for 20 years (quit May 2021)), seen today pre-op for Aflutter Ablation/ CARTO/JADA ON TABLE/ILR/MDT/ANES. Pt with longstanding hx of cardiac arrhythmia, has sustained atrial flutter with variable rates, and in this setting has had progressive symptoms of palpitation, dizziness, and dyspnea. Pt as been on Eliquis for a recent PE. Report  he was referred by Dr. Chand due to progression of  intermittent symptoms of chest pain, palpitation and exertional dyspnea. Subsequently  TTE revealed preserved LV function, and stress test revealed normal perfusion. On followup in 3/2021, he remained in atrial flutter, with more rapid rates. He was started on Cardizem 120mg q daily. Pt today denies chest pain, denies palpitations,  denies SOB, denies fever and chills, denies lightheadedness, denies dizziness, denies any other related cardiac issues. Pt seen today for a scheduled procedure on 21 with Dr. Pappas.      Cardiac workup results:       EKG today 21, Normal sinus rhythm 75 bpm  EK/15/21, atiral flutter 80 bpm, variable conduyction, narrow QRS   Stress Test: 3/1/21, normal study, no ischemia or infarct, LVEF 67%   Echo: 21, LVEF 55-60%, borderline pulm HTN, trivial pericardial effusion, LA normal 3.6cm   62 Y/o male poor historian with history of PE 2020 (on Eliquis), COPD, tobacco use 1PPD for 20 years (quit May 2021)), seen today pre-op for Aflutter Ablation/ CARTO/JADA ON TABLE/ILR/MDT/ANES. Pt with longstanding hx of cardiac arrhythmia, has sustained atrial flutter with variable rates, and in this setting has had progressive symptoms of palpitation, dizziness, and dyspnea. Pt as been on Eliquis for a recent PE. Report  he was referred by Dr. Chand due to progressively worsening symptoms of chest pain, palpitation and exertional dyspnea. Subsequently  TTE revealed preserved LV function, and stress test revealed normal perfusion. Follow-up on 3/2021, pt  remained in atrial flutter, with more rapid rates. He was started on Cardizem 120mg q daily. Pt today denies chest pain, denies palpitations, denies SOB, denies fever and chills, denies lightheadedness, denies dizziness, denies any other related cardiac issues. Pt seen today for a scheduled procedure on 21 with Dr. Pappas.      Cardiac workup results:       EKG today 21, Normal sinus rhythm 75 bpm, possible anteroseptal infarct, age undetermined   EK/15/21, atiral flutter 80 bpm, variable conduyction, narrow QRS   Stress Test: 3/1/21, normal study, no ischemia or infarct, LVEF 67%   Echo: 21, LVEF 55-60%, borderline pulm HTN, trivial pericardial effusion, LA normal 3.6cm
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
## 2021-09-08 NOTE — H&P PST ADULT - REASON FOR ADMISSION
" Pre-testing for procedure to remove  the clots from my atrial and put electrical machine in my  left side rib cage to help my heart function well"

## 2021-09-08 NOTE — H&P PST ADULT - EKG AND INTERPRETATION
Normal sinus rhythm 75 bpm, no acute change. EKG pending official reading Normal sinus rhythm 75 bpm, possible anteroseptal infarct, age undetermined, no acute change. EKG pending official reading

## 2021-09-08 NOTE — H&P PST ADULT - OTHER CARE PROVIDERS
Dr Camargo [facility] [phone], fax [phone], cardiologist dr Chand [phone]

## 2021-09-08 NOTE — H&P PST ADULT - PROBLEM SELECTOR PLAN 3
Pt on Suboxone- Pt to f/u with PCP( prescriber) for recommendation prior to procedure Pt on Suboxone- Pt to f/u with PCP( prescriber) for recommendation/titration prior to procedure
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
## 2021-09-08 NOTE — H&P PST ADULT - ASSESSMENT
60 Y/o male poor historian with history of PE 2020 (on Eliquis), COPD, tobacco use 1PPD for 20 years (quit May 2021)), seen today pre-op for Aflutter Ablation/ CARTO/JADA ON TABLE/ILR/MDT/ANES. Pt with longstanding hx of cardiac arrhythmia, has sustained atrial flutter with variable rates, and in this setting has had progressive symptoms of palpitation, dizziness, and dyspnea. Pt as been on Eliquis for a recent PE. Report  he was referred by Dr. Chand due to progression of  intermittent symptoms of chest pain, palpitation and exertional dyspnea. Subsequently  TTE revealed preserved LV function, and stress test revealed normal perfusion. On followup in 3/2021, he remained in atrial flutter, with more rapid rates. He was started on Cardizem 120mg q daily. Pt today denies chest pain, denies palpitations,  denies SOB, denies fever and chills, denies lightheadedness, denies dizziness, denies any other related cardiac issues. Pt seen today for a scheduled procedure on 21 with Dr. Pappas. Pt instructed to hold Eliquis the morning of procedure, Pt to follow-up with PCP regarding possible titration for Suboxone prior to procedure.  Plan:   Labs pending.   Pre-procedure instructions provided (verbal & written) as follows:  Ablation 2021   - Last dose Eliquis on 21 PM dose. Hold Eliquis the morning of procedure.   - F/u with PCP for recommendation/ possible of titration for Suboxone prior to procedure    - NPO after midnight prior except cardiac meds w/ sips of water.   CAPRINI VTE 2.0 SCORE [CLOT updated 2019]    AGE RELATED RISK FACTORS                                                       MOBILITY RELATED FACTORS  [x ] Age 41-60 years                                            (1 Point)                    [ ] Bed rest                                                        (1 Point)  [ ] Age: 61-74 years                                           (2 Points)                  [ ] Plaster cast                                                   (2 Points)  [ ] Age= 75 years                                              (3 Points)                    [ ] Bed bound for more than 72 hours                 (2 Points)    DISEASE RELATED RISK FACTORS                                               GENDER SPECIFIC FACTORS  [ ] Edema in the lower extremities                       (1 Point)              [ ] Pregnancy                                                     (1 Point)  [ ] Varicose veins                                               (1 Point)                     [ ] Post-partum < 6 weeks                                   (1 Point)             [x ] BMI > 25 Kg/m2                                            (1 Point)                     [ ] Hormonal therapy  or oral contraception          (1 Point)                 [ ] Sepsis (in the previous month)                        (1 Point)               [ ] History of pregnancy complications                 (1 point)  [ ] Pneumonia or serious lung disease                                               [ ] Unexplained or recurrent                     (1 Point)           (in the previous month)                               (1 Point)  [ ] Abnormal pulmonary function test                     (1 Point)                 SURGERY RELATED RISK FACTORS  [ ] Acute myocardial infarction                              (1 Point)               [ ]  Section                                             (1 Point)  [ ] Congestive heart failure (in the previous month)  (1 Point)      [ ] Minor surgery                                                  (1 Point)   [ ] Inflammatory bowel disease                             (1 Point)               [ ] Arthroscopic surgery                                        (2 Points)  [ ] Central venous access                                      (2 Points)                [x ] General surgery lasting more than 45 minutes (2 points)  [ ] Malignancy- Present or previous                   (2 Points)                [ ] Elective arthroplasty                                         (5 points)    [ ] Stroke (in the previous month)                          (5 Points)                                                                                                                                                           HEMATOLOGY RELATED FACTORS                                                 TRAUMA RELATED RISK FACTORS  [ ] Prior episodes of VTE                                     (3 Points)                [ ] Fracture of the hip, pelvis, or leg                       (5 Points)  [ ] Positive family history for VTE                         (3 Points)             [ ] Acute spinal cord injury (in the previous month)  (5 Points)  [ ] Prothrombin 18411 A                                     (3 Points)               [ ] Paralysis  (less than 1 month)                             (5 Points)  [ ] Factor V Leiden                                             (3 Points)                  [ ] Multiple Trauma within 1 month                        (5 Points)  [ ] Lupus anticoagulants                                     (3 Points)                                                           [ ] Anticardiolipin antibodies                               (3 Points)                                                       [ ] High homocysteine in the blood                      (3 Points)                                             [ ] Other congenital or acquired thrombophilia      (3 Points)                                                [ ] Heparin induced thrombocytopenia                  (3 Points)                                     Total Score [     4     ]  OPIOID RISK TOOL    TALI EACH BOX THAT APPLIES AND ADD TOTALS AT THE END    FAMILY HISTORY OF SUBSTANCE ABUSE                 FEMALE         MALE                                                Alcohol                             [  ]1 pt          [  ]3pts                                               Illegal Durgs                     [  ]2 pts        [  ]3pts                                               Rx Drugs                           [  ]4 pts        [  ]4 pts    PERSONAL HISTORY OF SUBSTANCE ABUSE                                                                                          Alcohol                             [  ]3 pts       [  ]3 pts                                               Illegal Drugs                     [  ]4 pts        [  ]4 pts                                               Rx Drugs                           [  ]5 pts        [  ]5 pts    AGE BETWEEN 16-45 YEARS                                      [  ]1 pt         [  ]1 pt    HISTORY OF PREADOLESCENT   SEXUAL ABUSE                                                             [  ]3 pts        [  ]0pts    PSYCHOLOGICAL DISEASE                     ADD, OCD, Bipolar, Schizophrenia        [  ]2 pts         [  ]2 pts                      Depression                                               [ x ]1 pt           [  ]1 pt           SCORING TOTAL   (add numbers and type here)              (*1**)                                     A score of 3 or lower indicated LOW risk for future opioid abuse  A score of 4 to 7 indicated moderate risk for future opioid abuse  A score of 8 or higher indicates a high risk for opioid abuse     62 Y/o male poor historian with history of PE 2020 (on Eliquis), COPD, tobacco use 1PPD for 20 years (quit May 2021)), seen today pre-op for Aflutter Ablation/ CARTO/JADA ON TABLE/ILR/MDT/ANES. Pt with longstanding hx of cardiac arrhythmia, has sustained atrial flutter with variable rates, and in this setting has had progressive symptoms of palpitation, dizziness, and dyspnea. Pt as been on Eliquis for a recent PE. Report  he was referred by Dr. Chand due to progressively worsening symptoms of chest pain, palpitation and exertional dyspnea. Subsequently  TTE revealed preserved LV function, and stress test revealed normal perfusion. Follow-up on 3/2021, revealed pt remained in atrial flutter, with more rapid rates, pt was started on Cardizem 120mg q daily. Pt today denies chest pain, denies palpitations,  denies SOB, denies fever and chills, denies lightheadedness, denies dizziness, denies any other related cardiac issues. Pt seen today for a scheduled procedure on 21 with Dr. Pappas. Pt instructed to hold Eliquis the morning of procedure, Pt to follow-up with PCP regarding recommendation/ possible titration for Suboxone prior to procedure.  Plan:   Labs pending.   Pre-procedure instructions provided (verbal & written) as follows:  Ablation 2021   - Last dose Eliquis on 21 PM dose. Hold Eliquis the morning of procedure.   - F/u with PCP for recommendation/ possible titration for Suboxone prior to procedure    - NPO after midnight prior except cardiac meds w/ sips of water.   CAPRINI VTE 2.0 SCORE [CLOT updated 2019]    AGE RELATED RISK FACTORS                                                       MOBILITY RELATED FACTORS  [x ] Age 41-60 years                                            (1 Point)                    [ ] Bed rest                                                        (1 Point)  [ ] Age: 61-74 years                                           (2 Points)                  [ ] Plaster cast                                                   (2 Points)  [ ] Age= 75 years                                              (3 Points)                    [ ] Bed bound for more than 72 hours                 (2 Points)    DISEASE RELATED RISK FACTORS                                               GENDER SPECIFIC FACTORS  [ ] Edema in the lower extremities                       (1 Point)              [ ] Pregnancy                                                     (1 Point)  [ ] Varicose veins                                               (1 Point)                     [ ] Post-partum < 6 weeks                                   (1 Point)             [x ] BMI > 25 Kg/m2                                            (1 Point)                     [ ] Hormonal therapy  or oral contraception          (1 Point)                 [ ] Sepsis (in the previous month)                        (1 Point)               [ ] History of pregnancy complications                 (1 point)  [ ] Pneumonia or serious lung disease                                               [ ] Unexplained or recurrent                     (1 Point)           (in the previous month)                               (1 Point)  [ ] Abnormal pulmonary function test                     (1 Point)                 SURGERY RELATED RISK FACTORS  [ ] Acute myocardial infarction                              (1 Point)               [ ]  Section                                             (1 Point)  [ ] Congestive heart failure (in the previous month)  (1 Point)      [ ] Minor surgery                                                  (1 Point)   [ ] Inflammatory bowel disease                             (1 Point)               [ ] Arthroscopic surgery                                        (2 Points)  [ ] Central venous access                                      (2 Points)                [x ] General surgery lasting more than 45 minutes (2 points)  [ ] Malignancy- Present or previous                   (2 Points)                [ ] Elective arthroplasty                                         (5 points)    [ ] Stroke (in the previous month)                          (5 Points)                                                                                                                                                           HEMATOLOGY RELATED FACTORS                                                 TRAUMA RELATED RISK FACTORS  [ ] Prior episodes of VTE                                     (3 Points)                [ ] Fracture of the hip, pelvis, or leg                       (5 Points)  [ ] Positive family history for VTE                         (3 Points)             [ ] Acute spinal cord injury (in the previous month)  (5 Points)  [ ] Prothrombin 57910 A                                     (3 Points)               [ ] Paralysis  (less than 1 month)                             (5 Points)  [ ] Factor V Leiden                                             (3 Points)                  [ ] Multiple Trauma within 1 month                        (5 Points)  [ ] Lupus anticoagulants                                     (3 Points)                                                           [ ] Anticardiolipin antibodies                               (3 Points)                                                       [ ] High homocysteine in the blood                      (3 Points)                                             [ ] Other congenital or acquired thrombophilia      (3 Points)                                                [ ] Heparin induced thrombocytopenia                  (3 Points)                                     Total Score [     4     ]  OPIOID RISK TOOL    TALI EACH BOX THAT APPLIES AND ADD TOTALS AT THE END    FAMILY HISTORY OF SUBSTANCE ABUSE                 FEMALE         MALE                                                Alcohol                             [  ]1 pt          [  ]3pts                                               Illegal Durgs                     [  ]2 pts        [  ]3pts                                               Rx Drugs                           [  ]4 pts        [  ]4 pts    PERSONAL HISTORY OF SUBSTANCE ABUSE                                                                                          Alcohol                             [  ]3 pts       [  ]3 pts                                               Illegal Drugs                     [  ]4 pts        [  ]4 pts                                               Rx Drugs                           [  ]5 pts        [  ]5 pts    AGE BETWEEN 16-45 YEARS                                      [  ]1 pt         [  ]1 pt    HISTORY OF PREADOLESCENT   SEXUAL ABUSE                                                             [  ]3 pts        [  ]0pts    PSYCHOLOGICAL DISEASE                     ADD, OCD, Bipolar, Schizophrenia        [  ]2 pts         [  ]2 pts                      Depression                                               [ x ]1 pt           [  ]1 pt           SCORING TOTAL   (add numbers and type here)              (*1**)                                     A score of 3 or lower indicated LOW risk for future opioid abuse  A score of 4 to 7 indicated moderate risk for future opioid abuse  A score of 8 or higher indicates a high risk for opioid abuse     62 Y/o male poor historian with history of PE 2020 (on Eliquis), COPD, tobacco use 1PPD for 20 years (quit May 2021)), seen today pre-op for Aflutter Ablation/ CARTO/JADA ON TABLE/ILR/MDT/ANES. Pt with longstanding hx of cardiac arrhythmia, has sustained atrial flutter with variable rates, and in this setting has had progressive symptoms of palpitation, dizziness, and dyspnea. Pt as been on Eliquis for a recent PE. Report  he was referred by Dr. Chand due to progressively worsening symptoms of chest pain, palpitation and exertional dyspnea. Subsequently  TTE revealed preserved LV function, and stress test revealed normal perfusion. Follow-up on 3/2021, revealed pt remained in atrial flutter, with more rapid rates, pt was started on Cardizem 120mg q daily. Pt today denies chest pain, denies palpitations,  denies SOB, denies fever and chills, denies lightheadedness, denies dizziness, denies any other related cardiac issues. Pt seen today for a scheduled procedure on 21 with Dr. Pappas. Pt instructed to hold Eliquis the morning of procedure, Pt to follow-up with PCP regarding recommendation/ possible titration for Suboxone prior to procedure. Pt Pharmacy called today to verify  medication list as per Pt he is on Metoprolol Succinate ER as per Pt pharmacy Metoprolol was last prescribed more than 3 months ago.    Plan:   Labs pending.   Pre-procedure instructions provided (verbal & written) as follows:  Ablation 2021   - Last dose Eliquis on 21 PM dose. Hold Eliquis the morning of procedure.   - F/u with PCP for recommendation/ possible titration for Suboxone prior to procedure    - NPO after midnight prior except cardiac meds w/ sips of water.   CAPRINI VTE 2.0 SCORE [CLOT updated 2019]    AGE RELATED RISK FACTORS                                                       MOBILITY RELATED FACTORS  [x ] Age 41-60 years                                            (1 Point)                    [ ] Bed rest                                                        (1 Point)  [ ] Age: 61-74 years                                           (2 Points)                  [ ] Plaster cast                                                   (2 Points)  [ ] Age= 75 years                                              (3 Points)                    [ ] Bed bound for more than 72 hours                 (2 Points)    DISEASE RELATED RISK FACTORS                                               GENDER SPECIFIC FACTORS  [ ] Edema in the lower extremities                       (1 Point)              [ ] Pregnancy                                                     (1 Point)  [ ] Varicose veins                                               (1 Point)                     [ ] Post-partum < 6 weeks                                   (1 Point)             [x ] BMI > 25 Kg/m2                                            (1 Point)                     [ ] Hormonal therapy  or oral contraception          (1 Point)                 [ ] Sepsis (in the previous month)                        (1 Point)               [ ] History of pregnancy complications                 (1 point)  [ ] Pneumonia or serious lung disease                                               [ ] Unexplained or recurrent                     (1 Point)           (in the previous month)                               (1 Point)  [ ] Abnormal pulmonary function test                     (1 Point)                 SURGERY RELATED RISK FACTORS  [ ] Acute myocardial infarction                              (1 Point)               [ ]  Section                                             (1 Point)  [ ] Congestive heart failure (in the previous month)  (1 Point)      [ ] Minor surgery                                                  (1 Point)   [ ] Inflammatory bowel disease                             (1 Point)               [ ] Arthroscopic surgery                                        (2 Points)  [ ] Central venous access                                      (2 Points)                [x ] General surgery lasting more than 45 minutes (2 points)  [ ] Malignancy- Present or previous                   (2 Points)                [ ] Elective arthroplasty                                         (5 points)    [ ] Stroke (in the previous month)                          (5 Points)                                                                                                                                                           HEMATOLOGY RELATED FACTORS                                                 TRAUMA RELATED RISK FACTORS  [ ] Prior episodes of VTE                                     (3 Points)                [ ] Fracture of the hip, pelvis, or leg                       (5 Points)  [ ] Positive family history for VTE                         (3 Points)             [ ] Acute spinal cord injury (in the previous month)  (5 Points)  [ ] Prothrombin 19719 A                                     (3 Points)               [ ] Paralysis  (less than 1 month)                             (5 Points)  [ ] Factor V Leiden                                             (3 Points)                  [ ] Multiple Trauma within 1 month                        (5 Points)  [ ] Lupus anticoagulants                                     (3 Points)                                                           [ ] Anticardiolipin antibodies                               (3 Points)                                                       [ ] High homocysteine in the blood                      (3 Points)                                             [ ] Other congenital or acquired thrombophilia      (3 Points)                                                [ ] Heparin induced thrombocytopenia                  (3 Points)                                     Total Score [     4     ]  OPIOID RISK TOOL    TALI EACH BOX THAT APPLIES AND ADD TOTALS AT THE END    FAMILY HISTORY OF SUBSTANCE ABUSE                 FEMALE         MALE                                                Alcohol                             [  ]1 pt          [  ]3pts                                               Illegal Durgs                     [  ]2 pts        [  ]3pts                                               Rx Drugs                           [  ]4 pts        [  ]4 pts    PERSONAL HISTORY OF SUBSTANCE ABUSE                                                                                          Alcohol                             [  ]3 pts       [  ]3 pts                                               Illegal Drugs                     [  ]4 pts        [  ]4 pts                                               Rx Drugs                           [  ]5 pts        [  ]5 pts    AGE BETWEEN 16-45 YEARS                                      [  ]1 pt         [  ]1 pt    HISTORY OF PREADOLESCENT   SEXUAL ABUSE                                                             [  ]3 pts        [  ]0pts    PSYCHOLOGICAL DISEASE                     ADD, OCD, Bipolar, Schizophrenia        [  ]2 pts         [  ]2 pts                      Depression                                               [ x ]1 pt           [  ]1 pt           SCORING TOTAL   (add numbers and type here)              (*1**)                                     A score of 3 or lower indicated LOW risk for future opioid abuse  A score of 4 to 7 indicated moderate risk for future opioid abuse  A score of 8 or higher indicates a high risk for opioid abuse

## 2021-09-08 NOTE — H&P PST ADULT - NSICDXPASTSURGICALHX_GEN_ALL_CORE_FT
PAST SURGICAL HISTORY:  History of cholecystectomy     History of tracheostomy Tracheostomy in May and closure June 2021    S/P exploratory laparotomy

## 2021-09-09 PROBLEM — I48.92 UNSPECIFIED ATRIAL FLUTTER: Chronic | Status: ACTIVE | Noted: 2021-09-08
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
## 2021-09-09 PROBLEM — M54.9 DORSALGIA, UNSPECIFIED: Chronic | Status: ACTIVE | Noted: 2021-09-08

## 2021-09-10 RX ORDER — DIAZEPAM 5 MG
0 TABLET ORAL
Qty: 0 | Refills: 0 | DISCHARGE

## 2021-09-11 ENCOUNTER — APPOINTMENT (OUTPATIENT)
Dept: DISASTER EMERGENCY | Facility: CLINIC | Age: 61
End: 2021-09-11

## 2021-09-12 LAB — SARS-COV-2 N GENE NPH QL NAA+PROBE: NOT DETECTED

## 2021-09-14 ENCOUNTER — OUTPATIENT (OUTPATIENT)
Dept: OUTPATIENT SERVICES | Facility: HOSPITAL | Age: 61
LOS: 1 days | End: 2021-09-14
Payer: MEDICAID

## 2021-09-14 ENCOUNTER — TRANSCRIPTION ENCOUNTER (OUTPATIENT)
Age: 61
End: 2021-09-14

## 2021-09-14 VITALS
RESPIRATION RATE: 20 BRPM | DIASTOLIC BLOOD PRESSURE: 80 MMHG | HEART RATE: 80 BPM | OXYGEN SATURATION: 94 % | SYSTOLIC BLOOD PRESSURE: 185 MMHG

## 2021-09-14 VITALS
SYSTOLIC BLOOD PRESSURE: 160 MMHG | RESPIRATION RATE: 16 BRPM | DIASTOLIC BLOOD PRESSURE: 77 MMHG | OXYGEN SATURATION: 96 % | TEMPERATURE: 98 F | HEART RATE: 85 BPM

## 2021-09-14 DIAGNOSIS — Z90.49 ACQUIRED ABSENCE OF OTHER SPECIFIED PARTS OF DIGESTIVE TRACT: Chronic | ICD-10-CM

## 2021-09-14 DIAGNOSIS — Z98.890 OTHER SPECIFIED POSTPROCEDURAL STATES: Chronic | ICD-10-CM

## 2021-09-14 DIAGNOSIS — I48.92 UNSPECIFIED ATRIAL FLUTTER: ICD-10-CM

## 2021-09-14 PROCEDURE — 33285 INSJ SUBQ CAR RHYTHM MNTR: CPT | Mod: 59

## 2021-09-14 PROCEDURE — 93010 ELECTROCARDIOGRAM REPORT: CPT

## 2021-09-14 PROCEDURE — 93613 INTRACARDIAC EPHYS 3D MAPG: CPT

## 2021-09-14 PROCEDURE — 93653 COMPRE EP EVAL TX SVT: CPT

## 2021-09-14 PROCEDURE — 33285 INSJ SUBQ CAR RHYTHM MNTR: CPT

## 2021-09-14 PROCEDURE — C1764: CPT

## 2021-09-14 PROCEDURE — C1731: CPT

## 2021-09-14 PROCEDURE — 93005 ELECTROCARDIOGRAM TRACING: CPT

## 2021-09-14 PROCEDURE — C1732: CPT

## 2021-09-14 PROCEDURE — 36415 COLL VENOUS BLD VENIPUNCTURE: CPT

## 2021-09-14 PROCEDURE — C1894: CPT

## 2021-09-14 PROCEDURE — C1766: CPT

## 2021-09-14 RX ORDER — ALPRAZOLAM 0.25 MG
0 TABLET ORAL
Qty: 0 | Refills: 0 | DISCHARGE

## 2021-09-14 RX ORDER — NICOTINE POLACRILEX 2 MG
0 GUM BUCCAL
Qty: 0 | Refills: 0 | DISCHARGE

## 2021-09-14 RX ORDER — ACETAMINOPHEN 500 MG
650 TABLET ORAL EVERY 6 HOURS
Refills: 0 | Status: DISCONTINUED | OUTPATIENT
Start: 2021-09-14 | End: 2021-09-28

## 2021-09-14 RX ORDER — APIXABAN 2.5 MG/1
5 TABLET, FILM COATED ORAL
Refills: 0 | Status: DISCONTINUED | OUTPATIENT
Start: 2021-09-14 | End: 2021-09-28

## 2021-09-14 RX ORDER — IPRATROPIUM BROMIDE 0.2 MG/ML
0 SOLUTION, NON-ORAL INHALATION
Qty: 0 | Refills: 0 | DISCHARGE

## 2021-09-14 RX ADMIN — APIXABAN 5 MILLIGRAM(S): 2.5 TABLET, FILM COATED ORAL at 17:30

## 2021-09-14 NOTE — PROGRESS NOTE ADULT - SUBJECTIVE AND OBJECTIVE BOX
PROCEDURE(S): Radiofrequency Ablation of Atrial Flutter and Loop Recorder Implant     ELECTRPHYSIOLOGIST(S): Steven Pappas MD         COMPLICATIONS:  none        DISPOSITION:  observation     CONDITION: stable      Pt doing well s/p atrial flutter ablation (CTI line) via right FV (hemostasis achieved via figure 8 groin suture) and MDT ILR implant. Denies complaint.       MEDICATIONS  (STANDING):  apixaban 5 milliGRAM(s) Oral two times a day    MEDICATIONS  (PRN):  acetaminophen   Tablet .. 650 milliGRAM(s) Oral every 6 hours PRN Mild Pain (1 - 3)      Allergies  No Known Allergies    Exam:   HR: 80  BP: 170/90  RR: 16   SpO2: 100%    VSS, NAD, sleepy from anesthesia  Card: S1/S2, RRR, no m/g/r. Incision with Dermabond; site C/D/I; no bleeding, hematoma, erythema or edema  Resp: lungs CTA b/l  Abd: S/NT/ND  Groin: (right) hemostatic sutures in place; sites C/D/I; no bleeding, hematoma, erythema, exudate or edema  Ext: no edema; distal pulses intact    I/Os: net + 1058    ECG: Sinus rhythm at 79bpm     Assessment:   61 year old gentleman with history of PE 8/2020 & 5/2021 (on Eliquis), COPD, tobacco use (quit 6 mo ago), ?remote opioid use on Suboxone and atrial flutter dx 1/2021, who was planned atrial flutter ablation earlier this year but was canceled due to hospitalization at [facility] 5/15/21-6/5/2021 for pneumonia, acute hypoxic respiratory failure requiring intubation and ultimately tracheostomy (5/25/21) s/p reversal, left pleural effusion/pulm edema s/p chest tube (5/16/21), right segmental PE and left peroneal DVT, and atrial flutter with RVR s/p urgent cardioversion in ER 5/15/21, due to hemodynamic instability.  On follow up last month, ECG revealed sinus rhythm, but pt offered c/o intermittent palpitations & feeling his heart racing at times. Decision was made to proceed with atrial flutter ablation & ILR implant post ablation for atrial arrhythmia surveillance to guide further management. He is now status post uncomplicated radiofrequency ablation of atrial flutter (CTI line) via right FV (hemostasis achieved via figure 8 groin suture) and MDT ILR implant.      Plan:   Bedrest x 4 hours, then OOB with assistance and progress as tolerated.   Groin sutures to be removed by EP service prior to discharge.   Pending groin status: 5 mg PO eliquis to resume at 17:30 tonight.  Continue home medications.   Strict I/Os.  Please encourage incentive spirometry and ambulation once able.  Observation and monitoring on telemetry with anticipated discharge later today with outpt follow up in 2-4 weeks.

## 2021-09-14 NOTE — DISCHARGE NOTE PROVIDER - NSDCMRMEDTOKEN_GEN_ALL_CORE_FT
ALBUTEROL    AER HFA:   apixaban 5 mg oral tablet: 1 tab(s) orally 2 times a day  ascorbic acid 500 mg oral tablet: 1 tab(s) orally once a day  buprenorphine-naloxone 8 mg-2 mg sublingual tablet: 1 tab(s) sublingual 3 times a day  DILTIAZEM    : cap(s) orally once a day  Metoprolol Succinate ER 25 mg oral tablet, extended release: 1 tab(s) orally once a day   Multiple Vitamins oral tablet: 1 tab(s) orally once a day  sertraline 100 mg oral tablet: 1 tab(s) orally once a day  tamsulosin 0.4 mg oral capsule: 1 cap(s) orally once a day (at bedtime)

## 2021-09-14 NOTE — PROGRESS NOTE ADULT - SUBJECTIVE AND OBJECTIVE BOX
Pt reevaluated, reports feeling well, denies any complaints. Vitals remain stable. Right groin suture removed without incident. Groin acces care and activity limitations reviewed. Pt to ambulate and if groin remains stable will discharge home with a follow up call tomorrow and outpatient follow up in 2-4 weeks with Dr. Pappas.       Pt doing well s/p atrial flutter ablation. Reports feeling well, requesting to be discharged home. Pt has remained stable throughout recovery without event. Eliquis continued, importance of strict compliance reinforced w/ pt, who expressed understanding. Right groin suture removed without incident, site C/D/I b/l; no bleeding or hematoma. Groin acces care and activity limitations reviewed. Pt to ambulate and if groin remains stable will discharge home with a follow up call tomorrow and outpatient follow up in 2-4 weeks with Dr. Pappas. Pt advised to call immediately with questions or concerns, seek immediate medical attention for acute distress.         Pt doing well s/p atrial flutter ablation. Reports feeling well, requesting to be discharged home. Pt has remained stable throughout recovery without event. Eliquis continued, importance of strict compliance reinforced w/ pt, who expressed understanding. Right groin suture removed without incident, site C/D/I b/l; no bleeding or hematoma. Groin access care and activity limitations reviewed. Pt to ambulate and if groin remains stable will discharge home with a follow up call tomorrow and outpatient follow up in 2-4 weeks with Dr. Pappas. Pt advised to call immediately with questions or concerns, seek immediate medical attention for acute distress.

## 2021-09-14 NOTE — DISCHARGE NOTE PROVIDER - HOSPITAL COURSE
61 year old gentleman with history of PE 8/2020 & 5/2021 (on Eliquis), COPD, tobacco use (quit 6 mo ago), ?remote opioid use on Suboxone and atrial flutter dx 1/2021, who was planned atrial flutter ablation earlier this year but was canceled due to hospitalization at [facility] 5/15/21-6/5/2021 for pneumonia, acute hypoxic respiratory failure requiring intubation and ultimately tracheostomy (5/25/21) s/p reversal, left pleural effusion/pulm edema s/p chest tube (5/16/21), right segmental PE and left peroneal DVT, and atrial flutter with RVR s/p urgent cardioversion in ER 5/15/21, due to hemodynamic instability.  On follow up last month, ECG revealed sinus rhythm, but pt offered c/o intermittent palpitations & feeling his heart racing at times. Decision was made to proceed with atrial flutter ablation & ILR implant post ablation for atrial arrhythmia surveillance to guide further management. He is now status post uncomplicated radiofrequency ablation of atrial flutter (CTI line) via right FV (hemostasis achieved via figure 8 groin suture) and MDT ILR implant.

## 2021-09-14 NOTE — DISCHARGE NOTE PROVIDER - CARE PROVIDERS DIRECT ADDRESSES
[email or web address]

## 2021-09-14 NOTE — PROGRESS NOTE ADULT - SUBJECTIVE AND OBJECTIVE BOX
Pt scheduled for JADA, ILR implant and AFL ablation with Dr. Pappas. PST performed on 2021. Labs reviewed. NPO ****. Last eliquis ****    Pt is a 62 Y/o male with PMH of PE 2020, COPD,  and atrial flutter (maintained on eliquis).  Pt reports progressive symptoms of palpitation, dizziness, and dyspnea while in flutter.  TTE revealed preserved LV function, and stress test revealed normal perfusion. Follow-up on 3/2021, pt  remained in atrial flutter, with more rapid rates. He presents electively today for atrial flutter ablation with preceding JADA, and ILR implant for continued long term monitoring.     Cardiologist:  Dr. Chand    Cardiac summary:   EK/15/21, atrial flutter 80 bpm, variable conduction narrow QRS   Stress Test: 3/1/21, normal study, no ischemia or infarct, LVEF 67%   Echo: 21, LVEF 55-60%, borderline pulm HTN, trivial pericardial effusion, LA normal 3.6cm Pt scheduled for elective JADA, ILR implant and atrial flutter ablation with Dr. Pappas. PST performed on 2021. Labs reviewed.   Confirmed NPO >10 hours   Last Eliquis 21 at ~3pm (pt takes at 7am and 3Pm)    Pt is a 60 Y/o male with PMH of PE 2020, COPD,  and atrial flutter (maintained on eliquis).  Pt reports progressive symptoms of palpitation, dizziness, and dyspnea while in flutter.  TTE revealed preserved LV function, and stress test revealed normal perfusion. Follow-up on 3/2021, pt  remained in atrial flutter, with more rapid rates. He presents electively today for atrial flutter ablation with preceding JADA, and ILR implant for continued long term monitoring.   EKG today 21: sinus rhythm 83bpm, LAE, PRWP     Cardiologist:  Dr. Chand    Cardiac summary:   EK/15/21, atrial flutter 80 bpm, variable conduction narrow QRS   Stress Test: 3/1/21, normal study, no ischemia or infarct, LVEF 67%   Echo: 21, LVEF 55-60%, borderline pulm HTN, trivial pericardial effusion, LA normal 3.6cm Pt scheduled for elective JADA, ILR implant and atrial flutter ablation with Dr. Pappas. PST performed on 2021. Labs reviewed.   Confirmed NPO >10 hours   Last Eliquis 21 at ~3pm (pt takes at 7am and 3Pm)    61 year old gentleman with history of PE 2020 & 2021 (on Eliquis), COPD, tobacco use (quit 6 mo ago), ?remote opioid use on Suboxone and atrial flutter dx 2021, who was planned atrial flutter ablation earlier this year but was canceled due to hospitalization at [facility] 5/15/ for pneumonia, acute hypoxic respiratory failure requiring intubation and ultimately tracheostomy (21) s/p reversal, left pleural effusion/pulm edema s/p chest tube (21), right segmental PE and left peroneal DVT, and atrial flutter with RVR s/p urgent cardioversion in ER 5/15/21, due to hemodynamic instability. Pt was discharged to [facility] 2021.       Most recently he presented to ER 21 after being found on the floor per family. He reports he was feeling weak and could not stand up but did not lose consciousness. Ultimately it was suspected this was related due to medication overdose (per I-stop picked up multiple prescriptions for alprazolam 1mg TID and diazepam 5mg BID) for which pscyh and sw were consulted.    He was seen in outpt f/u by Dr Pappas 21. ECG revealed sinus rhythm, but pt offered c/o intermittent palpitations & feeling his heart racing at times. Decision was made to proceed with atrial flutter ablation & ILR implant post ablation  for atrial arrhythmia surveillance to guide further management.    EKG today 21: sinus rhythm 83bpm, LAE, PRWP     Cardiologist:  Dr. Chand    Cardiac summary:   EK/15/21, atrial flutter 80 bpm, variable conduction narrow QRS   Stress Test: 3/1/21, normal study, no ischemia or infarct, LVEF 67%   Echo: 21, LVEF 55-60%, borderline pulm HTN, trivial pericardial effusion, LA normal 3.6cm

## 2021-09-14 NOTE — DISCHARGE NOTE PROVIDER - CARE PROVIDER_API CALL
Gifty Chand ()  Internal Medicine  [address] [place] [address]  Phone: [phone]  Fax: [phone]  Follow Up Time:     Steven Pappas)  Cardiology; Internal Medicine  [address] [facility], [facility] [address]  [place] [address]  Phone: [phone]  Fax: [phone]  Follow Up Time:

## 2021-09-14 NOTE — DISCHARGE NOTE NURSING/CASE MANAGEMENT/SOCIAL WORK - PATIENT PORTAL LINK FT
You can access the FollowMyHealth Patient Portal offered by [facility] by registering at the following website: [email or web address]. By joining [facility]’s FollowMyHealth portal, you will also be able to view your health information using other applications (apps) compatible with our system.

## 2021-09-15 ENCOUNTER — NON-APPOINTMENT (OUTPATIENT)
Age: 61
End: 2021-09-15

## 2021-10-14 ENCOUNTER — APPOINTMENT (OUTPATIENT)
Dept: ELECTROPHYSIOLOGY | Facility: CLINIC | Age: 61
End: 2021-10-14
Payer: MEDICAID

## 2021-10-14 VITALS
BODY MASS INDEX: 23.3 KG/M2 | DIASTOLIC BLOOD PRESSURE: 64 MMHG | HEIGHT: 72 IN | HEART RATE: 82 BPM | OXYGEN SATURATION: 96 % | SYSTOLIC BLOOD PRESSURE: 104 MMHG | WEIGHT: 172 LBS | TEMPERATURE: 98.3 F

## 2021-10-14 PROCEDURE — 93000 ELECTROCARDIOGRAM COMPLETE: CPT

## 2021-10-14 PROCEDURE — 99214 OFFICE O/P EST MOD 30 MIN: CPT

## 2021-10-14 NOTE — HISTORY OF PRESENT ILLNESS
[FreeTextEntry1] : 61 year old gentleman with history of PE 8/2020 & 5/2021 (on Eliquis), COPD, tobacco use (quit 6 mo ago), ?remote opioid use on Suboxone and atrial flutter dx 1/2021, who was planned atrial flutter ablation earlier this year but was canceled due to hospitalization at [facility] 5/15/21-6/5/2021 for pneumonia, acute hypoxic respiratory failure requiring intubation and ultimately tracheostomy (5/25/21) s/p reversal, left pleural effusion/pulm edema s/p chest tube (5/16/21), right segmental PE and left peroneal DVT, and atrial flutter with RVR s/p urgent cardioversion in ER 5/15/21, due to hemodynamic instability. On follow up last month, ECG revealed sinus rhythm, but pt offered c/o\par intermittent palpitations & feeling his heart racing at times. Decision was made to proceed with atrial flutter ablation & ILR implant post ablation for atrial arrhythmia surveillance to guide further management. He is now status\par post uncomplicated radiofrequency ablation of atrial flutter (CTI line) via right FV (hemostasis achieved via figure 8 groin suture) and MDT ILR implant on 9/13/21.\par \par Remote monitoring reveals no episodes since implant. Since ablation he reports he has been feeling well. Denies palpitations, chest pain. Has chronic MERRILL, unchanged. Tolerating Eliquis without complaints of easy bruising bleeding or falls. Left parasternal incision well approximated without erythema, edema, or exudate. \par \par

## 2021-10-14 NOTE — DISCUSSION/SUMMARY
[FreeTextEntry1] : 61 year old gentleman with history of PE 8/2020 & 5/2021 (on Eliquis), COPD, tobacco use (quit 6 mo ago), ?remote opioid use on Suboxone and atrial flutter dx 1/2021, who was planned atrial flutter ablation earlier this year but was canceled due to hospitalization at [facility] 5/15/21-6/5/2021 for pneumonia, acute hypoxic respiratory failure requiring intubation and ultimately tracheostomy (5/25/21) s/p reversal, left pleural effusion/pulm edema s/p chest tube (5/16/21), right segmental PE and left peroneal DVT, and atrial flutter with RVR s/p urgent cardioversion in ER 5/15/21, due to hemodynamic instability. On follow up last month, ECG revealed sinus rhythm, but pt offered c/o\par intermittent palpitations & feeling his heart racing at times. Decision was made to proceed with atrial flutter ablation & ILR implant post ablation for atrial arrhythmia surveillance to guide further management. He is now status\par post uncomplicated radiofrequency ablation of atrial flutter (CTI line) via right FV (hemostasis achieved via figure 8 groin suture) and MDT ILR implant on 9/13/21.\par \par Remote monitoring reveals no episodes since implant. Since ablation he reports he has been feeling well. Denies palpitations, chest pain. Has chronic MERRILL, unchanged. Tolerating Eliquis without complaints of easy bruising bleeding or falls. Left parasternal incision well approximated without erythema, edema, or exudate. \par \par Recommendation:\par \par -Doing well s/p ablation for typical atrial flutter with ILR in place for ongoing arrhythmia monitoring. At this time to continue Eliquis for stroke prophylaxis. Discussed that if no arrhythmia on at least 6 months of monitoring and AC no longer warranted for treatment of PE to consider cessation with continued ILR monitoring. To consider risks/benefits. \par -Pulm f/u with Dr. Prescott\par -Cardiology f/u with Dr. Chand \par -EP follow up in 6 months or sooner PRN.\par \par Tanisha Braun ANP-C

## 2021-10-14 NOTE — REVIEW OF SYSTEMS
[Headache] : no headache [Feeling Fatigued] : not feeling fatigued [SOB] : no shortness of breath [Dyspnea on exertion] : dyspnea during exertion [Chest Discomfort] : no chest discomfort [Palpitations] : no palpitations [Orthopnea] : no orthopnea [Syncope] : no syncope [Dizziness] : no dizziness [Easy Bleeding] : no tendency for easy bleeding

## 2021-10-21 ENCOUNTER — APPOINTMENT (OUTPATIENT)
Dept: ELECTROPHYSIOLOGY | Facility: CLINIC | Age: 61
End: 2021-10-21
Payer: MEDICAID

## 2021-10-21 ENCOUNTER — NON-APPOINTMENT (OUTPATIENT)
Age: 61
End: 2021-10-21

## 2021-10-21 PROCEDURE — 93298 REM INTERROG DEV EVAL SCRMS: CPT

## 2021-10-21 PROCEDURE — G2066: CPT | Mod: NC

## 2021-11-02 NOTE — PROCEDURE NOTE - NSANATOMICLOCCTUBE_GEN_A_CORE

## 2021-11-11 ENCOUNTER — APPOINTMENT (OUTPATIENT)
Dept: PULMONOLOGY | Facility: CLINIC | Age: 61
End: 2021-11-11
Payer: MEDICAID

## 2021-11-11 VITALS
RESPIRATION RATE: 16 BRPM | SYSTOLIC BLOOD PRESSURE: 125 MMHG | DIASTOLIC BLOOD PRESSURE: 70 MMHG | WEIGHT: 173 LBS | OXYGEN SATURATION: 97 % | BODY MASS INDEX: 23.46 KG/M2 | HEART RATE: 75 BPM

## 2021-11-11 DIAGNOSIS — R06.2 WHEEZING: ICD-10-CM

## 2021-11-11 DIAGNOSIS — R07.89 OTHER CHEST PAIN: ICD-10-CM

## 2021-11-11 DIAGNOSIS — J90 PLEURAL EFFUSION, NOT ELSEWHERE CLASSIFIED: ICD-10-CM

## 2021-11-11 PROCEDURE — 99215 OFFICE O/P EST HI 40 MIN: CPT

## 2021-11-11 RX ORDER — TIOTROPIUM BROMIDE INHALATION SPRAY 3.12 UG/1
2.5 SPRAY, METERED RESPIRATORY (INHALATION) DAILY
Qty: 3 | Refills: 1 | Status: DISCONTINUED | COMMUNITY
Start: 2020-05-07 | End: 2021-11-11

## 2021-11-11 RX ORDER — DILTIAZEM HYDROCHLORIDE 120 MG/1
120 CAPSULE, EXTENDED RELEASE ORAL DAILY
Qty: 90 | Refills: 3 | Status: DISCONTINUED | COMMUNITY
Start: 2021-03-04 | End: 2021-11-11

## 2021-11-11 RX ORDER — SERTRALINE HYDROCHLORIDE 100 MG/1
100 TABLET, FILM COATED ORAL DAILY
Refills: 0 | Status: DISCONTINUED | COMMUNITY
End: 2021-11-11

## 2021-11-19 NOTE — PRE-ANESTHESIA EVALUATION ADULT - NSANTHRISKSRD_GEN_ALL_CORE
Palliative Care follow up           Date of Visit: 11/19/2021   Visit Made By: Seun Snider MD    Primary Care Physician: Aris Cole MD  Office Phone #: [phone]  Fax Phone #: [phone]    Consults  Number of hospitalizations in the last year: 2  Last [facility] Discharge Date4/8/2021  Reason for Palliative Care: Goals of Care/Level of Care      Subjective           History of Present Illness  History Obtained by: Chart Review    Patient is a 89-year-old female past medical history significant for history of hypertension diabetes CVA with residual left-sided weakness hypothyroidism and dementia, at baseline the patient is dependent in all ADLs she lives with one of her sons at home and per family she was having rectal bleeding and brought to the hospital, patient was evaluated by the GI service and it was thought that most likely the bleeding was coming from hemorrhoids no further work-up was indicated or planned, family reports a steady decline in the patient condition.    Palliative care consultation was obtained to discuss goals of care.  Past Medical History  Past Medical History:   Diagnosis Date   • Alzheimer's dementia (CMS/HCC)    • Anemia    • Diabetes mellitus (CMS/HCC)    • Essential (primary) hypertension    • High cholesterol    • Otitis media    • Stroke (CMS/HCC)    • Thyroid condition    • Uncomplicated senile dementia (CMS/HCC)    • Urinary incontinence    • Urinary tract infection        Past Surgical History  Past Surgical History:   Procedure Laterality Date   • Cardiac catherization         Past Social History   reports that she has never smoked. She has never used smokeless tobacco. She reports previous alcohol use. She reports that she does not use drugs.    Past Spiritual History  [employer]yes[employer] Tradition/Restorationism Affiliation: Spiritism    Past Family History  family history includes Diabetes in her maternal grandmother; Kidney disease in her son; Stroke in her son.    Allergies  No Known  Allergies    Medications:  Continuous Infusions:   • dextrose 5 % / sodium chloride 0.9% infusion   Intravenous     Scheduled Meds:   • aspirin chewable 324 mg Oral Once   • donepezil (ARICEPT) tablet 5 mg Oral QHS   • ferrous sulfate (65 mg Fe per 325 mg) tablet 325 mg Oral BID WC   • insulin lispro (ADMELOG,HumaLOG) - Correction Dose Subcutaneous TID WC   • isosorbide mononitrate (IMDUR) ER tablet 30 mg Oral Daily   • polyethylene glycol (MIRALAX) packet 17 g Oral Daily   • propylthiouracil (PTU) tablet 50 mg Oral Daily   • rosuvastatin (CRESTOR) tablet 5 mg Oral QHS     PRN Meds:   • acetaminophen (TYLENOL) tablet 650 mg  650 mg Oral Q4H PRN   • dextrose (GLUTOSE) 40 % gel 15 g  15 g Oral PRN   • dextrose (GLUTOSE) 40 % gel 30 g  30 g Oral PRN   • dextrose 50 % injection 12.5 g  12.5 g Intravenous PRN   • dextrose 50 % injection 25 g  25 g Intravenous PRN   • glucagon (GLUCAGEN) injection 1 mg  1 mg Intramuscular PRN   • loratadine (CLARITIN) tablet 10 mg  10 mg Oral Daily PRN       Review of Systems   Reason unable to perform ROS: Secondary to the patient underlying dementia review of system is dependent on chart review discussion with other care teams and the patient's nursing staff and family.           Palliative Care Assessment Tools    Pain Assessment   Could not assess                      ESAS Scale     Could not assess    Palliative Performance Scale  Palliative Performance Scale: Ambulation Totally Bed-Bound. Unable to do Any Work Extensive Disease. Self-Care Total Care. Intake Minimal Sips. Consciousness Level Full or Drowsy or Confusion.               Functional Assessment Staging (FAST)  Functional Assessment Staging (FAST)  Functional Assessment Staging Score: Ambulatory ability is lost (cannot walk without personal assistance)    Heart Failure Tools             Objective      Vital Signs:   Vital Last Value (24 Hour) 24 Hour Range   Temperature 97.9 °F (36.6 °C) (11/19/21 0632) Temp  Min: 97 °F  (36.1 °C)  Max: 97.9 °F (36.6 °C)   Pulse 79 (11/19/21 0632) Pulse  Min: 59  Max: 79   Arterial   Blood Pressure   No data recorded   Non-Invasive  Blood Pressure (!) 150/67 (11/19/21 0632) BP  Min: 114/66  Max: 150/67   Central Venous Pressure   No data recorded   Respiratory 16 (11/18/21 2236) Resp  Min: 16  Max: 16   SpO2 93 % (11/19/21 0632) SpO2  Min: 93 %  Max: 100 %   No data recordedWeight: 49.4 kg (109 lb) (11/17/21 0203)    Physical Exam  Constitutional:       General: She is not in acute distress.     Comments: Patient is awake but confused   HENT:      Head: Normocephalic.      Nose: Nose normal.   Cardiovascular:      Rate and Rhythm: Normal rate.   Pulmonary:      Effort: Pulmonary effort is normal.   Abdominal:      General: Abdomen is flat.      Palpations: Abdomen is soft.   Skin:     General: Skin is warm and dry.   Neurological:      Comments: Patient is confused, hemiparetic   Psychiatric:      Comments: No agitation         Labs & Imaging    Recent Labs   Lab 11/18/21  0616 11/16/21  1310   SODIUM 143 141   POTASSIUM 4.1 4.0   CHLORIDE 113* 114*   CO2 23 26   BUN 10 13   CREATININE 0.65 0.59   CALCIUM 9.0 9.1   ALBUMIN  --  2.5*   BILIRUBIN  --  0.3   ALKPT  --  61   GPT  --  11   AST  --  21   GLUCOSE 108* 132*      Recent Labs   Lab 11/18/21  0616   WBC 8.7   RBC 4.90   HGB 14.9   HCT 45.2           Results for orders placed or performed during the hospital encounter of 11/16/21 (from the past 72 hour(s))   CT HEAD WO CONTRAST    Impression    1.   No acute intracranial process.   2.   Chronic large right middle cerebral artery territory infarct.    Electronically Signed by: PADMA GODDARD MD   Signed on: 11/16/2021 2:41 PM      XR CHEST PA OR AP 1 VIEW    Impression    Mild central congestion.  Minor left lung base atelectasis.  No focal right  lung consolidation.      Electronically Signed by: SHALA LEVI MD   Signed on: 11/16/2021 2:47 PM           Advance Care Planning/Goals of  Care      Medical Decision-making capacity: No   POAHC/Substitute Decision Maker: None yes    Code Status: Selective Treatment/DNR    POLST: In Paper Chart        Assessment      As above patient a 89-year-old with long history of dementia dependent in all ADLs, progressive decline in her condition.    I met with the patient's son and has other son on the phone, we discussed patient's goals of care, I presented hospice versus palliative care options, we discussed the CODE STATUS       Plan      No new Assessment & Plan notes have been filed under this hospital service since the last note was generated.  Service: Palliative Care  With documented full L ET, we completed a POLST.    Patient's family agreed to hospice evaluation in order was placed.    One of the sons will be appointed SDM.    was called    11/19/21  Patient with advanced dementia  Dependant in all ADLs  Hospice evaluation in progress  Comfort focussed care    Psychosocial/Spiritual Needs  Fredis is following        Total combined time for today's Face to Face encounter was 35 minutes, with > 50% of that time spent counseling and coordinating care regarding the above.     Discussed With: hospitalist    Thank you for involving Palliative and Supportive Care.  Please contact the covering provider via [employer] with further questions or concerns.    Seun Snider MD    Note to Patient: The 21st Century Cures Act makes medical notes like these available to patients in the interest of transparency. However, be advised this is a medical document. It is intended as peer to peer communication. It is written in medical language and may contain abbreviations or verbiage that are unfamiliar. It may appear blunt or direct. Medical documents are intended to carry relevant information, facts as evident, and the clinical opinion of the practitioner.  This note may have been transcribed using a voice dictation system. Voice recognition errors may occur.  This should not be taken to alter the content or meaning of this note.       Metrics          Primary Diagnosis: Dementia   Problem List: Delirium  LVAD: No  #1 Pre-Visit: No  #1 Post-Visit: No  Reason if No #1 on Chart Prior to Discharge from Palliative Care: HEALTHCARE SURROGATE  #2 Pre-Visit: No  #2 Post-Visit: Yes  #3 Pre-Visit: No  #3 Post-Visit: Yes                        ASA of 4, 4E, 5 or 5E

## 2021-11-23 ENCOUNTER — NON-APPOINTMENT (OUTPATIENT)
Age: 61
End: 2021-11-23

## 2021-11-23 ENCOUNTER — APPOINTMENT (OUTPATIENT)
Dept: ELECTROPHYSIOLOGY | Facility: CLINIC | Age: 61
End: 2021-11-23
Payer: MEDICAID

## 2021-11-23 PROCEDURE — G2066: CPT | Mod: NC

## 2021-11-23 PROCEDURE — 93298 REM INTERROG DEV EVAL SCRMS: CPT

## 2021-12-01 PROCEDURE — G9005: CPT

## 2021-12-03 ENCOUNTER — LABORATORY RESULT (OUTPATIENT)
Age: 61
End: 2021-12-03

## 2021-12-03 ENCOUNTER — APPOINTMENT (OUTPATIENT)
Dept: DISASTER EMERGENCY | Facility: CLINIC | Age: 61
End: 2021-12-03

## 2021-12-08 ENCOUNTER — APPOINTMENT (OUTPATIENT)
Dept: PULMONOLOGY | Facility: CLINIC | Age: 61
End: 2021-12-08
Payer: MEDICAID

## 2021-12-08 ENCOUNTER — NON-APPOINTMENT (OUTPATIENT)
Age: 61
End: 2021-12-08

## 2021-12-08 PROCEDURE — 94729 DIFFUSING CAPACITY: CPT

## 2021-12-08 PROCEDURE — 94010 BREATHING CAPACITY TEST: CPT

## 2021-12-08 PROCEDURE — 85018 HEMOGLOBIN: CPT | Mod: QW

## 2021-12-08 PROCEDURE — 94727 GAS DIL/WSHOT DETER LNG VOL: CPT

## 2021-12-28 ENCOUNTER — APPOINTMENT (OUTPATIENT)
Dept: ELECTROPHYSIOLOGY | Facility: CLINIC | Age: 61
End: 2021-12-28
Payer: MEDICAID

## 2021-12-28 ENCOUNTER — NON-APPOINTMENT (OUTPATIENT)
Age: 61
End: 2021-12-28

## 2021-12-28 PROCEDURE — G2066: CPT

## 2021-12-28 PROCEDURE — 93298 REM INTERROG DEV EVAL SCRMS: CPT

## 2022-01-09 NOTE — PROGRESS NOTE ADULT - TIME BILLING
Pain Management
coordinating care among multiple team members
Unable to offer, due to mother's clinical indication

## 2022-02-01 ENCOUNTER — FORM ENCOUNTER (OUTPATIENT)
Age: 62
End: 2022-02-01

## 2022-02-02 ENCOUNTER — APPOINTMENT (OUTPATIENT)
Dept: ELECTROPHYSIOLOGY | Facility: CLINIC | Age: 62
End: 2022-02-02
Payer: MEDICAID

## 2022-02-02 ENCOUNTER — NON-APPOINTMENT (OUTPATIENT)
Age: 62
End: 2022-02-02

## 2022-02-02 PROCEDURE — 93298 REM INTERROG DEV EVAL SCRMS: CPT

## 2022-02-02 PROCEDURE — G2066: CPT

## 2022-03-09 ENCOUNTER — NON-APPOINTMENT (OUTPATIENT)
Age: 62
End: 2022-03-09

## 2022-03-09 ENCOUNTER — APPOINTMENT (OUTPATIENT)
Dept: ELECTROPHYSIOLOGY | Facility: CLINIC | Age: 62
End: 2022-03-09
Payer: MEDICAID

## 2022-03-09 PROCEDURE — 93298 REM INTERROG DEV EVAL SCRMS: CPT

## 2022-03-09 PROCEDURE — G2066: CPT

## 2022-04-13 ENCOUNTER — APPOINTMENT (OUTPATIENT)
Dept: ELECTROPHYSIOLOGY | Facility: CLINIC | Age: 62
End: 2022-04-13
Payer: MEDICAID

## 2022-04-13 ENCOUNTER — NON-APPOINTMENT (OUTPATIENT)
Age: 62
End: 2022-04-13

## 2022-04-13 PROCEDURE — G2066: CPT

## 2022-04-13 PROCEDURE — 93298 REM INTERROG DEV EVAL SCRMS: CPT

## 2022-05-16 NOTE — HISTORY OF PRESENT ILLNESS
[FreeTextEntry1] : 61 year old gentleman with history of PE 8/2020 (on Eliquis), COPD, tobacco use (quit 6 mo ago) presenting for for follow up of atrial flutter. \par \par To summarize his history, he was seen in 1/2021 and noted chest pain and exertional dyspnea. ECG at that time reveal atrial flutter with controlled rates. He had been on anticoagulation with Eliquis from his prior PE. Subsequent TTE revealed preserved LV function, and stress test revealed normal perfusion. On followup in 3/2021, he remained in atrial flutter, with more rapid rates. He was started on Cardizem 120mg qd. \par \par He was evaluated by dr. Polanco 4/15/21 during which ablation for atrial flutter was planned. Prior to procedure being completed he presented to [facility] on 5/15 with aflutter with RVR along with acute hypoxic respiratory failure due to large left pleural effusion and pulmonary edema, found to have right segmental PE and left peroneal DVT\par s/p Left chest tube was placed (05/16), he required ventilatory support which was eventually weaned off (s/p intubation and tracheostomy now d/c'd), and had extended hospitalization. He was cardioverted during admission but was not followed by our group. He then spent several weeks in rehab.\par \par Most recently he presented to ER 8/2/21 after being found on the floor per family. He reports he was feeling weak and could not stand up but did not lose consciousness. Ultimately it was suspected this was related due to medication overdose (per [facility] picked up multiple prescriptions for alprazolam 1mg TID and diazepam 5mg BID) for which pscy and sw were consulted.\par \par Today, he is maintained in SR although he has had intermittent palpitations, feels heart racing at times. Still recovering from prolonged hospitalization, no longer requring 02 therapy. \par \par \par

## 2022-05-16 NOTE — REVIEW OF SYSTEMS
[Feeling Fatigued] : feeling fatigued [Palpitations] : palpitations [Headache] : no headache [SOB] : no shortness of breath [Dyspnea on exertion] : not dyspnea during exertion [Syncope] : no syncope

## 2022-05-16 NOTE — DISCUSSION/SUMMARY
[FreeTextEntry1] : 61 year old gentleman with history of PE 8/2020 (on Eliquis), COPD, tobacco use (quit 6 mo ago) presenting for for follow up of atrial flutter. \par \par To summarize his history, he was seen in 1/2021 and noted chest pain and exertional dyspnea. ECG at that time reveal atrial flutter with controlled rates. He had been on anticoagulation with Eliquis from his prior PE. Subsequent TTE revealed preserved LV function, and stress test revealed normal perfusion. On followup in 3/2021, he remained in atrial flutter, with more rapid rates. He was started on Cardizem 120mg qd. \par \par He was evaluated by dr. Polanco 4/15/21 during which ablation for atrial flutter was planned. Prior to procedure being completed he presented to [facility] on 5/15 with aflutter with RVR along with acute hypoxic respiratory failure due to large left pleural effusion and pulmonary edema, found to have right segmental PE and left peroneal DVT\par s/p Left chest tube was placed (05/16), he required ventilatory support which was eventually weaned off (s/p intubation and tracheostomy now d/c'd), and had extended hospitalization. He was cardioverted during admission but was not followed by our group. He then spent several weeks in rehab.\par \par Most recently he presented to ER 8/2/21 after being found on the floor per family. He reports he was feeling weak and could not stand up but did not lose consciousness. Ultimately it was suspected this was related due to medication overdose (per [facility] picked up multiple prescriptions for alprazolam 1mg TID and diazepam 5mg BID) for which pscy and sw were consulted.\par \par Today, he is maintained in SR although he has had intermittent palpitations, feels heart racing at times. Still recovering from prolonged hospitalization, no longer requring 02 therapy. \par \par Recommendation: \par \par -As discussed in past recommend proceeding with ablation for atrial flutter. Patient remains agreeable. We discussed that monitoring for other atrial arrhythmias would be beneficial. ILR implant post ablation was discussed for atrial arrhythmia surveillance to guide further management and he wishes to move forward.\par  -JADA prior to the procedure. F/u after above. \par \par Tanisha Braun ANP-C

## 2022-05-16 NOTE — DISCUSSION/SUMMARY
[FreeTextEntry1] : 61 year old gentleman with history of PE 8/2020 (on Eliquis), COPD, tobacco use (quit 6 mo ago) presenting for for follow up of atrial flutter. \par \par To summarize his history, he was seen in 1/2021 and noted chest pain and exertional dyspnea. ECG at that time reveal atrial flutter with controlled rates. He had been on anticoagulation with Eliquis from his prior PE. Subsequent TTE revealed preserved LV function, and stress test revealed normal perfusion. On followup in 3/2021, he remained in atrial flutter, with more rapid rates. He was started on Cardizem 120mg qd. \par \par He was evaluated by dr. Polanco 4/15/21 during which ablation for atrial flutter was planned. Prior to procedure being completed he presented to [facility] on 5/15 with aflutter with RVR along with acute hypoxic respiratory failure due to large left pleural effusion and pulmonary edema, found to have right segmental PE and left peroneal DVT\par s/p Left chest tube was placed (05/16), he required ventilatory support which was eventually weaned off (s/p intubation and tracheostomy now d/c'd), and had extended hospitalization. He was cardioverted during admission but was not followed by our group. He then spent several weeks in rehab.\par \par Most recently he presented to ER 8/2/21 after being found on the floor per family. He reports he was feeling weak and could not stand up but did not lose consciousness. Ultimately it was suspected this was related due to medication overdose (per [facility] picked up multiple prescriptions for alprazolam 1mg TID and diazepam 5mg BID) for which pscy and sw were consulted.\par \par Today, he is maintained in SR although he has had intermittent palpitations, feels heart racing at times. Still recovering from prolonged hospitalization, no longer requring 02 therapy. \par \par Recommendation: \par \par -As discussed in past recommend proceeding with ablation for atrial flutter. Patient remains agreeable. We discussed that monitoring for other atrial arrhythmias would be beneficial. ILR implant post ablation was discussed for atrial arrhythmia surveillance to guide further management and he wishes to move forward.\par  -JADA prior to the procedure. F/u after above. \par \par Tanisha Braun ANP-C

## 2022-05-16 NOTE — HISTORY OF PRESENT ILLNESS
[FreeTextEntry1] : 61 year old gentleman with history of PE 8/2020 (on Eliquis), COPD, tobacco use (quit 6 mo ago) presenting for for follow up of atrial flutter. \par \par To summarize his history, he was seen in 1/2021 and noted chest pain and exertional dyspnea. ECG at that time reveal atrial flutter with controlled rates. He had been on anticoagulation with Eliquis from his prior PE. Subsequent TTE revealed preserved LV function, and stress test revealed normal perfusion. On followup in 3/2021, he remained in atrial flutter, with more rapid rates. He was started on Cardizem 120mg qd. \par \par He was evaluated by dr. Polanco 4/15/21 during which ablation for atrial flutter was planned. Prior to procedure being completed he presented to [facility] on 5/15 with aflutter with RVR along with acute hypoxic respiratory failure due to large left pleural effusion and pulmonary edema, found to have right segmental PE and left peroneal DVT\par s/p Left chest tube was placed (05/16), he required ventilatory support which was eventually weaned off (s/p intubation and tracheostomy now d/c'd), and had extended hospitalization. He was cardioverted during admission but was not followed by our group. He then spent several weeks in rehab.\par \par Most recently he presented to ER 8/2/21 after being found on the floor per family. He reports he was feeling weak and could not stand up but did not lose consciousness. Ultimately it was suspected this was related due to medication overdose (per [facility] picked up multiple prescriptions for alprazolam 1mg TID and diazepam 5mg BID) for which pscy and sw were consulted.\par \par Today, he is maintained in SR although he has had intermittent palpitations, feels heart racing at times. Still recovering from prolonged hospitalization, no longer requring 02 therapy. \par \par \par

## 2022-05-18 ENCOUNTER — NON-APPOINTMENT (OUTPATIENT)
Age: 62
End: 2022-05-18

## 2022-05-18 ENCOUNTER — APPOINTMENT (OUTPATIENT)
Dept: ELECTROPHYSIOLOGY | Facility: CLINIC | Age: 62
End: 2022-05-18
Payer: MEDICARE

## 2022-05-18 PROCEDURE — G2066: CPT

## 2022-05-18 PROCEDURE — 93298 REM INTERROG DEV EVAL SCRMS: CPT

## 2022-05-19 ENCOUNTER — APPOINTMENT (OUTPATIENT)
Dept: ELECTROPHYSIOLOGY | Facility: CLINIC | Age: 62
End: 2022-05-19
Payer: MEDICARE

## 2022-05-19 VITALS
BODY MASS INDEX: 21.81 KG/M2 | DIASTOLIC BLOOD PRESSURE: 82 MMHG | TEMPERATURE: 97.7 F | HEART RATE: 74 BPM | OXYGEN SATURATION: 94 % | SYSTOLIC BLOOD PRESSURE: 126 MMHG | WEIGHT: 161 LBS | HEIGHT: 72 IN

## 2022-05-19 PROCEDURE — 93000 ELECTROCARDIOGRAM COMPLETE: CPT

## 2022-05-19 PROCEDURE — 99215 OFFICE O/P EST HI 40 MIN: CPT

## 2022-05-19 RX ORDER — MULTIVITAMIN
TABLET ORAL DAILY
Qty: 30 | Refills: 0 | Status: ACTIVE | COMMUNITY
Start: 2022-05-19 | End: 1900-01-01

## 2022-05-19 RX ORDER — BUPRENORPHINE AND NALOXONE 8; 2 MG/1; MG/1
8-2 TABLET SUBLINGUAL DAILY
Refills: 0 | Status: DISCONTINUED | COMMUNITY
End: 2022-05-19

## 2022-05-19 NOTE — DISCUSSION/SUMMARY
[FreeTextEntry1] : 62 year old gentleman with history of PE 8/2020 (on Eliquis), COPD, tobacco use, and atrial flutter s/p ablation 9/13/21. He has remained in sinus rhythm since his flutter ablation, with no recurrence of AF or AT. Due to his elevated thromboembolic risk including severe PE in the past, he will remain on anticoagulation for now. If he has significant bleeding risk in the future, could reconsider this. Otherwise he is off rate control medication.  \par \par He has evidence of malnutrition and weight loss, which I suspect is in large part related to depression. I prescribed a multivitamin. He will follow-up with his PMD as well.  \par \par -continue ILR monitoring \par \par -continue Eliquis (no need for concomitant asa).  \par \par -start daily MVI.  \par \par -EP followup in 1 yr or prn

## 2022-05-19 NOTE — REVIEW OF SYSTEMS
[Fever] : no fever [Chills] : no chills [Weight Loss (___ Lbs)] : [unfilled] ~Ulb weight loss [SOB] : no shortness of breath [Dyspnea on exertion] : not dyspnea during exertion [Chest Discomfort] : no chest discomfort [Lower Ext Edema] : no extremity edema [Leg Claudication] : no intermittent leg claudication [Palpitations] : no palpitations [Syncope] : no syncope [Dizziness] : no dizziness [Convulsions] : no convulsions [Depression] : depression [Negative] : Heme/Lymph

## 2022-05-19 NOTE — PHYSICAL EXAM
[General Appearance - Well Developed] : well developed [Normal Appearance] : normal appearance [Well Groomed] : well groomed [General Appearance - Well Nourished] : well nourished [No Deformities] : no deformities [General Appearance - In No Acute Distress] : no acute distress [Normal Conjunctiva] : the conjunctiva exhibited no abnormalities [Eyelids - No Xanthelasma] : the eyelids demonstrated no xanthelasmas [Normal Oral Mucosa] : normal oral mucosa [No Oral Pallor] : no oral pallor [No Oral Cyanosis] : no oral cyanosis [Normal Jugular Venous A Waves Present] : normal jugular venous A waves present [Normal Jugular Venous V Waves Present] : normal jugular venous V waves present [No Jugular Venous Sarah A Waves] : no jugular venous sarah A waves [Heart Rate And Rhythm] : heart rate and rhythm were normal [Heart Sounds] : normal S1 and S2 [Murmurs] : no murmurs present [Edema] : no peripheral edema present [Respiration, Rhythm And Depth] : normal respiratory rhythm and effort [Abdomen Soft] : soft [Abdomen Tenderness] : non-tender [] : no hepato-splenomegaly [Abdomen Mass (___ Cm)] : no abdominal mass palpated [Abnormal Walk] : normal gait [Gait - Sufficient For Exercise Testing] : the gait was sufficient for exercise testing [Nail Clubbing] : no clubbing of the fingernails [Cyanosis, Localized] : no localized cyanosis [Skin Color & Pigmentation] : normal skin color and pigmentation [No Skin Ulcers] : no skin ulcer [No Xanthoma] : no  xanthoma was observed [Oriented To Time, Place, And Person] : oriented to person, place, and time [Impaired Insight] : insight and judgment were intact [Affect] : the affect was normal [Mood] : the mood was normal

## 2022-05-19 NOTE — HISTORY OF PRESENT ILLNESS
[FreeTextEntry1] : 62 year old gentleman with history of PE 8/2020 (on Eliquis), COPD, tobacco use, and atrial flutter s/p ablation 9/13/21.  \par \par He was seen in 1/2021 and noted chest pain and exertional dyspnea, and found at that time to be in  atrial flutter with controlled rates. He had been on anticoagulation with Eliquis from his prior PE. Subsequent TTE revealed preserved LV function, and stress test revealed normal perfusion. On follow-up in 3/2021, he remained in atrial flutter, with more rapid rates. He then presented to [facility] on 5/15 with aflutter with RVR along with acute hypoxic respiratory failure due to large left pleural effusion and pulmonary edema, found to have right segmental PE and left peroneal DVT s/p Left chest tube was placed (05/16), he required ventilatory support which was eventually weaned off (s/p intubation and tracheostomy now d/c'd), and had extended hospitalization.  In 8/2021 he was found on the floor with suspected syncope, which was ultimately thought to be related to medication overdose. \par \par On 9/13/21 he underwent ablation of atrial flutter, with CTI ablation. He was in sinus rhythm on presentation. He was continued on Eliquis after the ablation. \par \par An ILR was implanted for further monitoring, and no further arrhythmias have been recorded.  \par \par He is feeling generally well, but has been sluggish and depressed since his wife passed away, and has noted continued weight loss.  \par \par He is tolerating anticoagulation well without bleeding. He had been on diltiazem but this has been stopped.  \par \par ECG today reveals sinus rhythm 74 bpm with narrow QRS and no arrhythmia event. Interrogation of his ILR reveals no recurrent AT/AF, and 0.6% PVC burden.

## 2022-06-06 ENCOUNTER — APPOINTMENT (OUTPATIENT)
Dept: PULMONOLOGY | Facility: CLINIC | Age: 62
End: 2022-06-06
Payer: MEDICARE

## 2022-06-06 VITALS
HEART RATE: 82 BPM | WEIGHT: 164 LBS | BODY MASS INDEX: 22.21 KG/M2 | HEIGHT: 72 IN | OXYGEN SATURATION: 96 % | RESPIRATION RATE: 16 BRPM | DIASTOLIC BLOOD PRESSURE: 72 MMHG | SYSTOLIC BLOOD PRESSURE: 130 MMHG

## 2022-06-06 DIAGNOSIS — R79.89 OTHER SPECIFIED ABNORMAL FINDINGS OF BLOOD CHEMISTRY: ICD-10-CM

## 2022-06-06 DIAGNOSIS — Z86.79 PERSONAL HISTORY OF OTHER DISEASES OF THE CIRCULATORY SYSTEM: ICD-10-CM

## 2022-06-06 PROCEDURE — 99406 BEHAV CHNG SMOKING 3-10 MIN: CPT

## 2022-06-06 PROCEDURE — 99214 OFFICE O/P EST MOD 30 MIN: CPT | Mod: 25

## 2022-06-22 ENCOUNTER — NON-APPOINTMENT (OUTPATIENT)
Age: 62
End: 2022-06-22

## 2022-06-22 ENCOUNTER — APPOINTMENT (OUTPATIENT)
Dept: ELECTROPHYSIOLOGY | Facility: CLINIC | Age: 62
End: 2022-06-22
Payer: MEDICARE

## 2022-06-22 PROCEDURE — G2066: CPT

## 2022-06-22 PROCEDURE — 93298 REM INTERROG DEV EVAL SCRMS: CPT

## 2022-07-27 ENCOUNTER — APPOINTMENT (OUTPATIENT)
Dept: ELECTROPHYSIOLOGY | Facility: CLINIC | Age: 62
End: 2022-07-27

## 2022-07-27 ENCOUNTER — NON-APPOINTMENT (OUTPATIENT)
Age: 62
End: 2022-07-27

## 2022-07-27 PROCEDURE — 93298 REM INTERROG DEV EVAL SCRMS: CPT

## 2022-07-27 PROCEDURE — G2066: CPT

## 2022-07-29 ENCOUNTER — NON-APPOINTMENT (OUTPATIENT)
Age: 62
End: 2022-07-29

## 2022-08-02 ENCOUNTER — INPATIENT (INPATIENT)
Facility: HOSPITAL | Age: 62
LOS: 0 days | Discharge: LEFT BEFORE DISCHARGE | DRG: 308 | End: 2022-08-03
Attending: HOSPITALIST
Payer: MEDICARE

## 2022-08-02 VITALS
SYSTOLIC BLOOD PRESSURE: 106 MMHG | HEIGHT: 72 IN | RESPIRATION RATE: 22 BRPM | WEIGHT: 151.02 LBS | DIASTOLIC BLOOD PRESSURE: 76 MMHG | OXYGEN SATURATION: 96 % | TEMPERATURE: 98 F | HEART RATE: 154 BPM

## 2022-08-02 DIAGNOSIS — R09.89 OTHER SPECIFIED SYMPTOMS AND SIGNS INVOLVING THE CIRCULATORY AND RESPIRATORY SYSTEMS: ICD-10-CM

## 2022-08-02 DIAGNOSIS — Z98.890 OTHER SPECIFIED POSTPROCEDURAL STATES: Chronic | ICD-10-CM

## 2022-08-02 DIAGNOSIS — Z90.49 ACQUIRED ABSENCE OF OTHER SPECIFIED PARTS OF DIGESTIVE TRACT: Chronic | ICD-10-CM

## 2022-08-02 DIAGNOSIS — I48.91 UNSPECIFIED ATRIAL FIBRILLATION: ICD-10-CM

## 2022-08-02 LAB
ALBUMIN SERPL ELPH-MCNC: 3.1 G/DL — LOW (ref 3.3–5.2)
ALP SERPL-CCNC: 80 U/L — SIGNIFICANT CHANGE UP (ref 40–120)
ALT FLD-CCNC: 33 U/L — SIGNIFICANT CHANGE UP
ANION GAP SERPL CALC-SCNC: 14 MMOL/L — SIGNIFICANT CHANGE UP (ref 5–17)
APTT BLD: 33.4 SEC — SIGNIFICANT CHANGE UP (ref 27.5–35.5)
APTT BLD: 46.4 SEC — HIGH (ref 27.5–35.5)
AST SERPL-CCNC: 40 U/L — HIGH
BASOPHILS # BLD AUTO: 0.08 K/UL — SIGNIFICANT CHANGE UP (ref 0–0.2)
BASOPHILS NFR BLD AUTO: 0.9 % — SIGNIFICANT CHANGE UP (ref 0–2)
BILIRUB SERPL-MCNC: 1 MG/DL — SIGNIFICANT CHANGE UP (ref 0.4–2)
BUN SERPL-MCNC: 21.8 MG/DL — HIGH (ref 8–20)
CALCIUM SERPL-MCNC: 8.8 MG/DL — SIGNIFICANT CHANGE UP (ref 8.4–10.5)
CHLORIDE SERPL-SCNC: 96 MMOL/L — LOW (ref 98–107)
CO2 SERPL-SCNC: 26 MMOL/L — SIGNIFICANT CHANGE UP (ref 22–29)
CREAT SERPL-MCNC: 0.85 MG/DL — SIGNIFICANT CHANGE UP (ref 0.5–1.3)
EGFR: 99 ML/MIN/1.73M2 — SIGNIFICANT CHANGE UP
EOSINOPHIL # BLD AUTO: 0.31 K/UL — SIGNIFICANT CHANGE UP (ref 0–0.5)
EOSINOPHIL NFR BLD AUTO: 3.5 % — SIGNIFICANT CHANGE UP (ref 0–6)
GIANT PLATELETS BLD QL SMEAR: PRESENT — SIGNIFICANT CHANGE UP
GLUCOSE SERPL-MCNC: 115 MG/DL — HIGH (ref 70–99)
HCT VFR BLD CALC: 38.4 % — LOW (ref 39–50)
HCT VFR BLD CALC: 43.5 % — SIGNIFICANT CHANGE UP (ref 39–50)
HGB BLD-MCNC: 13.3 G/DL — SIGNIFICANT CHANGE UP (ref 13–17)
HGB BLD-MCNC: 15.2 G/DL — SIGNIFICANT CHANGE UP (ref 13–17)
INR BLD: 1.34 RATIO — HIGH (ref 0.88–1.16)
LYMPHOCYTES # BLD AUTO: 2.55 K/UL — SIGNIFICANT CHANGE UP (ref 1–3.3)
LYMPHOCYTES # BLD AUTO: 29 % — SIGNIFICANT CHANGE UP (ref 13–44)
MAGNESIUM SERPL-MCNC: 1.9 MG/DL — SIGNIFICANT CHANGE UP (ref 1.6–2.6)
MANUAL SMEAR VERIFICATION: SIGNIFICANT CHANGE UP
MCHC RBC-ENTMCNC: 34 PG — SIGNIFICANT CHANGE UP (ref 27–34)
MCHC RBC-ENTMCNC: 34.1 PG — HIGH (ref 27–34)
MCHC RBC-ENTMCNC: 34.6 GM/DL — SIGNIFICANT CHANGE UP (ref 32–36)
MCHC RBC-ENTMCNC: 34.9 GM/DL — SIGNIFICANT CHANGE UP (ref 32–36)
MCV RBC AUTO: 97.3 FL — SIGNIFICANT CHANGE UP (ref 80–100)
MCV RBC AUTO: 98.5 FL — SIGNIFICANT CHANGE UP (ref 80–100)
MONOCYTES # BLD AUTO: 0.54 K/UL — SIGNIFICANT CHANGE UP (ref 0–0.9)
MONOCYTES NFR BLD AUTO: 6.1 % — SIGNIFICANT CHANGE UP (ref 2–14)
NEUTROPHILS # BLD AUTO: 5.01 K/UL — SIGNIFICANT CHANGE UP (ref 1.8–7.4)
NEUTROPHILS NFR BLD AUTO: 56.1 % — SIGNIFICANT CHANGE UP (ref 43–77)
NEUTS BAND # BLD: 0.9 % — SIGNIFICANT CHANGE UP (ref 0–8)
NT-PROBNP SERPL-SCNC: 1267 PG/ML — HIGH (ref 0–300)
PLAT MORPH BLD: NORMAL — SIGNIFICANT CHANGE UP
PLATELET # BLD AUTO: 182 K/UL — SIGNIFICANT CHANGE UP (ref 150–400)
PLATELET # BLD AUTO: 237 K/UL — SIGNIFICANT CHANGE UP (ref 150–400)
PLATELET CLUMP BLD QL SMEAR: SLIGHT
POLYCHROMASIA BLD QL SMEAR: SLIGHT — SIGNIFICANT CHANGE UP
POTASSIUM SERPL-MCNC: 3.5 MMOL/L — SIGNIFICANT CHANGE UP (ref 3.5–5.3)
POTASSIUM SERPL-SCNC: 3.5 MMOL/L — SIGNIFICANT CHANGE UP (ref 3.5–5.3)
PROT SERPL-MCNC: 7 G/DL — SIGNIFICANT CHANGE UP (ref 6.6–8.7)
PROTHROM AB SERPL-ACNC: 15.6 SEC — HIGH (ref 10.5–13.4)
RAPID RVP RESULT: SIGNIFICANT CHANGE UP
RBC # BLD: 3.9 M/UL — LOW (ref 4.2–5.8)
RBC # BLD: 4.47 M/UL — SIGNIFICANT CHANGE UP (ref 4.2–5.8)
RBC # FLD: 12.2 % — SIGNIFICANT CHANGE UP (ref 10.3–14.5)
RBC # FLD: 12.4 % — SIGNIFICANT CHANGE UP (ref 10.3–14.5)
RBC BLD AUTO: ABNORMAL
SARS-COV-2 RNA SPEC QL NAA+PROBE: SIGNIFICANT CHANGE UP
SMUDGE CELLS # BLD: PRESENT — SIGNIFICANT CHANGE UP
SODIUM SERPL-SCNC: 136 MMOL/L — SIGNIFICANT CHANGE UP (ref 135–145)
TROPONIN T SERPL-MCNC: <0.01 NG/ML — SIGNIFICANT CHANGE UP (ref 0–0.06)
VARIANT LYMPHS # BLD: 3.5 % — SIGNIFICANT CHANGE UP (ref 0–6)
WBC # BLD: 6.73 K/UL — SIGNIFICANT CHANGE UP (ref 3.8–10.5)
WBC # BLD: 8.79 K/UL — SIGNIFICANT CHANGE UP (ref 3.8–10.5)
WBC # FLD AUTO: 6.73 K/UL — SIGNIFICANT CHANGE UP (ref 3.8–10.5)
WBC # FLD AUTO: 8.79 K/UL — SIGNIFICANT CHANGE UP (ref 3.8–10.5)

## 2022-08-02 PROCEDURE — 99221 1ST HOSP IP/OBS SF/LOW 40: CPT

## 2022-08-02 PROCEDURE — 99223 1ST HOSP IP/OBS HIGH 75: CPT

## 2022-08-02 PROCEDURE — 93010 ELECTROCARDIOGRAM REPORT: CPT | Mod: 76

## 2022-08-02 PROCEDURE — 99291 CRITICAL CARE FIRST HOUR: CPT

## 2022-08-02 PROCEDURE — 71275 CT ANGIOGRAPHY CHEST: CPT | Mod: 26

## 2022-08-02 PROCEDURE — 71045 X-RAY EXAM CHEST 1 VIEW: CPT | Mod: 26

## 2022-08-02 RX ORDER — HEPARIN SODIUM 5000 [USP'U]/ML
INJECTION INTRAVENOUS; SUBCUTANEOUS
Qty: 25000 | Refills: 0 | Status: DISCONTINUED | OUTPATIENT
Start: 2022-08-02 | End: 2022-08-03

## 2022-08-02 RX ORDER — HEPARIN SODIUM 5000 [USP'U]/ML
5500 INJECTION INTRAVENOUS; SUBCUTANEOUS ONCE
Refills: 0 | Status: DISCONTINUED | OUTPATIENT
Start: 2022-08-02 | End: 2022-08-02

## 2022-08-02 RX ORDER — BUPRENORPHINE AND NALOXONE 2; .5 MG/1; MG/1
1 TABLET SUBLINGUAL
Refills: 0 | Status: DISCONTINUED | OUTPATIENT
Start: 2022-08-02 | End: 2022-08-03

## 2022-08-02 RX ORDER — METOPROLOL TARTRATE 50 MG
5 TABLET ORAL ONCE
Refills: 0 | Status: DISCONTINUED | OUTPATIENT
Start: 2022-08-02 | End: 2022-08-02

## 2022-08-02 RX ORDER — METOPROLOL TARTRATE 50 MG
5 TABLET ORAL ONCE
Refills: 0 | Status: COMPLETED | OUTPATIENT
Start: 2022-08-02 | End: 2022-08-02

## 2022-08-02 RX ORDER — HEPARIN SODIUM 5000 [USP'U]/ML
INJECTION INTRAVENOUS; SUBCUTANEOUS
Qty: 25000 | Refills: 0 | Status: DISCONTINUED | OUTPATIENT
Start: 2022-08-02 | End: 2022-08-02

## 2022-08-02 RX ORDER — ONDANSETRON 8 MG/1
4 TABLET, FILM COATED ORAL EVERY 8 HOURS
Refills: 0 | Status: DISCONTINUED | OUTPATIENT
Start: 2022-08-02 | End: 2022-08-03

## 2022-08-02 RX ORDER — HEPARIN SODIUM 5000 [USP'U]/ML
3000 INJECTION INTRAVENOUS; SUBCUTANEOUS EVERY 6 HOURS
Refills: 0 | Status: DISCONTINUED | OUTPATIENT
Start: 2022-08-02 | End: 2022-08-03

## 2022-08-02 RX ORDER — SODIUM CHLORIDE 9 MG/ML
1000 INJECTION INTRAMUSCULAR; INTRAVENOUS; SUBCUTANEOUS ONCE
Refills: 0 | Status: COMPLETED | OUTPATIENT
Start: 2022-08-02 | End: 2022-08-02

## 2022-08-02 RX ORDER — HEPARIN SODIUM 5000 [USP'U]/ML
2500 INJECTION INTRAVENOUS; SUBCUTANEOUS EVERY 6 HOURS
Refills: 0 | Status: DISCONTINUED | OUTPATIENT
Start: 2022-08-02 | End: 2022-08-02

## 2022-08-02 RX ORDER — ENOXAPARIN SODIUM 100 MG/ML
70 INJECTION SUBCUTANEOUS ONCE
Refills: 0 | Status: COMPLETED | OUTPATIENT
Start: 2022-08-02 | End: 2022-08-02

## 2022-08-02 RX ORDER — ALPRAZOLAM 0.25 MG
2 TABLET ORAL
Refills: 0 | Status: DISCONTINUED | OUTPATIENT
Start: 2022-08-02 | End: 2022-08-03

## 2022-08-02 RX ORDER — HEPARIN SODIUM 5000 [USP'U]/ML
6000 INJECTION INTRAVENOUS; SUBCUTANEOUS ONCE
Refills: 0 | Status: COMPLETED | OUTPATIENT
Start: 2022-08-02 | End: 2022-08-02

## 2022-08-02 RX ORDER — BUPRENORPHINE AND NALOXONE 2; .5 MG/1; MG/1
3 TABLET SUBLINGUAL DAILY
Refills: 0 | Status: DISCONTINUED | OUTPATIENT
Start: 2022-08-02 | End: 2022-08-02

## 2022-08-02 RX ORDER — HEPARIN SODIUM 5000 [USP'U]/ML
6000 INJECTION INTRAVENOUS; SUBCUTANEOUS EVERY 6 HOURS
Refills: 0 | Status: DISCONTINUED | OUTPATIENT
Start: 2022-08-02 | End: 2022-08-03

## 2022-08-02 RX ORDER — LANOLIN ALCOHOL/MO/W.PET/CERES
3 CREAM (GRAM) TOPICAL AT BEDTIME
Refills: 0 | Status: DISCONTINUED | OUTPATIENT
Start: 2022-08-02 | End: 2022-08-03

## 2022-08-02 RX ORDER — ACETAMINOPHEN 500 MG
650 TABLET ORAL EVERY 6 HOURS
Refills: 0 | Status: DISCONTINUED | OUTPATIENT
Start: 2022-08-02 | End: 2022-08-03

## 2022-08-02 RX ORDER — HEPARIN SODIUM 5000 [USP'U]/ML
5500 INJECTION INTRAVENOUS; SUBCUTANEOUS EVERY 6 HOURS
Refills: 0 | Status: DISCONTINUED | OUTPATIENT
Start: 2022-08-02 | End: 2022-08-02

## 2022-08-02 RX ADMIN — Medication 2 MILLIGRAM(S): at 18:05

## 2022-08-02 RX ADMIN — SODIUM CHLORIDE 1000 MILLILITER(S): 9 INJECTION INTRAMUSCULAR; INTRAVENOUS; SUBCUTANEOUS at 11:30

## 2022-08-02 RX ADMIN — Medication 5 MILLIGRAM(S): at 13:04

## 2022-08-02 RX ADMIN — SODIUM CHLORIDE 1000 MILLILITER(S): 9 INJECTION INTRAMUSCULAR; INTRAVENOUS; SUBCUTANEOUS at 13:56

## 2022-08-02 RX ADMIN — SODIUM CHLORIDE 1000 MILLILITER(S): 9 INJECTION INTRAMUSCULAR; INTRAVENOUS; SUBCUTANEOUS at 13:00

## 2022-08-02 RX ADMIN — HEPARIN SODIUM 3000 UNIT(S): 5000 INJECTION INTRAVENOUS; SUBCUTANEOUS at 23:18

## 2022-08-02 RX ADMIN — HEPARIN SODIUM 1300 UNIT(S)/HR: 5000 INJECTION INTRAVENOUS; SUBCUTANEOUS at 16:18

## 2022-08-02 RX ADMIN — Medication 5 MILLIGRAM(S): at 10:30

## 2022-08-02 RX ADMIN — HEPARIN SODIUM 6000 UNIT(S): 5000 INJECTION INTRAVENOUS; SUBCUTANEOUS at 16:19

## 2022-08-02 RX ADMIN — HEPARIN SODIUM 1500 UNIT(S)/HR: 5000 INJECTION INTRAVENOUS; SUBCUTANEOUS at 23:11

## 2022-08-02 RX ADMIN — BUPRENORPHINE AND NALOXONE 1 TABLET(S): 2; .5 TABLET SUBLINGUAL at 21:47

## 2022-08-02 RX ADMIN — SODIUM CHLORIDE 1000 MILLILITER(S): 9 INJECTION INTRAMUSCULAR; INTRAVENOUS; SUBCUTANEOUS at 10:25

## 2022-08-02 RX ADMIN — Medication 5 MILLIGRAM(S): at 10:50

## 2022-08-02 NOTE — ED ADULT NURSE REASSESSMENT NOTE - NS ED NURSE REASSESS COMMENT FT1
Pt is alert and oriented. Pt states that he has some sob. Pt O2 is 96%. Pt states that he has pain on the left side of his chest. Pt denies radiation of pain.  notified. Pt denies  nausea, vomiting, and dizziness. Pt resp are even and unlabored, skin color morgan for race. Pt updated on plan of care. Pt resting on cm.

## 2022-08-02 NOTE — CONSULT NOTE ADULT - NS PANP COMMENT GEN_ALL_CORE FT
Patient seen and examined in the ED.  Currently normotensive, heart rate fluctuates between 110s to 150s.  Recommend rate control and anticoagulation as noted above, please call us if his rates are difficult to control or he becomes hypotensive.
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
## 2022-08-02 NOTE — ED ADULT NURSE REASSESSMENT NOTE - NS ED NURSE REASSESS COMMENT FT1
Late entry : Pt noticed to convert to NSR - orders received to start pt on Heparin gtt. Dr. Hernandez called to confirmed medication . MD states she wants to start medication regardless Orders carry as per MD orders. Pt started on Heparin gtt @ 13 ml / hr. Will continue to monitor

## 2022-08-02 NOTE — ED ADULT NURSE NOTE - OBJECTIVE STATEMENT
BIBEMS after loop recorder triggered 911 call for rapid a-fib. patient denies any CP or palpitations, states he "just feels weak", Hx of COPD not on home O2

## 2022-08-02 NOTE — H&P ADULT - PSYCHIATRIC
Telephone Encounter by Windy Olson DO at 04/25/18 12:36 PM     Author:  Windy Olson DO Service:  (none) Author Type:  Physician     Filed:  04/25/18 12:36 PM Encounter Date:  4/25/2018 Status:  Signed     :  Windy Olson DO (Physician)            Please notify patient that stress test did not show any ischemia.  Follow-up with ordering physician as planned.  Electronically Signed by:    Windy Olson DO , 4/25/2018[FH1.1T]          Revision History        User Key Date/Time User Provider Type Action    > FH1.1 04/25/18 12:36 PM Windy Olson DO Physician Sign    T - Template             details… flat affect

## 2022-08-02 NOTE — H&P ADULT - MUSCULOSKELETAL
details… no joint swelling/strength 5/5 bilateral upper extremities/strength 5/5 bilateral lower extremities

## 2022-08-02 NOTE — H&P ADULT - ASSESSMENT
62 yr old male with atrial flutter s/p cardioversion chronic pain on Suboxone, anxiety, depression, BPH, DVT/PE, COPD BIBEMS for rapid atrial fibrillation. Patient has not been on any medications, except for Suboxone for few months. Labs and imaging noted. Evaluated by cardiology and ICU in ED, as he was noted to be in rapid a fib.    1. Rapid atrial fibrillation:  Spontaneously converted to NSR in ED  s/p EP and ICU eval  admit to telemetry  continue heparin gtt  CTA chest pending  will check LE dopplers  ECHO ordered  he was noted to be in NSR with normal BP during my eval  will hold beta blocker for now.    2. Anxiety/depression:  reviewed istop, on Xanax BID, will continue.  no SI/HI  outpatient psych follow up.    3. Chronic back pain:  On Suboxone  will continue home dose  verified on istop    4. H/o DVT/PE:  CTA chest pending  full dose AC for now  US dopplers    Discussed with patient and ED.

## 2022-08-02 NOTE — ED PROVIDER NOTE - ATTENDING CONTRIBUTION TO CARE
I spent 35 minutes of critical care time with this patient. This does not include time spent on separately reported billable procedures.    I performed the initial face to face bedside interview with this patient regarding history of present illness, review of symptoms and relevant past medical, social and family history.  I completed an independent physical examination.  I was the initial provider who evaluated this patient. I have signed out the follow up of any pending tests (i.e. labs, radiological studies) to the resident.  I have communicated the patient’s plan of care and disposition with the resident.

## 2022-08-02 NOTE — ED PROVIDER NOTE - PROGRESS NOTE DETAILS
JK - BP now 105/71, HR 120s, satting 96% on RA. D-dimer elevated to 1000s, CTA PE protocol ordered. EP consult pending, MICU consulted for persistent a fib with rvr. Will continue to monitor. JK - vss, resting comfortably. EP consulted, recommended TTE and heparin drip; MICU consulted, recommended stepdown for further monitoring as patient does not meet ICU needs at this time. Ready and agreeable to admission to stepdown. CTA pending. Currently stable.

## 2022-08-02 NOTE — ED PROVIDER NOTE - PHYSICAL EXAMINATION
Constitutional: Tired appearing, awake, alert, oriented to person, place, and time/situation and in no apparent distress  ENMT: Airway patent nasal mucosa clear. Mouth with normal mucosa. Throat has no vesicles no oropharyngeal exudates and uvula is midline. No blood in the oropharynx  EYES: clear bilaterally, pupils equal, round and reactive to light  Cardiac: Tachycardic, irregular rhythm. Heart sounds S1, S2. No murmurs, rubs or gallops. Good capillary refill, 2+ pulses, no peripheral edema  Respiratory: Lungs CTAB, no use of accessory muscles, no crackles, satting 94% on RA in no distress  Gastrointestinal: Abdomen nondistended, non-tender, no rebound guarding or peritoneal signs  Genitourinary: No CVA tenderness, pelvis nontender, bladder nondistended  Musculoskeletal: Spine appears normal, range of motion is not limited, no muscle or joint tenderness  Neurological: Alert and oriented, no focal deficits, no motor or sensory deficits. CN 2-12 intact, PERRLA, EOMI, No FND, moving all extremities equally, sensation intact, No dysmetria, no ataxia, negative heel-shin, 5/5 strength   Skin: Skin normal color for race, warm, dry and intact, No evidence of rash Constitutional: Tired appearing, awake, alert, oriented to person, place, and time/situation and in no apparent distress  ENMT: Airway patent nasal mucosa clear. Mouth with normal mucosa. Throat has no vesicles no oropharyngeal exudates and uvula is midline. No blood in the oropharynx  EYES: clear bilaterally, pupils equal, round and reactive to light  Cardiac: Tachycardic, irregular rhythm. Heart sounds S1, S2. No murmurs, rubs or gallops. Good capillary refill, 2+ pulses, no peripheral edema  Respiratory: Lungs CTAB, no use of accessory muscles, no crackles, satting 94% on RA in no distress  Gastrointestinal: Abdomen nondistended, non-tender, no rebound guarding or peritoneal signs  Genitourinary: No CVA tenderness, pelvis nontender, bladder nondistended  Musculoskeletal: Spine appears normal, range of motion is not limited, no muscle or joint tenderness  Neurological: Alert and oriented, no focal deficits, no motor or sensory deficits. CN 2-12 intact, PERRLA, EOMI, No FND, moving all extremities equally, sensation intact, No dysmetria, no ataxia, negative heel-shin, 5/5 strength   Skin: Skin normal color for race, warm, dry and intact, No evidence of rash.

## 2022-08-02 NOTE — H&P ADULT - HISTORY OF PRESENT ILLNESS
62 yr old male with atrial flutter s/p cardioversion chronic pain on Suboxone, anxiety, depression, BPH, DVT/PE, COPD BIBEMS for rapid atrial fibrillation. Patient has a ILR and his cardiologist office attempted to contact him as he was noted to be in rapid a fib for 5 days. EMS was contacted, when they went to check in on him, he did not open the door and they had to get into his house from the window. He endorsed weakness to EMS.  Patient states he has been having weakness for 1 week, dizziness, cough, low grade fevers and mid sternal chest pain. He self discontinued Eliquis a few months ago. His only medication is Suboxone. Reports poor appetite.

## 2022-08-02 NOTE — ED PROVIDER NOTE - CLINICAL SUMMARY MEDICAL DECISION MAKING FREE TEXT BOX
Patient is a 61 yo male with PMHx COPD not on home O2, active smoker, PE previously on eliquis, aflutter s/p ablation and ILR placed last fall, BIBEMS after ILR alerted cardiology patient has been in a fib with RVR for the past 5 days. Patient arrives in a fib with RVR with HR 160s, BP WNL, satting 92% on RA, aaox3; patient states he has felt weak for the past few days and currently has palpitations. Will rate control with lopressor, check labs, r/o PE, consult EP, and continue to monitor.

## 2022-08-02 NOTE — ED ADULT TRIAGE NOTE - CHIEF COMPLAINT QUOTE
Patient presents to ER C/P palpitations, A-fib 160, EMS notified by MD office about , denies CP, resp even/unlabored.
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
## 2022-08-02 NOTE — CONSULT NOTE ADULT - ASSESSMENT
62 year old male with prior hx of PE x2, typical atrial flutter s/p CTI ablation/ILR implant, and COPD. The patient has been experiencing SOB/fatigue for the past 7-10 days and noted to be in AF with RVR on ILR since 7/28/22. His symptoms seem to precede the onset of AF RVR. Of note, the patient has not been taking eliquis for the past two months (for PE). Rates better controlled after IV metoprolol. D-dimer is elevated (>1000).     Recommendations:  - Would rule out PE first; planned for Chest CT today  - If no PE, would schedule for JADA cardioversion tomorrow  - Start IV heparin for AC, if no PE would transition to eliquis 5 mg bid tonight  - Continue rate control for now with short-acting IV metoprolol; if no PE can add oral metoprolol later today  - Obtain TTE today    Thank you for this consult please call with any questions.

## 2022-08-02 NOTE — ED PROVIDER NOTE - OBJECTIVE STATEMENT
Patient is a 61 yo male with PMHx COPD not on home O2, active smoker, PE previously on eliquis, aflutter s/p ablation and ILR placed last fall, BIBEMS after ILR alerted cardiology patient has been in a fib with RVR for the past 5 days. Patient arrives in a fib with RVR with HR 160s, BP WNL, satting 92% on RA; patient states he has felt weak for the past few days and currently has palpitations. Denies syncope, CP, SOB, peripheral edema, fevers, sick contacts, recent travel. Patient has been off his eliquis for several months and last saw his EP (Dr. Pappas) 7 months ago. Denies any CAD, cardiac stents. Denies fevers, chills, dizziness, lightheadedness, dysphagia, dysarthria, diplopia, photophobia, syncope, cough, congestion, SOB, CP, abdominal pain, neck pain, back pain, diarrhea, dysuria, hematuria, hematochezia, hematemesis, n/v, recent travel, sick contacts, leg swelling. Patient is a 62 year old male with PMHx COPD not on home O2, active smoker, PE previously on eliquis, aflutter s/p ablation and ILR placed last fall, BIBEMS after ILR alerted cardiology patient has been in a fib with RVR for the past 5 days. Patient arrives in a fib with RVR with HR 160s, BP WNL, satting 92% on RA; patient states he has felt weak for the past few days and currently has palpitations. Denies syncope, CP, SOB, peripheral edema, fevers, sick contacts, recent travel. Patient has been off his eliquis for several months and last saw his EP (Dr. Pappas) 7 months ago. Denies any CAD, cardiac stents. Denies fevers, chills, dizziness, lightheadedness, dysphagia, dysarthria, diplopia, photophobia, syncope, cough, congestion, SOB, CP, abdominal pain, neck pain, back pain, diarrhea, dysuria, hematuria, hematochezia, hematemesis, n/v, recent travel, sick contacts, leg swelling.
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
## 2022-08-02 NOTE — CONSULT NOTE ADULT - ASSESSMENT
Patient is a 62 year old male with PMH COPD not on home O2, Active Smoker, PE previously on Eliquis, AF s/p ablation and ILR placed last fall, BIBEMS after ILR alerted cardiology patient has been in AF with RVR for the past 5 days, BIBA for the irregular rhythm.    AF with RVR  -HR 120s-130s s/p 5mg Lopressor x3. BP stable, 133/81. BNP elevated to 1267, prior 444 5/21. Likely in setting of new onset AF. Recommend continue rate control with Lopressor, can transition to PO if able to tolerate. If BP drops, can consider switching to Digoxin.   -Recommend TTE to r/o atrial thrombus. EP consulted, likely JADA Cardioversion tomorrow AM. Keep NPO.   -Ddimer 1001, CTA ordered results pending for r/o PE. Recommend start AC with Heparin given history of PE and new onset AF. If CTA negative, transition to Eliquis.    At this patient patient stable for admission to Telemetry with . If patient status changes, please re-consult ICU team. Plan discussed with ICU Attending Dr. Leal and EP Attending Dr. Ayala.  Patient is a 62 year old male with PMH COPD not on home O2, Active Smoker, PE previously on Eliquis, AFlutter s/p ablation and ILR, BIBEMS after ILR alerted cardiology patient has been in AF with RVR for the past 5 days, BIBA for the irregular rhythm.    -HR 120s-130s s/p 5mg Lopressor x3. BP stable, 133/81.  -Patient seen with EP at bedside, recommend continue rate control with Lopressor, can transition to PO if able to tolerate.   -If BP drops, can consider switching to Digoxin.   -Would defer amiodarone due to risk of chemical cardioversion/CVA as patient has been off AC for the past 2 months and been in AFib for 5-6 days.   -Recommend TTE to eval LVEF and r/o atrial thrombus  -EP consulted, plan for JADA Cardioversion tomorrow AM. Keep NPO p midnight.   -CTA pending to r/o PE  -CHADS-VASc = 0; HAS-BLED = 0. Recommend to start full anticoagulation given prior VTE with suspicion for recurrent PE and new onset AFib.    At this patient's rate control has improved and hemodynamics are stable, does not require ICU level of care at present time. Recommend admission to telemetry. Do not hesitate to reconsult should clinical condition change.   Plan discussed with ICU Attending Dr. Leal and EP Attending Dr. Ayala.

## 2022-08-03 VITALS
SYSTOLIC BLOOD PRESSURE: 124 MMHG | HEART RATE: 66 BPM | DIASTOLIC BLOOD PRESSURE: 77 MMHG | OXYGEN SATURATION: 93 % | TEMPERATURE: 97 F | RESPIRATION RATE: 18 BRPM

## 2022-08-03 LAB
A1C WITH ESTIMATED AVERAGE GLUCOSE RESULT: 5.3 % — SIGNIFICANT CHANGE UP (ref 4–5.6)
ALBUMIN SERPL ELPH-MCNC: 2.9 G/DL — LOW (ref 3.3–5.2)
ALP SERPL-CCNC: 70 U/L — SIGNIFICANT CHANGE UP (ref 40–120)
ALT FLD-CCNC: 27 U/L — SIGNIFICANT CHANGE UP
ANION GAP SERPL CALC-SCNC: 9 MMOL/L — SIGNIFICANT CHANGE UP (ref 5–17)
APTT BLD: 47.2 SEC — HIGH (ref 27.5–35.5)
AST SERPL-CCNC: 29 U/L — SIGNIFICANT CHANGE UP
BASOPHILS # BLD AUTO: 0.03 K/UL — SIGNIFICANT CHANGE UP (ref 0–0.2)
BASOPHILS NFR BLD AUTO: 0.4 % — SIGNIFICANT CHANGE UP (ref 0–2)
BILIRUB SERPL-MCNC: 0.5 MG/DL — SIGNIFICANT CHANGE UP (ref 0.4–2)
BUN SERPL-MCNC: 18 MG/DL — SIGNIFICANT CHANGE UP (ref 8–20)
CALCIUM SERPL-MCNC: 8.4 MG/DL — SIGNIFICANT CHANGE UP (ref 8.4–10.5)
CHLORIDE SERPL-SCNC: 100 MMOL/L — SIGNIFICANT CHANGE UP (ref 98–107)
CHOLEST SERPL-MCNC: 90 MG/DL — SIGNIFICANT CHANGE UP
CO2 SERPL-SCNC: 29 MMOL/L — SIGNIFICANT CHANGE UP (ref 22–29)
CREAT SERPL-MCNC: 0.82 MG/DL — SIGNIFICANT CHANGE UP (ref 0.5–1.3)
EGFR: 101 ML/MIN/1.73M2 — SIGNIFICANT CHANGE UP
EOSINOPHIL # BLD AUTO: 0.24 K/UL — SIGNIFICANT CHANGE UP (ref 0–0.5)
EOSINOPHIL NFR BLD AUTO: 3.6 % — SIGNIFICANT CHANGE UP (ref 0–6)
ESTIMATED AVERAGE GLUCOSE: 105 MG/DL — SIGNIFICANT CHANGE UP (ref 68–114)
GLUCOSE SERPL-MCNC: 98 MG/DL — SIGNIFICANT CHANGE UP (ref 70–99)
HCT VFR BLD CALC: 37.7 % — LOW (ref 39–50)
HDLC SERPL-MCNC: 15 MG/DL — LOW
HGB BLD-MCNC: 12.8 G/DL — LOW (ref 13–17)
IMM GRANULOCYTES NFR BLD AUTO: 0.4 % — SIGNIFICANT CHANGE UP (ref 0–1.5)
LIPID PNL WITH DIRECT LDL SERPL: 59 MG/DL — SIGNIFICANT CHANGE UP
LYMPHOCYTES # BLD AUTO: 2.38 K/UL — SIGNIFICANT CHANGE UP (ref 1–3.3)
LYMPHOCYTES # BLD AUTO: 35.3 % — SIGNIFICANT CHANGE UP (ref 13–44)
MCHC RBC-ENTMCNC: 33.7 PG — SIGNIFICANT CHANGE UP (ref 27–34)
MCHC RBC-ENTMCNC: 34 GM/DL — SIGNIFICANT CHANGE UP (ref 32–36)
MCV RBC AUTO: 99.2 FL — SIGNIFICANT CHANGE UP (ref 80–100)
MONOCYTES # BLD AUTO: 0.53 K/UL — SIGNIFICANT CHANGE UP (ref 0–0.9)
MONOCYTES NFR BLD AUTO: 7.9 % — SIGNIFICANT CHANGE UP (ref 2–14)
NEUTROPHILS # BLD AUTO: 3.54 K/UL — SIGNIFICANT CHANGE UP (ref 1.8–7.4)
NEUTROPHILS NFR BLD AUTO: 52.4 % — SIGNIFICANT CHANGE UP (ref 43–77)
NON HDL CHOLESTEROL: 75 MG/DL — SIGNIFICANT CHANGE UP
PLATELET # BLD AUTO: 214 K/UL — SIGNIFICANT CHANGE UP (ref 150–400)
POTASSIUM SERPL-MCNC: 3.4 MMOL/L — LOW (ref 3.5–5.3)
POTASSIUM SERPL-SCNC: 3.4 MMOL/L — LOW (ref 3.5–5.3)
PROT SERPL-MCNC: 6 G/DL — LOW (ref 6.6–8.7)
RBC # BLD: 3.8 M/UL — LOW (ref 4.2–5.8)
RBC # FLD: 12.3 % — SIGNIFICANT CHANGE UP (ref 10.3–14.5)
SODIUM SERPL-SCNC: 138 MMOL/L — SIGNIFICANT CHANGE UP (ref 135–145)
TRIGL SERPL-MCNC: 82 MG/DL — SIGNIFICANT CHANGE UP
WBC # BLD: 6.75 K/UL — SIGNIFICANT CHANGE UP (ref 3.8–10.5)
WBC # FLD AUTO: 6.75 K/UL — SIGNIFICANT CHANGE UP (ref 3.8–10.5)

## 2022-08-03 PROCEDURE — 93306 TTE W/DOPPLER COMPLETE: CPT | Mod: 26

## 2022-08-03 PROCEDURE — 99238 HOSP IP/OBS DSCHRG MGMT 30/<: CPT

## 2022-08-03 PROCEDURE — 93970 EXTREMITY STUDY: CPT | Mod: 26

## 2022-08-03 RX ORDER — AZITHROMYCIN 500 MG/1
1 TABLET, FILM COATED ORAL
Qty: 6 | Refills: 0
Start: 2022-08-03

## 2022-08-03 RX ORDER — POTASSIUM CHLORIDE 20 MEQ
40 PACKET (EA) ORAL ONCE
Refills: 0 | Status: DISCONTINUED | OUTPATIENT
Start: 2022-08-03 | End: 2022-08-03

## 2022-08-03 RX ORDER — METOPROLOL TARTRATE 50 MG
12.5 TABLET ORAL
Refills: 0 | Status: DISCONTINUED | OUTPATIENT
Start: 2022-08-03 | End: 2022-08-03

## 2022-08-03 RX ORDER — APIXABAN 2.5 MG/1
5 TABLET, FILM COATED ORAL EVERY 12 HOURS
Refills: 0 | Status: DISCONTINUED | OUTPATIENT
Start: 2022-08-03 | End: 2022-08-03

## 2022-08-03 RX ORDER — APIXABAN 2.5 MG/1
1 TABLET, FILM COATED ORAL
Qty: 60 | Refills: 0
Start: 2022-08-03 | End: 2022-09-01

## 2022-08-03 RX ORDER — POTASSIUM CHLORIDE 20 MEQ
40 PACKET (EA) ORAL ONCE
Refills: 0 | Status: COMPLETED | OUTPATIENT
Start: 2022-08-03 | End: 2022-08-03

## 2022-08-03 RX ADMIN — Medication 40 MILLIEQUIVALENT(S): at 08:09

## 2022-08-03 RX ADMIN — HEPARIN SODIUM 1500 UNIT(S)/HR: 5000 INJECTION INTRAVENOUS; SUBCUTANEOUS at 07:34

## 2022-08-03 RX ADMIN — HEPARIN SODIUM 3000 UNIT(S): 5000 INJECTION INTRAVENOUS; SUBCUTANEOUS at 08:08

## 2022-08-03 RX ADMIN — Medication 2 MILLIGRAM(S): at 05:27

## 2022-08-03 RX ADMIN — BUPRENORPHINE AND NALOXONE 1 TABLET(S): 2; .5 TABLET SUBLINGUAL at 05:27

## 2022-08-03 NOTE — DISCHARGE NOTE PROVIDER - NSDCMRMEDTOKEN_GEN_ALL_CORE_FT
ALBUTEROL    AER HFA:   apixaban 5 mg oral tablet: 1 tab(s) orally every 12 hours  ascorbic acid 500 mg oral tablet: 1 tab(s) orally once a day  buprenorphine-naloxone 8 mg-2 mg sublingual tablet: 1 tab(s) sublingual 3 times a day  DILTIAZEM    : cap(s) orally once a day  Metoprolol Succinate ER 25 mg oral tablet, extended release: 1 tab(s) orally once a day   Multiple Vitamins oral tablet: 1 tab(s) orally once a day  sertraline 100 mg oral tablet: 1 tab(s) orally once a day  tamsulosin 0.4 mg oral capsule: 1 cap(s) orally once a day (at bedtime)

## 2022-08-03 NOTE — CHART NOTE - NSCHARTNOTEFT_GEN_A_CORE
Patient requesting to sign out AMA. Patient reports "I have to go home and take care of my dog."    Patient is alert and oriented x3 and has capacity to make decisions. I explained at length to the Patient the risks of signing out AMA including but not limited to reoccurrence of rapid afib, blood clot, worsening cardiopulmonary status, injury and death. I explained the risks to leaving the hospital at this time, the benefits of staying, the alternatives to treatment as well as the risks of refusing to stay for treatment, monitoring and pending test results at this time. I offered to answer any questions and fully addressed concerns and answered any such questions. Patient fully understands what has been explained and answered. Patient understands that he will not be receiving discharge paperwork as he is not medically stable for discharge at this time but recommended to  prescription of Eliquis sent by MD at the pharmacy. Attending aware of above. Patient refused to signed form to sign out AMA; however, RN present during conversation as witness. Pt accepts responsibility for any and all results of this decision. Patient requesting to sign out AMA. Patient reports "I have to go home and take care of my dog."    Patient is alert and oriented x3 and has capacity to make decisions. I explained at length to the Patient the risks of signing out AMA including but not limited to reoccurrence of rapid afib, blood clot, worsening cardiopulmonary status, injury and death. I explained the risks to leaving the hospital at this time, the benefits of staying, the alternatives to treatment as well as the risks of refusing to stay for treatment, monitoring and pending test results at this time. I offered to answer any questions and fully addressed concerns and answered any such questions. Patient fully understands what has been explained and answered. Patient understands that he will not be receiving discharge paperwork as he is not medically stable for discharge at this time but advised to  prescription of Eliquis sent by MD at the pharmacy. Attending aware of above. Patient refused to signed form to sign out AMA; however, RN present during conversation as witness. Advised follow up with his outpatient providers and Pt agrees and actively lists his doctors in acknowledgment. Pt accepts responsibility for any and all results of this decision.

## 2022-08-03 NOTE — ED POST DISCHARGE NOTE - DETAILS
Called pt, he left AMA from hospital earlier today but was unaware of findings. admits to cough. will send rx abx. advised f/u pcp and return to ER if sx worsening

## 2022-08-03 NOTE — DISCHARGE NOTE PROVIDER - NSDCFUSCHEDAPPT_GEN_ALL_CORE_FT
[facility] Physician [facility]  YARED Wagner  Scheduled Appointment: 08/31/2022    [facility]ri Prescott  [facility] [address] [facility] [facility]  Scheduled Appointment: 09/07/2022

## 2022-08-04 ENCOUNTER — NON-APPOINTMENT (OUTPATIENT)
Age: 62
End: 2022-08-04

## 2022-08-11 ENCOUNTER — NON-APPOINTMENT (OUTPATIENT)
Age: 62
End: 2022-08-11

## 2022-08-11 ENCOUNTER — APPOINTMENT (OUTPATIENT)
Dept: ELECTROPHYSIOLOGY | Facility: CLINIC | Age: 62
End: 2022-08-11

## 2022-08-11 VITALS
SYSTOLIC BLOOD PRESSURE: 120 MMHG | TEMPERATURE: 98.1 F | OXYGEN SATURATION: 93 % | HEART RATE: 76 BPM | BODY MASS INDEX: 21.81 KG/M2 | HEIGHT: 72 IN | WEIGHT: 161 LBS | DIASTOLIC BLOOD PRESSURE: 70 MMHG

## 2022-08-11 PROCEDURE — 80061 LIPID PANEL: CPT

## 2022-08-11 PROCEDURE — 85610 PROTHROMBIN TIME: CPT

## 2022-08-11 PROCEDURE — 83735 ASSAY OF MAGNESIUM: CPT

## 2022-08-11 PROCEDURE — 96361 HYDRATE IV INFUSION ADD-ON: CPT

## 2022-08-11 PROCEDURE — 93000 ELECTROCARDIOGRAM COMPLETE: CPT

## 2022-08-11 PROCEDURE — 85379 FIBRIN DEGRADATION QUANT: CPT

## 2022-08-11 PROCEDURE — 93005 ELECTROCARDIOGRAM TRACING: CPT

## 2022-08-11 PROCEDURE — 84443 ASSAY THYROID STIM HORMONE: CPT

## 2022-08-11 PROCEDURE — 83036 HEMOGLOBIN GLYCOSYLATED A1C: CPT

## 2022-08-11 PROCEDURE — 85730 THROMBOPLASTIN TIME PARTIAL: CPT

## 2022-08-11 PROCEDURE — 83880 ASSAY OF NATRIURETIC PEPTIDE: CPT

## 2022-08-11 PROCEDURE — 0225U NFCT DS DNA&RNA 21 SARSCOV2: CPT

## 2022-08-11 PROCEDURE — 99285 EMERGENCY DEPT VISIT HI MDM: CPT

## 2022-08-11 PROCEDURE — 36415 COLL VENOUS BLD VENIPUNCTURE: CPT

## 2022-08-11 PROCEDURE — 71275 CT ANGIOGRAPHY CHEST: CPT | Mod: MA

## 2022-08-11 PROCEDURE — 85027 COMPLETE CBC AUTOMATED: CPT

## 2022-08-11 PROCEDURE — 96376 TX/PRO/DX INJ SAME DRUG ADON: CPT

## 2022-08-11 PROCEDURE — 93970 EXTREMITY STUDY: CPT

## 2022-08-11 PROCEDURE — 84484 ASSAY OF TROPONIN QUANT: CPT

## 2022-08-11 PROCEDURE — 84436 ASSAY OF TOTAL THYROXINE: CPT

## 2022-08-11 PROCEDURE — 96374 THER/PROPH/DIAG INJ IV PUSH: CPT

## 2022-08-11 PROCEDURE — 93306 TTE W/DOPPLER COMPLETE: CPT

## 2022-08-11 PROCEDURE — 71045 X-RAY EXAM CHEST 1 VIEW: CPT

## 2022-08-11 PROCEDURE — 80053 COMPREHEN METABOLIC PANEL: CPT

## 2022-08-11 PROCEDURE — 85025 COMPLETE CBC W/AUTO DIFF WBC: CPT

## 2022-08-11 PROCEDURE — 99215 OFFICE O/P EST HI 40 MIN: CPT | Mod: 25

## 2022-08-11 RX ORDER — UMECLIDINIUM BROMIDE AND VILANTEROL TRIFENATATE 62.5; 25 UG/1; UG/1
62.5-25 POWDER RESPIRATORY (INHALATION) DAILY
Qty: 3 | Refills: 3 | Status: DISCONTINUED | COMMUNITY
Start: 2021-11-11 | End: 2022-08-11

## 2022-08-11 RX ORDER — ALBUTEROL SULFATE 90 UG/1
108 (90 BASE) INHALANT RESPIRATORY (INHALATION)
Qty: 1 | Refills: 3 | Status: DISCONTINUED | COMMUNITY
Start: 2021-11-11 | End: 2022-08-11

## 2022-08-11 RX ORDER — UMECLIDINIUM BROMIDE AND VILANTEROL TRIFENATATE 62.5; 25 UG/1; UG/1
62.5-25 POWDER RESPIRATORY (INHALATION) DAILY
Qty: 3 | Refills: 3 | Status: DISCONTINUED | COMMUNITY
Start: 2022-06-06 | End: 2022-08-11

## 2022-08-11 RX ORDER — BENZONATATE 100 MG/1
100 CAPSULE ORAL 3 TIMES DAILY
Qty: 30 | Refills: 0 | Status: ACTIVE | COMMUNITY
Start: 2022-08-11 | End: 1900-01-01

## 2022-08-11 NOTE — DISCUSSION/SUMMARY
[EKG obtained to assist in diagnosis and management of assessed problem(s)] : EKG obtained to assist in diagnosis and management of assessed problem(s) [FreeTextEntry1] : 62 year old gentleman with history of PE 8/2020 & 5/2021, COPD, tobacco use (quit 6 mo ago), ?remote opioid use on Suboxone, atrial flutter dx 1/2021, prolonged hospital course (5/15/21-6/5/2021) for pneumonia, acute hypoxic respiratory failure requiring intubation and ultimately tracheostomy (5/25/21) s/p reversal, left pleural effusion/pulm edema s/p chest tube (5/16/21), right segmental PE and left peroneal DVT, and atrial flutter with RVR s/p urgent cardioversion in ER 5/15/21, due to hemodynamic instability. Ultimately stabilized and underwent atrial flutter ablation (CTI line) & ILR implant (9/2021).\par \par  \par Initially maintained SR on ILR.  Recently brought to [facility] with AF w/ RVR and hypotension c/w shortness of breath and extreme fatigue. Had not been taking eliquis ~2 months. D-dimer is elevated. Was given IV BB and self converted to sinus rhythm. CT was negative for PE but showed LUE infiltrate c/w possible PNA. Restarted on Eliquis. RLE DVT showed. TTE showed normal LVEF 60-65%, mild pHTN.\par \par Patient left AMA before PNA was identified.  [facility] called later that day and started antibiotic outpatient therapy (azithromycin, patient unsure of second antibiotic).\par \par Presents for post hospital EP f/up. Overall, notes symptomatic improvement. Still has residual nonproductive cough and fatigue. Denies CP, SOB, MERRILL, dizziness, palpitations, syncope or presyncope.  Remains on a/c and tolerating without bleeding issues. \par Maintaining SR on EKG today and ILR since discharge.\par \par - First episode of rapid AF with RVR, which appears to have occurred in the setting of PNA. Will continue to monitor for subsequent AF recurrence and consider rhythm control if indicated.\par - History of recurrent PE/DVT and now AF. Had self discontinued a/c but now resumed and tolerating  Stressed the importance of long term anticoagulation and medication compliance\par - Recommend pulmonary f/up \par - Will start low dose CCB for rate control. \par - Cardiology f/up with Dr. Chand\par \par EP f/up in 6 months unless sooner PRN\par Seen and discussed with Dr. Pappas\par [facility]\par \par \par

## 2022-08-11 NOTE — ADDENDUM
[FreeTextEntry1] : I was present for the above evaluation and agree with the history, physical examination, assessment and plan as above.  Initially no recurrent arrhythmia following flutter ablation, but now with episode of AF with RVR in setting of likely COPD exacerbation and possible pna. he did improve with treatment and is in sinus rhythm on followup. Regardless, will need to continue long-term anticoagulation given history of PEs and high risk of recurrent AF. will start diltiazem as above, and continue ILR monitoring. \par HE will need pulmonary followup and treatment for COPD. will benefit from rhythm control if further AF recurrence noted.

## 2022-08-11 NOTE — PHYSICAL EXAM
[Well Developed] : well developed [No Acute Distress] : no acute distress [No Respiratory Distress] : no respiratory distress  [Decreased Breath Sounds Bilaterally] : breath sounds were diminished over both lungs [Normal Gait] : normal gait [No Edema] : no edema [No Rash] : no rash [Moves all extremities] : moves all extremities [Alert and Oriented] : alert and oriented

## 2022-08-11 NOTE — HISTORY OF PRESENT ILLNESS
[FreeTextEntry1] : 62 year old gentleman with history of PE 8/2020 & 5/2021, COPD, tobacco use (quit 6 mo ago), ?remote opioid use on Suboxone, atrial flutter dx 1/2021, prolonged hospital course (5/15/21-6/5/2021) for pneumonia, acute hypoxic respiratory failure requiring intubation and ultimately tracheostomy (5/25/21) s/p reversal, left pleural effusion/pulm edema s/p chest tube (5/16/21), right segmental PE and left peroneal DVT, and atrial flutter with RVR s/p urgent cardioversion in ER 5/15/21, due to hemodynamic instability.\par Ultimately stabilized and underwent atrial flutter ablation (CTI line) & ILR implant (9/2021) with Dr. Pappas.  Initial monitoring showed NO events. Recently oted to have atrial fibrillation with RVR on remote monitoring of his ILR beginning on 7/28/2022. Initial attempts at contacting him were unsuccessful. He was eventually contacted today and EMS was sent to check on him. He was found to be hypotensive and brought to the ER where he's\par currently admitted with AF RVR in the 160s. \par He was symptomatic with shortness of breath and extreme fatigue and tiredness. Of note, the patient has not been taking eliquis for the past two months. He denies any chest pain, palpitations, or syncope. D-dimer is elevated. Was given IV BB and self converted to sinus rhythm. CT was negative for PE but showed LUE infiltrate c/w possible PNA. Restarted on Eliquis. RLE DVT showed. TTE showed normal LVEF 60-65%, mild pHTN.\par \par Patient left AMA before PNA was identified.  [facility] called later that day and started antibiotic outpatient therapy (azithromycin, patient unsure of second antibiotic).\par \par Presents for post hospital EP f/up. Overall, notes symptomatic improvement. Still has residual nonproductive cough and fatigue. Denies CP, SOB, MERRILL, dizziness, palpitations, syncope or presyncope.  Remains on a/c and tolerating without bleeding issues. \par

## 2022-08-12 ENCOUNTER — APPOINTMENT (OUTPATIENT)
Dept: PULMONOLOGY | Facility: CLINIC | Age: 62
End: 2022-08-12

## 2022-08-12 VITALS
WEIGHT: 161 LBS | OXYGEN SATURATION: 90 % | SYSTOLIC BLOOD PRESSURE: 112 MMHG | HEIGHT: 72 IN | HEART RATE: 79 BPM | RESPIRATION RATE: 16 BRPM | DIASTOLIC BLOOD PRESSURE: 60 MMHG | BODY MASS INDEX: 21.81 KG/M2

## 2022-08-12 DIAGNOSIS — J15.9 UNSPECIFIED BACTERIAL PNEUMONIA: ICD-10-CM

## 2022-08-12 DIAGNOSIS — J98.4 OTHER DISORDERS OF LUNG: ICD-10-CM

## 2022-08-12 PROCEDURE — 99215 OFFICE O/P EST HI 40 MIN: CPT

## 2022-08-25 ENCOUNTER — APPOINTMENT (OUTPATIENT)
Dept: CARDIOLOGY | Facility: CLINIC | Age: 62
End: 2022-08-25

## 2022-08-26 ENCOUNTER — APPOINTMENT (OUTPATIENT)
Dept: ELECTROPHYSIOLOGY | Facility: CLINIC | Age: 62
End: 2022-08-26

## 2022-08-26 VITALS
DIASTOLIC BLOOD PRESSURE: 57 MMHG | BODY MASS INDEX: 21.67 KG/M2 | WEIGHT: 160 LBS | SYSTOLIC BLOOD PRESSURE: 97 MMHG | TEMPERATURE: 97.8 F | OXYGEN SATURATION: 90 % | HEART RATE: 78 BPM | HEIGHT: 72 IN

## 2022-08-26 RX ORDER — DILTIAZEM HYDROCHLORIDE 120 MG/1
120 CAPSULE, EXTENDED RELEASE ORAL DAILY
Qty: 90 | Refills: 3 | Status: DISCONTINUED | COMMUNITY
Start: 2022-08-11 | End: 2022-08-26

## 2022-08-26 RX ORDER — PREDNISONE 10 MG/1
10 TABLET ORAL
Qty: 21 | Refills: 0 | Status: DISCONTINUED | COMMUNITY
Start: 2022-08-12 | End: 2022-08-26

## 2022-08-31 ENCOUNTER — NON-APPOINTMENT (OUTPATIENT)
Age: 62
End: 2022-08-31

## 2022-08-31 ENCOUNTER — APPOINTMENT (OUTPATIENT)
Dept: ELECTROPHYSIOLOGY | Facility: CLINIC | Age: 62
End: 2022-08-31

## 2022-08-31 PROCEDURE — 93298 REM INTERROG DEV EVAL SCRMS: CPT

## 2022-08-31 PROCEDURE — G2066: CPT

## 2022-09-01 ENCOUNTER — APPOINTMENT (OUTPATIENT)
Dept: CARDIOLOGY | Facility: CLINIC | Age: 62
End: 2022-09-01

## 2022-09-01 VITALS
HEIGHT: 72 IN | TEMPERATURE: 98.7 F | SYSTOLIC BLOOD PRESSURE: 110 MMHG | WEIGHT: 162 LBS | BODY MASS INDEX: 21.94 KG/M2 | HEART RATE: 71 BPM | OXYGEN SATURATION: 92 % | DIASTOLIC BLOOD PRESSURE: 62 MMHG

## 2022-09-01 PROCEDURE — 99406 BEHAV CHNG SMOKING 3-10 MIN: CPT

## 2022-09-01 PROCEDURE — 99214 OFFICE O/P EST MOD 30 MIN: CPT | Mod: 25

## 2022-09-01 NOTE — DISCUSSION/SUMMARY
[___ Month(s)] : in [unfilled] month(s) [FreeTextEntry1] : 60 y/o M with PMH PE dx'ed 8/2020 and 5/2021, stage III COPD, active smoker, atrial flutter s/p DCCV 5/2021 and RFA 9/2021, s/p ILR 9/2021. Patient was noted to be in AF with RVR in July of this year on remote monitoring; was symptomatic. EMS was contacted by EP team, and patient was admitted to [facility] for management of AF.\par \par #Afib/flutter\par Recent episode of AF likely in the setting of PNA\par Currently in SR\par Follows with EP\par Tolerating eliquis\par Was on cardizem; dc'ed 8/26 due to BP 97/57, although there is no documentation from that day \par Will likely need rhythm control if AF recurs \par Recent TTE with LVEF 60-65%\par \par #H/o recurrent PE\par On eliquis\par Was referred to heme/onc for hypercoaguability workup\par \par Continue to follow up with pulmonary, PCP

## 2022-09-01 NOTE — CARDIOLOGY SUMMARY
[de-identified] : 8/11/22: RAMÓN VALERO [de-identified] : 3/2021: no ischemia or infarct [de-identified] : 8/3/22: LVEF 60-65%, PASP 46\par 2/24/21: LVEF 55-60%, no significant valvular disease

## 2022-09-01 NOTE — HISTORY OF PRESENT ILLNESS
[FreeTextEntry1] : 60 y/o M with PMH PE dx'ed 8/2020 and 5/2021, stage III COPD, active smoker, atrial flutter s/p DCCV 5/2021 and RFA 9/2021, s/p ILR 9/2021. Patient was noted to be in AF with RVR in July of this year on remote monitoring; was symptomatic. EMS was contacted by EP team, and patient was admitted to [facility] for management of AF. He ultimately left AMA after being stabilized; was later found to have PNA and has since complete antibiotic therapy. \par \par 9/1/22:\par Patient was last seen by me 3/24/21\par He currently complains of fatigue and feeling generally unwell\par Also has nonproductive cough. Denies f/c\par He denies CP, dyspnea, palpitations, dizziness, lightheadedness, LE edema, syncope or near syncope\par Tolerating AC without bleeding issues

## 2022-10-05 ENCOUNTER — APPOINTMENT (OUTPATIENT)
Dept: ELECTROPHYSIOLOGY | Facility: CLINIC | Age: 62
End: 2022-10-05

## 2022-10-05 ENCOUNTER — NON-APPOINTMENT (OUTPATIENT)
Age: 62
End: 2022-10-05

## 2022-10-05 PROCEDURE — G2066: CPT

## 2022-10-05 PROCEDURE — 93298 REM INTERROG DEV EVAL SCRMS: CPT

## 2022-10-27 NOTE — PATIENT PROFILE ADULT - PRO INTERPRETER NEED 2
1.Return to ER immediately if having new or worsening symptoms.    2.Follow up with PCP in 2-3 days.  If unable to follow up with your PCP, call Clarisse barnhart at [phone].        
English

## 2022-11-09 ENCOUNTER — APPOINTMENT (OUTPATIENT)
Dept: ELECTROPHYSIOLOGY | Facility: CLINIC | Age: 62
End: 2022-11-09

## 2022-11-09 ENCOUNTER — NON-APPOINTMENT (OUTPATIENT)
Age: 62
End: 2022-11-09

## 2022-11-09 PROCEDURE — G2066: CPT

## 2022-11-09 PROCEDURE — 93298 REM INTERROG DEV EVAL SCRMS: CPT

## 2022-11-15 NOTE — PATIENT PROFILE ADULT - DO YOU FEEL LIKE HURTING YOURSELF OR OTHERS?
Informed patient he needed to sign a release of information before we could send any paperwork to [facility].  Patient states he will bring the fax number when he comes in to sign release.  Patient made aware the form will be at the  for him to sign.  No further questions or concerns.   no

## 2022-11-23 ENCOUNTER — APPOINTMENT (OUTPATIENT)
Dept: PULMONOLOGY | Facility: CLINIC | Age: 62
End: 2022-11-23

## 2022-12-14 ENCOUNTER — APPOINTMENT (OUTPATIENT)
Dept: ELECTROPHYSIOLOGY | Facility: CLINIC | Age: 62
End: 2022-12-14

## 2022-12-14 ENCOUNTER — NON-APPOINTMENT (OUTPATIENT)
Age: 62
End: 2022-12-14

## 2022-12-14 PROCEDURE — 93298 REM INTERROG DEV EVAL SCRMS: CPT

## 2022-12-14 PROCEDURE — G2066: CPT

## 2022-12-19 ENCOUNTER — NON-APPOINTMENT (OUTPATIENT)
Age: 62
End: 2022-12-19

## 2022-12-20 ENCOUNTER — NON-APPOINTMENT (OUTPATIENT)
Age: 62
End: 2022-12-20

## 2022-12-20 ENCOUNTER — APPOINTMENT (OUTPATIENT)
Dept: CARDIOLOGY | Facility: CLINIC | Age: 62
End: 2022-12-20

## 2022-12-20 VITALS
OXYGEN SATURATION: 92 % | HEIGHT: 72 IN | BODY MASS INDEX: 22.21 KG/M2 | WEIGHT: 164 LBS | TEMPERATURE: 98.8 F | DIASTOLIC BLOOD PRESSURE: 60 MMHG | SYSTOLIC BLOOD PRESSURE: 114 MMHG | HEART RATE: 72 BPM

## 2022-12-20 PROCEDURE — 93000 ELECTROCARDIOGRAM COMPLETE: CPT

## 2022-12-20 PROCEDURE — 99214 OFFICE O/P EST MOD 30 MIN: CPT

## 2022-12-21 ENCOUNTER — APPOINTMENT (OUTPATIENT)
Dept: PULMONOLOGY | Facility: CLINIC | Age: 62
End: 2022-12-21

## 2022-12-21 VITALS
WEIGHT: 164 LBS | HEIGHT: 72 IN | BODY MASS INDEX: 22.21 KG/M2 | HEART RATE: 73 BPM | DIASTOLIC BLOOD PRESSURE: 68 MMHG | SYSTOLIC BLOOD PRESSURE: 108 MMHG | OXYGEN SATURATION: 93 % | RESPIRATION RATE: 16 BRPM

## 2022-12-21 PROCEDURE — 99214 OFFICE O/P EST MOD 30 MIN: CPT

## 2022-12-21 RX ORDER — FLUTICASONE FUROATE, UMECLIDINIUM BROMIDE AND VILANTEROL TRIFENATATE 100; 62.5; 25 UG/1; UG/1; UG/1
100-62.5-25 POWDER RESPIRATORY (INHALATION)
Qty: 1 | Refills: 5 | Status: ACTIVE | COMMUNITY
Start: 2022-12-21 | End: 1900-01-01

## 2022-12-21 NOTE — PHYSICAL EXAM
[No Resp Distress] : no resp distress [Clear to Auscultation Bilaterally] : clear to auscultation bilaterally [TextBox_68] : Diminished breath sounds bilaterally [TextBox_140] : Flat affect

## 2022-12-21 NOTE — HISTORY OF PRESENT ILLNESS
[TextBox_4] : The patient has had a cough for several months.  Benzonatate suppressed it but he only had it for few days.  He also tried prednisone which helped while he was taking it.  Denies any increase in shortness of breath.

## 2022-12-21 NOTE — ASSESSMENT
[FreeTextEntry1] : Severe COPD.  Cough may be irritative.  Will give him another course of benzonatate.  Also we will switch him from Anoro to low-dose Trelegy to get some steroid effect.  Follow-up in 3 months with Dr. Prescott.

## 2023-01-20 ENCOUNTER — NON-APPOINTMENT (OUTPATIENT)
Age: 63
End: 2023-01-20

## 2023-01-20 ENCOUNTER — APPOINTMENT (OUTPATIENT)
Dept: ELECTROPHYSIOLOGY | Facility: CLINIC | Age: 63
End: 2023-01-20
Payer: MEDICARE

## 2023-01-20 PROCEDURE — G2066: CPT

## 2023-01-20 PROCEDURE — 93298 REM INTERROG DEV EVAL SCRMS: CPT

## 2023-02-16 ENCOUNTER — APPOINTMENT (OUTPATIENT)
Dept: ELECTROPHYSIOLOGY | Facility: CLINIC | Age: 63
End: 2023-02-16

## 2023-03-20 ENCOUNTER — NON-APPOINTMENT (OUTPATIENT)
Age: 63
End: 2023-03-20

## 2023-03-20 ENCOUNTER — APPOINTMENT (OUTPATIENT)
Dept: ELECTROPHYSIOLOGY | Facility: CLINIC | Age: 63
End: 2023-03-20
Payer: MEDICARE

## 2023-03-20 PROCEDURE — 93298 REM INTERROG DEV EVAL SCRMS: CPT

## 2023-03-20 PROCEDURE — G2066: CPT

## 2023-04-05 ENCOUNTER — APPOINTMENT (OUTPATIENT)
Dept: PULMONOLOGY | Facility: CLINIC | Age: 63
End: 2023-04-05
Payer: MEDICARE

## 2023-04-05 VITALS
HEIGHT: 72 IN | WEIGHT: 175 LBS | SYSTOLIC BLOOD PRESSURE: 112 MMHG | DIASTOLIC BLOOD PRESSURE: 70 MMHG | BODY MASS INDEX: 23.7 KG/M2 | OXYGEN SATURATION: 98 % | RESPIRATION RATE: 16 BRPM | HEART RATE: 68 BPM

## 2023-04-05 DIAGNOSIS — R91.1 SOLITARY PULMONARY NODULE: ICD-10-CM

## 2023-04-05 DIAGNOSIS — F41.9 ANXIETY DISORDER, UNSPECIFIED: ICD-10-CM

## 2023-04-05 DIAGNOSIS — F32.A ANXIETY DISORDER, UNSPECIFIED: ICD-10-CM

## 2023-04-05 PROCEDURE — 99215 OFFICE O/P EST HI 40 MIN: CPT

## 2023-04-05 NOTE — PHYSICAL EXAM
[No Acute Distress] : no acute distress [Normal Rate/Rhythm] : normal rate/rhythm [Normal S1, S2] : normal s1, s2 [No Resp Distress] : no resp distress [No Acc Muscle Use] : no acc muscle use [Normal Rhythm and Effort] : normal rhythm and effort [Not Tender] : not tender [Normal Gait] : normal gait [No Cyanosis] : no cyanosis [No Edema] : no edema [FROM] : FROM [Normal Color/ Pigmentation] : normal color/ pigmentation [No Focal Deficits] : no focal deficits [Oriented x3] : oriented x3 [Normal Mood] : normal mood [Normal Affect] : normal affect [TextBox_44] : tracheostomy scar [TextBox_68] : carrillo [TextBox_89] : surgical scar

## 2023-04-05 NOTE — HISTORY OF PRESENT ILLNESS
[Current] : current [TextBox_4] : Emphysema, nodule, PE.\par   \par Hx VDRF during hosp 2021. Trach removed at [facility].  \par  \par Still SOB. Cough improved but still present. On trelegy.  \par \par Refusing to get O2. \par \par On eliquis. On for PE X 2 and afib.   \par \par PFT done 12/8/21 with severe obstruction and restriction.\par \par PET done for lung nodule; No activity on PET;  nodule decreased in size. Saw Dr Ty. Repeat was planned. \par \par Very depressed. Still has not seen psychiatry. Has insomnia. Goes to bed around 11 pm; does not fall asleep often until 3-5 am; sleeps until 8-10 am. Watches TV in bed. Sedentary lifestyle. Does not do much physical activity at all. \par \par Still smoking.\par \par

## 2023-04-24 ENCOUNTER — NON-APPOINTMENT (OUTPATIENT)
Age: 63
End: 2023-04-24

## 2023-04-24 ENCOUNTER — APPOINTMENT (OUTPATIENT)
Dept: ELECTROPHYSIOLOGY | Facility: CLINIC | Age: 63
End: 2023-04-24
Payer: MEDICARE

## 2023-04-24 PROCEDURE — G2066: CPT

## 2023-04-24 PROCEDURE — 93298 REM INTERROG DEV EVAL SCRMS: CPT

## 2023-05-04 ENCOUNTER — NON-APPOINTMENT (OUTPATIENT)
Age: 63
End: 2023-05-04

## 2023-05-04 ENCOUNTER — APPOINTMENT (OUTPATIENT)
Dept: ELECTROPHYSIOLOGY | Facility: CLINIC | Age: 63
End: 2023-05-04
Payer: MEDICARE

## 2023-05-04 VITALS
TEMPERATURE: 98.1 F | HEART RATE: 86 BPM | OXYGEN SATURATION: 95 % | DIASTOLIC BLOOD PRESSURE: 80 MMHG | BODY MASS INDEX: 23.7 KG/M2 | SYSTOLIC BLOOD PRESSURE: 144 MMHG | HEIGHT: 72 IN | WEIGHT: 175 LBS

## 2023-05-04 DIAGNOSIS — I48.0 PAROXYSMAL ATRIAL FIBRILLATION: ICD-10-CM

## 2023-05-04 DIAGNOSIS — Z95.818 PRESENCE OF OTHER CARDIAC IMPLANTS AND GRAFTS: ICD-10-CM

## 2023-05-04 PROCEDURE — 99215 OFFICE O/P EST HI 40 MIN: CPT

## 2023-05-04 PROCEDURE — 93000 ELECTROCARDIOGRAM COMPLETE: CPT

## 2023-05-04 RX ORDER — BENZONATATE 100 MG/1
100 CAPSULE ORAL
Qty: 90 | Refills: 2 | Status: DISCONTINUED | COMMUNITY
Start: 2022-12-21 | End: 2023-05-04

## 2023-05-04 NOTE — DISCUSSION/SUMMARY
[EKG obtained to assist in diagnosis and management of assessed problem(s)] : EKG obtained to assist in diagnosis and management of assessed problem(s) [FreeTextEntry1] : 62 year old gentleman with history of PE 8/2020 & 5/2021, COPD, tobacco use (quit 6 mo ago), ?remote opioid use on Suboxone, atrial flutter dx 1/2021, prolonged hospital course (5/15/21-6/5/2021) for pneumonia, acute hypoxic respiratory failure requiring intubation and ultimately tracheostomy (5/25/21) s/p reversal, left pleural effusion/pulm edema s/p chest tube (5/16/21), right segmental PE and left peroneal DVT, and atrial flutter with RVR s/p urgent cardioversion in ER 5/15/21, due to hemodynamic instability. Ultimately stabilized and underwent atrial flutter ablation (CTI line) & ILR implant (9/2021).\par \par Initially maintained SR on ILR. In July 2022 - brought to [facility] with AF w/ RVR and hypotension c/w shortness of breath and extreme fatigue. Had not been taking eliquis ~2 months. D-dimer is elevated. Was given IV BB and self converted to sinus rhythm. CT was negative for PE but showed LUE infiltrate c/w possible PNA. Restarted on Eliquis. RLE DVT showed. TTE showed normal LVEF 60-65%, mild pHTN.\par Patient left AMA before PNA was identified. [facility] called later that day and started antibiotic outpatient therapy\par \par Seen for post hospital f/up 8/11/22 and overall symptomatic improvement. Planned for continued monitoring given that first episode of rapid AF with RVR occurred in the setting of PNA. \par \par Presents for EP f/up and remains in SR. Has been on Suboxone for many years and experiencing symptoms of withdrawal after MD recently stopped filling prescription. Feels nauseous and weak. He still has some medication left, but has been trying to wean down. Denies CP, SOB, MERRILL, dizziness, palpitations, syncope or presyncope.\par One AF subsequent AF event since last f/up, 6 day duration. event occurred 6 months ago with no recurrence since. Self discontinue Diltiazem in the past due to hypotension. Remains on a/c and tolerating without bleeding events.\par \par - Infrequent PAF with RVR on ILR. Initial event occurred in the setting of PNA, however, subsequent event was asymptomatic and self terminated. NO recurrence in the past 6 months. Off rate control due to history of intolerance and low BP\par - Continue Eliquis as currently tolerating without bleeding events\par - Suspect patient will ultimately need further rhythm control, however, recently struggling with opioid dependency on Suboxone. Encouraged patient to seek fup with pain management and alternative PCP to address opioid dependency. \par - Advised to go to ED if progressive symptoms of opioid withdrawal and unable to establish care with alternative MD in the near future.\par - Will continue to monitor for subsequent AF recurrence and consider rhythm control if indicated and if general anesthesia is a safe option for this patient\par \par EP fup in 6 months. Sooner PRN

## 2023-05-04 NOTE — HISTORY OF PRESENT ILLNESS
[FreeTextEntry1] : 62 year old gentleman with history of PE 8/2020 & 5/2021, COPD, tobacco use (quit 6 mo ago), ?remote opioid use on Suboxone, atrial flutter dx 1/2021, prolonged hospital course (5/15/21-6/5/2021) for pneumonia, acute hypoxic respiratory failure requiring intubation and ultimately tracheostomy (5/25/21) s/p reversal, left pleural effusion/pulm edema s/p chest tube (5/16/21), right segmental PE and left peroneal DVT, and atrial flutter with RVR s/p urgent cardioversion in ER 5/15/21, due to hemodynamic instability. Ultimately stabilized and underwent atrial flutter ablation (CTI line) & ILR implant (9/2021).\par \par Initially maintained SR on ILR. Recently brought to [facility] with AF w/ RVR and hypotension c/w shortness of breath and extreme fatigue. Had not been taking eliquis ~2 months. D-dimer is elevated. Was given IV BB and self converted to sinus rhythm. CT was negative for PE but showed LUE infiltrate c/w possible PNA. Restarted on Eliquis. RLE DVT showed. TTE showed normal LVEF 60-65%, mild pHTN.\par \par Patient left AMA before PNA was identified. [facility] called later that day and started antibiotic outpatient therapy (azithromycin, patient unsure of second antibiotic).\par \par Seen for post hospital f/up 8/11/22 and overall symptomatic improvement. Planned for continued monitoring given that first episode of rapid AF with RVR occurred in the setting of PNA. \par Presents for EP f/up and remains in SR. Has been on Suboxone for many years and experiencing symptoms of withdrawal after MD recently stopped filling prescription. Feels nauseous and weak. He still has some medication left, but has been trying to wean down. Denies CP, SOB, MERRILL, dizziness, palpitations, syncope or presyncope.\par One AF subsequent AF event since last f/up, 6 day duration. event occurred 6 months ago with no recurrence since \par \par

## 2023-05-27 ENCOUNTER — INPATIENT (INPATIENT)
Facility: HOSPITAL | Age: 63
LOS: 1 days | Discharge: HOME | DRG: 880 | End: 2023-05-29
Attending: STUDENT IN AN ORGANIZED HEALTH CARE EDUCATION/TRAINING PROGRAM | Admitting: INTERNAL MEDICINE
Payer: MEDICARE

## 2023-05-27 VITALS
DIASTOLIC BLOOD PRESSURE: 95 MMHG | HEART RATE: 99 BPM | TEMPERATURE: 97 F | RESPIRATION RATE: 18 BRPM | OXYGEN SATURATION: 97 % | WEIGHT: 169.98 LBS | SYSTOLIC BLOOD PRESSURE: 161 MMHG

## 2023-05-27 DIAGNOSIS — F13.939 SEDATIVE, HYPNOTIC OR ANXIOLYTIC USE, UNSPECIFIED WITH WITHDRAWAL, UNSPECIFIED: ICD-10-CM

## 2023-05-27 DIAGNOSIS — Z98.890 OTHER SPECIFIED POSTPROCEDURAL STATES: Chronic | ICD-10-CM

## 2023-05-27 DIAGNOSIS — R07.9 CHEST PAIN, UNSPECIFIED: ICD-10-CM

## 2023-05-27 DIAGNOSIS — Z90.49 ACQUIRED ABSENCE OF OTHER SPECIFIED PARTS OF DIGESTIVE TRACT: Chronic | ICD-10-CM

## 2023-05-27 DIAGNOSIS — I48.91 UNSPECIFIED ATRIAL FIBRILLATION: ICD-10-CM

## 2023-05-27 DIAGNOSIS — I48.92 UNSPECIFIED ATRIAL FLUTTER: ICD-10-CM

## 2023-05-27 LAB
ALBUMIN SERPL ELPH-MCNC: 4.2 G/DL — SIGNIFICANT CHANGE UP (ref 3.3–5.2)
ALP SERPL-CCNC: 61 U/L — SIGNIFICANT CHANGE UP (ref 40–120)
ALT FLD-CCNC: 10 U/L — SIGNIFICANT CHANGE UP
ANION GAP SERPL CALC-SCNC: 12 MMOL/L — SIGNIFICANT CHANGE UP (ref 5–17)
APTT BLD: 35.9 SEC — HIGH (ref 27.5–35.5)
AST SERPL-CCNC: 25 U/L — SIGNIFICANT CHANGE UP
BASOPHILS # BLD AUTO: 0.03 K/UL — SIGNIFICANT CHANGE UP (ref 0–0.2)
BASOPHILS NFR BLD AUTO: 0.5 % — SIGNIFICANT CHANGE UP (ref 0–2)
BILIRUB SERPL-MCNC: 1 MG/DL — SIGNIFICANT CHANGE UP (ref 0.4–2)
BUN SERPL-MCNC: 7.6 MG/DL — LOW (ref 8–20)
CALCIUM SERPL-MCNC: 9.1 MG/DL — SIGNIFICANT CHANGE UP (ref 8.4–10.5)
CHLORIDE SERPL-SCNC: 97 MMOL/L — SIGNIFICANT CHANGE UP (ref 96–108)
CK SERPL-CCNC: 76 U/L — SIGNIFICANT CHANGE UP (ref 30–200)
CO2 SERPL-SCNC: 25 MMOL/L — SIGNIFICANT CHANGE UP (ref 22–29)
CREAT SERPL-MCNC: 0.84 MG/DL — SIGNIFICANT CHANGE UP (ref 0.5–1.3)
EGFR: 99 ML/MIN/1.73M2 — SIGNIFICANT CHANGE UP
EOSINOPHIL # BLD AUTO: 0.09 K/UL — SIGNIFICANT CHANGE UP (ref 0–0.5)
EOSINOPHIL NFR BLD AUTO: 1.5 % — SIGNIFICANT CHANGE UP (ref 0–6)
GLUCOSE SERPL-MCNC: 156 MG/DL — HIGH (ref 70–99)
HCT VFR BLD CALC: 46 % — SIGNIFICANT CHANGE UP (ref 39–50)
HGB BLD-MCNC: 16.7 G/DL — SIGNIFICANT CHANGE UP (ref 13–17)
IMM GRANULOCYTES NFR BLD AUTO: 0.3 % — SIGNIFICANT CHANGE UP (ref 0–0.9)
INR BLD: 1.24 RATIO — HIGH (ref 0.88–1.16)
LYMPHOCYTES # BLD AUTO: 1.59 K/UL — SIGNIFICANT CHANGE UP (ref 1–3.3)
LYMPHOCYTES # BLD AUTO: 27 % — SIGNIFICANT CHANGE UP (ref 13–44)
MCHC RBC-ENTMCNC: 35.3 PG — HIGH (ref 27–34)
MCHC RBC-ENTMCNC: 36.3 GM/DL — HIGH (ref 32–36)
MCV RBC AUTO: 97.3 FL — SIGNIFICANT CHANGE UP (ref 80–100)
MONOCYTES # BLD AUTO: 0.38 K/UL — SIGNIFICANT CHANGE UP (ref 0–0.9)
MONOCYTES NFR BLD AUTO: 6.5 % — SIGNIFICANT CHANGE UP (ref 2–14)
NEUTROPHILS # BLD AUTO: 3.77 K/UL — SIGNIFICANT CHANGE UP (ref 1.8–7.4)
NEUTROPHILS NFR BLD AUTO: 64.2 % — SIGNIFICANT CHANGE UP (ref 43–77)
PLATELET # BLD AUTO: 161 K/UL — SIGNIFICANT CHANGE UP (ref 150–400)
POTASSIUM SERPL-MCNC: 3.9 MMOL/L — SIGNIFICANT CHANGE UP (ref 3.5–5.3)
POTASSIUM SERPL-SCNC: 3.9 MMOL/L — SIGNIFICANT CHANGE UP (ref 3.5–5.3)
PROT SERPL-MCNC: 6.4 G/DL — LOW (ref 6.6–8.7)
PROTHROM AB SERPL-ACNC: 14.4 SEC — HIGH (ref 10.5–13.4)
RBC # BLD: 4.73 M/UL — SIGNIFICANT CHANGE UP (ref 4.2–5.8)
RBC # FLD: 12.4 % — SIGNIFICANT CHANGE UP (ref 10.3–14.5)
SODIUM SERPL-SCNC: 134 MMOL/L — LOW (ref 135–145)
TROPONIN T SERPL-MCNC: <0.01 NG/ML — SIGNIFICANT CHANGE UP (ref 0–0.06)
WBC # BLD: 5.88 K/UL — SIGNIFICANT CHANGE UP (ref 3.8–10.5)
WBC # FLD AUTO: 5.88 K/UL — SIGNIFICANT CHANGE UP (ref 3.8–10.5)

## 2023-05-27 PROCEDURE — 99497 ADVNCD CARE PLAN 30 MIN: CPT | Mod: 25

## 2023-05-27 PROCEDURE — 99223 1ST HOSP IP/OBS HIGH 75: CPT

## 2023-05-27 PROCEDURE — 71045 X-RAY EXAM CHEST 1 VIEW: CPT | Mod: 26

## 2023-05-27 PROCEDURE — 93010 ELECTROCARDIOGRAM REPORT: CPT | Mod: 77

## 2023-05-27 PROCEDURE — 93010 ELECTROCARDIOGRAM REPORT: CPT | Mod: 76

## 2023-05-27 PROCEDURE — 99285 EMERGENCY DEPT VISIT HI MDM: CPT

## 2023-05-27 PROCEDURE — 93306 TTE W/DOPPLER COMPLETE: CPT | Mod: 26

## 2023-05-27 RX ORDER — ALPRAZOLAM 0.25 MG
0.5 TABLET ORAL
Refills: 0 | Status: DISCONTINUED | OUTPATIENT
Start: 2023-05-27 | End: 2023-05-28

## 2023-05-27 RX ORDER — GABAPENTIN 400 MG/1
100 CAPSULE ORAL
Refills: 0 | Status: DISCONTINUED | OUTPATIENT
Start: 2023-05-27 | End: 2023-05-29

## 2023-05-27 RX ORDER — BUPRENORPHINE AND NALOXONE 2; .5 MG/1; MG/1
1 TABLET SUBLINGUAL DAILY
Refills: 0 | Status: DISCONTINUED | OUTPATIENT
Start: 2023-05-27 | End: 2023-05-28

## 2023-05-27 RX ORDER — TAMSULOSIN HYDROCHLORIDE 0.4 MG/1
0.4 CAPSULE ORAL AT BEDTIME
Refills: 0 | Status: DISCONTINUED | OUTPATIENT
Start: 2023-05-27 | End: 2023-05-29

## 2023-05-27 RX ORDER — DILTIAZEM HCL 120 MG
0 CAPSULE, EXT RELEASE 24 HR ORAL
Qty: 0 | Refills: 0 | DISCHARGE

## 2023-05-27 RX ORDER — APIXABAN 2.5 MG/1
5 TABLET, FILM COATED ORAL EVERY 12 HOURS
Refills: 0 | Status: DISCONTINUED | OUTPATIENT
Start: 2023-05-27 | End: 2023-05-29

## 2023-05-27 RX ORDER — GABAPENTIN 400 MG/1
1 CAPSULE ORAL
Refills: 0 | DISCHARGE

## 2023-05-27 RX ORDER — METOPROLOL TARTRATE 50 MG
25 TABLET ORAL DAILY
Refills: 0 | Status: DISCONTINUED | OUTPATIENT
Start: 2023-05-27 | End: 2023-05-29

## 2023-05-27 RX ORDER — ALBUTEROL 90 UG/1
2 AEROSOL, METERED ORAL EVERY 6 HOURS
Refills: 0 | Status: DISCONTINUED | OUTPATIENT
Start: 2023-05-27 | End: 2023-05-29

## 2023-05-27 RX ORDER — ASPIRIN/CALCIUM CARB/MAGNESIUM 324 MG
324 TABLET ORAL ONCE
Refills: 0 | Status: COMPLETED | OUTPATIENT
Start: 2023-05-27 | End: 2023-05-27

## 2023-05-27 RX ADMIN — APIXABAN 5 MILLIGRAM(S): 2.5 TABLET, FILM COATED ORAL at 17:59

## 2023-05-27 RX ADMIN — TAMSULOSIN HYDROCHLORIDE 0.4 MILLIGRAM(S): 0.4 CAPSULE ORAL at 22:18

## 2023-05-27 RX ADMIN — Medication 1 MILLIGRAM(S): at 08:47

## 2023-05-27 RX ADMIN — Medication 25 MILLIGRAM(S): at 18:00

## 2023-05-27 RX ADMIN — BUPRENORPHINE AND NALOXONE 1 TABLET(S): 2; .5 TABLET SUBLINGUAL at 18:00

## 2023-05-27 RX ADMIN — Medication 0.5 MILLIGRAM(S): at 23:18

## 2023-05-27 RX ADMIN — Medication 1 TABLET(S): at 18:00

## 2023-05-27 RX ADMIN — GABAPENTIN 100 MILLIGRAM(S): 400 CAPSULE ORAL at 18:00

## 2023-05-27 RX ADMIN — Medication 324 MILLIGRAM(S): at 11:25
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
## 2023-05-27 NOTE — ED PROVIDER NOTE - OBJECTIVE STATEMENT
63-year-old male with history of hypertension hyperlipidemia if AICD in place history of oral opioids on Suboxone on chronic Xanax for pain management and anxiety depression presents with substernal chest pain for few days which feels like an anxiety episode and is feeling very shaky and tired and weak.  Patient says that he was following up with a doctor who has been providing him Xanax and Suboxone for years but there was a change in the  and he started requesting payments for Suboxone which he has never made and then he has not taken Xanax for 1 week due to that conflict.  Patient is in the process of getting in another doctor.  Patient also lost his wife 2 years ago on the around this time and is feeling sad and depressed

## 2023-05-27 NOTE — PATIENT PROFILE ADULT - FUNCTIONAL ASSESSMENT - BASIC MOBILITY 4.
HPI:  The patient is 78 y.o. female with a past medical history significant for HTN and obesity who is here for hospital follow up. Hospitalized from 12/4/2017-12/6/2017 with NSTEMI, HTN, chest pain and CAP. Had angiogram on 12/5/2017 and had GUILHERME placed to the LAD and POBA D1. Overall doing well. Interested in cardiac rehab. Denies anginal chest pain/discomfort, SOB, orthopnea/PND, cough, palpitations, dizziness, syncope, edema , weight change or claudication. Has resumed normal activities at home. Has had episodes of \"heartburn\" and reflux. Assessment:    1. NSTEMI (non-ST elevated myocardial infarction) ([facility] [address]  -S/P GUILHERME to LAD and POBA to Dg  -LVEF 50%  -no recurrent angina  -continue medical management with DAPT, statin and BB  -risk factor modification  -25 % residual RCA stenosis    2. Gastroesophageal reflux disease, esophagitis presence not specified  -will add Pepcid     3. Essential hypertension  -controlled  -continue medical management    4. RBBB      5. Hyperlipidemia LDL goal <70  -now on statin  -recommend repeating lipid/liver profile in 3 months (~ Feb/March 2018)    Plan:    Continue ASA, lipitor, HCTZ, metoprolol, Brilinta  Add pepcid 20 mg daily (see if heartburn/reflux improve)  Discussed low fat/low sodium diet and reinforced regular aerobic exercise. Check lipids/liver profile in 3 months (Feb-March 2018)  Referred to cardiac rehab  Follow up with Dr. lEvin Josue in 2-3 months [facility] office)    Return in about 2 months (around 2/14/2018) for 2-3 months with Dr. Elvin Chow office). Thanks for allowing me to participate in the care of this patient. 4 = No assist / stand by assistance

## 2023-05-27 NOTE — H&P ADULT - HISTORY OF PRESENT ILLNESS
63 yr old male with atrial flutter s/p cardioversion, chronic pain on Suboxone, anxiety, depression, BPH, PE 8/2020 (on Eliquis), COPD, s/p ILR, hypoxic respiratory failure s/p intubation and tracheostomy since closed now walks into ED with c/o left sided chest pain, intermittent, non radiating with shortness of breath and dizziness x 2 days. Seen by Cards in ED for EKG with new diffuse ST depressions while tachycardic. . O/P cardiac w/u planned. However he states that he fought with his pain mngmt doctor o/p as he could not afford his fees and has been off Xanax. He states he is still taking his Suboxone and gabapentin.   Currently lying in bed comfortably states his CP is intermittent sharp left side of chest non radiating no inciting/ reliving/ exacerbating factors, no associated symptoms.     SH - Quit Alcohol use in 2005; Quit smoking in 2021; now on suboxone/ benzo and opiod- switched to gabapentin  FH- unknown to patient

## 2023-05-27 NOTE — GOALS OF CARE CONVERSATION - ADVANCED CARE PLANNING - CONVERSATION DETAILS
given his multiple comorbidities and multiple hospital visits and opioid use, gaols of care discused    He understands that he needs to make this decision    He wants to think about this    Until then he wants to be resuscitated

## 2023-05-27 NOTE — ED ADULT NURSE NOTE - NSFALLUNIVINTERV_ED_ALL_ED
Bed/Stretcher in lowest position, wheels locked, appropriate side rails in place/Call bell, personal items and telephone in reach/Instruct patient to call for assistance before getting out of bed/chair/stretcher/Non-slip footwear applied when patient is off stretcher/[place] to call system/Physically safe environment - no spills, clutter or unnecessary equipment/Purposeful proactive rounding/Room/bathroom lighting operational, light cord in reach

## 2023-05-27 NOTE — CONSULT NOTE ADULT - NS ATTEND AMEND GEN_ALL_CORE FT
chest pain : atypical.  Non-specific ST- T changes .  ntihinn states he ran out of xanax and does not have prescirption and also not on his subxoone.   prior BZD abuise.   last stress 2021.    2 sts of trops and Transthoracic echocardiogram . if unremarkable  , then outpiatent stress test .  Aspirin statins.   will sign off. please call for further questions.

## 2023-05-27 NOTE — H&P ADULT - NSHPPHYSICALEXAM_GEN_ALL_CORE
Vital Signs Last 24 Hrs  T(C): 37 (05-27-23 @ 11:30), Max: 37 (05-27-23 @ 11:30)  T(F): 98.6 (05-27-23 @ 11:30), Max: 98.6 (05-27-23 @ 11:30)  HR: 52 (05-27-23 @ 11:30) (52 - 99)  BP: 133/72 (05-27-23 @ 11:30) (121/80 - 161/95)  BP(mean): --  RR: 18 (05-27-23 @ 11:30) (17 - 18)  SpO2: 99% (05-27-23 @ 11:30) (97% - 99%)    GENERAL: Lying comfortably in bed, asking for food, edentulous, denies pain now  HEAD:  Atraumatic, Normocephalic  EYES: EOMI, PERRLA, conjunctiva and sclera clear  NECK: Supple, No JVD, Normal thyroid  NERVOUS SYSTEM:  Alert & Oriented X 3, Motor Strength 5/5 B/L upper and lower extremities;  CHEST/LUNG: CTA bilaterally; No rales, rhonchi, wheezing, or rubs  HEART: Regular rate and rhythm; No murmurs, rubs, or gallops  ABDOMEN: Soft, Nontender, Nondistended; Bowel sounds present  EXTREMITIES:  2+ Peripheral Pulses, No clubbing, cyanosis, or edema; no calf tenderness  SKIN: No rashes or lesions

## 2023-05-27 NOTE — ED ADULT NURSE NOTE - OBJECTIVE STATEMENT
a&ox4 c/o intermittent non radiating left sided chest pain "sharp pressure" associated with SOB x 2 days. pt reports hx afib, has defib in place. on suboxone and xanax "which ran out 2 days ago when pain happened". + nausea   pt denies further symptoms; numb/tingling/weakness/jaw pain/sweats/arm pain/injury/falls. a&ox4 c/o intermittent non radiating left sided chest pain "sharp pressure" associated with SOB x 2 days. pt reports hx afib, has defib in place. on suboxone and xanax "which ran out 2 days ago when pain happened". + nausea + intermittent sweats  pt denies further symptoms; numb/tingling/weakness/jaw pain/sweats/arm pain/injury/falls.

## 2023-05-27 NOTE — CONSULT NOTE ADULT - PROBLEM SELECTOR RECOMMENDATION 2
- Hx of typical atrial flutter s/p ablation.   - F/u with Dr. Pappas.   - Continue Eliquis 5mg PO BID.

## 2023-05-27 NOTE — CONSULT NOTE ADULT - ASSESSMENT
A/P: Patient is a 64 y/o M active smoker with a PMHx of recurrent PEs (2020/2021), typical atrial flutter s/p ablation s/p ILR, COPD, hypoxic respiratory failure s/p intubation and tracheostomy, and anxiety who presented to the ED with chest pain. Patient states that starting yesterday he was beginning to feel very anxious as he had not been on his Suboxone and xanax for the last week. Patient states he then began to feel a severe, 8/10, sharp, stabbing left sided chest pain. Patient states that the pain was constant, and persistent. Patient states that he is still having this pain now. Patient states that he sometimes has dyspnea on exertion at baseline. Patient denies fevers, chills, SOB, syncope, near syncope, abdominal pain, N/V/D, headache, or dizziness.   Troponin negative x 1

## 2023-05-27 NOTE — ED PROVIDER NOTE - CRANIAL NERVE AND PUPILLARY EXAM
cranial nerves 2-12 intact/cough reflex intact/corneal reflex intact/central and peripheral vision intact/peripheral vision intact/extra-ocular movements intact/gag reflex intact

## 2023-05-27 NOTE — ED PROVIDER NOTE - PROGRESS NOTE DETAILS
Patient had an abnormal EKG with diffuse ST depressions but no elevations.  Patient was seen by cardiology and recommended D-dimer echo and inpatient ischemic work-up.  D-dimer is negative echo is ordered and patient admitted for abnormal EKG with chest pain and benzo withdrawal

## 2023-05-27 NOTE — ED PROVIDER NOTE - PHYSICAL EXAMINATION
Patient is alert well-appearing male appears anxious, patient is edentulous, S1-S2 is normal regular, bilateral clear breath sounds, abdomen soft nontender nondistended, neuro exam is alert oriented x3 with peripheral tremors noted on extension of the arms coordination is normal, skin warm dry moderate turgor

## 2023-05-27 NOTE — ED PROVIDER NOTE - CARE PLAN
Principal Discharge DX:	Benzodiazepine withdrawal with complication  Secondary Diagnosis:	Abnormal EKG  Secondary Diagnosis:	Acute chest pain   1

## 2023-05-27 NOTE — CONSULT NOTE ADULT - PROBLEM SELECTOR RECOMMENDATION 9
- Troponin negative x 1, continue to trend.   - EKG with new diffuse ST depressions while tachycardic.   - Repeat EKG ordered.   - Hx of recurrent PEs.   - Check D-Dimer.   - Obtain TTE.   - Patient wants to remain inpatient for workup.   - Last NST 03/21 with no ischemia. - Troponin negative x 1, continue to trend.   - EKG with new diffuse ST depressions while tachycardic.   - Repeat EKG ordered.   - Hx of recurrent PEs.   - Check D-Dimer.   - Obtain TTE.   - Last NST 03/21 with no ischemia.  - If echo and other workup negative for acute changes, patient can be discharged home on aspirin and statin with outpatient for stress testing.

## 2023-05-27 NOTE — ED ADULT NURSE NOTE - IS THE PATIENT ABLE TO BE SCREENED?
Informed by Qing Billings, that N[facility] choice is [facility] Spoke with Eugenia at [address] 2PM d/c time d/c time requested. [facility] accepted transport as above. SW updated pt's dtr, Hari Henriquez, and she agrees with plan.   RN Yes

## 2023-05-27 NOTE — H&P ADULT - ASSESSMENT
63 yr old male with typical atrial flutter s/p ablation s/p ILR, chronic pain on Suboxone, anxiety, depression, BPH, PE 8/2020 (on Eliquis), COPD, s/p ILR, hypoxic respiratory failure s/p intubation and tracheostomy since closed now walks into ED with c/o left sided chest pain, intermittent, non radiating with shortness of breath and dizziness x 2 days. Seen by Cards in ED for EKG with new diffuse ST depressions while tachycardic. . O/P cardiac w/u planned. However he states that he fought with his pain mngmt doctor o/p as he could not afford his fees and has been off Xanax. He states he is still taking his Suboxone and gabapentin.   Currently lying in bed comfortably states his CP is intermittent sharp left side of chest non radiating no inciting/ reliving/ exacerbating factors, no associated symptoms.     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 Cont here  Gabapentin 300 bid    # COPD  Inhaler PRN    # A flutter s/p ablation and ILR, on Eliquis  Cont home meds    # Eliquis  # Anticipate d/c home by tomorrow latest 63 yr old male with typical atrial flutter s/p ablation s/p ILR, chronic pain on Suboxone, anxiety, depression, BPH, PE 8/2020 (on Eliquis), COPD, s/p ILR, hypoxic respiratory failure s/p intubation and tracheostomy since closed now walks into ED with c/o left sided chest pain, intermittent, non radiating with shortness of breath and dizziness x 2 days. Seen by Cards in ED for EKG with new diffuse ST depressions while tachycardic. . O/P cardiac w/u planned. However he states that he fought with his pain mngmt doctor o/p as he could not afford his fees and has been off Xanax. He states he is still taking his Suboxone and gabapentin.   Currently lying in bed comfortably states his CP is intermittent sharp left side of chest non radiating no inciting/ reliving/ exacerbating factors, no associated symptoms.     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 Cont here  Gabapentin 300 bid    # COPD  Inhaler PRN    # A flutter s/p ablation and ILR, on Eliquis  Cont home meds    # Eliquis  # Anticipate d/c home by tomorrow latest

## 2023-05-27 NOTE — ED ADULT TRIAGE NOTE - CHIEF COMPLAINT QUOTE
ambulatory to triage c/o left sided chest pain, intermittent, non radiating with shortness of breath and dizziness x 2 days. takes plavix, has a defib. ekg done prior to triage.

## 2023-05-28 PROCEDURE — 93010 ELECTROCARDIOGRAM REPORT: CPT

## 2023-05-28 PROCEDURE — 71275 CT ANGIOGRAPHY CHEST: CPT | Mod: 26

## 2023-05-28 PROCEDURE — 99233 SBSQ HOSP IP/OBS HIGH 50: CPT

## 2023-05-28 PROCEDURE — 90792 PSYCH DIAG EVAL W/MED SRVCS: CPT

## 2023-05-28 RX ORDER — DULOXETINE HYDROCHLORIDE 30 MG/1
20 CAPSULE, DELAYED RELEASE ORAL ONCE
Refills: 0 | Status: COMPLETED | OUTPATIENT
Start: 2023-05-28 | End: 2023-05-28

## 2023-05-28 RX ORDER — BUPRENORPHINE AND NALOXONE 2; .5 MG/1; MG/1
1 TABLET SUBLINGUAL
Qty: 10 | Refills: 0
Start: 2023-05-28 | End: 2023-06-01

## 2023-05-28 RX ORDER — BUPRENORPHINE AND NALOXONE 2; .5 MG/1; MG/1
1 TABLET SUBLINGUAL
Refills: 0 | Status: DISCONTINUED | OUTPATIENT
Start: 2023-05-28 | End: 2023-05-29

## 2023-05-28 RX ORDER — ALPRAZOLAM 0.25 MG
0.5 TABLET ORAL EVERY 8 HOURS
Refills: 0 | Status: DISCONTINUED | OUTPATIENT
Start: 2023-05-28 | End: 2023-05-29

## 2023-05-28 RX ORDER — ALPRAZOLAM 0.25 MG
0.5 TABLET ORAL ONCE
Refills: 0 | Status: DISCONTINUED | OUTPATIENT
Start: 2023-05-28 | End: 2023-05-28

## 2023-05-28 RX ORDER — DULOXETINE HYDROCHLORIDE 30 MG/1
20 CAPSULE, DELAYED RELEASE ORAL DAILY
Refills: 0 | Status: DISCONTINUED | OUTPATIENT
Start: 2023-05-28 | End: 2023-05-29

## 2023-05-28 RX ORDER — ACETAMINOPHEN 500 MG
650 TABLET ORAL EVERY 6 HOURS
Refills: 0 | Status: DISCONTINUED | OUTPATIENT
Start: 2023-05-28 | End: 2023-05-29

## 2023-05-28 RX ORDER — ALPRAZOLAM 0.25 MG
1 TABLET ORAL
Qty: 15 | Refills: 0
Start: 2023-05-28 | End: 2023-06-01

## 2023-05-28 RX ADMIN — GABAPENTIN 100 MILLIGRAM(S): 400 CAPSULE ORAL at 05:30

## 2023-05-28 RX ADMIN — GABAPENTIN 100 MILLIGRAM(S): 400 CAPSULE ORAL at 17:21

## 2023-05-28 RX ADMIN — TAMSULOSIN HYDROCHLORIDE 0.4 MILLIGRAM(S): 0.4 CAPSULE ORAL at 21:46

## 2023-05-28 RX ADMIN — DULOXETINE HYDROCHLORIDE 20 MILLIGRAM(S): 30 CAPSULE, DELAYED RELEASE ORAL at 12:27

## 2023-05-28 RX ADMIN — DULOXETINE HYDROCHLORIDE 20 MILLIGRAM(S): 30 CAPSULE, DELAYED RELEASE ORAL at 12:26

## 2023-05-28 RX ADMIN — Medication 1 TABLET(S): at 11:20

## 2023-05-28 RX ADMIN — Medication 0.5 MILLIGRAM(S): at 14:09

## 2023-05-28 RX ADMIN — APIXABAN 5 MILLIGRAM(S): 2.5 TABLET, FILM COATED ORAL at 17:21

## 2023-05-28 RX ADMIN — Medication 0.5 MILLIGRAM(S): at 05:45

## 2023-05-28 RX ADMIN — Medication 650 MILLIGRAM(S): at 11:50

## 2023-05-28 RX ADMIN — BUPRENORPHINE AND NALOXONE 1 TABLET(S): 2; .5 TABLET SUBLINGUAL at 21:46

## 2023-05-28 RX ADMIN — Medication 0.5 MILLIGRAM(S): at 21:46

## 2023-05-28 RX ADMIN — Medication 0.5 MILLIGRAM(S): at 18:43

## 2023-05-28 RX ADMIN — Medication 650 MILLIGRAM(S): at 10:50

## 2023-05-28 RX ADMIN — APIXABAN 5 MILLIGRAM(S): 2.5 TABLET, FILM COATED ORAL at 05:30

## 2023-05-28 NOTE — PROGRESS NOTE ADULT - ASSESSMENT
63 yr old male with typical atrial flutter s/p ablation s/p ILR, chronic pain on Suboxone, anxiety, depression, BPH, PE 8/2020 (on Eliquis), COPD, s/p ILR, hypoxic respiratory failure s/p intubation and tracheostomy since closed now walks into ED with c/o left sided chest pain, intermittent, non radiating with shortness of breath and dizziness x 2 days. Seen by Cards in ED for EKG with new diffuse ST depressions while tachycardic. . O/P cardiac w/u planned. However he states that he fought with his pain mngmt doctor o/p as he could not afford his fees and has been off Xanax. He states he is still taking his Suboxone and gabapentin.   Currently lying in bed comfortably states his CP is intermittent sharp left side of chest non radiating no inciting/ reliving/ exacerbating factors, no associated symptoms.     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 Cont here  Gabapentin 300 bid    # COPD  Inhaler PRN    # A flutter s/p ablation and ILR, on Eliquis  Cont home meds    #Depression  Seen by   c/w xanax, c/w suboxone, must f/u w/ outpatient providers to continue dosage beyond 5 days  start cymbalta 20mg Q24    #Healthcare Maintenance  DVT PPX - Eliquis  Dispo - Medically cleared for discharge, pharmacy not open so unable to  meds until tomorrow.

## 2023-05-28 NOTE — BH CONSULTATION LIAISON ASSESSMENT NOTE - HPI (INCLUDE ILLNESS QUALITY, SEVERITY, DURATION, TIMING, CONTEXT, MODIFYING FACTORS, ASSOCIATED SIGNS AND SYMPTOMS)
Patient a 64 y/o male, unemployed, non-caregiver, domiciled with step son, hx of Depression, no prior Psychiatric intervention, no prior SA, denied drug abuse hx,  for past 3 years, and endorses feeling depressed with passive SI. Medically has Back pain for which he takes Suboxone/Xanax for anxiety.    Patient in bed AAOX3, endorses that he was admitted for chest pain and  work-up was negative and thus was about to be discharged home. Psychiatric consult was called in for evaluation for depression. 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 He endorses having back pain due to a fall from a ladder and  thus was prescribed Suboxone 89 mg/2 mg BID with Xanax 0.5 mg TID PRN. He denied current SI, has passive SI. He endorses that he would like to have an anti-depressant and also plans to get off from Suboxone/Xanax etc. He was advised to slowly taper off from Suboxone/Xanax. No perceptual experiences noted, and denied A/V/H or paranoid beliefs, he also has good relations with his Step-children. he agreed to take Cymbalta 20 mg and 1 dose was given  today.

## 2023-05-28 NOTE — BH CONSULTATION LIAISON ASSESSMENT NOTE - CURRENT MEDICATION
MEDICATIONS  (STANDING):  ALPRAZolam 0.5 milliGRAM(s) Oral every 8 hours  apixaban 5 milliGRAM(s) Oral every 12 hours  buprenorphine 8 mG/naloxone 2 mG SL  Tablet 1 Tablet(s) SubLingual <User Schedule>  DULoxetine 20 milliGRAM(s) Oral daily  DULoxetine 20 milliGRAM(s) Oral once  gabapentin 100 milliGRAM(s) Oral two times a day  metoprolol succinate ER 25 milliGRAM(s) Oral daily  multivitamin 1 Tablet(s) Oral daily  tamsulosin 0.4 milliGRAM(s) Oral at bedtime    MEDICATIONS  (PRN):  acetaminophen     Tablet .. 650 milliGRAM(s) Oral every 6 hours PRN Temp greater or equal to 38C (100.4F), Mild Pain (1 - 3)  albuterol    90 MICROgram(s) HFA Inhaler 2 Puff(s) Inhalation every 6 hours PRN Shortness of Breath and/or Wheezing

## 2023-05-28 NOTE — BH CONSULTATION LIAISON ASSESSMENT NOTE - SUMMARY
Patient a 64 y/o male, unemployed, non-caregiver, domiciled with step son, hx of Depression, no prior Psychiatric intervention, no prior SA, denied drug abuse hx,  for past 3 years, and endorses feeling depressed with passive SI. Medically has Back pain for which he takes Suboxone/Xanax for anxiety.    Patient in bed AAOX3, endorses that he was admitted for chest pain and  work-up was negative and thus was about to be discharged home. Psychiatric consult was called in for evaluation for depression. 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 He endorses having back pain due to a fall from a ladder and  thus was prescribed Suboxone 8 mg/2 mg BID with Xanax 0.5 mg TID PRN. He denied current SI, has passive SI. He endorses that he would like to have an anti-depressant and also plans to get off from Suboxone/Xanax etc. He was advised to slowly taper off from Suboxone/Xanax. No perceptual experiences noted, and denied A/V/H or paranoid beliefs, he also has good relations with his Step-children. he agreed to take Cymbalta 20 mg and 1 dose was given  today.    Plan: Psychiatrically cleared          Suboxone 8 mg/2 mg BID-5 days          Xanax 0.5 mg BID-5 days

## 2023-05-28 NOTE — PROGRESS NOTE ADULT - SUBJECTIVE AND OBJECTIVE BOX
Hospitalist Progress Note    Chief Complaint:  Chest Pain    SUBJECTIVE / OVERNIGHT EVENTS:  Admitted yesterday, patient seen at bedside, mildly anxious, complaining of chest pain, reproducible, workup negative, CTA negative. Patient denies SOB, abd pain, N/V, fever, chills, dysuria or any other complaints. All remainder ROS negative.     MEDICATIONS  (STANDING):  ALPRAZolam 0.5 milliGRAM(s) Oral every 8 hours  apixaban 5 milliGRAM(s) Oral every 12 hours  buprenorphine 8 mG/naloxone 2 mG SL  Tablet 1 Tablet(s) SubLingual <User Schedule>  DULoxetine 20 milliGRAM(s) Oral daily  DULoxetine 20 milliGRAM(s) Oral once  gabapentin 100 milliGRAM(s) Oral two times a day  metoprolol succinate ER 25 milliGRAM(s) Oral daily  multivitamin 1 Tablet(s) Oral daily  tamsulosin 0.4 milliGRAM(s) Oral at bedtime    MEDICATIONS  (PRN):  acetaminophen     Tablet .. 650 milliGRAM(s) Oral every 6 hours PRN Temp greater or equal to 38C (100.4F), Mild Pain (1 - 3)  albuterol    90 MICROgram(s) HFA Inhaler 2 Puff(s) Inhalation every 6 hours PRN Shortness of Breath and/or Wheezing        I&O's Summary    27 May 2023 07:01  -  28 May 2023 07:00  --------------------------------------------------------  IN: 0 mL / OUT: 500 mL / NET: -500 mL        PHYSICAL EXAM:  Vital Signs Last 24 Hrs  T(C): 36.4 (28 May 2023 09:55), Max: 36.9 (27 May 2023 17:44)  T(F): 97.5 (28 May 2023 09:55), Max: 98.5 (27 May 2023 22:13)  HR: 49 (28 May 2023 09:55) (49 - 65)  BP: 110/60 (28 May 2023 09:55) (110/60 - 127/72)  BP(mean): --  RR: 18 (28 May 2023 09:55) (18 - 19)  SpO2: 97% (28 May 2023 09:55) (92% - 97%)    Parameters below as of 28 May 2023 09:55  Patient On (Oxygen Delivery Method): room air          GENERAL: Lying comfortably in bed, asking for food, edentulous, denies pain now  HEAD:  Atraumatic, Normocephalic  EYES: EOMI, PERRLA, conjunctiva and sclera clear  NECK: Supple, No JVD, Normal thyroid  NERVOUS SYSTEM:  Alert & Oriented X 3, Motor Strength 5/5 B/L upper and lower extremities;  CHEST/LUNG: CTA bilaterally; No rales, rhonchi, wheezing, or rubs  HEART: Regular rate and rhythm; No murmurs, rubs, or gallops  ABDOMEN: Soft, Nontender, Nondistended; Bowel sounds present  EXTREMITIES:  2+ Peripheral Pulses, No clubbing, cyanosis, or edema; no calf tenderness  SKIN: No rashes or lesions    LABS:                        16.7   5.88  )-----------( 161      ( 27 May 2023 08:17 )             46.0     05-27    134<L>  |  97  |  7.6<L>  ----------------------------<  156<H>  3.9   |  25.0  |  0.84    Ca    9.1      27 May 2023 08:17    TPro  6.4<L>  /  Alb  4.2  /  TBili  1.0  /  DBili  x   /  AST  25  /  ALT  10  /  AlkPhos  61  05-27    PT/INR - ( 27 May 2023 08:17 )   PT: 14.4 sec;   INR: 1.24 ratio         PTT - ( 27 May 2023 08:17 )  PTT:35.9 sec  CARDIAC MARKERS ( 27 May 2023 20:31 )  x     / <0.01 ng/mL / x     / x     / x      CARDIAC MARKERS ( 27 May 2023 15:26 )  x     / <0.01 ng/mL / x     / x     / x      CARDIAC MARKERS ( 27 May 2023 08:17 )  x     / <0.01 ng/mL / 76 U/L / x     / x              CAPILLARY BLOOD GLUCOSE            RADIOLOGY & ADDITIONAL TESTS:  Results Reviewed: Y  Imaging Personally Reviewed: N  Electrocardiogram Personally Reviewed: N

## 2023-05-28 NOTE — CHART NOTE - NSCHARTNOTEFT_GEN_A_CORE
Joann Lopez | Reference #: [id number]    PDI	My Rx	Current Rx	Drug Type	Rx Written	Rx Dispensed	Drug	Quantity	Days Supply	Prescriber Name	Prescriber CANDELARIA #	Payment Method	Dispenser  A	N	Y	O	05/22/2023	05/22/2023	buprenorphine-naloxone 8-2 mg sl film	30	15	Gordo Cristina G, DO	[id number]	Insurance	[facility]  A	N	N	O	05/16/2023	05/17/2023	buprenorphine-naloxone 8-2 mg sl film	10	5	Gordo Cristina G, 	[id number]	Insurance	[facility]  A	N	Y	B	04/28/2023	04/29/2023	alprazolam 0.5 mg tablet	120	30	Raman Mullen	[id number]	Insurance	[facility]  A	N	N	O	04/17/2023	04/18/2023	buprenorphine-naloxone 8-2 mg sl film	60	30	Raman Mullen	[id number]	Insurance	[facility]  A	N	N	B	04/17/2023	04/18/2023	alprazolam 0.5 mg tablet	60	15	Raman Mullen	[id number]	Insurance	[facility]  A	N	N	B	04/03/2023	04/03/2023	alprazolam 0.5 mg tablet	60	15	Raman Mullen	[id number]	Insurance	[facility]  A	N	N	B	03/07/2023	03/07/2023	alprazolam 0.5 mg tablet	120	30	Raman Mullen	[id number]	Insurance	[facility]  A	N	N	O	11/11/2022	03/04/2023	buprenorphine-naloxone 8-2 mg sl tablet	90	30	Raman Mullen	[id number]	Insurance	[facility]  A	N	N	B	02/19/2023	02/21/2023	alprazolam 1 mg tablet	60	15	Raman Mullen	[id number]	Insurance	[facility]  A	N	N	B	02/06/2023	02/06/2023	alprazolam 1 mg tablet	60	15	Raman Mullen	[id number]	Insurance	[facility]  A	N	N	O	11/11/2022	01/27/2023	buprenorphine-naloxone 8-2 mg sl tablet	90	30	Raman Mullen	[id number]	Insurance	[facility]  A	N	N	B	01/09/2023	01/11/2023	alprazolam 2 mg tablet	60	30	Raman Mullen	[id number]	Insurance	[facility]  A	N	N	B	01/09/2023	01/09/2023	diazepam 10 mg tablet	10	5	Raman Mullen	[id number]	Insurance	[facility]  A	N	N	B	01/04/2023	01/04/2023	diazepam 5 mg tablet	14	7	Raman Mullen	[id number]	Insurance	[facility]  A	N	N	O	11/11/2022	12/23/2022	buprenorphine-naloxone 8-2 mg sl tablet	90	30	Raman Mullen	[id number]	Insurance	[facility]  A	N	N	B	12/14/2022	12/14/2022	alprazolam 2 mg tablet	60	30	Raman Mullen	[id number]	Insurance	[facility]  A	N	N	B	12/07/2022	12/07/2022	diazepam 10 mg tablet	14	7	Raman Mullen	[id number]	Insurance	[facility]  A	N	N	B	11/17/2022	11/17/2022	alprazolam 2 mg tablet	60	30	Raman Mullen	[id number]	Insurance	[facility]  A	N	N	O	11/11/2022	11/11/2022	buprenorphine-naloxone 8-2 mg sl tablet	90	30	Raman Mullen	[id number]	Insurance	[facility]  A	N	N	B	11/10/2022	11/10/2022	diazepam 10 mg tablet	15	15	Raman Mullen	[id number]	Insurance	[facility]  A	N	N	B	10/18/2022	10/19/2022	alprazolam 2 mg tablet	60	30	Raman Mullen	[id number]	Insurance	[facility]  A	N	N	B	10/13/2022	10/13/2022	diazepam 5 mg tablet	14	7	Raman Mullen	[id number]	Insurance	[facility]  A	N	N	B	09/20/2022	09/20/2022	alprazolam 2 mg tablet	60	30	Raman Mullen	[id number]	Insurance	[facility]  A	N	N	O	07/23/2022	09/15/2022	buprenorphine-naloxone 8-2 mg sl film	90	30	Raman Mullen	[id number]	Insurance	[facility]  A	N	N	B	09/15/2022	09/15/2022	diazepam 5 mg tablet	14	7	Raman Mullen	[id number]	Insurance	[facility]  A	N	N	B	08/23/2022	08/23/2022	alprazolam 2 mg tablet	60	30	Raman Mullen	[id number]	Insurance	[facility]  A	N	N	B	08/20/2022	08/20/2022	diazepam 10 mg tablet	15	15	Raman Mullne	[id number]	Insurance	[facility]  A	N	N	B	07/23/2022	07/25/2022	alprazolam 2 mg tablet	60	30	Raman Mullen	[id number]	Insurance	[facility]  A	N	N	O	07/23/2022	07/25/2022	buprenorphine-naloxone 8-2 mg sl film	90	30	Raman Mullen	[id number]	Insurance	[facility]  A	N	N	O	03/22/2022	06/27/2022	buprenorphine-naloxone 8-2 mg sl film	90	30	Raman Mullen	[id number]	Insurance	[facility]  A	N	N	B	06/23/2022	06/27/2022	alprazolam 2 mg tablet	60	30	Raman Mullen	[id number]	Insurance	[facility]  A	N	N	B	05/27/2022	05/30/2022	alprazolam 2 mg tablet	60	30	Raman Mullen	[id number]	Insurance	[facility]

## 2023-05-28 NOTE — BH CONSULTATION LIAISON ASSESSMENT NOTE - NSBHCHARTREVIEWVS_PSY_A_CORE FT
Vital Signs Last 24 Hrs  T(C): 36.4 (28 May 2023 09:55), Max: 37 (27 May 2023 11:30)  T(F): 97.5 (28 May 2023 09:55), Max: 98.6 (27 May 2023 11:30)  HR: 49 (28 May 2023 09:55) (49 - 65)  BP: 110/60 (28 May 2023 09:55) (110/60 - 133/72)  BP(mean): --  RR: 18 (28 May 2023 09:55) (18 - 19)  SpO2: 97% (28 May 2023 09:55) (92% - 99%)    Parameters below as of 28 May 2023 09:55  Patient On (Oxygen Delivery Method): room air

## 2023-05-28 NOTE — BH CONSULTATION LIAISON ASSESSMENT NOTE - NSBHCHARTREVIEWLAB_PSY_A_CORE FT
16.7   5.88  )-----------( 161      ( 27 May 2023 08:17 )             46.0   05-27    134<L>  |  97  |  7.6<L>  ----------------------------<  156<H>  3.9   |  25.0  |  0.84    Ca    9.1      27 May 2023 08:17    TPro  6.4<L>  /  Alb  4.2  /  TBili  1.0  /  DBili  x   /  AST  25  /  ALT  10  /  AlkPhos  61  05-27

## 2023-05-29 ENCOUNTER — TRANSCRIPTION ENCOUNTER (OUTPATIENT)
Age: 63
End: 2023-05-29

## 2023-05-29 VITALS — HEART RATE: 66 BPM | DIASTOLIC BLOOD PRESSURE: 68 MMHG | SYSTOLIC BLOOD PRESSURE: 138 MMHG

## 2023-05-29 PROCEDURE — 80053 COMPREHEN METABOLIC PANEL: CPT

## 2023-05-29 PROCEDURE — 93005 ELECTROCARDIOGRAM TRACING: CPT

## 2023-05-29 PROCEDURE — 85730 THROMBOPLASTIN TIME PARTIAL: CPT

## 2023-05-29 PROCEDURE — 84484 ASSAY OF TROPONIN QUANT: CPT

## 2023-05-29 PROCEDURE — 82550 ASSAY OF CK (CPK): CPT

## 2023-05-29 PROCEDURE — 85610 PROTHROMBIN TIME: CPT

## 2023-05-29 PROCEDURE — 85025 COMPLETE CBC W/AUTO DIFF WBC: CPT

## 2023-05-29 PROCEDURE — 93307 TTE W/O DOPPLER COMPLETE: CPT

## 2023-05-29 PROCEDURE — 87635 SARS-COV-2 COVID-19 AMP PRB: CPT

## 2023-05-29 PROCEDURE — 85379 FIBRIN DEGRADATION QUANT: CPT

## 2023-05-29 PROCEDURE — 36415 COLL VENOUS BLD VENIPUNCTURE: CPT

## 2023-05-29 PROCEDURE — 71045 X-RAY EXAM CHEST 1 VIEW: CPT

## 2023-05-29 PROCEDURE — 99285 EMERGENCY DEPT VISIT HI MDM: CPT | Mod: 25

## 2023-05-29 PROCEDURE — G1004: CPT

## 2023-05-29 PROCEDURE — 99239 HOSP IP/OBS DSCHRG MGMT >30: CPT

## 2023-05-29 PROCEDURE — 71275 CT ANGIOGRAPHY CHEST: CPT | Mod: ME

## 2023-05-29 PROCEDURE — 96374 THER/PROPH/DIAG INJ IV PUSH: CPT

## 2023-05-29 PROCEDURE — 93010 ELECTROCARDIOGRAM REPORT: CPT

## 2023-05-29 RX ORDER — ALBUTEROL 90 UG/1
2 AEROSOL, METERED ORAL
Qty: 0 | Refills: 0 | DISCHARGE

## 2023-05-29 RX ORDER — DULOXETINE HYDROCHLORIDE 30 MG/1
1 CAPSULE, DELAYED RELEASE ORAL
Qty: 30 | Refills: 0
Start: 2023-05-29 | End: 2023-06-27

## 2023-05-29 RX ORDER — SODIUM CHLORIDE 9 MG/ML
1000 INJECTION, SOLUTION INTRAVENOUS
Refills: 0 | Status: DISCONTINUED | OUTPATIENT
Start: 2023-05-29 | End: 2023-05-29

## 2023-05-29 RX ADMIN — BUPRENORPHINE AND NALOXONE 1 TABLET(S): 2; .5 TABLET SUBLINGUAL at 08:22

## 2023-05-29 RX ADMIN — DULOXETINE HYDROCHLORIDE 20 MILLIGRAM(S): 30 CAPSULE, DELAYED RELEASE ORAL at 11:30

## 2023-05-29 RX ADMIN — Medication 25 MILLIGRAM(S): at 07:06

## 2023-05-29 RX ADMIN — Medication 0.5 MILLIGRAM(S): at 07:05

## 2023-05-29 RX ADMIN — Medication 1 TABLET(S): at 11:30

## 2023-05-29 RX ADMIN — APIXABAN 5 MILLIGRAM(S): 2.5 TABLET, FILM COATED ORAL at 07:06

## 2023-05-29 RX ADMIN — GABAPENTIN 100 MILLIGRAM(S): 400 CAPSULE ORAL at 07:05

## 2023-05-29 NOTE — DISCHARGE NOTE NURSING/CASE MANAGEMENT/SOCIAL WORK - NSDCPEFALRISK_GEN_ALL_CORE
For information on Fall & Injury Prevention, visit: [email or web address] OR  [email or web address] OR  https://www.cdc.gov/steadi/patient.html

## 2023-05-29 NOTE — DISCHARGE NOTE PROVIDER - POSTFACE STATEMENT FOR MINUTES SPENT
Progress Note      Patient Name: Suzanne Gutierrez   Patient ID: [id number]   Sex: Female   YOB: 1948    Primary Care Provider: Chelita Simpson MD   Referring Provider: Juany Palencia MD    Visit Date: Meenu 10, 2020    Provider: Radha Farias PA-C   Location: [facility]   Location Address: [address] [place] [address]  [id number]   Location Phone: [phone]          Chief Complaint  · Right knee pain      History Of Present Illness  Suzanne Gutierrez is a 72 year old /White female who presents today to [facility].      Patient is following up for right knee pain. Patient states pain on the medial and lateral sides of the knee. Patient states steroid injection provided relief for 1 week. Patient states pain is causing a limping gait. Patient states right knee will lock at times.       Past Medical History  ADHD (attention deficit hyperactivity disorder); Chronic Obstructive Pulmonary Disease; Conjunctivitis; Dyspnea; Hearing loss; Hoarseness; Insomnia; Limb Swelling; Lung disease; Myofacial muscle pain; Otalgia; Peripheral neuropathy; Pneumonia; Pulmonary fibrosis; Recurrent otitis media; Sinus congestion/allergies; Sleep apnea; Tinnitus, right; Vertigo; Vocal cord dysfunction         Past Surgical History  Appendectomy; Cholecstectomy; Colonoscopy; Gallbladder; Hysterectomy; Tonsilectomy         Medication List  Actimmune 100 mcg/0.5 mL subcutaneous solution; diclofenac sodium 75 mg oral tablet,delayed release (DR/EC); ProAir HFA 90 mcg/actuation inhalation HFA aerosol inhaler; Symbicort 80-4.5 mcg/actuation inhalation HFA aerosol inhaler; Vitamin D3 1,000 unit oral capsule         Allergy List  Excedrin Migraine; Keflex         Family Medical History  Heart Disease; Cancer, Unspecified; Breast Neoplasm; Melanoma; Diabetes         Social History  Alcohol Use (Never); .; Recreational Drug Use (Never); Tobacco (Former); Working         Review of  "Systems  · Constitutional  o Denies  o : fever, chills, weight loss  · Cardiovascular  o Denies  o : chest pain, shortness of breath  · Gastrointestinal  o Denies  o : liver disease, heartburn, nausea, blood in stools  · Genitourinary  o Denies  o : painful urination, blood in urine  · Integument  o Denies  o : rash, itching  · Neurologic  o Denies  o : headache, weakness, loss of consciousness  · Musculoskeletal  o Denies  o : painful, swollen joints  · Psychiatric  o Denies  o : drug/alcohol addiction, anxiety, depression      Vitals  Date Time BP Position Site L\R Cuff Size HR RR TEMP (F) WT  HT  BMI kg/m2 BSA m2 O2 Sat        06/10/2020 01:25 PM      108 - R   215lbs 8oz 5'  4\" 36.99 2.1 94 %          Physical Examination  · Constitutional  o Appearance  o : well developed, well-nourished, no obvious deformities present  · Head and Face  o Head  o :   § Inspection  § : normocephalic  o Face  o :   § Inspection  § : no facial lesions  · Eyes  o Conjunctivae  o : conjunctivae normal  o Sclerae  o : sclerae white  · Ears, Nose, Mouth and Throat  o Ears  o :   § External Ears  § : appearance within normal limits  § Hearing  § : intact  o Nose  o :   § External Nose  § : appearance normal  · Neck  o Inspection/Palpation  o : normal appearance  o Range of Motion  o : full range of motion  · Respiratory  o Respiratory Effort  o : breathing unlabored  o Inspection of Chest  o : normal appearance  o Auscultation of Lungs  o : no audible wheezing or rales  · Cardiovascular  o Heart  o : regular rate  · Gastrointestinal  o Abdominal Examination  o : soft and non-tender  · Skin and Subcutaneous Tissue  o General Inspection  o : intact, no rashes  · Psychiatric  o General  o : Alert and oriented x3  o Judgement and Insight  o : judgment and insight intact  o Mood and Affect  o : mood normal, affect appropriate  · Injection Note/Aspiration Note  o Site  o : right knee  o Procedure  o : Procedure: After educating the " patient, patient gave consent for the procedure. After using alcohol prep, injection was given. The patient tolerated the procedure well.   o Medication  o : 2ml's of 4 mg Dexamethasone  · Right Knee-Street  o Inspection  o : limping gait, weight bearing, swelling present, no ecchymosis, no atrophy, neutral alignment  o Palpation  o : tenderness at medial joint line, tenderness at lateral joint line, no patellar tendon tenderness, no pain of MCL, no pain at LCL  o ROM  o : full extension, full flexion  o Strength  o : full extension, full flexion  o Special Tests  o : negative ballotable effusion , negtive fluid wave, negative patellar compression, negative patellar apprehenison, positive Bruce's test, positive Apley's test, negative anterior drawer, negative posterior drawer, negative lachman's drawer , negative varus stress, negaitve valgus stress  o Neurovascular  o : Full sensation, Dorsal Pedal Pulse 2+, posteriror tibialis pulse 2+          Assessment  · Right knee pain, unspecified chronicity     719.46/M25.561      Plan  · Orders  o 2.00 - Dexamethasone Injection 8mg (-8) - 719.46/M25.561 - 06/10/2020   Lot [id number] Exp 01 2021 [facility] [employer] Administered by DALLIN WONG  o IM/SQ - Injection Fee [facility] ([id number]) - 719.46/M25.561 - 06/10/2020   Administered by DALLIN WONG  · Medications  o Medications have been Reconciled  o Transition of Care or Provider Policy  · Instructions  o Reviewed the patient's Past Medical, Social, and Family history as well as the ROS at today's visit, no changes.  o Call or return if worsening symptoms.  o Follow up after MRI.  o Electronically Identified Patient Education Materials Provided Electronically            Electronically Signed by: TERESE Dang-WAYNE -Author on Meenu 10, 2020 01:46:52 PM  Electronically Co-signed by: Nicola Langford MD -Reviewer on June 12, 2020 04:49:29 PM   minutes on the discharge service.

## 2023-05-29 NOTE — DISCHARGE NOTE PROVIDER - CARE PROVIDER_API CALL
[facility] family medicine clinic,   Phone: (   )    -  Fax: (   )    -  Follow Up Time:

## 2023-05-29 NOTE — DISCHARGE NOTE NURSING/CASE MANAGEMENT/SOCIAL WORK - PATIENT PORTAL LINK FT
You can access the FollowMyHealth Patient Portal offered by [facility] by registering at the following website: [email or web address]. By joining [employer]’s FollowMyHealth portal, you will also be able to view your health information using other applications (apps) compatible with our system.

## 2023-05-29 NOTE — DISCHARGE NOTE PROVIDER - ATTENDING DISCHARGE PHYSICAL EXAMINATION:
GENERAL: Lying comfortably in bed, asking for food, edentulous, denies pain now  HEAD:  Atraumatic, Normocephalic  EYES: EOMI, PERRLA, conjunctiva and sclera clear  NECK: Supple, No JVD, Normal thyroid  NERVOUS SYSTEM:  Alert & Oriented X 3, Motor Strength 5/5 B/L upper and lower extremities;  CHEST/LUNG: CTA bilaterally; No rales, rhonchi, wheezing, or rubs  HEART: Regular rate and rhythm; No murmurs, rubs, or gallops  ABDOMEN: Soft, Nontender, Nondistended; Bowel sounds present  EXTREMITIES:  2+ Peripheral Pulses, No clubbing, cyanosis, or edema; no calf tenderness  SKIN: No rashes or lesions

## 2023-05-29 NOTE — DISCHARGE NOTE PROVIDER - NSDCCPCAREPLAN_GEN_ALL_CORE_FT
PRINCIPAL DISCHARGE DIAGNOSIS  Diagnosis: Acute chest pain  Assessment and Plan of Treatment: your chest pain was not cardiac in origin, instead was likely anxiety related. please follow up with cardiology outpatient      SECONDARY DISCHARGE DIAGNOSES  Diagnosis: Benzodiazepine withdrawal with complication  Assessment and Plan of Treatment: short course of xanax, must follow up with outpatient pain and primary care providers. please seek help for withdrawals outpatient    Diagnosis: Abnormal EKG  Assessment and Plan of Treatment:     Diagnosis: Atrial flutter  Assessment and Plan of Treatment: Please continue with eliquis, follow up with cards outaptient    Diagnosis: Acute chest pain  Assessment and Plan of Treatment: your chest pain was not cardiac in origin, instead was likely anxiety related. please follow up with cardiology outpatient    Diagnosis: Depression, major  Assessment and Plan of Treatment: start cymbalta as directed, follow up with primary care outpatient

## 2023-05-29 NOTE — DISCHARGE NOTE PROVIDER - NSFOLLOWUPCLINICS_GEN_ALL_ED_FT
[address] [place] [address]  Phone: [phone]  Fax:   Follow Up Time: 1 week

## 2023-05-29 NOTE — DISCHARGE NOTE PROVIDER - HOSPITAL COURSE
63 yr old male with atrial flutter s/p cardioversion, chronic pain on Suboxone, anxiety, depression, BPH, PE 8/2020 (on Eliquis), COPD, s/p ILR, hypoxic respiratory failure s/p intubation and tracheostomy here for chest pain. Ruled out any cardiac origin, likely 2/2 anxiety, patient complaining of anxiety and need for prescribed xanax and suboxone, restarted on dosing, patient seen by , started on cymbalta and prescribed 5 days only of suboxone and xanax, sent to patient's pharmacy, patient deemed fit for DC home.

## 2023-05-29 NOTE — DISCHARGE NOTE PROVIDER - NSDCFUSCHEDAPPT_GEN_ALL_CORE_FT
[facility] Physician [facility]  ELECTROPH [address]  Scheduled Appointment: 06/05/2023    Gifty Chand  [facility]  CARDIOLOGY [address]  Scheduled Appointment: 06/22/2023    [facility]  [facility] [address]  Scheduled Appointment: 08/10/2023    Karri Prescott  [facility]  [facility] [address]  Scheduled Appointment: 08/10/2023

## 2023-05-29 NOTE — DISCHARGE NOTE PROVIDER - NSDCMRMEDTOKEN_GEN_ALL_CORE_FT
ALBUTEROL    AER HFA:   apixaban 5 mg oral tablet: 1 tab(s) orally every 12 hours  ascorbic acid 500 mg oral tablet: 1 tab(s) orally once a day  buprenorphine-naloxone 8 mg-2 mg sublingual tablet: 1 tab(s) sublingually 2 times a day MDD: 16mg/4mg  DULoxetine 20 mg oral delayed release capsule: 1 cap(s) orally once a day  gabapentin 300 mg oral tablet: 1 tab(s) orally 2 times a day  Metoprolol Succinate ER 25 mg oral tablet, extended release: 1 tab(s) orally once a day   Multiple Vitamins oral tablet: 1 tab(s) orally once a day  tamsulosin 0.4 mg oral capsule: 1 cap(s) orally once a day (at bedtime)  Xanax 0.5 mg oral tablet: 1 tab(s) orally 3 times a day MDD: 1.5mg/day

## 2023-06-05 ENCOUNTER — NON-APPOINTMENT (OUTPATIENT)
Age: 63
End: 2023-06-05

## 2023-06-05 ENCOUNTER — APPOINTMENT (OUTPATIENT)
Dept: ELECTROPHYSIOLOGY | Facility: CLINIC | Age: 63
End: 2023-06-05
Payer: MEDICARE

## 2023-06-05 PROCEDURE — 93298 REM INTERROG DEV EVAL SCRMS: CPT

## 2023-06-05 PROCEDURE — G2066: CPT

## 2023-06-11 ENCOUNTER — TRANSCRIPTION ENCOUNTER (OUTPATIENT)
Age: 63
End: 2023-06-11

## 2023-06-11 ENCOUNTER — INPATIENT (INPATIENT)
Facility: HOSPITAL | Age: 63
LOS: 9 days | Discharge: HOME | DRG: 354 | End: 2023-06-21
Attending: STUDENT IN AN ORGANIZED HEALTH CARE EDUCATION/TRAINING PROGRAM | Admitting: STUDENT IN AN ORGANIZED HEALTH CARE EDUCATION/TRAINING PROGRAM
Payer: MEDICARE

## 2023-06-11 VITALS
OXYGEN SATURATION: 100 % | HEART RATE: 58 BPM | SYSTOLIC BLOOD PRESSURE: 149 MMHG | HEIGHT: 72 IN | RESPIRATION RATE: 16 BRPM | WEIGHT: 175.27 LBS | TEMPERATURE: 98 F | DIASTOLIC BLOOD PRESSURE: 83 MMHG

## 2023-06-11 DIAGNOSIS — Z98.890 OTHER SPECIFIED POSTPROCEDURAL STATES: Chronic | ICD-10-CM

## 2023-06-11 DIAGNOSIS — Z90.49 ACQUIRED ABSENCE OF OTHER SPECIFIED PARTS OF DIGESTIVE TRACT: Chronic | ICD-10-CM

## 2023-06-11 LAB
ALBUMIN SERPL ELPH-MCNC: 4.2 G/DL — SIGNIFICANT CHANGE UP (ref 3.3–5.2)
ALP SERPL-CCNC: 60 U/L — SIGNIFICANT CHANGE UP (ref 40–120)
ALT FLD-CCNC: 8 U/L — SIGNIFICANT CHANGE UP
ANION GAP SERPL CALC-SCNC: 14 MMOL/L — SIGNIFICANT CHANGE UP (ref 5–17)
APTT BLD: 34 SEC — SIGNIFICANT CHANGE UP (ref 27.5–35.5)
AST SERPL-CCNC: 17 U/L — SIGNIFICANT CHANGE UP
BASOPHILS # BLD AUTO: 0.02 K/UL — SIGNIFICANT CHANGE UP (ref 0–0.2)
BASOPHILS NFR BLD AUTO: 0.3 % — SIGNIFICANT CHANGE UP (ref 0–2)
BILIRUB SERPL-MCNC: 0.8 MG/DL — SIGNIFICANT CHANGE UP (ref 0.4–2)
BUN SERPL-MCNC: 7 MG/DL — LOW (ref 8–20)
CALCIUM SERPL-MCNC: 9.1 MG/DL — SIGNIFICANT CHANGE UP (ref 8.4–10.5)
CHLORIDE SERPL-SCNC: 96 MMOL/L — SIGNIFICANT CHANGE UP (ref 96–108)
CO2 SERPL-SCNC: 25 MMOL/L — SIGNIFICANT CHANGE UP (ref 22–29)
CREAT SERPL-MCNC: 0.93 MG/DL — SIGNIFICANT CHANGE UP (ref 0.5–1.3)
EGFR: 93 ML/MIN/1.73M2 — SIGNIFICANT CHANGE UP
EOSINOPHIL # BLD AUTO: 0.01 K/UL — SIGNIFICANT CHANGE UP (ref 0–0.5)
EOSINOPHIL NFR BLD AUTO: 0.1 % — SIGNIFICANT CHANGE UP (ref 0–6)
GAS PNL BLDV: SIGNIFICANT CHANGE UP
GLUCOSE SERPL-MCNC: 105 MG/DL — HIGH (ref 70–99)
HCT VFR BLD CALC: 48.1 % — SIGNIFICANT CHANGE UP (ref 39–50)
HGB BLD-MCNC: 17.6 G/DL — HIGH (ref 13–17)
IMM GRANULOCYTES NFR BLD AUTO: 0.3 % — SIGNIFICANT CHANGE UP (ref 0–0.9)
INR BLD: 1.24 RATIO — HIGH (ref 0.88–1.16)
LIDOCAIN IGE QN: 9 U/L — LOW (ref 22–51)
LYMPHOCYTES # BLD AUTO: 0.93 K/UL — LOW (ref 1–3.3)
LYMPHOCYTES # BLD AUTO: 12.6 % — LOW (ref 13–44)
MCHC RBC-ENTMCNC: 35 PG — HIGH (ref 27–34)
MCHC RBC-ENTMCNC: 36.6 GM/DL — HIGH (ref 32–36)
MCV RBC AUTO: 95.6 FL — SIGNIFICANT CHANGE UP (ref 80–100)
MONOCYTES # BLD AUTO: 0.33 K/UL — SIGNIFICANT CHANGE UP (ref 0–0.9)
MONOCYTES NFR BLD AUTO: 4.5 % — SIGNIFICANT CHANGE UP (ref 2–14)
NEUTROPHILS # BLD AUTO: 6.1 K/UL — SIGNIFICANT CHANGE UP (ref 1.8–7.4)
NEUTROPHILS NFR BLD AUTO: 82.2 % — HIGH (ref 43–77)
PLATELET # BLD AUTO: 172 K/UL — SIGNIFICANT CHANGE UP (ref 150–400)
POTASSIUM SERPL-MCNC: 3.8 MMOL/L — SIGNIFICANT CHANGE UP (ref 3.5–5.3)
POTASSIUM SERPL-SCNC: 3.8 MMOL/L — SIGNIFICANT CHANGE UP (ref 3.5–5.3)
PROT SERPL-MCNC: 6.5 G/DL — LOW (ref 6.6–8.7)
PROTHROM AB SERPL-ACNC: 14.4 SEC — HIGH (ref 10.5–13.4)
RBC # BLD: 5.03 M/UL — SIGNIFICANT CHANGE UP (ref 4.2–5.8)
RBC # FLD: 11.9 % — SIGNIFICANT CHANGE UP (ref 10.3–14.5)
SODIUM SERPL-SCNC: 135 MMOL/L — SIGNIFICANT CHANGE UP (ref 135–145)
WBC # BLD: 7.41 K/UL — SIGNIFICANT CHANGE UP (ref 3.8–10.5)
WBC # FLD AUTO: 7.41 K/UL — SIGNIFICANT CHANGE UP (ref 3.8–10.5)

## 2023-06-11 PROCEDURE — 99285 EMERGENCY DEPT VISIT HI MDM: CPT | Mod: GC

## 2023-06-11 RX ORDER — ACETAMINOPHEN 500 MG
1000 TABLET ORAL ONCE
Refills: 0 | Status: COMPLETED | OUTPATIENT
Start: 2023-06-11 | End: 2023-06-11

## 2023-06-11 RX ORDER — MORPHINE SULFATE 50 MG/1
4 CAPSULE, EXTENDED RELEASE ORAL ONCE
Refills: 0 | Status: DISCONTINUED | OUTPATIENT
Start: 2023-06-11 | End: 2023-06-11

## 2023-06-11 RX ORDER — ONDANSETRON 8 MG/1
4 TABLET, FILM COATED ORAL ONCE
Refills: 0 | Status: COMPLETED | OUTPATIENT
Start: 2023-06-11 | End: 2023-06-11

## 2023-06-11 RX ADMIN — ONDANSETRON 4 MILLIGRAM(S): 8 TABLET, FILM COATED ORAL at 22:36

## 2023-06-11 RX ADMIN — Medication 1000 MILLIGRAM(S): at 00:05

## 2023-06-11 RX ADMIN — MORPHINE SULFATE 4 MILLIGRAM(S): 50 CAPSULE, EXTENDED RELEASE ORAL at 22:35

## 2023-06-11 RX ADMIN — MORPHINE SULFATE 4 MILLIGRAM(S): 50 CAPSULE, EXTENDED RELEASE ORAL at 23:35

## 2023-06-11 RX ADMIN — Medication 400 MILLIGRAM(S): at 23:35

## 2023-06-11 NOTE — ED ADULT NURSE NOTE - OBJECTIVE STATEMENT
PT is A&Ox4, PT reports to ED with complaints of new onset abdominal pain. . PT reports NV starting today, PT denies any fever or sick contacts, PT reports no chest pain or diff breathing. PT denies any abnormality in vomitus or changes in bowel habits.

## 2023-06-11 NOTE — ED ADULT TRIAGE NOTE - HEIGHT IN CM
Diabetes is unchanged.   Continue current treatment regimen.  Diabetes will be reassessed in 3 months.    Set up DexCom for sharing.   [phone]

## 2023-06-11 NOTE — ED ADULT TRIAGE NOTE - CHIEF COMPLAINT QUOTE
BIBEMS from home with history of hernia complaining of pain and N/V since this morning. Never has had an issue with it before. PMH COPD, persistent cough, 100% on RA. Given NS, ondansetron and 100mcg of fentanyl pre-hospitally by EMS.

## 2023-06-12 ENCOUNTER — TRANSCRIPTION ENCOUNTER (OUTPATIENT)
Age: 63
End: 2023-06-12

## 2023-06-12 ENCOUNTER — RESULT REVIEW (OUTPATIENT)
Age: 63
End: 2023-06-12

## 2023-06-12 DIAGNOSIS — K56.609 UNSPECIFIED INTESTINAL OBSTRUCTION, UNSPECIFIED AS TO PARTIAL VERSUS COMPLETE OBSTRUCTION: ICD-10-CM

## 2023-06-12 LAB
ANION GAP SERPL CALC-SCNC: 10 MMOL/L — SIGNIFICANT CHANGE UP (ref 5–17)
BASOPHILS # BLD AUTO: 0.02 K/UL — SIGNIFICANT CHANGE UP (ref 0–0.2)
BASOPHILS NFR BLD AUTO: 0.2 % — SIGNIFICANT CHANGE UP (ref 0–2)
BUN SERPL-MCNC: 7.8 MG/DL — LOW (ref 8–20)
CALCIUM SERPL-MCNC: 8.6 MG/DL — SIGNIFICANT CHANGE UP (ref 8.4–10.5)
CHLORIDE SERPL-SCNC: 98 MMOL/L — SIGNIFICANT CHANGE UP (ref 96–108)
CO2 SERPL-SCNC: 28 MMOL/L — SIGNIFICANT CHANGE UP (ref 22–29)
CREAT SERPL-MCNC: 1.04 MG/DL — SIGNIFICANT CHANGE UP (ref 0.5–1.3)
EGFR: 82 ML/MIN/1.73M2 — SIGNIFICANT CHANGE UP
EOSINOPHIL # BLD AUTO: 0.02 K/UL — SIGNIFICANT CHANGE UP (ref 0–0.5)
EOSINOPHIL NFR BLD AUTO: 0.2 % — SIGNIFICANT CHANGE UP (ref 0–6)
GLUCOSE SERPL-MCNC: 110 MG/DL — HIGH (ref 70–99)
HCT VFR BLD CALC: 45.6 % — SIGNIFICANT CHANGE UP (ref 39–50)
HGB BLD-MCNC: 16.4 G/DL — SIGNIFICANT CHANGE UP (ref 13–17)
IMM GRANULOCYTES NFR BLD AUTO: 0.2 % — SIGNIFICANT CHANGE UP (ref 0–0.9)
LYMPHOCYTES # BLD AUTO: 0.73 K/UL — LOW (ref 1–3.3)
LYMPHOCYTES # BLD AUTO: 9 % — LOW (ref 13–44)
MAGNESIUM SERPL-MCNC: 1.8 MG/DL — SIGNIFICANT CHANGE UP (ref 1.6–2.6)
MCHC RBC-ENTMCNC: 34.8 PG — HIGH (ref 27–34)
MCHC RBC-ENTMCNC: 36 GM/DL — SIGNIFICANT CHANGE UP (ref 32–36)
MCV RBC AUTO: 96.8 FL — SIGNIFICANT CHANGE UP (ref 80–100)
MONOCYTES # BLD AUTO: 0.3 K/UL — SIGNIFICANT CHANGE UP (ref 0–0.9)
MONOCYTES NFR BLD AUTO: 3.7 % — SIGNIFICANT CHANGE UP (ref 2–14)
NEUTROPHILS # BLD AUTO: 7.03 K/UL — SIGNIFICANT CHANGE UP (ref 1.8–7.4)
NEUTROPHILS NFR BLD AUTO: 86.7 % — HIGH (ref 43–77)
PHOSPHATE SERPL-MCNC: 3.4 MG/DL — SIGNIFICANT CHANGE UP (ref 2.4–4.7)
PLATELET # BLD AUTO: 148 K/UL — LOW (ref 150–400)
POTASSIUM SERPL-MCNC: 4.4 MMOL/L — SIGNIFICANT CHANGE UP (ref 3.5–5.3)
POTASSIUM SERPL-SCNC: 4.4 MMOL/L — SIGNIFICANT CHANGE UP (ref 3.5–5.3)
RBC # BLD: 4.71 M/UL — SIGNIFICANT CHANGE UP (ref 4.2–5.8)
RBC # FLD: 12.1 % — SIGNIFICANT CHANGE UP (ref 10.3–14.5)
SODIUM SERPL-SCNC: 136 MMOL/L — SIGNIFICANT CHANGE UP (ref 135–145)
WBC # BLD: 8.12 K/UL — SIGNIFICANT CHANGE UP (ref 3.8–10.5)
WBC # FLD AUTO: 8.12 K/UL — SIGNIFICANT CHANGE UP (ref 3.8–10.5)

## 2023-06-12 PROCEDURE — 71045 X-RAY EXAM CHEST 1 VIEW: CPT | Mod: 26,77

## 2023-06-12 PROCEDURE — 74019 RADEX ABDOMEN 2 VIEWS: CPT | Mod: 26

## 2023-06-12 PROCEDURE — 93010 ELECTROCARDIOGRAM REPORT: CPT

## 2023-06-12 PROCEDURE — 74177 CT ABD & PELVIS W/CONTRAST: CPT | Mod: 26,MG

## 2023-06-12 PROCEDURE — 88302 TISSUE EXAM BY PATHOLOGIST: CPT | Mod: 26

## 2023-06-12 PROCEDURE — 49592 RPR AA HRN 1ST < 3 NCR/STRN: CPT

## 2023-06-12 PROCEDURE — G1004: CPT

## 2023-06-12 PROCEDURE — 71045 X-RAY EXAM CHEST 1 VIEW: CPT | Mod: 26

## 2023-06-12 RX ORDER — SODIUM CHLORIDE 9 MG/ML
1000 INJECTION, SOLUTION INTRAVENOUS
Refills: 0 | Status: DISCONTINUED | OUTPATIENT
Start: 2023-06-12 | End: 2023-06-15

## 2023-06-12 RX ORDER — FENTANYL CITRATE 50 UG/ML
25 INJECTION INTRAVENOUS
Refills: 0 | Status: DISCONTINUED | OUTPATIENT
Start: 2023-06-12 | End: 2023-06-12

## 2023-06-12 RX ORDER — HYDROMORPHONE HYDROCHLORIDE 2 MG/ML
1 INJECTION INTRAMUSCULAR; INTRAVENOUS; SUBCUTANEOUS EVERY 4 HOURS
Refills: 0 | Status: DISCONTINUED | OUTPATIENT
Start: 2023-06-12 | End: 2023-06-12

## 2023-06-12 RX ORDER — HYDROMORPHONE HYDROCHLORIDE 2 MG/ML
0.2 INJECTION INTRAMUSCULAR; INTRAVENOUS; SUBCUTANEOUS EVERY 4 HOURS
Refills: 0 | Status: DISCONTINUED | OUTPATIENT
Start: 2023-06-12 | End: 2023-06-12

## 2023-06-12 RX ORDER — MAGNESIUM SULFATE 500 MG/ML
2 VIAL (ML) INJECTION ONCE
Refills: 0 | Status: COMPLETED | OUTPATIENT
Start: 2023-06-12 | End: 2023-06-12

## 2023-06-12 RX ORDER — ONDANSETRON 8 MG/1
4 TABLET, FILM COATED ORAL ONCE
Refills: 0 | Status: DISCONTINUED | OUTPATIENT
Start: 2023-06-12 | End: 2023-06-12

## 2023-06-12 RX ORDER — HEPARIN SODIUM 5000 [USP'U]/ML
5000 INJECTION INTRAVENOUS; SUBCUTANEOUS EVERY 8 HOURS
Refills: 0 | Status: DISCONTINUED | OUTPATIENT
Start: 2023-06-12 | End: 2023-06-12

## 2023-06-12 RX ORDER — ENOXAPARIN SODIUM 100 MG/ML
40 INJECTION SUBCUTANEOUS EVERY 24 HOURS
Refills: 0 | Status: DISCONTINUED | OUTPATIENT
Start: 2023-06-12 | End: 2023-06-21

## 2023-06-12 RX ORDER — ONDANSETRON 8 MG/1
4 TABLET, FILM COATED ORAL EVERY 6 HOURS
Refills: 0 | Status: DISCONTINUED | OUTPATIENT
Start: 2023-06-12 | End: 2023-06-19

## 2023-06-12 RX ORDER — PIPERACILLIN AND TAZOBACTAM 4; .5 G/20ML; G/20ML
3.38 INJECTION, POWDER, LYOPHILIZED, FOR SOLUTION INTRAVENOUS ONCE
Refills: 0 | Status: DISCONTINUED | OUTPATIENT
Start: 2023-06-12 | End: 2023-06-12

## 2023-06-12 RX ORDER — IPRATROPIUM/ALBUTEROL SULFATE 18-103MCG
3 AEROSOL WITH ADAPTER (GRAM) INHALATION EVERY 6 HOURS
Refills: 0 | Status: DISCONTINUED | OUTPATIENT
Start: 2023-06-12 | End: 2023-06-14

## 2023-06-12 RX ORDER — MORPHINE SULFATE 50 MG/1
2 CAPSULE, EXTENDED RELEASE ORAL ONCE
Refills: 0 | Status: DISCONTINUED | OUTPATIENT
Start: 2023-06-12 | End: 2023-06-12

## 2023-06-12 RX ORDER — FENTANYL CITRATE 50 UG/ML
50 INJECTION INTRAVENOUS
Refills: 0 | Status: DISCONTINUED | OUTPATIENT
Start: 2023-06-12 | End: 2023-06-12

## 2023-06-12 RX ORDER — HYDROMORPHONE HYDROCHLORIDE 2 MG/ML
30 INJECTION INTRAMUSCULAR; INTRAVENOUS; SUBCUTANEOUS
Refills: 0 | Status: DISCONTINUED | OUTPATIENT
Start: 2023-06-12 | End: 2023-06-14

## 2023-06-12 RX ORDER — IPRATROPIUM/ALBUTEROL SULFATE 18-103MCG
3 AEROSOL WITH ADAPTER (GRAM) INHALATION ONCE
Refills: 0 | Status: COMPLETED | OUTPATIENT
Start: 2023-06-12 | End: 2023-06-12

## 2023-06-12 RX ORDER — HYDROMORPHONE HYDROCHLORIDE 2 MG/ML
0.5 INJECTION INTRAMUSCULAR; INTRAVENOUS; SUBCUTANEOUS ONCE
Refills: 0 | Status: DISCONTINUED | OUTPATIENT
Start: 2023-06-12 | End: 2023-06-12

## 2023-06-12 RX ORDER — NALOXONE HYDROCHLORIDE 4 MG/.1ML
0.1 SPRAY NASAL
Refills: 0 | Status: DISCONTINUED | OUTPATIENT
Start: 2023-06-12 | End: 2023-06-21

## 2023-06-12 RX ORDER — PIPERACILLIN AND TAZOBACTAM 4; .5 G/20ML; G/20ML
3.38 INJECTION, POWDER, LYOPHILIZED, FOR SOLUTION INTRAVENOUS EVERY 8 HOURS
Refills: 0 | Status: DISCONTINUED | OUTPATIENT
Start: 2023-06-12 | End: 2023-06-12

## 2023-06-12 RX ORDER — HYDROMORPHONE HYDROCHLORIDE 2 MG/ML
0.5 INJECTION INTRAMUSCULAR; INTRAVENOUS; SUBCUTANEOUS EVERY 4 HOURS
Refills: 0 | Status: DISCONTINUED | OUTPATIENT
Start: 2023-06-12 | End: 2023-06-12

## 2023-06-12 RX ORDER — SODIUM CHLORIDE 9 MG/ML
1000 INJECTION, SOLUTION INTRAVENOUS
Refills: 0 | Status: DISCONTINUED | OUTPATIENT
Start: 2023-06-12 | End: 2023-06-12

## 2023-06-12 RX ADMIN — MORPHINE SULFATE 4 MILLIGRAM(S): 50 CAPSULE, EXTENDED RELEASE ORAL at 00:05

## 2023-06-12 RX ADMIN — HYDROMORPHONE HYDROCHLORIDE 30 MILLILITER(S): 2 INJECTION INTRAMUSCULAR; INTRAVENOUS; SUBCUTANEOUS at 12:36

## 2023-06-12 RX ADMIN — MORPHINE SULFATE 2 MILLIGRAM(S): 50 CAPSULE, EXTENDED RELEASE ORAL at 03:46

## 2023-06-12 RX ADMIN — HYDROMORPHONE HYDROCHLORIDE 1 MILLIGRAM(S): 2 INJECTION INTRAMUSCULAR; INTRAVENOUS; SUBCUTANEOUS at 11:10

## 2023-06-12 RX ADMIN — FENTANYL CITRATE 50 MICROGRAM(S): 50 INJECTION INTRAVENOUS at 07:37

## 2023-06-12 RX ADMIN — ENOXAPARIN SODIUM 40 MILLIGRAM(S): 100 INJECTION SUBCUTANEOUS at 15:28

## 2023-06-12 RX ADMIN — FENTANYL CITRATE 50 MICROGRAM(S): 50 INJECTION INTRAVENOUS at 08:09

## 2023-06-12 RX ADMIN — FENTANYL CITRATE 50 MICROGRAM(S): 50 INJECTION INTRAVENOUS at 08:07

## 2023-06-12 RX ADMIN — Medication 25 GRAM(S): at 11:10

## 2023-06-12 RX ADMIN — SODIUM CHLORIDE 100 MILLILITER(S): 9 INJECTION, SOLUTION INTRAVENOUS at 21:05

## 2023-06-12 RX ADMIN — HYDROMORPHONE HYDROCHLORIDE 0.5 MILLIGRAM(S): 2 INJECTION INTRAMUSCULAR; INTRAVENOUS; SUBCUTANEOUS at 01:52

## 2023-06-12 RX ADMIN — HYDROMORPHONE HYDROCHLORIDE 30 MILLILITER(S): 2 INJECTION INTRAMUSCULAR; INTRAVENOUS; SUBCUTANEOUS at 19:12

## 2023-06-12 RX ADMIN — FENTANYL CITRATE 50 MICROGRAM(S): 50 INJECTION INTRAVENOUS at 08:39

## 2023-06-12 RX ADMIN — HYDROMORPHONE HYDROCHLORIDE 1 MILLIGRAM(S): 2 INJECTION INTRAMUSCULAR; INTRAVENOUS; SUBCUTANEOUS at 11:24

## 2023-06-12 RX ADMIN — MORPHINE SULFATE 2 MILLIGRAM(S): 50 CAPSULE, EXTENDED RELEASE ORAL at 03:16

## 2023-06-12 RX ADMIN — HYDROMORPHONE HYDROCHLORIDE 0.5 MILLIGRAM(S): 2 INJECTION INTRAMUSCULAR; INTRAVENOUS; SUBCUTANEOUS at 01:22

## 2023-06-12 RX ADMIN — Medication 3 MILLILITER(S): at 22:15

## 2023-06-12 NOTE — H&P ADULT - ATTENDING COMMENTS
Seen and examined in ER. Patient uncomfortable and in significant pain - w/ guarding. Concern for incarcerated (possible strangulated) incisional hernia. Last took eliquis ~30hrs ago.   Discussed plans for exploratory laparotomy, possible bowel resection, possible ostomy, possible open abdomen, possible ventral hernia repair with mesh; with associated risks of bleeding (consents to transfusion as indicated), infection (including delayed, potentially involving mesh - which may require excision of mesh), injury to other intraabdominal structures.   Paper consent signed and placed in chart. Seen and examined in ER. Patient uncomfortable and in significant pain - w/ guarding. Concern for incarcerated (possible strangulated) incisional hernia. Last took eliquis ~30hrs ago.   Discussed plans for exploratory laparotomy, possible bowel resection, possible ostomy, possible open abdomen, possible ventral hernia repair with mesh; with associated risks of bleeding (consents to transfusion as indicated), infection (including delayed, potentially involving mesh - which may require excision of mesh), injury to other intraabdominal structures. Further discussed emergent nature of procedure, which prevents otherwise adequate preoperative medical assessment and optimization; with associated risks of cardiopulmonary catastrophes.   Paper consent signed and placed in chart. Seen and examined in ER. Patient uncomfortable and in significant pain - w/ guarding. Concern for incarcerated (possible strangulated) incisional hernia. Last took eliquis ~30hrs ago.   Discussed plans for exploratory laparotomy, possible bowel resection, possible ostomy, possible open abdomen, possible ventral hernia repair with mesh; with associated risks of bleeding (consents to transfusion as indicated), infection (including delayed, potentially involving mesh - which may require excision of mesh), injury to other intraabdominal structures. Further discussed emergent nature of procedure, which prevents otherwise adequate preoperative medical assessment and optimization; with associated risks of cardiopulmonary catastrophes.   Paper consent signed and placed in chart.        **Of note; patient reports that Han montano ([phone]) should be primary contact for assistance for medical decision making if required. Secondary person is sister, Michelle Rolle ([phone]).

## 2023-06-12 NOTE — ED PROVIDER NOTE - ATTENDING CONTRIBUTION TO CARE
64 yo male with HX of PE, Afib, COPD, abdominal hernias, chronic back pain presents for evaluation of worsening abdominal pain since this morning. Patient denies fever, nausea, vomiting, trauma, diarrhea or difficulty urinating. Patient states pain localizes to lower abdomen near his hernia site.  Gen: Alert, mild painful distress  Pulm: Bilateral BS, normal resp effort, no wheeze/stridor/retractions  CV: RRR, no R/G, +dist pulses  Abd: soft, diffuse tender with unreducible hernia in the right lower quadrant.   Mskel: no edema/erythema/cyanosis  Skin: no rash  Neuro: AAOx3, no gross sensory/motor deficits, CN 2-12 intact    I personally saw the patient with the resident, and completed the key components of the history and physical exam. I then discussed the management plan with the resident.

## 2023-06-12 NOTE — H&P ADULT - ASSESSMENT
Patient is a 63yoM with PMH herniated discs, atrial flutter, anxiety, depression, PE (2020), BPH, COPD who presents to the ED with acute onset abdominal pain associated with an abdominal wall bulge. Patient says he has had a known hernia for over 20 years since his exlap, hernia usually reducible and nontender until today. He says he woke up with significant pain, nausea and vomiting, and has been unable to reduce hernia. Last BM Sunday AM, last flatus sunday afternoon. Patient denies prior similar episodes. He currently denies any chest pain, SOB, headache, diarrhea. Patient with cough over last 2 months, no recent changes,     In the ED, patient afebrile and HD stable, however SBP 140s decreasing to 100s. Bloodwork without leukocytosis however presence of 82% neutrophilic shift, LA 1.7, otherwise unremarkable. CT A/P showing R anterior abdominal wall ventral hernia containing a loop of small bowel, evidence of SBO likely 2/2 abdominal wall hernia, evidence of swirling in mesentery in RLQ with small amount of free fluid in pelvis and b/l paracolic gutters.     Plan:  -admit to surgery under Dr. Ornelas  -add-on for exploratory laparotomy, hernia repair, poss bowel resection, poss ostomy  -NPO/IVF  -pain control  -strict Is/Os  -continue home meds  -trend labs, replete electrolytes as needed  -encourage OOB  -incentive spirometry  -DVT ppx: SCDs Patient is a 63yoM with PMH herniated discs, atrial flutter, anxiety, depression, PE (2020), BPH, COPD who presents to the ED with acute onset abdominal pain associated with an abdominal wall bulge. Patient says he has had a known hernia for over 20 years since his exlap, hernia usually reducible and nontender until today. He says he woke up with significant pain, nausea and vomiting, and has been unable to reduce hernia. Last BM Sunday AM, last flatus sunday afternoon. Patient denies prior similar episodes. He currently denies any chest pain, SOB, headache, diarrhea. Patient with cough over last 2 months, no recent changes,     In the ED, patient afebrile and HD stable, however SBP 140s decreasing to 100s. Bloodwork without leukocytosis however presence of 82% neutrophilic shift, LA 1.7, otherwise unremarkable. CT A/P showing R anterior abdominal wall ventral hernia containing a loop of small bowel, evidence of SBO likely 2/2 abdominal wall hernia, evidence of swirling in mesentery in RLQ with small amount of free fluid in pelvis and b/l paracolic gutters.     Plan:  -admit to surgery under Dr. Ornelas  -add-on for exploratory laparotomy, hernia repair, poss bowel resection, poss ostomy  -NPO/IVF  -will likely need cardiology, pulm, and pain team on board post-op  -pain control  -strict Is/Os  -continue home meds  -trend labs, replete electrolytes as needed  -encourage OOB  -incentive spirometry  -DVT ppx: SCDs Patient is a 63yoM with PMH herniated discs, atrial flutter, anxiety, depression, PE (2020), BPH, COPD (dx'ed ~2months ago; no supplemental O2) who presents to the ED with acute onset abdominal pain associated with an abdominal wall bulge. Patient says he has had a known hernia for over 20 years since his exlap, hernia usually reducible and nontender until today. He says he woke up with significant pain, nausea and vomiting, and has been unable to reduce hernia. Last BM Sunday AM, last flatus sunday afternoon. Patient denies prior similar episodes. He currently denies any chest pain, SOB, headache, diarrhea. Patient with cough over last 2 months, no recent changes,     In the ED, patient afebrile and HD stable, however SBP 140s decreasing to 100s. Bloodwork without leukocytosis however presence of 82% neutrophilic shift, LA 1.7, otherwise unremarkable. CT A/P showing R anterior abdominal wall ventral hernia containing a loop of small bowel, evidence of SBO likely 2/2 abdominal wall hernia, evidence of swirling in mesentery in RLQ with small amount of free fluid in pelvis and b/l paracolic gutters.     Plan:  -admit to surgery under Dr. Jones  -add-on for exploratory laparotomy, hernia repair, poss bowel resection, poss ostomy  -NPO/IVF  -will likely need cardiology, pulm, and pain team on board post-op  -pain control  -strict Is/Os  -continue home meds  -trend labs, replete electrolytes as needed  -encourage OOB  -incentive spirometry  -DVT ppx: SCDs

## 2023-06-12 NOTE — H&P ADULT - NSVTERISKREFERASSESS_GEN_ALL_CORE
Refer to the Assessment tab to view/cancel completed assessment. Right ear hearing screen completed date: 2021  Right ear screen method: EOAE (evoked otoacoustic emission)  Right ear screen result: Passed  Right ear screen comment: N/A    Left ear hearing screen completed date: 2021  Left ear screen method: EOAE (evoked otoacoustic emission)  Left ear screen result: Passed  Left ear screen comments: N/A

## 2023-06-12 NOTE — ED PROVIDER NOTE - PHYSICAL EXAMINATION
General: Well appearing male in no acute distress  HEENT: Normocephalic, atraumatic. Moist mucous membranes. Oropharynx clear. No lymphadenopathy.  Eyes: No scleral icterus. EOMI. SOSA.  Neck:. Soft and supple. Full ROM without pain. No midline tenderness  Cardiac: Regular rate and regular rhythm. No murmurs, rubs, gallops. Peripheral pulses 2+ and symmetric. No LE edema.  Resp: Lungs CTAB. Speaking in full sentences. No wheezes, rales or rhonchi.  Abd: +vertical scar middle of abdomen. ventral wall hernia R side of abdomen, soft to touch but not reducible, tenderness, no erythema. soft, non-tender hernia on L side of abdomen.   Back: Spine midline and non-tender. No CVA tenderness.    Skin: No rashes, abrasions, or lacerations.  Neuro: AO x 3. Moves all extremities symmetrically. Motor strength and sensation grossly intact.
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
## 2023-06-12 NOTE — CHART NOTE - NSCHARTNOTEFT_GEN_A_CORE
Acute Care Surgery/Trauma Surgery Postoperative Note:  Patient seen s/p exploratory laparotomy and ventral hernia repair. Patient afebrile, VSS. Complains of 10/10 abdominal pain. NGT in place with minimal bilious output. Denies n/v/f/c/cp/sob.     Diet, NPO:   Except Medications     Special Instructions for Nursing:  Except Medications (06-12-23 @ 02:19)      Objective:   T(F): 97.5 (09:20), Max: 99.3 (06:50)  HR: 79 (09:20) (58 - 87)  BP: 144/81 (09:20) (102/63 - 163/65)  BP(mean): 83 (07:05) (79 - 92)  ABP: --  ABP(mean): --  RR: 18 (09:20) (15 - 98)  SpO2: 95% (09:20) (95% - 100%)    Physical Exam:   GEN: patient resting comfortably in bed, NAD  HEN: NGT in place to wall suction, with bilious output  RESP: respirations are unlabored, no accessory muscle use  CVS: RRR  GI: Binder in place Abdomen soft, appropriately tender, non-distended, no rebound tenderness / guarding. Incisions c/d/i with island dressing in place  : bocanegra in place    I&O's        LABS:                        16.4   8.12  )-----------( 148      ( 12 Jun 2023 07:30 )             45.6     06-12    136  |  98  |  7.8<L>  ----------------------------<  110<H>  4.4   |  28.0  |  1.04    Ca    8.6      12 Jun 2023 07:30  Phos  3.4     06-12  Mg     1.8     06-12    TPro  6.5<L>  /  Alb  4.2  /  TBili  0.8  /  DBili  x   /  AST  17  /  ALT  8   /  AlkPhos  60  06-11    PT/INR - ( 11 Jun 2023 22:30 )   PT: 14.4 sec;   INR: 1.24 ratio         PTT - ( 11 Jun 2023 22:30 )  PTT:34.0 sec      Plan:  Pain management at bedside starting on PCA pump  D/C bocanegra  Keep NGT to wall suction  NPO/IVF  Keep binder in place  Out of bed

## 2023-06-12 NOTE — BRIEF OPERATIVE NOTE - OPERATION/FINDINGS
Serosanguinous fluid within abdomen. No evidence of enteric contents within belly. Small knuckle of bowel freed from 1cm x 1.5cm ventral hernia. Area initially appeared erythematous with small speck of ischemia, appearance improved with time and ischemic portion normalized. Bowel examined from LOT to TI with no further evidence of injury or obstruction. Hernia sac removed and defect closed primarily. Fascia closed with running PDS. Skin closed loosely with staples.
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
## 2023-06-12 NOTE — CONSULT NOTE ADULT - ASSESSMENT
63yoM with PMH herniated discs, atrial flutter, anxiety, depression, PE (2020), BPH, COPD who presents to the ED with acute onset abdominal pain associated with an abdominal wall bulge.   on suboxone - LD:    s/p Exploratory laparotomy 12-Jun-2023     63yoM with PMH herniated discs, atrial flutter, anxiety, depression, PE (2020), BPH, COPD who presents to the ED with acute onset abdominal pain associated with an abdominal wall bulge.   on suboxone 8mg tid - LD: 3 days ago as per patient    s/p Exploratory laparotomy 12-Jun-2023    will utilize dPCA for now    will restart suboxone when patients pain control improves and bowel function returns    no opioid rx needed on discharge

## 2023-06-12 NOTE — H&P ADULT - NSHPSOURCEINFORD_GEN_ALL_CORE
Patient Rinvoq Counseling: I discussed with the patient the risks of Rinvoq therapy including but not limited to upper respiratory tract infections, shingles, cold sores, bronchitis, nausea, cough, fever, acne, and headache. Live vaccines should be avoided.  This medication has been linked to serious infections; higher rate of mortality; malignancy and lymphoproliferative disorders; major adverse cardiovascular events; thrombosis; thrombocytopenia, anemia, and neutropenia; lipid elevations; liver enzyme elevations; and gastrointestinal perforations.

## 2023-06-12 NOTE — ED PROVIDER NOTE - CLINICAL SUMMARY MEDICAL DECISION MAKING FREE TEXT BOX
possible incarcerated hernia. labs, CT abd/pelvis. tender to touch, not able to reduce currently, soft to touch, does not appear to be firm, no erythema around the hernia. R lateral ventral wall hernia. surgery consulted. pain meds, anti-emetics.

## 2023-06-12 NOTE — PATIENT PROFILE ADULT - FALL HARM RISK - UNIVERSAL INTERVENTIONS
Bed in lowest position, wheels locked, appropriate side rails in place/Call bell, personal items and telephone in reach/Instruct patient to call for assistance before getting out of bed or chair/Non-slip footwear when patient is out of bed/[place] to call system/Physically safe environment - no spills, clutter or unnecessary equipment/Purposeful Proactive Rounding/Room/bathroom lighting operational, light cord in reach

## 2023-06-13 LAB
ALBUMIN SERPL ELPH-MCNC: 3 G/DL — LOW (ref 3.3–5.2)
ALP SERPL-CCNC: 44 U/L — SIGNIFICANT CHANGE UP (ref 40–120)
ALT FLD-CCNC: 6 U/L — SIGNIFICANT CHANGE UP
ANION GAP SERPL CALC-SCNC: 10 MMOL/L — SIGNIFICANT CHANGE UP (ref 5–17)
APTT BLD: 35.8 SEC — HIGH (ref 27.5–35.5)
AST SERPL-CCNC: 13 U/L — SIGNIFICANT CHANGE UP
BASOPHILS # BLD AUTO: 0.02 K/UL — SIGNIFICANT CHANGE UP (ref 0–0.2)
BASOPHILS NFR BLD AUTO: 0.3 % — SIGNIFICANT CHANGE UP (ref 0–2)
BILIRUB SERPL-MCNC: 0.6 MG/DL — SIGNIFICANT CHANGE UP (ref 0.4–2)
BUN SERPL-MCNC: 10.6 MG/DL — SIGNIFICANT CHANGE UP (ref 8–20)
CALCIUM SERPL-MCNC: 8 MG/DL — LOW (ref 8.4–10.5)
CHLORIDE SERPL-SCNC: 98 MMOL/L — SIGNIFICANT CHANGE UP (ref 96–108)
CO2 SERPL-SCNC: 26 MMOL/L — SIGNIFICANT CHANGE UP (ref 22–29)
CREAT SERPL-MCNC: 0.82 MG/DL — SIGNIFICANT CHANGE UP (ref 0.5–1.3)
EGFR: 100 ML/MIN/1.73M2 — SIGNIFICANT CHANGE UP
EOSINOPHIL # BLD AUTO: 0.13 K/UL — SIGNIFICANT CHANGE UP (ref 0–0.5)
EOSINOPHIL NFR BLD AUTO: 1.9 % — SIGNIFICANT CHANGE UP (ref 0–6)
GLUCOSE SERPL-MCNC: 82 MG/DL — SIGNIFICANT CHANGE UP (ref 70–99)
HCT VFR BLD CALC: 39.7 % — SIGNIFICANT CHANGE UP (ref 39–50)
HGB BLD-MCNC: 13.7 G/DL — SIGNIFICANT CHANGE UP (ref 13–17)
IMM GRANULOCYTES NFR BLD AUTO: 0.4 % — SIGNIFICANT CHANGE UP (ref 0–0.9)
INR BLD: 1.46 RATIO — HIGH (ref 0.88–1.16)
LYMPHOCYTES # BLD AUTO: 1.65 K/UL — SIGNIFICANT CHANGE UP (ref 1–3.3)
LYMPHOCYTES # BLD AUTO: 24.6 % — SIGNIFICANT CHANGE UP (ref 13–44)
MAGNESIUM SERPL-MCNC: 2.1 MG/DL — SIGNIFICANT CHANGE UP (ref 1.6–2.6)
MCHC RBC-ENTMCNC: 33.8 PG — SIGNIFICANT CHANGE UP (ref 27–34)
MCHC RBC-ENTMCNC: 34.5 GM/DL — SIGNIFICANT CHANGE UP (ref 32–36)
MCV RBC AUTO: 98 FL — SIGNIFICANT CHANGE UP (ref 80–100)
MONOCYTES # BLD AUTO: 0.49 K/UL — SIGNIFICANT CHANGE UP (ref 0–0.9)
MONOCYTES NFR BLD AUTO: 7.3 % — SIGNIFICANT CHANGE UP (ref 2–14)
NEUTROPHILS # BLD AUTO: 4.39 K/UL — SIGNIFICANT CHANGE UP (ref 1.8–7.4)
NEUTROPHILS NFR BLD AUTO: 65.5 % — SIGNIFICANT CHANGE UP (ref 43–77)
PHOSPHATE SERPL-MCNC: 3.3 MG/DL — SIGNIFICANT CHANGE UP (ref 2.4–4.7)
PLATELET # BLD AUTO: 148 K/UL — LOW (ref 150–400)
POTASSIUM SERPL-MCNC: 4.1 MMOL/L — SIGNIFICANT CHANGE UP (ref 3.5–5.3)
POTASSIUM SERPL-SCNC: 4.1 MMOL/L — SIGNIFICANT CHANGE UP (ref 3.5–5.3)
PROT SERPL-MCNC: 5.3 G/DL — LOW (ref 6.6–8.7)
PROTHROM AB SERPL-ACNC: 17 SEC — HIGH (ref 10.5–13.4)
RBC # BLD: 4.05 M/UL — LOW (ref 4.2–5.8)
RBC # FLD: 12.1 % — SIGNIFICANT CHANGE UP (ref 10.3–14.5)
SODIUM SERPL-SCNC: 134 MMOL/L — LOW (ref 135–145)
WBC # BLD: 6.71 K/UL — SIGNIFICANT CHANGE UP (ref 3.8–10.5)
WBC # FLD AUTO: 6.71 K/UL — SIGNIFICANT CHANGE UP (ref 3.8–10.5)

## 2023-06-13 RX ORDER — KETOROLAC TROMETHAMINE 30 MG/ML
15 SYRINGE (ML) INJECTION ONCE
Refills: 0 | Status: DISCONTINUED | OUTPATIENT
Start: 2023-06-13 | End: 2023-06-13

## 2023-06-13 RX ORDER — LACTULOSE 10 G/15ML
40 SOLUTION ORAL ONCE
Refills: 0 | Status: COMPLETED | OUTPATIENT
Start: 2023-06-13 | End: 2023-06-13

## 2023-06-13 RX ORDER — HYDROMORPHONE HYDROCHLORIDE 2 MG/ML
0.5 INJECTION INTRAMUSCULAR; INTRAVENOUS; SUBCUTANEOUS ONCE
Refills: 0 | Status: DISCONTINUED | OUTPATIENT
Start: 2023-06-13 | End: 2023-06-13

## 2023-06-13 RX ORDER — HYDROMORPHONE HYDROCHLORIDE 2 MG/ML
0.5 INJECTION INTRAMUSCULAR; INTRAVENOUS; SUBCUTANEOUS EVERY 4 HOURS
Refills: 0 | Status: DISCONTINUED | OUTPATIENT
Start: 2023-06-13 | End: 2023-06-13

## 2023-06-13 RX ORDER — SODIUM CHLORIDE 9 MG/ML
500 INJECTION, SOLUTION INTRAVENOUS ONCE
Refills: 0 | Status: COMPLETED | OUTPATIENT
Start: 2023-06-13 | End: 2023-06-13

## 2023-06-13 RX ORDER — HYDROMORPHONE HYDROCHLORIDE 2 MG/ML
0.5 INJECTION INTRAMUSCULAR; INTRAVENOUS; SUBCUTANEOUS EVERY 4 HOURS
Refills: 0 | Status: DISCONTINUED | OUTPATIENT
Start: 2023-06-13 | End: 2023-06-14

## 2023-06-13 RX ORDER — LACTULOSE 10 G/15ML
60 SOLUTION ORAL ONCE
Refills: 0 | Status: DISCONTINUED | OUTPATIENT
Start: 2023-06-13 | End: 2023-06-13

## 2023-06-13 RX ORDER — ACETAMINOPHEN 500 MG
1000 TABLET ORAL ONCE
Refills: 0 | Status: COMPLETED | OUTPATIENT
Start: 2023-06-13 | End: 2023-06-13

## 2023-06-13 RX ADMIN — Medication 1000 MILLIGRAM(S): at 03:15

## 2023-06-13 RX ADMIN — HYDROMORPHONE HYDROCHLORIDE 0.5 MILLIGRAM(S): 2 INJECTION INTRAMUSCULAR; INTRAVENOUS; SUBCUTANEOUS at 23:52

## 2023-06-13 RX ADMIN — Medication 15 MILLIGRAM(S): at 02:43

## 2023-06-13 RX ADMIN — HYDROMORPHONE HYDROCHLORIDE 0.5 MILLIGRAM(S): 2 INJECTION INTRAMUSCULAR; INTRAVENOUS; SUBCUTANEOUS at 12:56

## 2023-06-13 RX ADMIN — SODIUM CHLORIDE 1000 MILLILITER(S): 9 INJECTION, SOLUTION INTRAVENOUS at 17:22

## 2023-06-13 RX ADMIN — HYDROMORPHONE HYDROCHLORIDE 0.5 MILLIGRAM(S): 2 INJECTION INTRAMUSCULAR; INTRAVENOUS; SUBCUTANEOUS at 17:05

## 2023-06-13 RX ADMIN — Medication 15 MILLIGRAM(S): at 03:15

## 2023-06-13 RX ADMIN — SODIUM CHLORIDE 60 MILLILITER(S): 9 INJECTION, SOLUTION INTRAVENOUS at 20:06

## 2023-06-13 RX ADMIN — Medication 100 MILLIGRAM(S): at 06:04

## 2023-06-13 RX ADMIN — HYDROMORPHONE HYDROCHLORIDE 0.5 MILLIGRAM(S): 2 INJECTION INTRAMUSCULAR; INTRAVENOUS; SUBCUTANEOUS at 11:56

## 2023-06-13 RX ADMIN — HYDROMORPHONE HYDROCHLORIDE 0.5 MILLIGRAM(S): 2 INJECTION INTRAMUSCULAR; INTRAVENOUS; SUBCUTANEOUS at 02:38

## 2023-06-13 RX ADMIN — LACTULOSE 40 GRAM(S): 10 SOLUTION ORAL at 12:42

## 2023-06-13 RX ADMIN — Medication 400 MILLIGRAM(S): at 02:44

## 2023-06-13 RX ADMIN — HYDROMORPHONE HYDROCHLORIDE 0.5 MILLIGRAM(S): 2 INJECTION INTRAMUSCULAR; INTRAVENOUS; SUBCUTANEOUS at 20:22

## 2023-06-13 RX ADMIN — Medication 3 MILLILITER(S): at 14:44

## 2023-06-13 RX ADMIN — HYDROMORPHONE HYDROCHLORIDE 0.5 MILLIGRAM(S): 2 INJECTION INTRAMUSCULAR; INTRAVENOUS; SUBCUTANEOUS at 20:07

## 2023-06-13 RX ADMIN — HYDROMORPHONE HYDROCHLORIDE 0.5 MILLIGRAM(S): 2 INJECTION INTRAMUSCULAR; INTRAVENOUS; SUBCUTANEOUS at 17:20

## 2023-06-13 RX ADMIN — Medication 3 MILLILITER(S): at 08:17

## 2023-06-13 RX ADMIN — ONDANSETRON 4 MILLIGRAM(S): 8 TABLET, FILM COATED ORAL at 21:28

## 2023-06-13 RX ADMIN — Medication 100 MILLIGRAM(S): at 00:37

## 2023-06-13 RX ADMIN — ENOXAPARIN SODIUM 40 MILLIGRAM(S): 100 INJECTION SUBCUTANEOUS at 14:59

## 2023-06-13 RX ADMIN — HYDROMORPHONE HYDROCHLORIDE 0.5 MILLIGRAM(S): 2 INJECTION INTRAMUSCULAR; INTRAVENOUS; SUBCUTANEOUS at 00:25

## 2023-06-13 RX ADMIN — HYDROMORPHONE HYDROCHLORIDE 30 MILLILITER(S): 2 INJECTION INTRAMUSCULAR; INTRAVENOUS; SUBCUTANEOUS at 19:38

## 2023-06-13 RX ADMIN — HYDROMORPHONE HYDROCHLORIDE 0.5 MILLIGRAM(S): 2 INJECTION INTRAMUSCULAR; INTRAVENOUS; SUBCUTANEOUS at 03:15

## 2023-06-13 RX ADMIN — HYDROMORPHONE HYDROCHLORIDE 0.5 MILLIGRAM(S): 2 INJECTION INTRAMUSCULAR; INTRAVENOUS; SUBCUTANEOUS at 07:14

## 2023-06-13 RX ADMIN — HYDROMORPHONE HYDROCHLORIDE 0.5 MILLIGRAM(S): 2 INJECTION INTRAMUSCULAR; INTRAVENOUS; SUBCUTANEOUS at 00:10

## 2023-06-13 RX ADMIN — HYDROMORPHONE HYDROCHLORIDE 0.5 MILLIGRAM(S): 2 INJECTION INTRAMUSCULAR; INTRAVENOUS; SUBCUTANEOUS at 06:44

## 2023-06-13 RX ADMIN — HYDROMORPHONE HYDROCHLORIDE 30 MILLILITER(S): 2 INJECTION INTRAMUSCULAR; INTRAVENOUS; SUBCUTANEOUS at 07:20

## 2023-06-13 NOTE — PROGRESS NOTE ADULT - ASSESSMENT
Patient presenting with incarcerated ventral hernia now pod#1 of ex-lap hernia with primary repair  Pain management at bedside starting on PCA pump  D/C bocanegra  await return of bowel function   Keep NGT to wall suction  NPO/IVF  Keep binder in place  Out of bed.

## 2023-06-13 NOTE — PROGRESS NOTE ADULT - SUBJECTIVE AND OBJECTIVE BOX
Interval Hx:  Patient seen during rounds  Patient reports pain to be mod controlled on current medications  Patient denies sedation with medications     Analgesic Dosing for past 24 hours reviewed as below:    acetaminophen   IVPB ..   400 mL/Hr IV Intermittent (06-13-23 @ 02:44)    HYDROmorphone  Injectable   0.5 milliGRAM(s) IV Push (06-13-23 @ 06:44)    HYDROmorphone  Injectable   0.5 milliGRAM(s) IV Push (06-13-23 @ 11:56)    HYDROmorphone  Injectable   0.5 milliGRAM(s) IV Push (06-13-23 @ 00:10)    HYDROmorphone  Injectable   0.5 milliGRAM(s) IV Push (06-13-23 @ 02:38)    ketorolac   Injectable   15 milliGRAM(s) IV Push (06-13-23 @ 02:43)          T(C): 36.3 (06-13-23 @ 08:05), Max: 36.8 (06-12-23 @ 20:40)  HR: 58 (06-13-23 @ 08:17) (48 - 67)  BP: 149/81 (06-13-23 @ 08:05) (116/63 - 149/81)  RR: 18 (06-13-23 @ 08:05) (18 - 19)  SpO2: 95% (06-13-23 @ 08:17) (92% - 97%)      06-12-23 @ 07:01  -  06-13-23 @ 07:00  --------------------------------------------------------  IN: 200 mL / OUT: 745 mL / NET: -545 mL        albuterol/ipratropium for Nebulization 3 milliLiter(s) Nebulizer every 6 hours  enoxaparin Injectable 40 milliGRAM(s) SubCutaneous every 24 hours  guaiFENesin Oral Liquid (Sugar-Free) 100 milliGRAM(s) Oral every 6 hours PRN  HYDROmorphone  Injectable 0.5 milliGRAM(s) IV Push every 4 hours PRN  HYDROmorphone PCA (1 mG/mL) 30 milliLiter(s) PCA Continuous PCA Continuous  lactated ringers. 1000 milliLiter(s) IV Continuous <Continuous>  naloxone Injectable 0.1 milliGRAM(s) IV Push every 3 minutes PRN  ondansetron Injectable 4 milliGRAM(s) IV Push every 6 hours PRN                          13.7   6.71  )-----------( 148      ( 13 Jun 2023 05:08 )             39.7     06-13    134<L>  |  98  |  10.6  ----------------------------<  82  4.1   |  26.0  |  0.82    Ca    8.0<L>      13 Jun 2023 05:08  Phos  3.3     06-13  Mg     2.1     06-13    TPro  5.3<L>  /  Alb  3.0<L>  /  TBili  0.6  /  DBili  x   /  AST  13  /  ALT  6   /  AlkPhos  44  06-13    PT/INR - ( 13 Jun 2023 05:08 )   PT: 17.0 sec;   INR: 1.46 ratio         PTT - ( 13 Jun 2023 05:08 )  PTT:35.8 sec      Pain Service   [phone]

## 2023-06-13 NOTE — PROGRESS NOTE ADULT - ASSESSMENT
63yoM with PMH herniated discs, atrial flutter, anxiety, depression, PE (2020), BPH, COPD who presents to the ED with acute onset abdominal pain associated with an abdominal wall bulge.   on suboxone 8mg tid   s/p Exploratory laparotomy 12-Jun-2023    will utilize dPCA for now  will restart suboxone when patients pain control improves and bowel function returns    - Recommend starting robaxin IVPB 1000mg TID standing. HOLD for sedation     no opioid rx needed on discharge

## 2023-06-13 NOTE — PROGRESS NOTE ADULT - SUBJECTIVE AND OBJECTIVE BOX
SUBJECTIVE/24 hour events:  Patient is a 63yMale presenting with incacrerated ventral hernia with sbo now pod#1 ex-lap with primary repair. Patient with no acute events overnight, NG tube in place, IV hydration , awaiting TOV. Pain management on board for chronic pain regimen       Vital Signs Last 24 Hrs  T(C): 36.8 (13 Jun 2023 00:32), Max: 37.4 (12 Jun 2023 06:50)  T(F): 98.2 (13 Jun 2023 00:32), Max: 99.3 (12 Jun 2023 06:50)  HR: 66 (13 Jun 2023 00:32) (48 - 87)  BP: 137/74 (13 Jun 2023 00:32) (116/62 - 163/65)  BP(mean): 83 (12 Jun 2023 07:05) (79 - 92)  RR: 18 (13 Jun 2023 00:32) (15 - 98)  SpO2: 96% (13 Jun 2023 00:32) (93% - 99%)    Parameters below as of 13 Jun 2023 00:32  Patient On (Oxygen Delivery Method): room air      Drug Dosing Weight  Height (cm): 182.9 (11 Jun 2023 21:57)  Weight (kg): 79.5 (11 Jun 2023 21:57)  BMI (kg/m2): 23.8 (11 Jun 2023 21:57)  BSA (m2): 2.01 (11 Jun 2023 21:57)  I&O's Detail    12 Jun 2023 07:01  -  13 Jun 2023 01:09  --------------------------------------------------------  IN:    Lactated Ringers: 200 mL  Total IN: 200 mL    OUT:    Indwelling Catheter - Urethral (mL): 500 mL    Nasogastric/Oral tube (mL): 75 mL  Total OUT: 575 mL    Total NET: -375 mL        Allergies    No Known Allergies    Intolerances                              16.4   8.12  )-----------( 148      ( 12 Jun 2023 07:30 )             45.6   06-12    136  |  98  |  7.8<L>  ----------------------------<  110<H>  4.4   |  28.0  |  1.04    Ca    8.6      12 Jun 2023 07:30  Phos  3.4     06-12  Mg     1.8     06-12    TPro  6.5<L>  /  Alb  4.2  /  TBili  0.8  /  DBili  x   /  AST  17  /  ALT  8   /  AlkPhos  60  06-11  PT/INR - ( 11 Jun 2023 22:30 )   PT: 14.4 sec;   INR: 1.24 ratio         PTT - ( 11 Jun 2023 22:30 )  PTT:34.0 sec    ROS:    PHYSICAL EXAM:  Constitutional: resting comfortably   HEN: NGT in place to wall suction, with bilious output  RESP: respirations are unlabored, no accessory muscle use  CVS: RRR  GI: Binder in place Abdomen soft, appropriately tender, non-distended, no rebound tenderness / guarding. Incisions c/d/i with island dressing in place  : awaiting TOV      MEDICATIONS  (STANDING):  albuterol/ipratropium for Nebulization 3 milliLiter(s) Nebulizer every 6 hours  enoxaparin Injectable 40 milliGRAM(s) SubCutaneous every 24 hours  HYDROmorphone PCA (1 mG/mL) 30 milliLiter(s) PCA Continuous PCA Continuous  lactated ringers. 1000 milliLiter(s) (100 mL/Hr) IV Continuous <Continuous>    MEDICATIONS  (PRN):  guaiFENesin Oral Liquid (Sugar-Free) 100 milliGRAM(s) Oral every 6 hours PRN Cough  naloxone Injectable 0.1 milliGRAM(s) IV Push every 3 minutes PRN For ANY of the following changes in patient status:  A. RR LESS THAN 10 breaths per minute, B. Oxygen saturation LESS THAN 90%, C. Sedation score of 6  ondansetron Injectable 4 milliGRAM(s) IV Push every 6 hours PRN Nausea      RADIOLOGY STUDIES:    CULTURES:         SUBJECTIVE/24 hour events:  Patient is a 63yMale presenting with incarcerated ventral hernia with sbo now pod#1 ex-lap with primary repair. Patient with no acute events overnight, NG tube in place, IV hydration , awaiting TOV. Pain management on board for chronic pain regimen       Vital Signs Last 24 Hrs  T(C): 36.8 (13 Jun 2023 00:32), Max: 37.4 (12 Jun 2023 06:50)  T(F): 98.2 (13 Jun 2023 00:32), Max: 99.3 (12 Jun 2023 06:50)  HR: 66 (13 Jun 2023 00:32) (48 - 87)  BP: 137/74 (13 Jun 2023 00:32) (116/62 - 163/65)  BP(mean): 83 (12 Jun 2023 07:05) (79 - 92)  RR: 18 (13 Jun 2023 00:32) (15 - 98)  SpO2: 96% (13 Jun 2023 00:32) (93% - 99%)    Parameters below as of 13 Jun 2023 00:32  Patient On (Oxygen Delivery Method): room air      Drug Dosing Weight  Height (cm): 182.9 (11 Jun 2023 21:57)  Weight (kg): 79.5 (11 Jun 2023 21:57)  BMI (kg/m2): 23.8 (11 Jun 2023 21:57)  BSA (m2): 2.01 (11 Jun 2023 21:57)  I&O's Detail    12 Jun 2023 07:01  -  13 Jun 2023 01:09  --------------------------------------------------------  IN:    Lactated Ringers: 200 mL  Total IN: 200 mL    OUT:    Indwelling Catheter - Urethral (mL): 500 mL    Nasogastric/Oral tube (mL): 75 mL  Total OUT: 575 mL    Total NET: -375 mL        Allergies    No Known Allergies    Intolerances                              16.4   8.12  )-----------( 148      ( 12 Jun 2023 07:30 )             45.6   06-12    136  |  98  |  7.8<L>  ----------------------------<  110<H>  4.4   |  28.0  |  1.04    Ca    8.6      12 Jun 2023 07:30  Phos  3.4     06-12  Mg     1.8     06-12    TPro  6.5<L>  /  Alb  4.2  /  TBili  0.8  /  DBili  x   /  AST  17  /  ALT  8   /  AlkPhos  60  06-11  PT/INR - ( 11 Jun 2023 22:30 )   PT: 14.4 sec;   INR: 1.24 ratio         PTT - ( 11 Jun 2023 22:30 )  PTT:34.0 sec    ROS:    PHYSICAL EXAM:  Constitutional: resting comfortably   HEN: NGT in place to wall suction, with bilious output  RESP: respirations are unlabored, no accessory muscle use  CVS: RRR  GI: Binder in place Abdomen soft, appropriately tender, non-distended, no rebound tenderness / guarding. Incisions c/d/i with island dressing in place  : awaiting TOV      MEDICATIONS  (STANDING):  albuterol/ipratropium for Nebulization 3 milliLiter(s) Nebulizer every 6 hours  enoxaparin Injectable 40 milliGRAM(s) SubCutaneous every 24 hours  HYDROmorphone PCA (1 mG/mL) 30 milliLiter(s) PCA Continuous PCA Continuous  lactated ringers. 1000 milliLiter(s) (100 mL/Hr) IV Continuous <Continuous>    MEDICATIONS  (PRN):  guaiFENesin Oral Liquid (Sugar-Free) 100 milliGRAM(s) Oral every 6 hours PRN Cough  naloxone Injectable 0.1 milliGRAM(s) IV Push every 3 minutes PRN For ANY of the following changes in patient status:  A. RR LESS THAN 10 breaths per minute, B. Oxygen saturation LESS THAN 90%, C. Sedation score of 6  ondansetron Injectable 4 milliGRAM(s) IV Push every 6 hours PRN Nausea      RADIOLOGY STUDIES:    CULTURES:

## 2023-06-13 NOTE — CHART NOTE - NSCHARTNOTEFT_GEN_A_CORE
Patient asked that we provide him with a letter stating that he is currently hospitalized - he states he has a court date tomorrow and needs documentation as to why he cannot attend. I explained to patient that it might not be a private fax machine receiving this document and therefore I cannot ensure that his information would stay private. Patient expressed understanding of this and asked that I send the fax regardless - I provided patient with a letter stating he is currently hospitalized and faxed it to the phone number he provided me with.

## 2023-06-14 LAB
ANION GAP SERPL CALC-SCNC: 9 MMOL/L — SIGNIFICANT CHANGE UP (ref 5–17)
BASOPHILS # BLD AUTO: 0.02 K/UL — SIGNIFICANT CHANGE UP (ref 0–0.2)
BASOPHILS NFR BLD AUTO: 0.4 % — SIGNIFICANT CHANGE UP (ref 0–2)
BUN SERPL-MCNC: 8.7 MG/DL — SIGNIFICANT CHANGE UP (ref 8–20)
CALCIUM SERPL-MCNC: 8.1 MG/DL — LOW (ref 8.4–10.5)
CHLORIDE SERPL-SCNC: 101 MMOL/L — SIGNIFICANT CHANGE UP (ref 96–108)
CO2 SERPL-SCNC: 25 MMOL/L — SIGNIFICANT CHANGE UP (ref 22–29)
CREAT SERPL-MCNC: 0.73 MG/DL — SIGNIFICANT CHANGE UP (ref 0.5–1.3)
EGFR: 104 ML/MIN/1.73M2 — SIGNIFICANT CHANGE UP
EOSINOPHIL # BLD AUTO: 0.17 K/UL — SIGNIFICANT CHANGE UP (ref 0–0.5)
EOSINOPHIL NFR BLD AUTO: 3.2 % — SIGNIFICANT CHANGE UP (ref 0–6)
GLUCOSE SERPL-MCNC: 76 MG/DL — SIGNIFICANT CHANGE UP (ref 70–99)
HCT VFR BLD CALC: 35.8 % — LOW (ref 39–50)
HGB BLD-MCNC: 12.3 G/DL — LOW (ref 13–17)
IMM GRANULOCYTES NFR BLD AUTO: 0.4 % — SIGNIFICANT CHANGE UP (ref 0–0.9)
LYMPHOCYTES # BLD AUTO: 1.4 K/UL — SIGNIFICANT CHANGE UP (ref 1–3.3)
LYMPHOCYTES # BLD AUTO: 26 % — SIGNIFICANT CHANGE UP (ref 13–44)
MAGNESIUM SERPL-MCNC: 1.9 MG/DL — SIGNIFICANT CHANGE UP (ref 1.8–2.6)
MCHC RBC-ENTMCNC: 33.9 PG — SIGNIFICANT CHANGE UP (ref 27–34)
MCHC RBC-ENTMCNC: 34.4 GM/DL — SIGNIFICANT CHANGE UP (ref 32–36)
MCV RBC AUTO: 98.6 FL — SIGNIFICANT CHANGE UP (ref 80–100)
MONOCYTES # BLD AUTO: 0.32 K/UL — SIGNIFICANT CHANGE UP (ref 0–0.9)
MONOCYTES NFR BLD AUTO: 5.9 % — SIGNIFICANT CHANGE UP (ref 2–14)
NEUTROPHILS # BLD AUTO: 3.45 K/UL — SIGNIFICANT CHANGE UP (ref 1.8–7.4)
NEUTROPHILS NFR BLD AUTO: 64.1 % — SIGNIFICANT CHANGE UP (ref 43–77)
PHOSPHATE SERPL-MCNC: 3 MG/DL — SIGNIFICANT CHANGE UP (ref 2.4–4.7)
PLATELET # BLD AUTO: 143 K/UL — LOW (ref 150–400)
POTASSIUM SERPL-MCNC: 3.8 MMOL/L — SIGNIFICANT CHANGE UP (ref 3.5–5.3)
POTASSIUM SERPL-SCNC: 3.8 MMOL/L — SIGNIFICANT CHANGE UP (ref 3.5–5.3)
RBC # BLD: 3.63 M/UL — LOW (ref 4.2–5.8)
RBC # FLD: 11.9 % — SIGNIFICANT CHANGE UP (ref 10.3–14.5)
SODIUM SERPL-SCNC: 135 MMOL/L — SIGNIFICANT CHANGE UP (ref 135–145)
WBC # BLD: 5.38 K/UL — SIGNIFICANT CHANGE UP (ref 3.8–10.5)
WBC # FLD AUTO: 5.38 K/UL — SIGNIFICANT CHANGE UP (ref 3.8–10.5)

## 2023-06-14 PROCEDURE — 99024 POSTOP FOLLOW-UP VISIT: CPT

## 2023-06-14 RX ORDER — LIDOCAINE 4 G/100G
1 CREAM TOPICAL DAILY
Refills: 0 | Status: DISCONTINUED | OUTPATIENT
Start: 2023-06-14 | End: 2023-06-21

## 2023-06-14 RX ORDER — IPRATROPIUM/ALBUTEROL SULFATE 18-103MCG
3 AEROSOL WITH ADAPTER (GRAM) INHALATION EVERY 6 HOURS
Refills: 0 | Status: DISCONTINUED | OUTPATIENT
Start: 2023-06-14 | End: 2023-06-21

## 2023-06-14 RX ORDER — ACETAMINOPHEN 500 MG
1000 TABLET ORAL ONCE
Refills: 0 | Status: COMPLETED | OUTPATIENT
Start: 2023-06-14 | End: 2023-06-14

## 2023-06-14 RX ORDER — METHOCARBAMOL 500 MG/1
1000 TABLET, FILM COATED ORAL EVERY 8 HOURS
Refills: 0 | Status: DISCONTINUED | OUTPATIENT
Start: 2023-06-14 | End: 2023-06-14

## 2023-06-14 RX ORDER — GABAPENTIN 400 MG/1
900 CAPSULE ORAL EVERY 8 HOURS
Refills: 0 | Status: DISCONTINUED | OUTPATIENT
Start: 2023-06-14 | End: 2023-06-21

## 2023-06-14 RX ORDER — HYDROMORPHONE HYDROCHLORIDE 2 MG/ML
0.5 INJECTION INTRAMUSCULAR; INTRAVENOUS; SUBCUTANEOUS
Refills: 0 | Status: DISCONTINUED | OUTPATIENT
Start: 2023-06-14 | End: 2023-06-14

## 2023-06-14 RX ORDER — KETOROLAC TROMETHAMINE 30 MG/ML
15 SYRINGE (ML) INJECTION EVERY 6 HOURS
Refills: 0 | Status: DISCONTINUED | OUTPATIENT
Start: 2023-06-14 | End: 2023-06-14

## 2023-06-14 RX ORDER — HYDROMORPHONE HYDROCHLORIDE 2 MG/ML
2 INJECTION INTRAMUSCULAR; INTRAVENOUS; SUBCUTANEOUS
Refills: 0 | Status: DISCONTINUED | OUTPATIENT
Start: 2023-06-14 | End: 2023-06-17

## 2023-06-14 RX ORDER — DULOXETINE HYDROCHLORIDE 30 MG/1
30 CAPSULE, DELAYED RELEASE ORAL AT BEDTIME
Refills: 0 | Status: DISCONTINUED | OUTPATIENT
Start: 2023-06-14 | End: 2023-06-21

## 2023-06-14 RX ORDER — KETOROLAC TROMETHAMINE 30 MG/ML
15 SYRINGE (ML) INJECTION EVERY 6 HOURS
Refills: 0 | Status: DISCONTINUED | OUTPATIENT
Start: 2023-06-14 | End: 2023-06-16

## 2023-06-14 RX ORDER — HYDROMORPHONE HYDROCHLORIDE 2 MG/ML
0.5 INJECTION INTRAMUSCULAR; INTRAVENOUS; SUBCUTANEOUS ONCE
Refills: 0 | Status: DISCONTINUED | OUTPATIENT
Start: 2023-06-14 | End: 2023-06-14

## 2023-06-14 RX ORDER — POTASSIUM CHLORIDE 20 MEQ
10 PACKET (EA) ORAL
Refills: 0 | Status: DISCONTINUED | OUTPATIENT
Start: 2023-06-14 | End: 2023-06-14

## 2023-06-14 RX ORDER — METHOCARBAMOL 500 MG/1
1500 TABLET, FILM COATED ORAL EVERY 6 HOURS
Refills: 0 | Status: DISCONTINUED | OUTPATIENT
Start: 2023-06-14 | End: 2023-06-17

## 2023-06-14 RX ORDER — POTASSIUM CHLORIDE 20 MEQ
20 PACKET (EA) ORAL ONCE
Refills: 0 | Status: COMPLETED | OUTPATIENT
Start: 2023-06-14 | End: 2023-06-14

## 2023-06-14 RX ORDER — ACETAMINOPHEN 500 MG
975 TABLET ORAL EVERY 8 HOURS
Refills: 0 | Status: DISCONTINUED | OUTPATIENT
Start: 2023-06-14 | End: 2023-06-17

## 2023-06-14 RX ADMIN — Medication 15 MILLIGRAM(S): at 01:20

## 2023-06-14 RX ADMIN — HYDROMORPHONE HYDROCHLORIDE 0.5 MILLIGRAM(S): 2 INJECTION INTRAMUSCULAR; INTRAVENOUS; SUBCUTANEOUS at 12:18

## 2023-06-14 RX ADMIN — Medication 1000 MILLIGRAM(S): at 01:31

## 2023-06-14 RX ADMIN — Medication 15 MILLIGRAM(S): at 12:13

## 2023-06-14 RX ADMIN — Medication 15 MILLIGRAM(S): at 01:31

## 2023-06-14 RX ADMIN — Medication 400 MILLIGRAM(S): at 01:17

## 2023-06-14 RX ADMIN — HYDROMORPHONE HYDROCHLORIDE 30 MILLILITER(S): 2 INJECTION INTRAMUSCULAR; INTRAVENOUS; SUBCUTANEOUS at 12:36

## 2023-06-14 RX ADMIN — LIDOCAINE 1 PATCH: 4 CREAM TOPICAL at 18:31

## 2023-06-14 RX ADMIN — Medication 15 MILLIGRAM(S): at 19:28

## 2023-06-14 RX ADMIN — HYDROMORPHONE HYDROCHLORIDE 0.5 MILLIGRAM(S): 2 INJECTION INTRAMUSCULAR; INTRAVENOUS; SUBCUTANEOUS at 13:00

## 2023-06-14 RX ADMIN — METHOCARBAMOL 1500 MILLIGRAM(S): 500 TABLET, FILM COATED ORAL at 18:32

## 2023-06-14 RX ADMIN — HYDROMORPHONE HYDROCHLORIDE 0.5 MILLIGRAM(S): 2 INJECTION INTRAMUSCULAR; INTRAVENOUS; SUBCUTANEOUS at 05:00

## 2023-06-14 RX ADMIN — METHOCARBAMOL 220 MILLIGRAM(S): 500 TABLET, FILM COATED ORAL at 10:18

## 2023-06-14 RX ADMIN — HYDROMORPHONE HYDROCHLORIDE 0.5 MILLIGRAM(S): 2 INJECTION INTRAMUSCULAR; INTRAVENOUS; SUBCUTANEOUS at 04:45

## 2023-06-14 RX ADMIN — HYDROMORPHONE HYDROCHLORIDE 30 MILLILITER(S): 2 INJECTION INTRAMUSCULAR; INTRAVENOUS; SUBCUTANEOUS at 05:41

## 2023-06-14 RX ADMIN — Medication 975 MILLIGRAM(S): at 22:27

## 2023-06-14 RX ADMIN — Medication 15 MILLIGRAM(S): at 18:32

## 2023-06-14 RX ADMIN — Medication 100 MILLIEQUIVALENT(S): at 08:15

## 2023-06-14 RX ADMIN — DULOXETINE HYDROCHLORIDE 30 MILLIGRAM(S): 30 CAPSULE, DELAYED RELEASE ORAL at 22:26

## 2023-06-14 RX ADMIN — Medication 15 MILLIGRAM(S): at 05:21

## 2023-06-14 RX ADMIN — METHOCARBAMOL 220 MILLIGRAM(S): 500 TABLET, FILM COATED ORAL at 01:34

## 2023-06-14 RX ADMIN — Medication 20 MILLIEQUIVALENT(S): at 12:13

## 2023-06-14 RX ADMIN — Medication 975 MILLIGRAM(S): at 23:00

## 2023-06-14 RX ADMIN — HYDROMORPHONE HYDROCHLORIDE 0.5 MILLIGRAM(S): 2 INJECTION INTRAMUSCULAR; INTRAVENOUS; SUBCUTANEOUS at 00:10

## 2023-06-14 RX ADMIN — Medication 15 MILLIGRAM(S): at 12:43

## 2023-06-14 RX ADMIN — Medication 15 MILLIGRAM(S): at 05:22

## 2023-06-14 RX ADMIN — HYDROMORPHONE HYDROCHLORIDE 2 MILLIGRAM(S): 2 INJECTION INTRAMUSCULAR; INTRAVENOUS; SUBCUTANEOUS at 16:35

## 2023-06-14 RX ADMIN — GABAPENTIN 900 MILLIGRAM(S): 400 CAPSULE ORAL at 22:27

## 2023-06-14 RX ADMIN — HYDROMORPHONE HYDROCHLORIDE 30 MILLILITER(S): 2 INJECTION INTRAMUSCULAR; INTRAVENOUS; SUBCUTANEOUS at 07:27

## 2023-06-14 RX ADMIN — HYDROMORPHONE HYDROCHLORIDE 2 MILLIGRAM(S): 2 INJECTION INTRAMUSCULAR; INTRAVENOUS; SUBCUTANEOUS at 15:35

## 2023-06-14 RX ADMIN — ONDANSETRON 4 MILLIGRAM(S): 8 TABLET, FILM COATED ORAL at 20:53

## 2023-06-14 RX ADMIN — ENOXAPARIN SODIUM 40 MILLIGRAM(S): 100 INJECTION SUBCUTANEOUS at 15:35

## 2023-06-14 NOTE — PROGRESS NOTE ADULT - SUBJECTIVE AND OBJECTIVE BOX
Interval Hx:  Patient seen during rounds  Patient reports pain to be controlled on current medications  Patient denies sedation with medications     Analgesic Dosing for past 24 hours reviewed as below:    acetaminophen   IVPB ..   400 mL/Hr IV Intermittent (06-14-23 @ 01:17)    HYDROmorphone  Injectable   0.5 milliGRAM(s) IV Push (06-14-23 @ 12:18)   0.5 milliGRAM(s) IV Push (06-14-23 @ 04:45)    HYDROmorphone  Injectable   0.5 milliGRAM(s) IV Push (06-13-23 @ 20:07)    HYDROmorphone  Injectable   0.5 milliGRAM(s) IV Push (06-13-23 @ 23:52)   0.5 milliGRAM(s) IV Push (06-13-23 @ 17:05)    ketorolac   Injectable   15 milliGRAM(s) IV Push (06-14-23 @ 12:13)   15 milliGRAM(s) IV Push (06-14-23 @ 05:21)   15 milliGRAM(s) IV Push (06-14-23 @ 01:20)    methocarbamol IVPB   220 mL/Hr IV Intermittent (06-14-23 @ 10:18)   220 mL/Hr IV Intermittent (06-14-23 @ 01:34)    ondansetron Injectable   4 milliGRAM(s) IV Push (06-13-23 @ 21:28)          T(C): 36.7 (06-14-23 @ 08:21), Max: 36.9 (06-13-23 @ 20:33)  HR: 56 (06-14-23 @ 08:21) (56 - 80)  BP: 134/73 (06-14-23 @ 08:21) (134/73 - 174/72)  RR: 18 (06-14-23 @ 04:18) (18 - 18)  SpO2: 95% (06-14-23 @ 08:21) (92% - 95%)      06-13-23 @ 07:01  -  06-14-23 @ 07:00  --------------------------------------------------------  IN: 1560 mL / OUT: 600 mL / NET: 960 mL        albuterol/ipratropium for Nebulization 3 milliLiter(s) Nebulizer every 6 hours PRN  enoxaparin Injectable 40 milliGRAM(s) SubCutaneous every 24 hours  guaiFENesin Oral Liquid (Sugar-Free) 100 milliGRAM(s) Oral every 6 hours PRN  HYDROmorphone  Injectable 0.5 milliGRAM(s) IV Push every 3 hours PRN  HYDROmorphone  Injectable 0.5 milliGRAM(s) IV Push once PRN  HYDROmorphone PCA (1 mG/mL) 30 milliLiter(s) PCA Continuous PCA Continuous  ketorolac   Injectable 15 milliGRAM(s) IV Push every 6 hours  lactated ringers. 1000 milliLiter(s) IV Continuous <Continuous>  methocarbamol IVPB 1000 milliGRAM(s) IV Intermittent every 8 hours  naloxone Injectable 0.1 milliGRAM(s) IV Push every 3 minutes PRN  ondansetron Injectable 4 milliGRAM(s) IV Push every 6 hours PRN                          12.3   5.38  )-----------( 143      ( 14 Jun 2023 05:12 )             35.8     06-14    135  |  101  |  8.7  ----------------------------<  76  3.8   |  25.0  |  0.73    Ca    8.1<L>      14 Jun 2023 05:12  Phos  3.0     06-14  Mg     1.9     06-14    TPro  5.3<L>  /  Alb  3.0<L>  /  TBili  0.6  /  DBili  x   /  AST  13  /  ALT  6   /  AlkPhos  44  06-13    PT/INR - ( 13 Jun 2023 05:08 )   PT: 17.0 sec;   INR: 1.46 ratio         PTT - ( 13 Jun 2023 05:08 )  PTT:35.8 sec      Pain Service   [phone]

## 2023-06-14 NOTE — PROGRESS NOTE ADULT - SUBJECTIVE AND OBJECTIVE BOX
SUBJECTIVE/24 hour events:  Patient is a 63yMale presenting with incarcerated ventral hernia with sbo now pod#2 ex-lap with primary repair. Patient with no acute events overnight, NG tube removed no post nausea and or vomiting IV hydration , passed TOV. Pain management on board for chronic pain regimen, added IV robaxin, needed additional medications to help with his pain overnight, multiple complaints of pain      Vital Signs Last 24 Hrs  T(C): 36.9 (13 Jun 2023 20:33), Max: 36.9 (13 Jun 2023 20:33)  T(F): 98.4 (13 Jun 2023 20:33), Max: 98.4 (13 Jun 2023 20:33)  HR: 80 (13 Jun 2023 20:33) (56 - 80)  BP: 174/72 (13 Jun 2023 20:33) (128/75 - 174/72)  BP(mean): --  RR: 18 (13 Jun 2023 20:33) (18 - 18)  SpO2: 92% (13 Jun 2023 20:33) (92% - 97%)    Parameters below as of 13 Jun 2023 20:33  Patient On (Oxygen Delivery Method): room air      Drug Dosing Weight  Height (cm): 182.9 (11 Jun 2023 21:57)  Weight (kg): 79.5 (11 Jun 2023 21:57)  BMI (kg/m2): 23.8 (11 Jun 2023 21:57)  BSA (m2): 2.01 (11 Jun 2023 21:57)  I&O's Detail    12 Jun 2023 07:01  -  13 Jun 2023 07:00  --------------------------------------------------------  IN:    Lactated Ringers: 200 mL  Total IN: 200 mL    OUT:    Indwelling Catheter - Urethral (mL): 500 mL    Nasogastric/Oral tube (mL): 245 mL  Total OUT: 745 mL    Total NET: -545 mL      13 Jun 2023 07:01  -  14 Jun 2023 02:29  --------------------------------------------------------  IN:    Lactated Ringers: 1320 mL  Total IN: 1320 mL    OUT:    Voided (mL): 200 mL  Total OUT: 200 mL    Total NET: 1120 mL        Allergies    No Known Allergies    Intolerances                              13.7   6.71  )-----------( 148      ( 13 Jun 2023 05:08 )             39.7   06-13    134<L>  |  98  |  10.6  ----------------------------<  82  4.1   |  26.0  |  0.82    Ca    8.0<L>      13 Jun 2023 05:08  Phos  3.3     06-13  Mg     2.1     06-13    TPro  5.3<L>  /  Alb  3.0<L>  /  TBili  0.6  /  DBili  x   /  AST  13  /  ALT  6   /  AlkPhos  44  06-13  PT/INR - ( 13 Jun 2023 05:08 )   PT: 17.0 sec;   INR: 1.46 ratio         PTT - ( 13 Jun 2023 05:08 )  PTT:35.8 sec    ROS:      PHYSICAL EXAM:  Constitutional: "  I need more pain meds"  HEN: NGT in place to wall suction, with bilious output  RESP: respirations are unlabored, no accessory muscle use  CVS: RRR  GI: Binder in place Abdomen soft, appropriately tender, non-distended, no rebound tenderness / guarding. Incisions c/d/i with island dressing in place  : awaiting TOV          MEDICATIONS  (STANDING):  albuterol/ipratropium for Nebulization 3 milliLiter(s) Nebulizer every 6 hours  enoxaparin Injectable 40 milliGRAM(s) SubCutaneous every 24 hours  HYDROmorphone PCA (1 mG/mL) 30 milliLiter(s) PCA Continuous PCA Continuous  ketorolac   Injectable 15 milliGRAM(s) IV Push every 6 hours  lactated ringers. 1000 milliLiter(s) (60 mL/Hr) IV Continuous <Continuous>  methocarbamol IVPB 1000 milliGRAM(s) IV Intermittent every 8 hours    MEDICATIONS  (PRN):  guaiFENesin Oral Liquid (Sugar-Free) 100 milliGRAM(s) Oral every 6 hours PRN Cough  HYDROmorphone  Injectable 0.5 milliGRAM(s) IV Push once PRN Severe Pain (7 - 10)  HYDROmorphone  Injectable 0.5 milliGRAM(s) IV Push every 3 hours PRN break thru pain  naloxone Injectable 0.1 milliGRAM(s) IV Push every 3 minutes PRN For ANY of the following changes in patient status:  A. RR LESS THAN 10 breaths per minute, B. Oxygen saturation LESS THAN 90%, C. Sedation score of 6  ondansetron Injectable 4 milliGRAM(s) IV Push every 6 hours PRN Nausea      RADIOLOGY STUDIES:    CULTURES:

## 2023-06-14 NOTE — PROGRESS NOTE ADULT - ASSESSMENT
63yoM with PMH herniated discs, atrial flutter, anxiety, depression, PE (2020), BPH, COPD who presents to the ED with acute onset abdominal pain associated with an abdominal wall bulge.   on suboxone 8mg tid   s/p Exploratory laparotomy 12-Jun-2023  patient now refusing suboxone. pt understands that he will not be able to receive an opioid rx upon discharge    Med Recs when tolerating PO:  DC PCA  - Recommend starting dilaudid PO 2mg [id number] PRN severe pain  - Recommend starting acetaminophen PO 975mg [id number] standing. HOLD for liver function test dysfunction  - Recommend starting gabapentin PO 900mg [id number] standing. HOLD for sedation  - cont ketorolac IV 15mg [id number] standing x 8 doses. Afterwards, can use celebrex 200mg PO q12h x 7days, with food. HOLD for black/red stools.  - Recommend starting robaxin 1500mg QID standing. HOLD for sedation   DC robaxin ivp  lidocaine   Patch 12 hours on, 12 hours off. Max 3 patches on at one time      63yoM with PMH herniated discs, atrial flutter, anxiety, depression, PE (2020), BPH, COPD who presents to the ED with acute onset abdominal pain associated with an abdominal wall bulge.   on suboxone 8mg tid   s/p Exploratory laparotomy 12-Jun-2023  patient now refusing suboxone. pt understands that he will not be able to receive an opioid rx upon discharge    Med Recs when tolerating PO:  DC PCA  - Recommend starting dilaudid PO 2mg [id number] PRN severe pain  - Recommend starting acetaminophen PO 975mg [id number] standing. HOLD for liver function test dysfunction  - Recommend starting gabapentin PO 900mg [id number] standing. HOLD for sedation  - cont ketorolac IV 15mg [id number] standing x 8 doses. Afterwards, can use celebrex 200mg PO q12h x 7days, with food. HOLD for black/red stools.  - Recommend starting robaxin 1500mg QID standing. HOLD for sedation   DC robaxin ivp  lidocaine   Patch 12 hours on, 12 hours off. Max 3 patches on at one time   Recommend starting cymbalta 30mg qhs.

## 2023-06-14 NOTE — PROGRESS NOTE ADULT - ASSESSMENT
Patient presenting with incarcerated ventral hernia now pod#2 of ex-lap hernia with primary repair, NG tube out , passed tov  Pain management at bedside starting on PCA pump  monitor pain control, needs additional adjustments  await return of bowel function   NPO/IVF  Keep binder in place  Out of bed.

## 2023-06-15 LAB
ANION GAP SERPL CALC-SCNC: 12 MMOL/L — SIGNIFICANT CHANGE UP (ref 5–17)
BASOPHILS # BLD AUTO: 0.03 K/UL — SIGNIFICANT CHANGE UP (ref 0–0.2)
BASOPHILS NFR BLD AUTO: 0.5 % — SIGNIFICANT CHANGE UP (ref 0–2)
BUN SERPL-MCNC: 7.4 MG/DL — LOW (ref 8–20)
CALCIUM SERPL-MCNC: 8.1 MG/DL — LOW (ref 8.4–10.5)
CHLORIDE SERPL-SCNC: 98 MMOL/L — SIGNIFICANT CHANGE UP (ref 96–108)
CO2 SERPL-SCNC: 25 MMOL/L — SIGNIFICANT CHANGE UP (ref 22–29)
CREAT SERPL-MCNC: 0.69 MG/DL — SIGNIFICANT CHANGE UP (ref 0.5–1.3)
EGFR: 105 ML/MIN/1.73M2 — SIGNIFICANT CHANGE UP
EOSINOPHIL # BLD AUTO: 0.24 K/UL — SIGNIFICANT CHANGE UP (ref 0–0.5)
EOSINOPHIL NFR BLD AUTO: 4 % — SIGNIFICANT CHANGE UP (ref 0–6)
GLUCOSE SERPL-MCNC: 56 MG/DL — LOW (ref 70–99)
HCT VFR BLD CALC: 38.6 % — LOW (ref 39–50)
HGB BLD-MCNC: 13.5 G/DL — SIGNIFICANT CHANGE UP (ref 13–17)
IMM GRANULOCYTES NFR BLD AUTO: 0.5 % — SIGNIFICANT CHANGE UP (ref 0–0.9)
LYMPHOCYTES # BLD AUTO: 1.35 K/UL — SIGNIFICANT CHANGE UP (ref 1–3.3)
LYMPHOCYTES # BLD AUTO: 22.5 % — SIGNIFICANT CHANGE UP (ref 13–44)
MAGNESIUM SERPL-MCNC: 1.7 MG/DL — SIGNIFICANT CHANGE UP (ref 1.6–2.6)
MCHC RBC-ENTMCNC: 34.2 PG — HIGH (ref 27–34)
MCHC RBC-ENTMCNC: 35 GM/DL — SIGNIFICANT CHANGE UP (ref 32–36)
MCV RBC AUTO: 97.7 FL — SIGNIFICANT CHANGE UP (ref 80–100)
MONOCYTES # BLD AUTO: 0.34 K/UL — SIGNIFICANT CHANGE UP (ref 0–0.9)
MONOCYTES NFR BLD AUTO: 5.7 % — SIGNIFICANT CHANGE UP (ref 2–14)
NEUTROPHILS # BLD AUTO: 4 K/UL — SIGNIFICANT CHANGE UP (ref 1.8–7.4)
NEUTROPHILS NFR BLD AUTO: 66.8 % — SIGNIFICANT CHANGE UP (ref 43–77)
PHOSPHATE SERPL-MCNC: 2.5 MG/DL — SIGNIFICANT CHANGE UP (ref 2.4–4.7)
PLATELET # BLD AUTO: 153 K/UL — SIGNIFICANT CHANGE UP (ref 150–400)
POTASSIUM SERPL-MCNC: 4 MMOL/L — SIGNIFICANT CHANGE UP (ref 3.5–5.3)
POTASSIUM SERPL-SCNC: 4 MMOL/L — SIGNIFICANT CHANGE UP (ref 3.5–5.3)
RBC # BLD: 3.95 M/UL — LOW (ref 4.2–5.8)
RBC # FLD: 11.7 % — SIGNIFICANT CHANGE UP (ref 10.3–14.5)
SODIUM SERPL-SCNC: 135 MMOL/L — SIGNIFICANT CHANGE UP (ref 135–145)
WBC # BLD: 5.99 K/UL — SIGNIFICANT CHANGE UP (ref 3.8–10.5)
WBC # FLD AUTO: 5.99 K/UL — SIGNIFICANT CHANGE UP (ref 3.8–10.5)

## 2023-06-15 PROCEDURE — 74018 RADEX ABDOMEN 1 VIEW: CPT | Mod: 26

## 2023-06-15 RX ORDER — ALPRAZOLAM 0.25 MG
0.25 TABLET ORAL ONCE
Refills: 0 | Status: DISCONTINUED | OUTPATIENT
Start: 2023-06-15 | End: 2023-06-15

## 2023-06-15 RX ORDER — SODIUM CHLORIDE 9 MG/ML
1000 INJECTION, SOLUTION INTRAVENOUS
Refills: 0 | Status: DISCONTINUED | OUTPATIENT
Start: 2023-06-15 | End: 2023-06-15

## 2023-06-15 RX ORDER — SODIUM CHLORIDE 9 MG/ML
1000 INJECTION, SOLUTION INTRAVENOUS
Refills: 0 | Status: DISCONTINUED | OUTPATIENT
Start: 2023-06-15 | End: 2023-06-18

## 2023-06-15 RX ORDER — MAGNESIUM SULFATE 500 MG/ML
2 VIAL (ML) INJECTION ONCE
Refills: 0 | Status: COMPLETED | OUTPATIENT
Start: 2023-06-15 | End: 2023-06-15

## 2023-06-15 RX ORDER — ONDANSETRON 8 MG/1
4 TABLET, FILM COATED ORAL ONCE
Refills: 0 | Status: COMPLETED | OUTPATIENT
Start: 2023-06-15 | End: 2023-06-15

## 2023-06-15 RX ORDER — IPRATROPIUM/ALBUTEROL SULFATE 18-103MCG
3 AEROSOL WITH ADAPTER (GRAM) INHALATION EVERY 6 HOURS
Refills: 0 | Status: DISCONTINUED | OUTPATIENT
Start: 2023-06-15 | End: 2023-06-15

## 2023-06-15 RX ORDER — ALPRAZOLAM 0.25 MG
0.5 TABLET ORAL EVERY 8 HOURS
Refills: 0 | Status: DISCONTINUED | OUTPATIENT
Start: 2023-06-15 | End: 2023-06-21

## 2023-06-15 RX ORDER — SODIUM CHLORIDE 9 MG/ML
1000 INJECTION, SOLUTION INTRAVENOUS ONCE
Refills: 0 | Status: COMPLETED | OUTPATIENT
Start: 2023-06-15 | End: 2023-06-15

## 2023-06-15 RX ADMIN — LIDOCAINE 1 PATCH: 4 CREAM TOPICAL at 12:14

## 2023-06-15 RX ADMIN — Medication 15 MILLIGRAM(S): at 08:53

## 2023-06-15 RX ADMIN — Medication 975 MILLIGRAM(S): at 22:47

## 2023-06-15 RX ADMIN — METHOCARBAMOL 1500 MILLIGRAM(S): 500 TABLET, FILM COATED ORAL at 12:11

## 2023-06-15 RX ADMIN — Medication 15 MILLIGRAM(S): at 23:28

## 2023-06-15 RX ADMIN — Medication 15 MILLIGRAM(S): at 08:38

## 2023-06-15 RX ADMIN — GABAPENTIN 900 MILLIGRAM(S): 400 CAPSULE ORAL at 15:41

## 2023-06-15 RX ADMIN — Medication 975 MILLIGRAM(S): at 15:43

## 2023-06-15 RX ADMIN — DULOXETINE HYDROCHLORIDE 30 MILLIGRAM(S): 30 CAPSULE, DELAYED RELEASE ORAL at 22:48

## 2023-06-15 RX ADMIN — LIDOCAINE 1 PATCH: 4 CREAM TOPICAL at 05:39

## 2023-06-15 RX ADMIN — Medication 15 MILLIGRAM(S): at 00:32

## 2023-06-15 RX ADMIN — ONDANSETRON 4 MILLIGRAM(S): 8 TABLET, FILM COATED ORAL at 20:22

## 2023-06-15 RX ADMIN — ONDANSETRON 4 MILLIGRAM(S): 8 TABLET, FILM COATED ORAL at 12:10

## 2023-06-15 RX ADMIN — HYDROMORPHONE HYDROCHLORIDE 2 MILLIGRAM(S): 2 INJECTION INTRAMUSCULAR; INTRAVENOUS; SUBCUTANEOUS at 13:11

## 2023-06-15 RX ADMIN — HYDROMORPHONE HYDROCHLORIDE 2 MILLIGRAM(S): 2 INJECTION INTRAMUSCULAR; INTRAVENOUS; SUBCUTANEOUS at 16:24

## 2023-06-15 RX ADMIN — Medication 0.25 MILLIGRAM(S): at 00:47

## 2023-06-15 RX ADMIN — Medication 0.25 MILLIGRAM(S): at 21:40

## 2023-06-15 RX ADMIN — Medication 0.5 MILLIGRAM(S): at 14:37

## 2023-06-15 RX ADMIN — Medication 975 MILLIGRAM(S): at 23:00

## 2023-06-15 RX ADMIN — Medication 15 MILLIGRAM(S): at 17:19

## 2023-06-15 RX ADMIN — Medication 25 GRAM(S): at 08:45

## 2023-06-15 RX ADMIN — Medication 15 MILLIGRAM(S): at 01:02

## 2023-06-15 RX ADMIN — SODIUM CHLORIDE 75 MILLILITER(S): 9 INJECTION, SOLUTION INTRAVENOUS at 08:32

## 2023-06-15 RX ADMIN — GABAPENTIN 900 MILLIGRAM(S): 400 CAPSULE ORAL at 22:48

## 2023-06-15 RX ADMIN — Medication 15 MILLIGRAM(S): at 17:49

## 2023-06-15 RX ADMIN — MORPHINE SULFATE 4 MILLIGRAM(S): 50 CAPSULE, EXTENDED RELEASE ORAL at 23:05

## 2023-06-15 RX ADMIN — Medication 0.25 MILLIGRAM(S): at 00:32

## 2023-06-15 RX ADMIN — ENOXAPARIN SODIUM 40 MILLIGRAM(S): 100 INJECTION SUBCUTANEOUS at 15:44

## 2023-06-15 RX ADMIN — HYDROMORPHONE HYDROCHLORIDE 2 MILLIGRAM(S): 2 INJECTION INTRAMUSCULAR; INTRAVENOUS; SUBCUTANEOUS at 16:54

## 2023-06-15 RX ADMIN — ONDANSETRON 4 MILLIGRAM(S): 8 TABLET, FILM COATED ORAL at 14:37

## 2023-06-15 RX ADMIN — Medication 975 MILLIGRAM(S): at 16:43

## 2023-06-15 RX ADMIN — SODIUM CHLORIDE 1000 MILLILITER(S): 9 INJECTION, SOLUTION INTRAVENOUS at 00:48

## 2023-06-15 RX ADMIN — SODIUM CHLORIDE 100 MILLILITER(S): 9 INJECTION, SOLUTION INTRAVENOUS at 05:33

## 2023-06-15 RX ADMIN — LIDOCAINE 1 PATCH: 4 CREAM TOPICAL at 23:25

## 2023-06-15 RX ADMIN — Medication 15 MILLIGRAM(S): at 23:30

## 2023-06-15 RX ADMIN — HYDROMORPHONE HYDROCHLORIDE 2 MILLIGRAM(S): 2 INJECTION INTRAMUSCULAR; INTRAVENOUS; SUBCUTANEOUS at 12:11

## 2023-06-15 RX ADMIN — LIDOCAINE 1 PATCH: 4 CREAM TOPICAL at 19:59

## 2023-06-15 RX ADMIN — Medication 0.5 MILLIGRAM(S): at 02:00

## 2023-06-15 NOTE — PROGRESS NOTE ADULT - ASSESSMENT
Patient presenting with incarcerated ventral hernia now pod#3 of ex-lap hernia with primary repair, NG tube out , passed tov, now on sips and chips and passing gas    Pain management changed Iv narcotics to PO with multi-modality regimen  xanax prn or ativan for anxiety  needs good pulmonary toliet, neb treatments incentive spirometer  monitor pain control, needs additional adjustments  await return of bowel function   IVF, sips and chips  Keep binder in place  Out of bed.

## 2023-06-15 NOTE — PROGRESS NOTE ADULT - SUBJECTIVE AND OBJECTIVE BOX
Interval Hx:  Patient seen during rounds  Patient reports pain to be controlled on current medications  Patient denies sedation with medications     Analgesic Dosing for past 24 hours reviewed as below:  acetaminophen     Tablet ..   975 milliGRAM(s) Oral (06-14-23 @ 22:27)    ALPRAZolam   0.5 milliGRAM(s) Oral (06-15-23 @ 14:37)    DULoxetine   30 milliGRAM(s) Oral (06-14-23 @ 22:26)    gabapentin   900 milliGRAM(s) Oral (06-14-23 @ 22:27)    HYDROmorphone   Tablet   2 milliGRAM(s) Oral (06-15-23 @ 12:11)   2 milliGRAM(s) Oral (06-14-23 @ 15:35)    ketorolac   Injectable   15 milliGRAM(s) IV Push (06-15-23 @ 08:38)   15 milliGRAM(s) IV Push (06-15-23 @ 00:32)   15 milliGRAM(s) IV Push (06-14-23 @ 18:32)    LORazepam   Injectable   0.25 milliGRAM(s) IV Push (06-15-23 @ 00:32)    LORazepam   Injectable   0.25 milliGRAM(s) IV Push (06-15-23 @ 00:47)    LORazepam   Injectable   0.5 milliGRAM(s) IV Push (06-15-23 @ 02:00)    methocarbamol   1500 milliGRAM(s) Oral (06-15-23 @ 12:11)   1500 milliGRAM(s) Oral (06-14-23 @ 18:32)    ondansetron Injectable   4 milliGRAM(s) IV Push (06-15-23 @ 12:10)   4 milliGRAM(s) IV Push (06-14-23 @ 20:53)    ondansetron Injectable   4 milliGRAM(s) IV Push (06-15-23 @ 14:37)          T(C): 36.9 (06-15-23 @ 04:07), Max: 36.9 (06-15-23 @ 04:07)  HR: 67 (06-15-23 @ 04:07) (56 - 71)  BP: 161/76 (06-15-23 @ 04:07) (158/77 - 195/89)  RR: 18 (06-15-23 @ 04:07) (18 - 18)  SpO2: 92% (06-15-23 @ 04:07) (92% - 95%)      06-14-23 @ 07:01  -  06-15-23 @ 07:00  --------------------------------------------------------  IN: 0 mL / OUT: 750 mL / NET: -750 mL        acetaminophen     Tablet .. 975 milliGRAM(s) Oral every 8 hours  albuterol/ipratropium for Nebulization 3 milliLiter(s) Nebulizer every 6 hours PRN  ALPRAZolam 0.5 milliGRAM(s) Oral every 8 hours PRN  dextrose 5% + sodium chloride 0.9%. 1000 milliLiter(s) IV Continuous <Continuous>  DULoxetine 30 milliGRAM(s) Oral at bedtime  enoxaparin Injectable 40 milliGRAM(s) SubCutaneous every 24 hours  gabapentin 900 milliGRAM(s) Oral every 8 hours  guaiFENesin Oral Liquid (Sugar-Free) 100 milliGRAM(s) Oral every 6 hours PRN  HYDROmorphone   Tablet 2 milliGRAM(s) Oral every 3 hours PRN  ketorolac   Injectable 15 milliGRAM(s) IV Push every 6 hours  lidocaine   4% Patch 1 Patch Transdermal daily  methocarbamol 1500 milliGRAM(s) Oral every 6 hours  naloxone Injectable 0.1 milliGRAM(s) IV Push every 3 minutes PRN  ondansetron Injectable 4 milliGRAM(s) IV Push every 6 hours PRN                          13.5   5.99  )-----------( 153      ( 15 Db 2023 05:16 )             38.6     06-15    135  |  98  |  7.4<L>  ----------------------------<  56<L>  4.0   |  25.0  |  0.69    Ca    8.1<L>      15 Db 2023 05:16  Phos  2.5     06-15  Mg     1.7     06-15            Pain Service   [phone]

## 2023-06-15 NOTE — CHART NOTE - NSCHARTNOTEFT_GEN_A_CORE
Pt c/o abdominal pain and nausea. Seen and examined, c/o nausea, Zofran was given and gave some relief. No vomiting. He was started on a CLD today. Last passed gas in the afternoon.     Constitutional: NAD  HEENT: PERRL, EOMI  Respiratory: Respirations non-labored, no accessory muscle use  Gastrointestinal: Soft, + tender around incision, + distention, hyperresonant to percussion, incision c/d/i   Neurological: A&O x 3; without gross deficit    Pt made NPO and started on IVFs.   Will continue to monitor pt, if vomits, will likely need NGT.     Pt also requested additional anxiety meds, additional 0.25 xanax 1x dose ordered. Pt c/o abdominal pain and nausea. Seen and examined, c/o nausea, Zofran was given and gave some relief. No vomiting. He was started on a CLD today. Last passed gas in the afternoon.     Constitutional: NAD  HEENT: PERRL, EOMI  Respiratory: Respirations non-labored, no accessory muscle use  Gastrointestinal: Soft, + tender around incision, + distention, hyperresonant to percussion, incision c/d/i   Neurological: A&O x 3; without gross deficit    Pt made NPO and started on IVFs.   Will continue to monitor pt, if vomits, will need NGT.     Pt also requested additional anxiety meds, additional 0.25 xanax 1x dose ordered. Pt c/o abdominal pain and nausea. Seen and examined, c/o nausea, Zofran was given and gave some relief. No vomiting. He was started on a CLD today. Last passed gas in the afternoon.     Constitutional: NAD  HEENT: PERRL, EOMI  Respiratory: Respirations non-labored, no accessory muscle use  Gastrointestinal: Soft, + tender around incision, + distention, hyperresonant to percussion, incision c/d/i   Neurological: A&O x 3; without gross deficit    Pt made NPO and started on IVFs.   Will continue to monitor pt, if vomits, will need NGT.     Pt also requested additional anxiety meds, specifically ativan, 0.25 Ativan 1x dose ordered.

## 2023-06-15 NOTE — PROGRESS NOTE ADULT - SUBJECTIVE AND OBJECTIVE BOX
SUBJECTIVE/24 hour events:    Patient is a 63yMale presenting with incarcerated ventral hernia with sbo now pod#3 ex-lap with primary repair. Patient began passing gas, is now allowed sips&chips. Overnight patient had issues with anxeity/ panic attacks, spoke to patient at bedside, ativan ordered, abdominal exam performed and found to be tympanic, midline wound c/d/i, generalized ttp. KUB performed and looks okay gas throughout, no large stomach buble seen, no big dilation of bowel. Patient given ativan, starting with 0.25 x2 which did not help him, gave another 0.5 and found patient sleeping peacefully, in no distress. Patient does have issues with anxiety/ panic attacks at home and has taken xanax in the past. Pain management following, changed IV pain to po, with multi modality regimen and seems to be okay, did not ask for any IV pain meds overnight. Patient tolerating sips/chips, was having nausea with the panic attack but now resolved, no vomiting, per patient continues to pass gas. Patient noted to have a wet , non-productive wet cough will order neb treatments, patient participating and using the incentive spirometer frequently.  1 liter bolus given and IV fluid maintance rate increased, patient appears dry and urine dark      Vital Signs Last 24 Hrs  T(C): 36.4 (14 Jun 2023 23:52), Max: 37.1 (14 Jun 2023 13:47)  T(F): 97.5 (14 Jun 2023 23:52), Max: 98.7 (14 Jun 2023 13:47)  HR: 63 (15 Db 2023 00:46) (56 - 113)  BP: 182/84 (15 Db 2023 00:46) (123/69 - 195/89)  BP(mean): --  RR: 18 (15 Db 2023 00:46) (18 - 18)  SpO2: 93% (15 Db 2023 00:46) (91% - 97%)    Parameters below as of 15 Db 2023 00:46  Patient On (Oxygen Delivery Method): room air      Drug Dosing Weight  Height (cm): 182.9 (11 Jun 2023 21:57)  Weight (kg): 79.5 (11 Jun 2023 21:57)  BMI (kg/m2): 23.8 (11 Jun 2023 21:57)  BSA (m2): 2.01 (11 Jun 2023 21:57)  I&O's Detail    13 Jun 2023 07:01  -  14 Jun 2023 07:00  --------------------------------------------------------  IN:    Lactated Ringers: 1560 mL  Total IN: 1560 mL    OUT:    Voided (mL): 600 mL  Total OUT: 600 mL    Total NET: 960 mL      14 Jun 2023 07:01  -  15 Jun 2023 01:57  --------------------------------------------------------  IN:  Total IN: 0 mL    OUT:    Voided (mL): 400 mL  Total OUT: 400 mL    Total NET: -400 mL        Allergies    No Known Allergies    Intolerances                              12.3   5.38  )-----------( 143      ( 14 Jun 2023 05:12 )             35.8   06-14    135  |  101  |  8.7  ----------------------------<  76  3.8   |  25.0  |  0.73    Ca    8.1<L>      14 Jun 2023 05:12  Phos  3.0     06-14  Mg     1.9     06-14    TPro  5.3<L>  /  Alb  3.0<L>  /  TBili  0.6  /  DBili  x   /  AST  13  /  ALT  6   /  AlkPhos  44  06-13  PT/INR - ( 13 Jun 2023 05:08 )   PT: 17.0 sec;   INR: 1.46 ratio         PTT - ( 13 Jun 2023 05:08 )  PTT:35.8 sec    ROS:    PHYSICAL EXAM:  Constitutional: was extremely anxious on first exam, now resting peacefully     Respiratory: no respiratory distress, patient has a non-productive wet cough, o2 saturation wnl    Gastrointestinal: abdomen tympanic, distended, mid line ex-lap surgical incision staples with lengthy space inbetween, no discharge, no erythema surrounding the wound, generalized ttp but greatest around the incision and most periumbilical area    Genitourinary: voiding     Neurological: A&OX3          MEDICATIONS  (STANDING):  acetaminophen     Tablet .. 975 milliGRAM(s) Oral every 8 hours  DULoxetine 30 milliGRAM(s) Oral at bedtime  enoxaparin Injectable 40 milliGRAM(s) SubCutaneous every 24 hours  gabapentin 900 milliGRAM(s) Oral every 8 hours  ketorolac   Injectable 15 milliGRAM(s) IV Push every 6 hours  lactated ringers. 1000 milliLiter(s) (100 mL/Hr) IV Continuous <Continuous>  lidocaine   4% Patch 1 Patch Transdermal daily  methocarbamol 1500 milliGRAM(s) Oral every 6 hours    MEDICATIONS  (PRN):  albuterol/ipratropium for Nebulization 3 milliLiter(s) Nebulizer every 6 hours PRN Shortness of Breath and/or Wheezing  ALPRAZolam 0.5 milliGRAM(s) Oral every 8 hours PRN anxiety  guaiFENesin Oral Liquid (Sugar-Free) 100 milliGRAM(s) Oral every 6 hours PRN Cough  HYDROmorphone   Tablet 2 milliGRAM(s) Oral every 3 hours PRN Severe Pain (7 - 10)  LORazepam   Injectable 0.5 milliGRAM(s) IV Push once PRN Anxiety  naloxone Injectable 0.1 milliGRAM(s) IV Push every 3 minutes PRN For ANY of the following changes in patient status:  A. RR LESS THAN 10 breaths per minute, B. Oxygen saturation LESS THAN 90%, C. Sedation score of 6  ondansetron Injectable 4 milliGRAM(s) IV Push every 6 hours PRN Nausea      RADIOLOGY STUDIES:    CULTURES:

## 2023-06-15 NOTE — PROGRESS NOTE ADULT - ASSESSMENT
63yoM with PMH herniated discs, atrial flutter, anxiety, depression, PE (2020), BPH, COPD who presents to the ED with acute onset abdominal pain associated with an abdominal wall bulge.   on suboxone 8mg tid   s/p Exploratory laparotomy 12-Jun-2023  patient now refusing suboxone. pt understands that he will not be able to receive an opioid rx upon discharge    c/o anxiety  pain controlled    dilaudid PO 2mg [id number] PRN severe pain - used 2 doses in past 24 hrs  acetaminophen PO 975mg [id number] standing. HOLD for liver function test dysfunction  - Recommend increasing gabapentin PO 1200mg [id number] standing. HOLD for sedation  ketorolac IV 15mg [id number] standing x 8 doses. Afterwards, can use celebrex 200mg PO q12h x 7days, with food. HOLD for black/red stools.  robaxin 1500mg QID standing. HOLD for sedation   lidocaine   Patch 12 hours on, 12 hours off. Max 3 patches on at one time   cymbalta 30mg qhs.

## 2023-06-16 LAB
ANION GAP SERPL CALC-SCNC: 9 MMOL/L — SIGNIFICANT CHANGE UP (ref 5–17)
BASOPHILS # BLD AUTO: 0.02 K/UL — SIGNIFICANT CHANGE UP (ref 0–0.2)
BASOPHILS NFR BLD AUTO: 0.4 % — SIGNIFICANT CHANGE UP (ref 0–2)
BUN SERPL-MCNC: 5.4 MG/DL — LOW (ref 8–20)
CALCIUM SERPL-MCNC: 7.5 MG/DL — LOW (ref 8.4–10.5)
CHLORIDE SERPL-SCNC: 103 MMOL/L — SIGNIFICANT CHANGE UP (ref 96–108)
CO2 SERPL-SCNC: 25 MMOL/L — SIGNIFICANT CHANGE UP (ref 22–29)
CREAT SERPL-MCNC: 0.71 MG/DL — SIGNIFICANT CHANGE UP (ref 0.5–1.3)
EGFR: 104 ML/MIN/1.73M2 — SIGNIFICANT CHANGE UP
EOSINOPHIL # BLD AUTO: 0.2 K/UL — SIGNIFICANT CHANGE UP (ref 0–0.5)
EOSINOPHIL NFR BLD AUTO: 4.2 % — SIGNIFICANT CHANGE UP (ref 0–6)
GLUCOSE SERPL-MCNC: 103 MG/DL — HIGH (ref 70–99)
HCT VFR BLD CALC: 37.7 % — LOW (ref 39–50)
HGB BLD-MCNC: 13.1 G/DL — SIGNIFICANT CHANGE UP (ref 13–17)
IMM GRANULOCYTES NFR BLD AUTO: 0.2 % — SIGNIFICANT CHANGE UP (ref 0–0.9)
LYMPHOCYTES # BLD AUTO: 1.54 K/UL — SIGNIFICANT CHANGE UP (ref 1–3.3)
LYMPHOCYTES # BLD AUTO: 32.2 % — SIGNIFICANT CHANGE UP (ref 13–44)
MAGNESIUM SERPL-MCNC: 1.9 MG/DL — SIGNIFICANT CHANGE UP (ref 1.6–2.6)
MCHC RBC-ENTMCNC: 33.9 PG — SIGNIFICANT CHANGE UP (ref 27–34)
MCHC RBC-ENTMCNC: 34.7 GM/DL — SIGNIFICANT CHANGE UP (ref 32–36)
MCV RBC AUTO: 97.7 FL — SIGNIFICANT CHANGE UP (ref 80–100)
MONOCYTES # BLD AUTO: 0.33 K/UL — SIGNIFICANT CHANGE UP (ref 0–0.9)
MONOCYTES NFR BLD AUTO: 6.9 % — SIGNIFICANT CHANGE UP (ref 2–14)
NEUTROPHILS # BLD AUTO: 2.68 K/UL — SIGNIFICANT CHANGE UP (ref 1.8–7.4)
NEUTROPHILS NFR BLD AUTO: 56.1 % — SIGNIFICANT CHANGE UP (ref 43–77)
PHOSPHATE SERPL-MCNC: 2.5 MG/DL — SIGNIFICANT CHANGE UP (ref 2.4–4.7)
PLATELET # BLD AUTO: 155 K/UL — SIGNIFICANT CHANGE UP (ref 150–400)
POTASSIUM SERPL-MCNC: 3.7 MMOL/L — SIGNIFICANT CHANGE UP (ref 3.5–5.3)
POTASSIUM SERPL-SCNC: 3.7 MMOL/L — SIGNIFICANT CHANGE UP (ref 3.5–5.3)
RBC # BLD: 3.86 M/UL — LOW (ref 4.2–5.8)
RBC # FLD: 11.7 % — SIGNIFICANT CHANGE UP (ref 10.3–14.5)
SODIUM SERPL-SCNC: 137 MMOL/L — SIGNIFICANT CHANGE UP (ref 135–145)
WBC # BLD: 4.78 K/UL — SIGNIFICANT CHANGE UP (ref 3.8–10.5)
WBC # FLD AUTO: 4.78 K/UL — SIGNIFICANT CHANGE UP (ref 3.8–10.5)

## 2023-06-16 RX ORDER — LACTULOSE 10 G/15ML
60 SOLUTION ORAL ONCE
Refills: 0 | Status: COMPLETED | OUTPATIENT
Start: 2023-06-16 | End: 2023-06-16

## 2023-06-16 RX ORDER — SODIUM CHLORIDE 9 MG/ML
1000 INJECTION, SOLUTION INTRAVENOUS ONCE
Refills: 0 | Status: COMPLETED | OUTPATIENT
Start: 2023-06-16 | End: 2023-06-16

## 2023-06-16 RX ORDER — POTASSIUM CHLORIDE 20 MEQ
10 PACKET (EA) ORAL
Refills: 0 | Status: COMPLETED | OUTPATIENT
Start: 2023-06-16 | End: 2023-06-16

## 2023-06-16 RX ORDER — MAGNESIUM SULFATE 500 MG/ML
1 VIAL (ML) INJECTION ONCE
Refills: 0 | Status: COMPLETED | OUTPATIENT
Start: 2023-06-16 | End: 2023-06-16

## 2023-06-16 RX ADMIN — Medication 100 MILLIEQUIVALENT(S): at 09:50

## 2023-06-16 RX ADMIN — Medication 15 MILLIGRAM(S): at 05:47

## 2023-06-16 RX ADMIN — Medication 100 MILLIEQUIVALENT(S): at 11:00

## 2023-06-16 RX ADMIN — Medication 15 MILLIGRAM(S): at 11:40

## 2023-06-16 RX ADMIN — Medication 975 MILLIGRAM(S): at 05:45

## 2023-06-16 RX ADMIN — ENOXAPARIN SODIUM 40 MILLIGRAM(S): 100 INJECTION SUBCUTANEOUS at 15:47

## 2023-06-16 RX ADMIN — DULOXETINE HYDROCHLORIDE 30 MILLIGRAM(S): 30 CAPSULE, DELAYED RELEASE ORAL at 22:22

## 2023-06-16 RX ADMIN — Medication 15 MILLIGRAM(S): at 12:20

## 2023-06-16 RX ADMIN — LACTULOSE 60 GRAM(S): 10 SOLUTION ORAL at 11:38

## 2023-06-16 RX ADMIN — Medication 975 MILLIGRAM(S): at 22:22

## 2023-06-16 RX ADMIN — SODIUM CHLORIDE 1000 MILLILITER(S): 9 INJECTION, SOLUTION INTRAVENOUS at 22:33

## 2023-06-16 RX ADMIN — SODIUM CHLORIDE 110 MILLILITER(S): 9 INJECTION, SOLUTION INTRAVENOUS at 22:33

## 2023-06-16 RX ADMIN — GABAPENTIN 900 MILLIGRAM(S): 400 CAPSULE ORAL at 05:38

## 2023-06-16 RX ADMIN — Medication 0.5 MILLIGRAM(S): at 05:38

## 2023-06-16 RX ADMIN — LIDOCAINE 1 PATCH: 4 CREAM TOPICAL at 11:38

## 2023-06-16 RX ADMIN — Medication 975 MILLIGRAM(S): at 13:23

## 2023-06-16 RX ADMIN — GABAPENTIN 900 MILLIGRAM(S): 400 CAPSULE ORAL at 13:24

## 2023-06-16 RX ADMIN — HYDROMORPHONE HYDROCHLORIDE 2 MILLIGRAM(S): 2 INJECTION INTRAMUSCULAR; INTRAVENOUS; SUBCUTANEOUS at 20:37

## 2023-06-16 RX ADMIN — Medication 100 MILLIEQUIVALENT(S): at 08:03

## 2023-06-16 RX ADMIN — METHOCARBAMOL 1500 MILLIGRAM(S): 500 TABLET, FILM COATED ORAL at 05:38

## 2023-06-16 RX ADMIN — SODIUM CHLORIDE 110 MILLILITER(S): 9 INJECTION, SOLUTION INTRAVENOUS at 13:26

## 2023-06-16 RX ADMIN — GABAPENTIN 900 MILLIGRAM(S): 400 CAPSULE ORAL at 22:22

## 2023-06-16 RX ADMIN — Medication 975 MILLIGRAM(S): at 05:39

## 2023-06-16 RX ADMIN — ONDANSETRON 4 MILLIGRAM(S): 8 TABLET, FILM COATED ORAL at 20:37

## 2023-06-16 RX ADMIN — LIDOCAINE 1 PATCH: 4 CREAM TOPICAL at 20:00

## 2023-06-16 RX ADMIN — HYDROMORPHONE HYDROCHLORIDE 2 MILLIGRAM(S): 2 INJECTION INTRAMUSCULAR; INTRAVENOUS; SUBCUTANEOUS at 21:30

## 2023-06-16 RX ADMIN — METHOCARBAMOL 1500 MILLIGRAM(S): 500 TABLET, FILM COATED ORAL at 11:39

## 2023-06-16 RX ADMIN — METHOCARBAMOL 1500 MILLIGRAM(S): 500 TABLET, FILM COATED ORAL at 17:53

## 2023-06-16 RX ADMIN — Medication 975 MILLIGRAM(S): at 15:12

## 2023-06-16 RX ADMIN — Medication 100 GRAM(S): at 13:26

## 2023-06-16 RX ADMIN — Medication 15 MILLIGRAM(S): at 05:38

## 2023-06-16 NOTE — PROGRESS NOTE ADULT - SUBJECTIVE AND OBJECTIVE BOX
Subjective: Patient seen and examined at bedside, last night he was c/o nausea, so he was made NPO again. The nausea improved, denies vomiting. He is having bowel function.     MEDICATIONS  (STANDING):  acetaminophen     Tablet .. 975 milliGRAM(s) Oral every 8 hours  dextrose 5% + sodium chloride 0.9%. 1000 milliLiter(s) (110 mL/Hr) IV Continuous <Continuous>  DULoxetine 30 milliGRAM(s) Oral at bedtime  enoxaparin Injectable 40 milliGRAM(s) SubCutaneous every 24 hours  gabapentin 900 milliGRAM(s) Oral every 8 hours  ketorolac   Injectable 15 milliGRAM(s) IV Push every 6 hours  lidocaine   4% Patch 1 Patch Transdermal daily  methocarbamol 1500 milliGRAM(s) Oral every 6 hours    MEDICATIONS  (PRN):  albuterol/ipratropium for Nebulization 3 milliLiter(s) Nebulizer every 6 hours PRN Shortness of Breath and/or Wheezing  ALPRAZolam 0.5 milliGRAM(s) Oral every 8 hours PRN anxiety  guaiFENesin Oral Liquid (Sugar-Free) 100 milliGRAM(s) Oral every 6 hours PRN Cough  HYDROmorphone   Tablet 2 milliGRAM(s) Oral every 3 hours PRN Severe Pain (7 - 10)  naloxone Injectable 0.1 milliGRAM(s) IV Push every 3 minutes PRN For ANY of the following changes in patient status:  A. RR LESS THAN 10 breaths per minute, B. Oxygen saturation LESS THAN 90%, C. Sedation score of 6  ondansetron Injectable 4 milliGRAM(s) IV Push every 6 hours PRN Nausea    Vital Signs Last 24 Hrs  T(C): 36.7 (15 Db 2023 23:57), Max: 36.9 (15 Db 2023 04:07)  T(F): 98.1 (15 Db 2023 23:57), Max: 98.5 (15 Db 2023 04:07)  HR: 59 (15 Db 2023 23:57) (59 - 67)  BP: 160/84 (15 Db 2023 23:57) (159/83 - 170/86)  BP(mean): --  RR: 18 (15 Db 2023 23:57) (18 - 18)  SpO2: 92% (15 Db 2023 23:57) (92% - 94%)  Parameters below as of 15 Db 2023 23:57  Patient On (Oxygen Delivery Method): room air    Physical Exam:  Constitutional: NAD  HEENT: PERRL, EOMI  Neck: No JVD, FROM without pain  Respiratory: Respirations non-labored, no accessory muscle use  Gastrointestinal: Soft, + tender around incision, + distention, hyperresonant to percussion, incision c/d/i   Extremities: No peripheral edema, No cyanosis  Neurological: A&O x 3; without gross deficit    LABS:  6/16 labs pending     A: Patient is a 64 yo M with an incarcerated ventral hernia with SBO, s/p ex lap, hernia repair 6/12.     Plan:   Pain management following for better pain control   anxiety meds prn   NPO with IVFs for now, if nausea and distention improves, and continues bowel function, will advance diet   Encourage IS and ambulation oob   Monitor bowel function

## 2023-06-16 NOTE — PROGRESS NOTE ADULT - SUBJECTIVE AND OBJECTIVE BOX
Interval Hx:  Patient seen during rounds  Patient reports pain to be controlled on current medications  Patient denies sedation with medications     Analgesic Dosing for past 24 hours reviewed as below:  acetaminophen     Tablet ..   975 milliGRAM(s) Oral (06-16-23 @ 13:23)   975 milliGRAM(s) Oral (06-16-23 @ 05:39)   975 milliGRAM(s) Oral (06-15-23 @ 22:47)   975 milliGRAM(s) Oral (06-15-23 @ 15:43)    ALPRAZolam   0.5 milliGRAM(s) Oral (06-16-23 @ 05:38)    DULoxetine   30 milliGRAM(s) Oral (06-15-23 @ 22:48)    gabapentin   900 milliGRAM(s) Oral (06-16-23 @ 13:24)   900 milliGRAM(s) Oral (06-16-23 @ 05:38)   900 milliGRAM(s) Oral (06-15-23 @ 22:48)   900 milliGRAM(s) Oral (06-15-23 @ 15:41)    HYDROmorphone   Tablet   2 milliGRAM(s) Oral (06-15-23 @ 16:24)    ketorolac   Injectable   15 milliGRAM(s) IV Push (06-16-23 @ 11:40)   15 milliGRAM(s) IV Push (06-16-23 @ 05:38)   15 milliGRAM(s) IV Push (06-15-23 @ 23:28)   15 milliGRAM(s) IV Push (06-15-23 @ 17:19)    LORazepam   Injectable   0.25 milliGRAM(s) IV Push (06-15-23 @ 21:40)    methocarbamol   1500 milliGRAM(s) Oral (06-16-23 @ 11:39)   1500 milliGRAM(s) Oral (06-16-23 @ 05:38)    ondansetron Injectable   4 milliGRAM(s) IV Push (06-15-23 @ 20:22)          T(C): 37.2 (06-16-23 @ 09:54), Max: 37.2 (06-16-23 @ 09:54)  HR: 66 (06-16-23 @ 09:54) (59 - 66)  BP: 149/83 (06-16-23 @ 09:54) (149/83 - 170/86)  RR: 18 (06-16-23 @ 09:54) (18 - 18)  SpO2: 93% (06-16-23 @ 09:54) (92% - 94%)      06-15-23 @ 07:01  -  06-16-23 @ 07:00  --------------------------------------------------------  IN: 0 mL / OUT: 350 mL / NET: -350 mL        acetaminophen     Tablet .. 975 milliGRAM(s) Oral every 8 hours  albuterol/ipratropium for Nebulization 3 milliLiter(s) Nebulizer every 6 hours PRN  ALPRAZolam 0.5 milliGRAM(s) Oral every 8 hours PRN  dextrose 5% + sodium chloride 0.9%. 1000 milliLiter(s) IV Continuous <Continuous>  DULoxetine 30 milliGRAM(s) Oral at bedtime  enoxaparin Injectable 40 milliGRAM(s) SubCutaneous every 24 hours  gabapentin 900 milliGRAM(s) Oral every 8 hours  guaiFENesin Oral Liquid (Sugar-Free) 100 milliGRAM(s) Oral every 6 hours PRN  HYDROmorphone   Tablet 2 milliGRAM(s) Oral every 3 hours PRN  lidocaine   4% Patch 1 Patch Transdermal daily  methocarbamol 1500 milliGRAM(s) Oral every 6 hours  naloxone Injectable 0.1 milliGRAM(s) IV Push every 3 minutes PRN  ondansetron Injectable 4 milliGRAM(s) IV Push every 6 hours PRN                          13.1   4.78  )-----------( 155      ( 16 Jun 2023 04:50 )             37.7     06-16    137  |  103  |  5.4<L>  ----------------------------<  103<H>  3.7   |  25.0  |  0.71    Ca    7.5<L>      16 Jun 2023 04:50  Phos  2.5     06-16  Mg     1.9     06-16            Pain Service   [phone]

## 2023-06-16 NOTE — PROGRESS NOTE ADULT - ASSESSMENT
63yoM with PMH herniated discs, atrial flutter, anxiety, depression, PE (2020), BPH, COPD who presents to the ED with acute onset abdominal pain associated with an abdominal wall bulge.   on suboxone 8mg tid   s/p Exploratory laparotomy 12-Jun-2023  patient now refusing suboxone. pt understands that he will not be able to receive an opioid rx upon discharge    Pain controlled  Pain service will sign off  Please reconsult as needed     used only 1 dose of opioid in past 24 hrs, can be dc'd when due for discharge  no opioid rx on discharge

## 2023-06-17 LAB
ANION GAP SERPL CALC-SCNC: 7 MMOL/L — SIGNIFICANT CHANGE UP (ref 5–17)
BASOPHILS # BLD AUTO: 0.03 K/UL — SIGNIFICANT CHANGE UP (ref 0–0.2)
BASOPHILS NFR BLD AUTO: 0.7 % — SIGNIFICANT CHANGE UP (ref 0–2)
BUN SERPL-MCNC: 7.6 MG/DL — LOW (ref 8–20)
CALCIUM SERPL-MCNC: 7.8 MG/DL — LOW (ref 8.4–10.5)
CHLORIDE SERPL-SCNC: 106 MMOL/L — SIGNIFICANT CHANGE UP (ref 96–108)
CO2 SERPL-SCNC: 26 MMOL/L — SIGNIFICANT CHANGE UP (ref 22–29)
CREAT SERPL-MCNC: 0.67 MG/DL — SIGNIFICANT CHANGE UP (ref 0.5–1.3)
EGFR: 106 ML/MIN/1.73M2 — SIGNIFICANT CHANGE UP
EOSINOPHIL # BLD AUTO: 0.18 K/UL — SIGNIFICANT CHANGE UP (ref 0–0.5)
EOSINOPHIL NFR BLD AUTO: 4.2 % — SIGNIFICANT CHANGE UP (ref 0–6)
GLUCOSE SERPL-MCNC: 87 MG/DL — SIGNIFICANT CHANGE UP (ref 70–99)
HCT VFR BLD CALC: 36.8 % — LOW (ref 39–50)
HGB BLD-MCNC: 12.6 G/DL — LOW (ref 13–17)
IMM GRANULOCYTES NFR BLD AUTO: 0.2 % — SIGNIFICANT CHANGE UP (ref 0–0.9)
LYMPHOCYTES # BLD AUTO: 1.7 K/UL — SIGNIFICANT CHANGE UP (ref 1–3.3)
LYMPHOCYTES # BLD AUTO: 40.1 % — SIGNIFICANT CHANGE UP (ref 13–44)
MAGNESIUM SERPL-MCNC: 1.9 MG/DL — SIGNIFICANT CHANGE UP (ref 1.6–2.6)
MCHC RBC-ENTMCNC: 33.6 PG — SIGNIFICANT CHANGE UP (ref 27–34)
MCHC RBC-ENTMCNC: 34.2 GM/DL — SIGNIFICANT CHANGE UP (ref 32–36)
MCV RBC AUTO: 98.1 FL — SIGNIFICANT CHANGE UP (ref 80–100)
MONOCYTES # BLD AUTO: 0.22 K/UL — SIGNIFICANT CHANGE UP (ref 0–0.9)
MONOCYTES NFR BLD AUTO: 5.2 % — SIGNIFICANT CHANGE UP (ref 2–14)
NEUTROPHILS # BLD AUTO: 2.1 K/UL — SIGNIFICANT CHANGE UP (ref 1.8–7.4)
NEUTROPHILS NFR BLD AUTO: 49.6 % — SIGNIFICANT CHANGE UP (ref 43–77)
PHOSPHATE SERPL-MCNC: 2.4 MG/DL — SIGNIFICANT CHANGE UP (ref 2.4–4.7)
PLATELET # BLD AUTO: 158 K/UL — SIGNIFICANT CHANGE UP (ref 150–400)
POTASSIUM SERPL-MCNC: 4 MMOL/L — SIGNIFICANT CHANGE UP (ref 3.5–5.3)
POTASSIUM SERPL-SCNC: 4 MMOL/L — SIGNIFICANT CHANGE UP (ref 3.5–5.3)
RBC # BLD: 3.75 M/UL — LOW (ref 4.2–5.8)
RBC # FLD: 11.8 % — SIGNIFICANT CHANGE UP (ref 10.3–14.5)
SODIUM SERPL-SCNC: 139 MMOL/L — SIGNIFICANT CHANGE UP (ref 135–145)
WBC # BLD: 4.24 K/UL — SIGNIFICANT CHANGE UP (ref 3.8–10.5)
WBC # FLD AUTO: 4.24 K/UL — SIGNIFICANT CHANGE UP (ref 3.8–10.5)

## 2023-06-17 PROCEDURE — 74018 RADEX ABDOMEN 1 VIEW: CPT | Mod: 26

## 2023-06-17 PROCEDURE — 99024 POSTOP FOLLOW-UP VISIT: CPT

## 2023-06-17 RX ORDER — ACETAMINOPHEN 500 MG
1000 TABLET ORAL EVERY 6 HOURS
Refills: 0 | Status: COMPLETED | OUTPATIENT
Start: 2023-06-17 | End: 2023-06-18

## 2023-06-17 RX ORDER — ONDANSETRON 8 MG/1
4 TABLET, FILM COATED ORAL ONCE
Refills: 0 | Status: COMPLETED | OUTPATIENT
Start: 2023-06-17 | End: 2023-06-17

## 2023-06-17 RX ORDER — HYDROMORPHONE HYDROCHLORIDE 2 MG/ML
0.5 INJECTION INTRAMUSCULAR; INTRAVENOUS; SUBCUTANEOUS EVERY 4 HOURS
Refills: 0 | Status: DISCONTINUED | OUTPATIENT
Start: 2023-06-17 | End: 2023-06-20

## 2023-06-17 RX ORDER — HYDROMORPHONE HYDROCHLORIDE 2 MG/ML
0.5 INJECTION INTRAMUSCULAR; INTRAVENOUS; SUBCUTANEOUS ONCE
Refills: 0 | Status: DISCONTINUED | OUTPATIENT
Start: 2023-06-17 | End: 2023-06-17

## 2023-06-17 RX ORDER — METOCLOPRAMIDE HCL 10 MG
10 TABLET ORAL ONCE
Refills: 0 | Status: COMPLETED | OUTPATIENT
Start: 2023-06-17 | End: 2023-06-17

## 2023-06-17 RX ADMIN — GABAPENTIN 900 MILLIGRAM(S): 400 CAPSULE ORAL at 05:18

## 2023-06-17 RX ADMIN — SODIUM CHLORIDE 110 MILLILITER(S): 9 INJECTION, SOLUTION INTRAVENOUS at 08:52

## 2023-06-17 RX ADMIN — ONDANSETRON 4 MILLIGRAM(S): 8 TABLET, FILM COATED ORAL at 14:38

## 2023-06-17 RX ADMIN — HYDROMORPHONE HYDROCHLORIDE 0.5 MILLIGRAM(S): 2 INJECTION INTRAMUSCULAR; INTRAVENOUS; SUBCUTANEOUS at 18:26

## 2023-06-17 RX ADMIN — HYDROMORPHONE HYDROCHLORIDE 2 MILLIGRAM(S): 2 INJECTION INTRAMUSCULAR; INTRAVENOUS; SUBCUTANEOUS at 05:50

## 2023-06-17 RX ADMIN — LIDOCAINE 1 PATCH: 4 CREAM TOPICAL at 01:45

## 2023-06-17 RX ADMIN — HYDROMORPHONE HYDROCHLORIDE 2 MILLIGRAM(S): 2 INJECTION INTRAMUSCULAR; INTRAVENOUS; SUBCUTANEOUS at 01:20

## 2023-06-17 RX ADMIN — METHOCARBAMOL 1500 MILLIGRAM(S): 500 TABLET, FILM COATED ORAL at 08:18

## 2023-06-17 RX ADMIN — LIDOCAINE 1 PATCH: 4 CREAM TOPICAL at 19:00

## 2023-06-17 RX ADMIN — METHOCARBAMOL 1500 MILLIGRAM(S): 500 TABLET, FILM COATED ORAL at 01:32

## 2023-06-17 RX ADMIN — GABAPENTIN 900 MILLIGRAM(S): 400 CAPSULE ORAL at 23:36

## 2023-06-17 RX ADMIN — ENOXAPARIN SODIUM 40 MILLIGRAM(S): 100 INJECTION SUBCUTANEOUS at 18:16

## 2023-06-17 RX ADMIN — LIDOCAINE 1 PATCH: 4 CREAM TOPICAL at 18:16

## 2023-06-17 RX ADMIN — HYDROMORPHONE HYDROCHLORIDE 2 MILLIGRAM(S): 2 INJECTION INTRAMUSCULAR; INTRAVENOUS; SUBCUTANEOUS at 05:20

## 2023-06-17 RX ADMIN — ONDANSETRON 4 MILLIGRAM(S): 8 TABLET, FILM COATED ORAL at 18:26

## 2023-06-17 RX ADMIN — HYDROMORPHONE HYDROCHLORIDE 2 MILLIGRAM(S): 2 INJECTION INTRAMUSCULAR; INTRAVENOUS; SUBCUTANEOUS at 00:50

## 2023-06-17 RX ADMIN — Medication 10 MILLIGRAM(S): at 18:26

## 2023-06-17 RX ADMIN — Medication 975 MILLIGRAM(S): at 05:18

## 2023-06-17 RX ADMIN — HYDROMORPHONE HYDROCHLORIDE 0.5 MILLIGRAM(S): 2 INJECTION INTRAMUSCULAR; INTRAVENOUS; SUBCUTANEOUS at 18:56

## 2023-06-17 NOTE — PROGRESS NOTE ADULT - SUBJECTIVE AND OBJECTIVE BOX
Subjective: Patient seen and examined at bedside, tolerating a CLD, has had multiple bowel movements after lactulose, and voiding spontaneously      MEDICATIONS  (STANDING):  acetaminophen     Tablet .. 975 milliGRAM(s) Oral every 8 hours  dextrose 5% + sodium chloride 0.9%. 1000 milliLiter(s) (110 mL/Hr) IV Continuous <Continuous>  DULoxetine 30 milliGRAM(s) Oral at bedtime  enoxaparin Injectable 40 milliGRAM(s) SubCutaneous every 24 hours  gabapentin 900 milliGRAM(s) Oral every 8 hours  lidocaine   4% Patch 1 Patch Transdermal daily  methocarbamol 1500 milliGRAM(s) Oral every 6 hours    MEDICATIONS  (PRN):  albuterol/ipratropium for Nebulization 3 milliLiter(s) Nebulizer every 6 hours PRN Shortness of Breath and/or Wheezing  ALPRAZolam 0.5 milliGRAM(s) Oral every 8 hours PRN anxiety  guaiFENesin Oral Liquid (Sugar-Free) 100 milliGRAM(s) Oral every 6 hours PRN Cough  HYDROmorphone   Tablet 2 milliGRAM(s) Oral every 3 hours PRN Severe Pain (7 - 10)  naloxone Injectable 0.1 milliGRAM(s) IV Push every 3 minutes PRN For ANY of the following changes in patient status:  A. RR LESS THAN 10 breaths per minute, B. Oxygen saturation LESS THAN 90%, C. Sedation score of 6  ondansetron Injectable 4 milliGRAM(s) IV Push every 6 hours PRN Nausea    Vital Signs Last 24 Hrs  T(C): 36.9 (16 Jun 2023 23:25), Max: 37.2 (16 Jun 2023 09:54)  T(F): 98.5 (16 Jun 2023 23:25), Max: 98.9 (16 Jun 2023 09:54)  HR: 53 (16 Jun 2023 23:25) (53 - 66)  BP: 153/78 (16 Jun 2023 23:25) (119/69 - 160/85)  BP(mean): --  RR: 18 (16 Jun 2023 23:25) (18 - 19)  SpO2: 96% (16 Jun 2023 23:25) (93% - 96%)  Parameters below as of 16 Jun 2023 23:25  Patient On (Oxygen Delivery Method): room air    Physical Exam:  Constitutional: NAD  HEENT: PERRL, EOMI  Neck: No JVD, FROM without pain  Respiratory: Respirations non-labored, no accessory muscle use  Gastrointestinal: Soft, + tender around incision, not distended, incision c/d/i   Extremities: No peripheral edema, No cyanosis  Neurological: A&O x 3; without gross deficit    LABS:  labs pending     A: Patient is a 64 yo M with an incarcerated ventral hernia with SBO, s/p ex lap, hernia repair 6/12, tolerating a CLD and having bowel movements.     Plan:   Pain management following for better pain control   anxiety meds prn   CLD, will advance   Encourage IS and ambulation oob   Monitor bowel function

## 2023-06-17 NOTE — CHART NOTE - NSCHARTNOTEFT_GEN_A_CORE
patient seen and examined after RN called due to patient co nausea and abd pain.   abd more distended but soft, tympanic, decreased bowel sounds, no rebound or guarding. TTP to incision as expected.   VSS     IV zofran reglan and dilaudid given   night team to reassess  made NPO

## 2023-06-18 LAB
ANION GAP SERPL CALC-SCNC: 9 MMOL/L — SIGNIFICANT CHANGE UP (ref 5–17)
BASOPHILS # BLD AUTO: 0.03 K/UL — SIGNIFICANT CHANGE UP (ref 0–0.2)
BASOPHILS NFR BLD AUTO: 0.8 % — SIGNIFICANT CHANGE UP (ref 0–2)
BUN SERPL-MCNC: 6.2 MG/DL — LOW (ref 8–20)
CALCIUM SERPL-MCNC: 7.7 MG/DL — LOW (ref 8.4–10.5)
CHLORIDE SERPL-SCNC: 101 MMOL/L — SIGNIFICANT CHANGE UP (ref 96–108)
CO2 SERPL-SCNC: 23 MMOL/L — SIGNIFICANT CHANGE UP (ref 22–29)
CREAT SERPL-MCNC: 0.66 MG/DL — SIGNIFICANT CHANGE UP (ref 0.5–1.3)
EGFR: 107 ML/MIN/1.73M2 — SIGNIFICANT CHANGE UP
EOSINOPHIL # BLD AUTO: 0.14 K/UL — SIGNIFICANT CHANGE UP (ref 0–0.5)
EOSINOPHIL NFR BLD AUTO: 3.5 % — SIGNIFICANT CHANGE UP (ref 0–6)
GLUCOSE SERPL-MCNC: 90 MG/DL — SIGNIFICANT CHANGE UP (ref 70–99)
HCT VFR BLD CALC: 40.4 % — SIGNIFICANT CHANGE UP (ref 39–50)
HGB BLD-MCNC: 13.7 G/DL — SIGNIFICANT CHANGE UP (ref 13–17)
IMM GRANULOCYTES NFR BLD AUTO: 0.3 % — SIGNIFICANT CHANGE UP (ref 0–0.9)
LYMPHOCYTES # BLD AUTO: 1.03 K/UL — SIGNIFICANT CHANGE UP (ref 1–3.3)
LYMPHOCYTES # BLD AUTO: 25.9 % — SIGNIFICANT CHANGE UP (ref 13–44)
MAGNESIUM SERPL-MCNC: 1.7 MG/DL — SIGNIFICANT CHANGE UP (ref 1.6–2.6)
MCHC RBC-ENTMCNC: 33.5 PG — SIGNIFICANT CHANGE UP (ref 27–34)
MCHC RBC-ENTMCNC: 33.9 GM/DL — SIGNIFICANT CHANGE UP (ref 32–36)
MCV RBC AUTO: 98.8 FL — SIGNIFICANT CHANGE UP (ref 80–100)
MONOCYTES # BLD AUTO: 0.32 K/UL — SIGNIFICANT CHANGE UP (ref 0–0.9)
MONOCYTES NFR BLD AUTO: 8 % — SIGNIFICANT CHANGE UP (ref 2–14)
NEUTROPHILS # BLD AUTO: 2.45 K/UL — SIGNIFICANT CHANGE UP (ref 1.8–7.4)
NEUTROPHILS NFR BLD AUTO: 61.5 % — SIGNIFICANT CHANGE UP (ref 43–77)
PHOSPHATE SERPL-MCNC: 3 MG/DL — SIGNIFICANT CHANGE UP (ref 2.4–4.7)
PLATELET # BLD AUTO: 155 K/UL — SIGNIFICANT CHANGE UP (ref 150–400)
POTASSIUM SERPL-MCNC: 4.1 MMOL/L — SIGNIFICANT CHANGE UP (ref 3.5–5.3)
POTASSIUM SERPL-SCNC: 4.1 MMOL/L — SIGNIFICANT CHANGE UP (ref 3.5–5.3)
RBC # BLD: 4.09 M/UL — LOW (ref 4.2–5.8)
RBC # FLD: 11.8 % — SIGNIFICANT CHANGE UP (ref 10.3–14.5)
SODIUM SERPL-SCNC: 133 MMOL/L — LOW (ref 135–145)
WBC # BLD: 3.98 K/UL — SIGNIFICANT CHANGE UP (ref 3.8–10.5)
WBC # FLD AUTO: 3.98 K/UL — SIGNIFICANT CHANGE UP (ref 3.8–10.5)

## 2023-06-18 PROCEDURE — 99024 POSTOP FOLLOW-UP VISIT: CPT

## 2023-06-18 RX ORDER — BUPRENORPHINE AND NALOXONE 2; .5 MG/1; MG/1
1 TABLET SUBLINGUAL
Refills: 0 | Status: DISCONTINUED | OUTPATIENT
Start: 2023-06-18 | End: 2023-06-21

## 2023-06-18 RX ORDER — SODIUM CHLORIDE 9 MG/ML
1000 INJECTION, SOLUTION INTRAVENOUS
Refills: 0 | Status: DISCONTINUED | OUTPATIENT
Start: 2023-06-18 | End: 2023-06-20

## 2023-06-18 RX ORDER — HYDROMORPHONE HYDROCHLORIDE 2 MG/ML
0.5 INJECTION INTRAMUSCULAR; INTRAVENOUS; SUBCUTANEOUS
Refills: 0 | Status: DISCONTINUED | OUTPATIENT
Start: 2023-06-18 | End: 2023-06-18

## 2023-06-18 RX ORDER — KETOROLAC TROMETHAMINE 30 MG/ML
15 SYRINGE (ML) INJECTION EVERY 6 HOURS
Refills: 0 | Status: DISCONTINUED | OUTPATIENT
Start: 2023-06-18 | End: 2023-06-19

## 2023-06-18 RX ADMIN — ENOXAPARIN SODIUM 40 MILLIGRAM(S): 100 INJECTION SUBCUTANEOUS at 15:43

## 2023-06-18 RX ADMIN — HYDROMORPHONE HYDROCHLORIDE 0.5 MILLIGRAM(S): 2 INJECTION INTRAMUSCULAR; INTRAVENOUS; SUBCUTANEOUS at 06:35

## 2023-06-18 RX ADMIN — GABAPENTIN 900 MILLIGRAM(S): 400 CAPSULE ORAL at 06:34

## 2023-06-18 RX ADMIN — Medication 1000 MILLIGRAM(S): at 06:00

## 2023-06-18 RX ADMIN — Medication 15 MILLIGRAM(S): at 16:12

## 2023-06-18 RX ADMIN — DULOXETINE HYDROCHLORIDE 30 MILLIGRAM(S): 30 CAPSULE, DELAYED RELEASE ORAL at 20:50

## 2023-06-18 RX ADMIN — HYDROMORPHONE HYDROCHLORIDE 0.5 MILLIGRAM(S): 2 INJECTION INTRAMUSCULAR; INTRAVENOUS; SUBCUTANEOUS at 00:28

## 2023-06-18 RX ADMIN — Medication 400 MILLIGRAM(S): at 00:36

## 2023-06-18 RX ADMIN — HYDROMORPHONE HYDROCHLORIDE 0.5 MILLIGRAM(S): 2 INJECTION INTRAMUSCULAR; INTRAVENOUS; SUBCUTANEOUS at 00:13

## 2023-06-18 RX ADMIN — Medication 15 MILLIGRAM(S): at 20:51

## 2023-06-18 RX ADMIN — ONDANSETRON 4 MILLIGRAM(S): 8 TABLET, FILM COATED ORAL at 00:18

## 2023-06-18 RX ADMIN — Medication 1000 MILLIGRAM(S): at 00:55

## 2023-06-18 RX ADMIN — Medication 15 MILLIGRAM(S): at 15:42

## 2023-06-18 RX ADMIN — ONDANSETRON 4 MILLIGRAM(S): 8 TABLET, FILM COATED ORAL at 06:45

## 2023-06-18 RX ADMIN — ONDANSETRON 4 MILLIGRAM(S): 8 TABLET, FILM COATED ORAL at 20:54

## 2023-06-18 RX ADMIN — LIDOCAINE 1 PATCH: 4 CREAM TOPICAL at 11:29

## 2023-06-18 RX ADMIN — LIDOCAINE 1 PATCH: 4 CREAM TOPICAL at 23:00

## 2023-06-18 RX ADMIN — DULOXETINE HYDROCHLORIDE 30 MILLIGRAM(S): 30 CAPSULE, DELAYED RELEASE ORAL at 00:30

## 2023-06-18 RX ADMIN — GABAPENTIN 900 MILLIGRAM(S): 400 CAPSULE ORAL at 20:51

## 2023-06-18 RX ADMIN — LIDOCAINE 1 PATCH: 4 CREAM TOPICAL at 19:00

## 2023-06-18 RX ADMIN — Medication 1000 MILLIGRAM(S): at 18:26

## 2023-06-18 RX ADMIN — Medication 1000 MILLIGRAM(S): at 11:39

## 2023-06-18 RX ADMIN — Medication 400 MILLIGRAM(S): at 17:56

## 2023-06-18 RX ADMIN — Medication 15 MILLIGRAM(S): at 21:51

## 2023-06-18 RX ADMIN — Medication 400 MILLIGRAM(S): at 06:37

## 2023-06-18 RX ADMIN — GABAPENTIN 900 MILLIGRAM(S): 400 CAPSULE ORAL at 13:33

## 2023-06-18 RX ADMIN — Medication 400 MILLIGRAM(S): at 11:29

## 2023-06-18 RX ADMIN — LIDOCAINE 1 PATCH: 4 CREAM TOPICAL at 06:16

## 2023-06-18 RX ADMIN — Medication 0.5 MILLIGRAM(S): at 13:32

## 2023-06-18 RX ADMIN — Medication 0.5 MILLIGRAM(S): at 21:45

## 2023-06-18 RX ADMIN — BUPRENORPHINE AND NALOXONE 1 FILM(S): 2; .5 TABLET SUBLINGUAL at 20:20

## 2023-06-18 NOTE — PROGRESS NOTE ADULT - SUBJECTIVE AND OBJECTIVE BOX
Subjective: Patient seen at bedside, had nausea earlier in evening which had resolved after antiemetics and change to NPO status, no overnight events, denies current n/v/cp/sob.       MEDICATIONS  (STANDING):  acetaminophen   IVPB .. 1000 milliGRAM(s) IV Intermittent every 6 hours  dextrose 5% + sodium chloride 0.9%. 1000 milliLiter(s) (110 mL/Hr) IV Continuous <Continuous>  DULoxetine 30 milliGRAM(s) Oral at bedtime  enoxaparin Injectable 40 milliGRAM(s) SubCutaneous every 24 hours  gabapentin 900 milliGRAM(s) Oral every 8 hours  lidocaine   4% Patch 1 Patch Transdermal daily    MEDICATIONS  (PRN):  albuterol/ipratropium for Nebulization 3 milliLiter(s) Nebulizer every 6 hours PRN Shortness of Breath and/or Wheezing  ALPRAZolam 0.5 milliGRAM(s) Oral every 8 hours PRN anxiety  guaiFENesin Oral Liquid (Sugar-Free) 100 milliGRAM(s) Oral every 6 hours PRN Cough  HYDROmorphone  Injectable 0.5 milliGRAM(s) IV Push every 4 hours PRN Severe Pain (7 - 10)  naloxone Injectable 0.1 milliGRAM(s) IV Push every 3 minutes PRN For ANY of the following changes in patient status:  A. RR LESS THAN 10 breaths per minute, B. Oxygen saturation LESS THAN 90%, C. Sedation score of 6  ondansetron Injectable 4 milliGRAM(s) IV Push every 6 hours PRN Nausea      Vital Signs Last 24 Hrs  T(C): 37.1 (17 Jun 2023 19:57), Max: 37.1 (17 Jun 2023 19:57)  T(F): 98.8 (17 Jun 2023 19:57), Max: 98.8 (17 Jun 2023 19:57)  HR: 64 (17 Jun 2023 19:57) (53 - 64)  BP: 163/89 (17 Jun 2023 19:57) (146/79 - 163/89)  BP(mean): --  RR: 18 (17 Jun 2023 19:57) (18 - 18)  SpO2: 93% (17 Jun 2023 19:57) (93% - 100%)    Parameters below as of 17 Jun 2023 19:57  Patient On (Oxygen Delivery Method): room air        Physical Exam:    Constitutional: NAD  HEENT: PERRL, EOMI  Respiratory: Respirations non-labored, no accessory muscle use  Gastrointestinal: Softly distended, tender mostly around midline, midline dressing c/d/i  Neurological: A&O x 3      LABS:                        12.6   4.24  )-----------( 158      ( 17 Jun 2023 04:53 )             36.8     06-17    139  |  106  |  7.6<L>  ----------------------------<  87  4.0   |  26.0  |  0.67    Ca    7.8<L>      17 Jun 2023 04:53  Phos  2.4     06-17  Mg     1.9     06-17            A: 64 yo M with an incarcerated ventral hernia with SBO, s/p ex lap, hernia repair 6/12, diet made NPO after episode of nausea and abd pain    Plan:   anxiety meds prn   NPO, may advance today   Encourage IS and ambulation oob   Monitor bowel function   pain control  AM labs

## 2023-06-18 NOTE — CHART NOTE - NSCHARTNOTEFT_GEN_A_CORE
RN called and notified team about patient complaining of pain and anxiety. Patient was given dose of Xanax and was stating that "he doesn't feel right". I went and saw patient at bedside, laying in fetal position. Patient complaining of abdominal pain, body shakes and nausea. Patient stated " I feel like im in withdrawal". Patient is a known chronic Suboxone user, and refused it inpatient stating " I don't want it, I want to stay off of that". Patients vital signs stable, abdomen soft, non distended, tender to palpation with +guarding. Patient was given PRN dose of Dilaudid for breakthrough pain. When I went to reassess 30mins later, patient was laying comfortably in sleeping. will monitor and reassess as needed.

## 2023-06-19 LAB
ANION GAP SERPL CALC-SCNC: 9 MMOL/L — SIGNIFICANT CHANGE UP (ref 5–17)
BASOPHILS # BLD AUTO: 0.04 K/UL — SIGNIFICANT CHANGE UP (ref 0–0.2)
BASOPHILS NFR BLD AUTO: 0.9 % — SIGNIFICANT CHANGE UP (ref 0–2)
BUN SERPL-MCNC: 6.8 MG/DL — LOW (ref 8–20)
CALCIUM SERPL-MCNC: 8.6 MG/DL — SIGNIFICANT CHANGE UP (ref 8.4–10.5)
CHLORIDE SERPL-SCNC: 101 MMOL/L — SIGNIFICANT CHANGE UP (ref 96–108)
CO2 SERPL-SCNC: 27 MMOL/L — SIGNIFICANT CHANGE UP (ref 22–29)
CREAT SERPL-MCNC: 0.75 MG/DL — SIGNIFICANT CHANGE UP (ref 0.5–1.3)
EGFR: 103 ML/MIN/1.73M2 — SIGNIFICANT CHANGE UP
EOSINOPHIL # BLD AUTO: 0.15 K/UL — SIGNIFICANT CHANGE UP (ref 0–0.5)
EOSINOPHIL NFR BLD AUTO: 3.3 % — SIGNIFICANT CHANGE UP (ref 0–6)
GLUCOSE SERPL-MCNC: 60 MG/DL — LOW (ref 70–99)
HCT VFR BLD CALC: 43.8 % — SIGNIFICANT CHANGE UP (ref 39–50)
HGB BLD-MCNC: 14.9 G/DL — SIGNIFICANT CHANGE UP (ref 13–17)
IMM GRANULOCYTES NFR BLD AUTO: 0.4 % — SIGNIFICANT CHANGE UP (ref 0–0.9)
LYMPHOCYTES # BLD AUTO: 1.79 K/UL — SIGNIFICANT CHANGE UP (ref 1–3.3)
LYMPHOCYTES # BLD AUTO: 39.3 % — SIGNIFICANT CHANGE UP (ref 13–44)
MAGNESIUM SERPL-MCNC: 1.9 MG/DL — SIGNIFICANT CHANGE UP (ref 1.6–2.6)
MCHC RBC-ENTMCNC: 34 GM/DL — SIGNIFICANT CHANGE UP (ref 32–36)
MCHC RBC-ENTMCNC: 34 PG — SIGNIFICANT CHANGE UP (ref 27–34)
MCV RBC AUTO: 100 FL — SIGNIFICANT CHANGE UP (ref 80–100)
MONOCYTES # BLD AUTO: 0.27 K/UL — SIGNIFICANT CHANGE UP (ref 0–0.9)
MONOCYTES NFR BLD AUTO: 5.9 % — SIGNIFICANT CHANGE UP (ref 2–14)
NEUTROPHILS # BLD AUTO: 2.28 K/UL — SIGNIFICANT CHANGE UP (ref 1.8–7.4)
NEUTROPHILS NFR BLD AUTO: 50.2 % — SIGNIFICANT CHANGE UP (ref 43–77)
PHOSPHATE SERPL-MCNC: 3.3 MG/DL — SIGNIFICANT CHANGE UP (ref 2.4–4.7)
PLATELET # BLD AUTO: 158 K/UL — SIGNIFICANT CHANGE UP (ref 150–400)
POTASSIUM SERPL-MCNC: 5.1 MMOL/L — SIGNIFICANT CHANGE UP (ref 3.5–5.3)
POTASSIUM SERPL-SCNC: 5.1 MMOL/L — SIGNIFICANT CHANGE UP (ref 3.5–5.3)
RBC # BLD: 4.38 M/UL — SIGNIFICANT CHANGE UP (ref 4.2–5.8)
RBC # FLD: 11.9 % — SIGNIFICANT CHANGE UP (ref 10.3–14.5)
SODIUM SERPL-SCNC: 137 MMOL/L — SIGNIFICANT CHANGE UP (ref 135–145)
WBC # BLD: 4.55 K/UL — SIGNIFICANT CHANGE UP (ref 3.8–10.5)
WBC # FLD AUTO: 4.55 K/UL — SIGNIFICANT CHANGE UP (ref 3.8–10.5)

## 2023-06-19 PROCEDURE — 99231 SBSQ HOSP IP/OBS SF/LOW 25: CPT

## 2023-06-19 RX ADMIN — LIDOCAINE 1 PATCH: 4 CREAM TOPICAL at 19:30

## 2023-06-19 RX ADMIN — LIDOCAINE 1 PATCH: 4 CREAM TOPICAL at 11:11

## 2023-06-19 RX ADMIN — GABAPENTIN 900 MILLIGRAM(S): 400 CAPSULE ORAL at 15:39

## 2023-06-19 RX ADMIN — GABAPENTIN 900 MILLIGRAM(S): 400 CAPSULE ORAL at 05:21

## 2023-06-19 RX ADMIN — ENOXAPARIN SODIUM 40 MILLIGRAM(S): 100 INJECTION SUBCUTANEOUS at 15:38

## 2023-06-19 RX ADMIN — ONDANSETRON 4 MILLIGRAM(S): 8 TABLET, FILM COATED ORAL at 07:35

## 2023-06-19 RX ADMIN — Medication 15 MILLIGRAM(S): at 02:16

## 2023-06-19 RX ADMIN — Medication 15 MILLIGRAM(S): at 08:00

## 2023-06-19 RX ADMIN — LIDOCAINE 1 PATCH: 4 CREAM TOPICAL at 23:23

## 2023-06-19 RX ADMIN — Medication 15 MILLIGRAM(S): at 07:32

## 2023-06-19 RX ADMIN — BUPRENORPHINE AND NALOXONE 1 FILM(S): 2; .5 TABLET SUBLINGUAL at 17:53

## 2023-06-19 RX ADMIN — DULOXETINE HYDROCHLORIDE 30 MILLIGRAM(S): 30 CAPSULE, DELAYED RELEASE ORAL at 22:49

## 2023-06-19 RX ADMIN — GABAPENTIN 900 MILLIGRAM(S): 400 CAPSULE ORAL at 22:49

## 2023-06-19 RX ADMIN — BUPRENORPHINE AND NALOXONE 1 FILM(S): 2; .5 TABLET SUBLINGUAL at 05:21

## 2023-06-19 RX ADMIN — Medication 15 MILLIGRAM(S): at 03:16

## 2023-06-19 RX ADMIN — SODIUM CHLORIDE 125 MILLILITER(S): 9 INJECTION, SOLUTION INTRAVENOUS at 22:49

## 2023-06-19 RX ADMIN — Medication 0.5 MILLIGRAM(S): at 05:58

## 2023-06-19 NOTE — PROGRESS NOTE ADULT - NS ATTEND AMEND GEN_ALL_CORE FT
Agree with above assessment.  The patient was seen and examined by myself with the surgical PA.  The patient is with no nausea or vomit. He states he had a loose BM and is feeling hungry.  No flatus this morning, but some yesterday.  Patient tolerating PO full liquids.  Abdomen is soft without localizing tenderness, wound with 2 staples not completely securing the skin, these were removed.  A clean dressing was applied.  The patient will be advanced to regular diet as tolerated. Continue with local wound care.
Agree with above assessment.  The patient was seen and examined by myself with the surgical PA.  The patient is with flatus and small BM this morning. He states he is feeling better than last night, but states he still feels somewhat bloated. Abdomen is softly distended without guarding or rebound, wound is stable with 2 open gaps without gross pus.  Encourage OOB ambulate, will hold PO today and await further improvement in GI function as patient is with probable post op ileus.
I have seen and examined the patient during rounds form [id number] hrs  events noted    Passing flatus, NGT removed  abd not distended but tympanic    A/p  VHR resolving ileus.  OK for PO medications  Appreciate Pain management input and efforts
I have seen and examined this patient with the team.  No acute events overnight.    Patient appears well this morning.  S/p VHR c/b ileus.  Denies nausea/vomiting, endorses flatus and multiple episodes of diarrhea.  Abdomen soft and non-distended on exam.  Will ADAT to CLD and monitor for now.  Will re-consult pain management for Suboxone prescription.

## 2023-06-20 LAB
ANION GAP SERPL CALC-SCNC: 10 MMOL/L — SIGNIFICANT CHANGE UP (ref 5–17)
BASOPHILS # BLD AUTO: 0.02 K/UL — SIGNIFICANT CHANGE UP (ref 0–0.2)
BASOPHILS NFR BLD AUTO: 0.4 % — SIGNIFICANT CHANGE UP (ref 0–2)
BUN SERPL-MCNC: 7.8 MG/DL — LOW (ref 8–20)
CALCIUM SERPL-MCNC: 8.1 MG/DL — LOW (ref 8.4–10.5)
CHLORIDE SERPL-SCNC: 101 MMOL/L — SIGNIFICANT CHANGE UP (ref 96–108)
CO2 SERPL-SCNC: 25 MMOL/L — SIGNIFICANT CHANGE UP (ref 22–29)
CREAT SERPL-MCNC: 0.66 MG/DL — SIGNIFICANT CHANGE UP (ref 0.5–1.3)
EGFR: 107 ML/MIN/1.73M2 — SIGNIFICANT CHANGE UP
EOSINOPHIL # BLD AUTO: 0.2 K/UL — SIGNIFICANT CHANGE UP (ref 0–0.5)
EOSINOPHIL NFR BLD AUTO: 4.3 % — SIGNIFICANT CHANGE UP (ref 0–6)
GLUCOSE SERPL-MCNC: 63 MG/DL — LOW (ref 70–99)
HCT VFR BLD CALC: 38.4 % — LOW (ref 39–50)
HGB BLD-MCNC: 13 G/DL — SIGNIFICANT CHANGE UP (ref 13–17)
IMM GRANULOCYTES NFR BLD AUTO: 0.4 % — SIGNIFICANT CHANGE UP (ref 0–0.9)
LYMPHOCYTES # BLD AUTO: 1.51 K/UL — SIGNIFICANT CHANGE UP (ref 1–3.3)
LYMPHOCYTES # BLD AUTO: 32.3 % — SIGNIFICANT CHANGE UP (ref 13–44)
MAGNESIUM SERPL-MCNC: 1.6 MG/DL — SIGNIFICANT CHANGE UP (ref 1.6–2.6)
MCHC RBC-ENTMCNC: 33.4 PG — SIGNIFICANT CHANGE UP (ref 27–34)
MCHC RBC-ENTMCNC: 33.9 GM/DL — SIGNIFICANT CHANGE UP (ref 32–36)
MCV RBC AUTO: 98.7 FL — SIGNIFICANT CHANGE UP (ref 80–100)
MONOCYTES # BLD AUTO: 0.35 K/UL — SIGNIFICANT CHANGE UP (ref 0–0.9)
MONOCYTES NFR BLD AUTO: 7.5 % — SIGNIFICANT CHANGE UP (ref 2–14)
NEUTROPHILS # BLD AUTO: 2.57 K/UL — SIGNIFICANT CHANGE UP (ref 1.8–7.4)
NEUTROPHILS NFR BLD AUTO: 55.1 % — SIGNIFICANT CHANGE UP (ref 43–77)
PHOSPHATE SERPL-MCNC: 3.1 MG/DL — SIGNIFICANT CHANGE UP (ref 2.4–4.7)
PLATELET # BLD AUTO: 140 K/UL — LOW (ref 150–400)
POTASSIUM SERPL-MCNC: 4.9 MMOL/L — SIGNIFICANT CHANGE UP (ref 3.5–5.3)
POTASSIUM SERPL-SCNC: 4.9 MMOL/L — SIGNIFICANT CHANGE UP (ref 3.5–5.3)
RBC # BLD: 3.89 M/UL — LOW (ref 4.2–5.8)
RBC # FLD: 11.9 % — SIGNIFICANT CHANGE UP (ref 10.3–14.5)
SODIUM SERPL-SCNC: 136 MMOL/L — SIGNIFICANT CHANGE UP (ref 135–145)
WBC # BLD: 4.67 K/UL — SIGNIFICANT CHANGE UP (ref 3.8–10.5)
WBC # FLD AUTO: 4.67 K/UL — SIGNIFICANT CHANGE UP (ref 3.8–10.5)

## 2023-06-20 RX ORDER — HYDROMORPHONE HYDROCHLORIDE 2 MG/ML
2 INJECTION INTRAMUSCULAR; INTRAVENOUS; SUBCUTANEOUS EVERY 4 HOURS
Refills: 0 | Status: DISCONTINUED | OUTPATIENT
Start: 2023-06-20 | End: 2023-06-21

## 2023-06-20 RX ORDER — MAGNESIUM SULFATE 500 MG/ML
2 VIAL (ML) INJECTION ONCE
Refills: 0 | Status: COMPLETED | OUTPATIENT
Start: 2023-06-20 | End: 2023-06-20

## 2023-06-20 RX ORDER — ACETAMINOPHEN 500 MG
975 TABLET ORAL EVERY 8 HOURS
Refills: 0 | Status: DISCONTINUED | OUTPATIENT
Start: 2023-06-20 | End: 2023-06-21

## 2023-06-20 RX ORDER — ACETAMINOPHEN 500 MG
975 TABLET ORAL EVERY 6 HOURS
Refills: 0 | Status: DISCONTINUED | OUTPATIENT
Start: 2023-06-20 | End: 2023-06-20

## 2023-06-20 RX ORDER — IBUPROFEN 200 MG
600 TABLET ORAL EVERY 6 HOURS
Refills: 0 | Status: DISCONTINUED | OUTPATIENT
Start: 2023-06-20 | End: 2023-06-21

## 2023-06-20 RX ADMIN — ENOXAPARIN SODIUM 40 MILLIGRAM(S): 100 INJECTION SUBCUTANEOUS at 16:45

## 2023-06-20 RX ADMIN — BUPRENORPHINE AND NALOXONE 1 FILM(S): 2; .5 TABLET SUBLINGUAL at 18:00

## 2023-06-20 RX ADMIN — GABAPENTIN 900 MILLIGRAM(S): 400 CAPSULE ORAL at 16:45

## 2023-06-20 RX ADMIN — GABAPENTIN 900 MILLIGRAM(S): 400 CAPSULE ORAL at 22:16

## 2023-06-20 RX ADMIN — LIDOCAINE 1 PATCH: 4 CREAM TOPICAL at 19:38

## 2023-06-20 RX ADMIN — Medication 975 MILLIGRAM(S): at 17:45

## 2023-06-20 RX ADMIN — LIDOCAINE 1 PATCH: 4 CREAM TOPICAL at 11:30

## 2023-06-20 RX ADMIN — Medication 975 MILLIGRAM(S): at 22:15

## 2023-06-20 RX ADMIN — HYDROMORPHONE HYDROCHLORIDE 2 MILLIGRAM(S): 2 INJECTION INTRAMUSCULAR; INTRAVENOUS; SUBCUTANEOUS at 22:15

## 2023-06-20 RX ADMIN — Medication 975 MILLIGRAM(S): at 16:45

## 2023-06-20 RX ADMIN — GABAPENTIN 900 MILLIGRAM(S): 400 CAPSULE ORAL at 06:15

## 2023-06-20 RX ADMIN — LIDOCAINE 1 PATCH: 4 CREAM TOPICAL at 23:36

## 2023-06-20 RX ADMIN — Medication 25 GRAM(S): at 07:49

## 2023-06-20 RX ADMIN — BUPRENORPHINE AND NALOXONE 1 FILM(S): 2; .5 TABLET SUBLINGUAL at 06:15

## 2023-06-20 RX ADMIN — HYDROMORPHONE HYDROCHLORIDE 0.5 MILLIGRAM(S): 2 INJECTION INTRAMUSCULAR; INTRAVENOUS; SUBCUTANEOUS at 07:45

## 2023-06-20 RX ADMIN — HYDROMORPHONE HYDROCHLORIDE 0.5 MILLIGRAM(S): 2 INJECTION INTRAMUSCULAR; INTRAVENOUS; SUBCUTANEOUS at 07:30

## 2023-06-20 RX ADMIN — HYDROMORPHONE HYDROCHLORIDE 2 MILLIGRAM(S): 2 INJECTION INTRAMUSCULAR; INTRAVENOUS; SUBCUTANEOUS at 23:15

## 2023-06-20 RX ADMIN — DULOXETINE HYDROCHLORIDE 30 MILLIGRAM(S): 30 CAPSULE, DELAYED RELEASE ORAL at 22:16

## 2023-06-20 RX ADMIN — Medication 975 MILLIGRAM(S): at 23:15
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
## 2023-06-20 NOTE — BH CONSULTATION LIAISON ASSESSMENT NOTE - NICOTINE
[facility] ED Geriatric Program SW/CM Initial Assessment:    ISAR Score: 4    RN Comments: Patient lives with sister    SW/CM Intervention: Chart reviewed. Patient resides at home with her sister and a referral to home health care was made by the provider. Per chart, patient is currently being followed by population health care management team. No further ED SW interventions indicated at this time.     Disposition: Home with Dyan at Home          Yes

## 2023-06-21 ENCOUNTER — TRANSCRIPTION ENCOUNTER (OUTPATIENT)
Age: 63
End: 2023-06-21

## 2023-06-21 VITALS
OXYGEN SATURATION: 93 % | DIASTOLIC BLOOD PRESSURE: 68 MMHG | HEART RATE: 89 BPM | TEMPERATURE: 99 F | RESPIRATION RATE: 18 BRPM | SYSTOLIC BLOOD PRESSURE: 120 MMHG

## 2023-06-21 PROCEDURE — 83690 ASSAY OF LIPASE: CPT

## 2023-06-21 PROCEDURE — 84295 ASSAY OF SERUM SODIUM: CPT

## 2023-06-21 PROCEDURE — 85730 THROMBOPLASTIN TIME PARTIAL: CPT

## 2023-06-21 PROCEDURE — 74019 RADEX ABDOMEN 2 VIEWS: CPT

## 2023-06-21 PROCEDURE — 71045 X-RAY EXAM CHEST 1 VIEW: CPT

## 2023-06-21 PROCEDURE — 36415 COLL VENOUS BLD VENIPUNCTURE: CPT

## 2023-06-21 PROCEDURE — 82803 BLOOD GASES ANY COMBINATION: CPT

## 2023-06-21 PROCEDURE — 96374 THER/PROPH/DIAG INJ IV PUSH: CPT

## 2023-06-21 PROCEDURE — 82435 ASSAY OF BLOOD CHLORIDE: CPT

## 2023-06-21 PROCEDURE — G1004: CPT

## 2023-06-21 PROCEDURE — 99285 EMERGENCY DEPT VISIT HI MDM: CPT | Mod: 25

## 2023-06-21 PROCEDURE — 86901 BLOOD TYPING SEROLOGIC RH(D): CPT

## 2023-06-21 PROCEDURE — 86850 RBC ANTIBODY SCREEN: CPT

## 2023-06-21 PROCEDURE — 74018 RADEX ABDOMEN 1 VIEW: CPT

## 2023-06-21 PROCEDURE — 80048 BASIC METABOLIC PNL TOTAL CA: CPT

## 2023-06-21 PROCEDURE — 94640 AIRWAY INHALATION TREATMENT: CPT

## 2023-06-21 PROCEDURE — 74177 CT ABD & PELVIS W/CONTRAST: CPT | Mod: MG

## 2023-06-21 PROCEDURE — 83605 ASSAY OF LACTIC ACID: CPT

## 2023-06-21 PROCEDURE — 86900 BLOOD TYPING SEROLOGIC ABO: CPT

## 2023-06-21 PROCEDURE — 82947 ASSAY GLUCOSE BLOOD QUANT: CPT

## 2023-06-21 PROCEDURE — C9399: CPT

## 2023-06-21 PROCEDURE — 80053 COMPREHEN METABOLIC PANEL: CPT

## 2023-06-21 PROCEDURE — 96375 TX/PRO/DX INJ NEW DRUG ADDON: CPT

## 2023-06-21 PROCEDURE — 88302 TISSUE EXAM BY PATHOLOGIST: CPT

## 2023-06-21 PROCEDURE — 84100 ASSAY OF PHOSPHORUS: CPT

## 2023-06-21 PROCEDURE — 84132 ASSAY OF SERUM POTASSIUM: CPT

## 2023-06-21 PROCEDURE — 85025 COMPLETE CBC W/AUTO DIFF WBC: CPT

## 2023-06-21 PROCEDURE — 85018 HEMOGLOBIN: CPT

## 2023-06-21 PROCEDURE — 83735 ASSAY OF MAGNESIUM: CPT

## 2023-06-21 PROCEDURE — 82330 ASSAY OF CALCIUM: CPT

## 2023-06-21 PROCEDURE — 96376 TX/PRO/DX INJ SAME DRUG ADON: CPT

## 2023-06-21 PROCEDURE — 85610 PROTHROMBIN TIME: CPT

## 2023-06-21 PROCEDURE — 93005 ELECTROCARDIOGRAM TRACING: CPT

## 2023-06-21 PROCEDURE — 85014 HEMATOCRIT: CPT

## 2023-06-21 RX ORDER — IBUPROFEN 200 MG
1 TABLET ORAL
Qty: 0 | Refills: 0 | DISCHARGE
Start: 2023-06-21

## 2023-06-21 RX ORDER — BUPRENORPHINE AND NALOXONE 2; .5 MG/1; MG/1
1 TABLET SUBLINGUAL
Qty: 28 | Refills: 0
Start: 2023-06-21 | End: 2023-07-04

## 2023-06-21 RX ORDER — BUPRENORPHINE AND NALOXONE 2; .5 MG/1; MG/1
1 TABLET SUBLINGUAL
Qty: 1 | Refills: 0
Start: 2023-06-21 | End: 2023-07-04

## 2023-06-21 RX ORDER — ACETAMINOPHEN 500 MG
3 TABLET ORAL
Qty: 0 | Refills: 0 | DISCHARGE
Start: 2023-06-21

## 2023-06-21 RX ADMIN — ENOXAPARIN SODIUM 40 MILLIGRAM(S): 100 INJECTION SUBCUTANEOUS at 11:41

## 2023-06-21 RX ADMIN — LIDOCAINE 1 PATCH: 4 CREAM TOPICAL at 11:38

## 2023-06-21 RX ADMIN — GABAPENTIN 900 MILLIGRAM(S): 400 CAPSULE ORAL at 11:39

## 2023-06-21 RX ADMIN — BUPRENORPHINE AND NALOXONE 1 FILM(S): 2; .5 TABLET SUBLINGUAL at 05:15

## 2023-06-21 RX ADMIN — Medication 975 MILLIGRAM(S): at 05:15

## 2023-06-21 RX ADMIN — GABAPENTIN 900 MILLIGRAM(S): 400 CAPSULE ORAL at 05:15

## 2023-06-21 RX ADMIN — Medication 975 MILLIGRAM(S): at 11:38

## 2023-06-21 RX ADMIN — Medication 975 MILLIGRAM(S): at 06:15

## 2023-06-21 RX ADMIN — Medication 975 MILLIGRAM(S): at 12:38

## 2023-06-21 NOTE — DISCHARGE NOTE PROVIDER - NSDCCPTREATMENT_GEN_ALL_CORE_FT
PRINCIPAL PROCEDURE  Procedure: Exploratory laparotomy  Findings and Treatment:       SECONDARY PROCEDURE  Procedure: Abdominoplasty, with ventral hernia repair  Findings and Treatment:

## 2023-06-21 NOTE — DISCHARGE NOTE PROVIDER - CARE PROVIDER_API CALL
Gordo Cristina  Anesthesiology  [address], [address]  [place] [address]  Phone: [phone]  Fax: [phone]  Follow Up Time:
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
## 2023-06-21 NOTE — DISCHARGE NOTE PROVIDER - NSDCFUSCHEDAPPT_GEN_ALL_CORE_FT
Gifty Chand  [facility]  [address]ARDIOLOGY [address]  Scheduled Appointment: 06/22/2023    [facility]  ELECTROPH [address]  Scheduled Appointment: 07/10/2023    [facility]  PULED [address]  Scheduled Appointment: 08/10/2023    Karri Prescott  [facility]  PULED [address]  Scheduled Appointment: 08/10/2023

## 2023-06-21 NOTE — DISCHARGE NOTE PROVIDER - NSFOLLOWUPCLINICS_GEN_ALL_ED_FT
[facility] Acute Care Surgery  Acute Care Surgery  [address] [place] [id number]  Phone: [phone]  Fax:

## 2023-06-21 NOTE — DISCHARGE NOTE PROVIDER - NSDCCPCAREPLAN_GEN_ALL_CORE_FT
PRINCIPAL DISCHARGE DIAGNOSIS  Diagnosis: Bowel obstruction  Assessment and Plan of Treatment: Follow up: Please call and make an appointment with the Acute Care Surgery Clinic 7-10 days after discharge. Also, please call and make an appointment with your primary care physician as per your usual schedule.   Activity: May return to normal activities as tolerated, however refrain from heavy lifting > 10-15 lbs.  Diet: May continue regular diet.  Medications: Please resume home medications as previously prescribed. Gabapentin was started as per pain management recommendations. A 2 week prescription for this has been sent to your pharmacy - you will need to follow-up with your pain management physician for refills of gabapentin and any other home pain medications you have been taking. You can also take OTC tylenol and/or ibuprofen for assistance with pain relief, as needed.  Wound Care: Please, keep wound site clean and dry. You may shower, but do not bathe.  Patient is advised to RETURN TO THE EMERGENCY DEPARTMENT for any of the following - worsening pain, fever/chills, nausea/vomiting, altered mental status, chest pain, shortness of breath, or any other new / worsening symptom.     PRINCIPAL DISCHARGE DIAGNOSIS  Diagnosis: Bowel obstruction  Assessment and Plan of Treatment: Follow up: Please call and make an appointment with the Acute Care Surgery Clinic 7-10 days after discharge. Also, please call and make an appointment with your primary care physician as per your usual schedule.   Activity: May return to normal activities as tolerated, however refrain from heavy lifting > 10-15 lbs.  Diet: May continue regular diet.  Medications: Please resume home medications as previously prescribed. Gabapentin was started as per pain management recommendations and Suboxone was prescribed for you as well. A 2 week prescription for these have been sent to the pharmacy - you will need to follow-up with Dr. Cristina within the next 2 weeks so that refills can be filled as he sees fit.  You can also take OTC tylenol and/or ibuprofen for assistance with pain relief, as needed.  Wound Care: Please, keep wound site clean and dry. You may shower, but do not bathe. Place DRY GAUZE over wound and secure with tape. Change daily or more frequently, if soiled.   Patient is advised to RETURN TO THE EMERGENCY DEPARTMENT for any of the following - worsening pain, fever/chills, nausea/vomiting, altered mental status, chest pain, shortness of breath, or any other new / worsening symptom.

## 2023-06-21 NOTE — PROGRESS NOTE ADULT - SUBJECTIVE AND OBJECTIVE BOX
SUBJECTIVE/24 hour events:  Patient is a 63yMale presenting with incarcerated ventral hernia w/ sbo pod #9 ex-lap with hernia primary repair, course complicated by post-op ileus now resolved. Patient no acute events overnight, tolerating a regular diet, pain well controlled. Patient should go home today      Vital Signs Last 24 Hrs  T(C): 36.5 (21 Jun 2023 04:33), Max: 37.1 (20 Jun 2023 08:38)  T(F): 97.7 (21 Jun 2023 04:33), Max: 98.7 (20 Jun 2023 08:38)  HR: 62 (21 Jun 2023 04:33) (57 - 80)  BP: 159/71 (21 Jun 2023 04:33) (137/73 - 159/71)  BP(mean): --  RR: 18 (21 Jun 2023 04:33) (18 - 18)  SpO2: 95% (21 Jun 2023 04:33) (92% - 99%)    Parameters below as of 21 Jun 2023 04:33  Patient On (Oxygen Delivery Method): room air      Drug Dosing Weight  Height (cm): 182.9 (11 Jun 2023 21:57)  Weight (kg): 79.5 (11 Jun 2023 21:57)  BMI (kg/m2): 23.8 (11 Jun 2023 21:57)  BSA (m2): 2.01 (11 Jun 2023 21:57)  I&O's Detail    19 Jun 2023 07:01  -  20 Jun 2023 07:00  --------------------------------------------------------  IN:  Total IN: 0 mL    OUT:    Voided (mL): 800 mL  Total OUT: 800 mL    Total NET: -800 mL        Allergies    No Known Allergies    Intolerances                              13.0   4.67  )-----------( 140      ( 20 Jun 2023 05:00 )             38.4   06-20    136  |  101  |  7.8<L>  ----------------------------<  63<L>  4.9   |  25.0  |  0.66    Ca    8.1<L>      20 Jun 2023 05:00  Phos  3.1     06-20  Mg     1.6     06-20        ROS:      Physical Exam:  General: NAD, resting comfortably in bed  Pulmonary: Nonlabored breathing, no respiratory distress  Cardiovascular: NSR  Abdominal: soft,  non tender and nondistended, no rigidity or peritonitic signs   Extremities: WWP        MEDICATIONS  (STANDING):  acetaminophen     Tablet .. 975 milliGRAM(s) Oral every 8 hours  buprenorphine 8 mG/naloxone 2 mG SL Film 1 Film(s) SubLingual two times a day  DULoxetine 30 milliGRAM(s) Oral at bedtime  enoxaparin Injectable 40 milliGRAM(s) SubCutaneous every 24 hours  gabapentin 900 milliGRAM(s) Oral every 8 hours  lidocaine   4% Patch 1 Patch Transdermal daily    MEDICATIONS  (PRN):  albuterol/ipratropium for Nebulization 3 milliLiter(s) Nebulizer every 6 hours PRN Shortness of Breath and/or Wheezing  ALPRAZolam 0.5 milliGRAM(s) Oral every 8 hours PRN anxiety  bisacodyl Suppository 10 milliGRAM(s) Rectal daily PRN Constipation  guaiFENesin Oral Liquid (Sugar-Free) 100 milliGRAM(s) Oral every 6 hours PRN Cough  HYDROmorphone   Tablet 2 milliGRAM(s) Oral every 4 hours PRN Severe Pain (7 - 10)  ibuprofen  Tablet. 600 milliGRAM(s) Oral every 6 hours PRN Moderate Pain (4 - 6)  naloxone Injectable 0.1 milliGRAM(s) IV Push every 3 minutes PRN For ANY of the following changes in patient status:  A. RR LESS THAN 10 breaths per minute, B. Oxygen saturation LESS THAN 90%, C. Sedation score of 6      RADIOLOGY STUDIES:    CULTURES:

## 2023-06-21 NOTE — DISCHARGE NOTE PROVIDER - HOSPITAL COURSE
Admission HPI:  Patient is a 63yoM with PMH herniated discs, atrial flutter, anxiety, depression, PE (2020), BPH, COPD who presents to the ED with acute onset abdominal pain associated with an abdominal wall bulge. Patient says he has had a known hernia for over 20 years since his exlap, hernia usually reducible and nontender until today. He says he woke up with significant pain, nausea and vomiting, and has been unable to reduce hernia. Last BM Sunday AM, last flatus sunday afternoon. Patient denies prior similar episodes. He currently denies any chest pain, SOB, headache, diarrhea. Patient with cough over last 2 months, no recent changes.    Hospital Course:  CT scan on admission showed right anterior abdominal wall ventral hernia containing loop of small bowel; distended and obstructed small bowel within the abdomen and pelvis consistent with small bowel obstruction secondary to presumed incarcerated hernia; swirling within the mesentery of the right lower quadrant; small amount of free pelvic fluid; mild amount of free fluid in the  paracolic gutters bilaterally, left greater than right.    Patient was admitted to the acute care surgery service. Taken to the OR on 6/23 for ex-lap and primary repair of ventral hernia - no bowel resection performed. Pt returned to surgical floors in stable condition. Post-op course complicated by ileus. Patient eventually had return of bowel function and abdominal distension resolved. He was gradually advanced to clear liquid diet and then regular diet. Pain management service was also consulted during pt's hospital stay as pt has hx of suboxone use and they assisted w/ recommendations regarding pain regimen. Patient is currently tolerating a regular diet, having consistent bowel function, OOB ambulating and voiding. Stable for discharge home with outpatient follow-up.    Patient is advised to RETURN TO THE EMERGENCY DEPARTMENT for any of the following - worsening pain, fever/chills, nausea/vomiting, altered mental status, chest pain, shortness of breath, or any other new / worsening symptom.   Admission HPI:  Patient is a 63yoM with PMH herniated discs, atrial flutter, anxiety, depression, PE (2020), BPH, COPD who presents to the ED with acute onset abdominal pain associated with an abdominal wall bulge. Patient says he has had a known hernia for over 20 years since his exlap, hernia usually reducible and nontender until today. He says he woke up with significant pain, nausea and vomiting, and has been unable to reduce hernia. Last BM Sunday AM, last flatus sunday afternoon. Patient denies prior similar episodes. He currently denies any chest pain, SOB, headache, diarrhea. Patient with cough over last 2 months, no recent changes.    Hospital Course:  CT scan on admission showed right anterior abdominal wall ventral hernia containing loop of small bowel; distended and obstructed small bowel within the abdomen and pelvis consistent with small bowel obstruction secondary to presumed incarcerated hernia; swirling within the mesentery of the right lower quadrant; small amount of free pelvic fluid; mild amount of free fluid in the  paracolic gutters bilaterally, left greater than right.    Patient was admitted to the acute care surgery service. Taken to the OR on 6/23 for ex-lap and primary repair of ventral hernia - no bowel resection performed. Pt returned to surgical floors in stable condition. Post-op course complicated by ileus. Patient eventually had return of bowel function and abdominal distension resolved. He was gradually advanced to clear liquid diet and then regular diet. Pain management service was also consulted during pt's hospital stay as pt has hx of suboxone use and they assisted w/ recommendations regarding pain regimen. Patient is currently tolerating a regular diet, having consistent bowel function, OOB ambulating and voiding. It was discussed with Dr. Bettencourt and patient that he would be sent home with a 2 week Rx for suboxone with the understanding that he must follow-up with his pain management physician Dr Cristina within these next 2 weeks for further management and refills. Pt expressed understanding of this and agreeable with plan. He is stable for discharge home with outpatient follow-up.    Patient is advised to RETURN TO THE EMERGENCY DEPARTMENT for any of the following - worsening pain, fever/chills, nausea/vomiting, altered mental status, chest pain, shortness of breath, or any other new / worsening symptom.

## 2023-06-21 NOTE — DISCHARGE NOTE NURSING/CASE MANAGEMENT/SOCIAL WORK - PATIENT PORTAL LINK FT
You can access the FollowMyHealth Patient Portal offered by [facility] by registering at the following website: [email or web address]. By joining [facility]’s FollowMyHealth portal, you will also be able to view your health information using other applications (apps) compatible with our system.

## 2023-06-21 NOTE — DISCHARGE NOTE PROVIDER - NSDCMRMEDTOKEN_GEN_ALL_CORE_FT
ALBUTEROL    AER HFA:   apixaban 5 mg oral tablet: 1 tab(s) orally every 12 hours  ascorbic acid 500 mg oral tablet: 1 tab(s) orally once a day  buprenorphine-naloxone 8 mg-2 mg sublingual tablet: 1 tab(s) sublingually 2 times a day MDD: 16mg/4mg  DULoxetine 20 mg oral delayed release capsule: 1 cap(s) orally once a day  gabapentin 300 mg oral tablet: 1 tab(s) orally 2 times a day  Multiple Vitamins oral tablet: 1 tab(s) orally once a day  tamsulosin 0.4 mg oral capsule: 1 cap(s) orally once a day (at bedtime)  Xanax 0.5 mg oral tablet: 1 tab(s) orally 3 times a day MDD: 1.5mg/day   acetaminophen 325 mg oral tablet: 3 tab(s) orally every 8 hours  ALBUTEROL    AER HFA:   ascorbic acid 500 mg oral tablet: 1 tab(s) orally once a day  buprenorphine-naloxone 8 mg-2 mg sublingual film: 1 film(s) sublingual 2 times a day Refills will need to be sent by Dr. Cristina when you see him at your follow-up appointment. Please make an appointment within 2 weeks so you do not run out of medication MDD: 2 films  DULoxetine 20 mg oral delayed release capsule: 1 cap(s) orally once a day  gabapentin 300 mg oral tablet: 1 tab(s) orally 2 times a day  ibuprofen 600 mg oral tablet: 1 tab(s) orally every 6 hours  Multiple Vitamins oral tablet: 1 tab(s) orally once a day  tamsulosin 0.4 mg oral capsule: 1 cap(s) orally once a day (at bedtime)
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
## 2023-06-21 NOTE — PROGRESS NOTE ADULT - PROVIDER SPECIALTY LIST ADULT
Pain Medicine
Surgery
Surgery
Trauma Surgery
Pain Medicine
Surgery
Surgery
Trauma Surgery
Pain Medicine
Pain Medicine

## 2023-06-22 ENCOUNTER — APPOINTMENT (OUTPATIENT)
Dept: CARDIOLOGY | Facility: CLINIC | Age: 63
End: 2023-06-22

## 2023-06-22 LAB — SURGICAL PATHOLOGY STUDY: SIGNIFICANT CHANGE UP

## 2023-07-10 ENCOUNTER — APPOINTMENT (OUTPATIENT)
Dept: ELECTROPHYSIOLOGY | Facility: CLINIC | Age: 63
End: 2023-07-10
Payer: MEDICARE

## 2023-07-10 ENCOUNTER — NON-APPOINTMENT (OUTPATIENT)
Age: 63
End: 2023-07-10

## 2023-07-10 PROCEDURE — 93298 REM INTERROG DEV EVAL SCRMS: CPT

## 2023-07-10 PROCEDURE — G2066: CPT

## 2023-08-10 ENCOUNTER — APPOINTMENT (OUTPATIENT)
Dept: PULMONOLOGY | Facility: CLINIC | Age: 63
End: 2023-08-10
Payer: MEDICARE

## 2023-08-10 VITALS — WEIGHT: 161 LBS | BODY MASS INDEX: 23.05 KG/M2 | HEIGHT: 70 IN

## 2023-08-10 VITALS
RESPIRATION RATE: 16 BRPM | DIASTOLIC BLOOD PRESSURE: 72 MMHG | SYSTOLIC BLOOD PRESSURE: 130 MMHG | HEART RATE: 102 BPM | OXYGEN SATURATION: 96 %

## 2023-08-10 DIAGNOSIS — J43.9 EMPHYSEMA, UNSPECIFIED: ICD-10-CM

## 2023-08-10 DIAGNOSIS — Z87.891 PERSONAL HISTORY OF NICOTINE DEPENDENCE: ICD-10-CM

## 2023-08-10 DIAGNOSIS — I26.99 OTHER PULMONARY EMBOLISM W/OUT ACUTE COR PULMONALE: ICD-10-CM

## 2023-08-10 DIAGNOSIS — R05.9 COUGH, UNSPECIFIED: ICD-10-CM

## 2023-08-10 DIAGNOSIS — J44.9 CHRONIC OBSTRUCTIVE PULMONARY DISEASE, UNSPECIFIED: ICD-10-CM

## 2023-08-10 DIAGNOSIS — Z09 ENCOUNTER FOR FOLLOW-UP EXAMINATION AFTER COMPLETED TREATMENT FOR CONDITIONS OTHER THAN MALIGNANT NEOPLASM: ICD-10-CM

## 2023-08-10 DIAGNOSIS — F17.200 NICOTINE DEPENDENCE, UNSPECIFIED, UNCOMPLICATED: ICD-10-CM

## 2023-08-10 DIAGNOSIS — R06.09 OTHER FORMS OF DYSPNEA: ICD-10-CM

## 2023-08-10 PROCEDURE — 99215 OFFICE O/P EST HI 40 MIN: CPT | Mod: 25

## 2023-08-10 PROCEDURE — 94010 BREATHING CAPACITY TEST: CPT

## 2023-08-10 RX ORDER — FLUTICASONE FUROATE, UMECLIDINIUM BROMIDE AND VILANTEROL TRIFENATATE 100; 62.5; 25 UG/1; UG/1; UG/1
100-62.5-25 POWDER RESPIRATORY (INHALATION) DAILY
Qty: 3 | Refills: 3 | Status: ACTIVE | COMMUNITY
Start: 2023-08-10 | End: 1900-01-01

## 2023-08-10 NOTE — HISTORY OF PRESENT ILLNESS
[Current] : current [TextBox_4] : Emphysema, nodule, PE.    Hx VDRF during hosp 2021. Trach removed at [facility].     S/O hosp at [facility] 6/12-6/21/23 for abd pain; underwent emergent ex-lap and ventral hernia repair.   At baseline respiratory wise. Has MERRILL.  Cough improved but still present.  Ran out of all his inhalers about one month ago; did not call.     Refusing to get O2.   On eliquis. On for PE X 2 and afib.     PET done for lung nodule; No activity on PET;  nodule decreased in size. Saw Dr Ty. Repeat was planned.   Very depressed. Still has not seen psychiatry. Has insomnia. Goes to bed around 11 pm; does not fall asleep often until 3-5 am; sleeps until 8-10 am. Watches TV in bed. Sedentary lifestyle. Does not do much physical activity at all.   Still smoking.

## 2023-08-10 NOTE — PROCEDURE
[FreeTextEntry1] :  dm done today: obstruction in the sever range with FEV1 49%; slightly improved from 2021 ----  ACC: [id number] EXAM: CT ANGIO CHEST PULM ART [facility] ORDERED BY: GAGE LOZADA  PROCEDURE DATE: 05/28/2023    INTERPRETATION: HISTORY: Admitting Dxs: F13.939 SEDATV/HYP/ANXIOLYTC USE, UNSP W WITHDRAWAL, UNSP. Chest pain.  EXAMINATION: CTA CHEST was performed for evaluation of the pulmonary arteries. Multiplanar reformatted images and MIPS were acquired. CONTRAST/COMPLICATIONS: IV Contrast: Omnipaque 350 70 cc administered 30 cc discarded Oral Contrast: NONE Complications: None reported at time of study completion  COMPARISON: 8/2/2022.  FINDINGS:  PULMONARY ARTERIES: No pulmonary embolism.  MEDIASTINUM: Normal heart size. Coronary atherosclerosis. No pericardial effusion. Ascending thoracic aorta measures 3.9 cm. No large mediastinal lymph nodes.  AIRWAYS, LUNGS, PLEURA: Central airways secretions. Emphysema. No focal consolidation. Mild scarring at the right apex and lingula. No pleural effusion.  IMAGED ABDOMEN: Cholecystectomy. Left renal hypodense lesion measuring 1 cm, likely cyst.  SOFT TISSUES: Left anterior chest wall loop recorder device.  BONES: Unremarkable.   IMPRESSION:.  No pulmonary embolism.  Coronary atherosclerosis.  --- End of Report ---      CALVIN LAWSON MD; Attending Radiologist ----- ACC: [id number] EXAM: XR CHEST PORTABLE URGENT 1V ORDERED BY: JANELL DAI  PROCEDURE DATE: 06/12/2023    INTERPRETATION: TIME OF EXAM: June 12, 2023 at 7:29 AM and 7:30 AM.  CLINICAL INFORMATION: Obstruction secondary to incarcerated hernia. NG tube.  COMPARISON: June 12, 2023 at 12:14 AM.  TECHNIQUE: AP Portable chest x-rays.  INTERPRETATION:  The heart is not enlarged. There is a left-sided loop recorder. Enteric tube tip is in the stomach with side port likely in the distal esophagus or GE junction. Multiple dilated gas-filled small bowel loops are seen. The lungs are clear. No pleural effusion or pneumothorax is seen. Multiple old right rib fractures are again noted.    IMPRESSION: Enteric tube tip is in the stomach with side port likely in the distal esophagus or GE junction. Consider advancing the enteric tube.  Multiple dilated gas-filled small bowel loops are seen.  Clear lungs.  --- End of Report ---    -------------  JULIA BUSH MD; Attending Radiologist This document has been electronically signed. Jun 12 2023 4:12PM ACC: [id number] EXAM: XR CHEST PORTABLE URGENT 1V ORDERED BY: VIKTORIYA LAGUNAS  PROCEDURE DATE: 06/12/2023    INTERPRETATION: TECHNIQUE: Single portable view of the chest.  COMPARISON: 5/27/2023  CLINICAL HISTORY: abdominal pain  FINDINGS:  Single frontal view of the chest demonstrates the lungs to be clear. The cardiomediastinal silhouette is normal. No acute osseous abnormalities. Left-sided loop recorder. Multiple chronic right-sided rib fracture deformities. Postcholecystectomy clips.  IMPRESSION: No acute cardiopulmonary disease process.  --- End of Report ---      ROCÍO WORTHINGTON MD; Attending Radiologist This document has been electronically signed. Jun 12 2023 10:04AM

## 2023-08-14 ENCOUNTER — APPOINTMENT (OUTPATIENT)
Dept: ELECTROPHYSIOLOGY | Facility: CLINIC | Age: 63
End: 2023-08-14
Payer: MEDICARE

## 2023-08-14 ENCOUNTER — NON-APPOINTMENT (OUTPATIENT)
Age: 63
End: 2023-08-14

## 2023-08-14 PROCEDURE — G2066: CPT

## 2023-08-14 PROCEDURE — 93298 REM INTERROG DEV EVAL SCRMS: CPT

## 2023-09-18 ENCOUNTER — APPOINTMENT (OUTPATIENT)
Dept: ELECTROPHYSIOLOGY | Facility: CLINIC | Age: 63
End: 2023-09-18
Payer: MEDICARE

## 2023-09-18 ENCOUNTER — NON-APPOINTMENT (OUTPATIENT)
Age: 63
End: 2023-09-18

## 2023-09-18 PROCEDURE — 93298 REM INTERROG DEV EVAL SCRMS: CPT

## 2023-09-18 PROCEDURE — G2066: CPT

## 2023-10-23 ENCOUNTER — NON-APPOINTMENT (OUTPATIENT)
Age: 63
End: 2023-10-23

## 2023-10-23 ENCOUNTER — APPOINTMENT (OUTPATIENT)
Dept: ELECTROPHYSIOLOGY | Facility: CLINIC | Age: 63
End: 2023-10-23
Payer: MEDICARE

## 2023-10-23 PROCEDURE — G2066: CPT | Mod: NC

## 2023-10-23 PROCEDURE — 93298 REM INTERROG DEV EVAL SCRMS: CPT | Mod: NC

## 2023-11-09 ENCOUNTER — APPOINTMENT (OUTPATIENT)
Dept: ELECTROPHYSIOLOGY | Facility: CLINIC | Age: 63
End: 2023-11-09

## 2023-11-13 NOTE — PATIENT PROFILE ADULT - HOME ACCESSIBILITY CONCERNS
Orthopaedic Surgery Consult Note    Date of Service:   11/12/23    Attending:   Dr. Lee, Dr. Hackett    Chief Complaint:   Right leg injury    History of Present Illness:   Stephanie is a 71 year old female with hx of multiple myeloma with known bony disease currently undergoing chemotherapy, who presents with right leg pain.  The patient states that she was in her home earlier when she was transferring from her wheelchair to her bed and had immediate onset of severe pain associated with an audible crack as she was sitting herself onto the bed.  She was brought to the emergency department by EMS.  Imaging in the emergency department is significant for a pathologic right distal femur fracture.  Presently, she states that she has severe pain around the knee with any attempted motion.  She denies pain in the bilateral upper extremities or the left lower extremity.  She denies any numbness or tingling in the right lower extremity. She does report that she had antecedent pain in the right thigh for several months, but does not have similar pain in other extremities.  She lives alone and has a caregiver who comes to help her with ADLs on a daily basis.  She is minimally ambulatory and uses a wheelchair but does self-transfer and pivot.    Past Medical History:   HTN  Multiple myeloma  CKD  Hyperparathyroidism     Past Surgical History:   No past surgical history on file.    Medications:   Prior to Admission medications    Medication Sig Start Date End Date Taking? Authorizing Provider   HYDROcodone-acetaminophen (NORCO) 5-325 MG per tablet Take 1 tablet by mouth every 6 hours as needed for Pain. 11/10/23   Ajith Jade MD   metoPROLOL succinate (TOPROL-XL) 100 MG 24 hr tablet Take 1 tablet by mouth daily. 11/2/23   Ajith Jade MD   NIFEdipine XL (Procardia XL) 60 MG 24 hr tablet Take 1 tablet by mouth every 24 hours. 11/2/23   Ajith Jade MD   LORazepam (ATIVAN) 2 MG tablet Take 1 tablet by mouth daily. 11/2/23    Ajith Jade MD   ondansetron (ZOFRAN ODT) 4 MG disintegrating tablet Place 1 tablet onto the tongue every 8 hours as needed for Nausea. 11/2/23   Ajith Jade MD   gabapentin (NEURONTIN) 300 MG capsule Take 1 capsule by mouth in the morning and 1 capsule at noon and 1 capsule in the evening. 11/2/23   Ajith Jade MD   loperamide (IMODIUM) 2 MG capsule Take 1 capsule by mouth 4 times daily as needed for Diarrhea. 8/7/23   Ajith Jade MD   acetaminophen (TYLENOL) 325 MG tablet Take 650 mg by mouth every 4 hours as needed.    Provider, Outside   acyclovir (ZOVIRAX) 400 MG tablet Take 400 mg by mouth in the morning and 400 mg in the evening.    Provider, Outside   bisacodyl (DULCOLAX) 10 MG suppository Place 10 mg rectally daily as needed.    Provider, Outside   clobetasol (TEMOVATE) 0.05 % ointment Apply 0.05 Applications topically in the morning and 0.05 Applications in the evening.    Provider, Outside   diclofenac (VOLTAREN) 1 % gel Apply 1 g topically in the morning and 1 g in the evening.    Provider, Outside   potassium chloride (K-TAB) 20 MEQ ER tablet Take 20 mEq by mouth in the morning and 20 mEq in the evening.    Provider, Outside   docusate sodium-sennosides (SENOKOT S) 50-8.6 MG per tablet Take 1 tablet by mouth in the morning and 1 tablet in the evening.    Provider, Outside   tamsulosin (FLOMAX) 0.4 MG Cap Take 0.4 mg by mouth daily.    Provider, Outside   methylPREDNISolone (MEDROL DOSEPAK) 4 MG tablet Take 1 tablet by mouth as directed. follow package directions 5/11/23   Ajith Jade MD     Current Facility-Administered Medications   Medication Dose Route Frequency Provider Last Rate Last Admin   • sodium chloride 0.9% infusion   Intravenous Continuous Schwab, Theresa, MD       • [START ON 11/13/2023] acetaminophen (TYLENOL) tablet 650 mg  650 mg Oral Q4H Constantin Woodward MD       • [START ON 11/13/2023] ibuprofen (MOTRIN) tablet 600 mg  600 mg Oral Q6H Constantin Woodward MD       •  HYDROcodone-acetaminophen (NORCO) 5-325 MG per tablet 1 tablet  1 tablet Oral Q4H Constantin Harris MD         Current Outpatient Medications   Medication Sig Dispense Refill   • HYDROcodone-acetaminophen (NORCO) 5-325 MG per tablet Take 1 tablet by mouth every 6 hours as needed for Pain. 30 tablet 0   • metoPROLOL succinate (TOPROL-XL) 100 MG 24 hr tablet Take 1 tablet by mouth daily. 90 tablet 0   • NIFEdipine XL (Procardia XL) 60 MG 24 hr tablet Take 1 tablet by mouth every 24 hours. 90 tablet 0   • LORazepam (ATIVAN) 2 MG tablet Take 1 tablet by mouth daily. 30 tablet 0   • ondansetron (ZOFRAN ODT) 4 MG disintegrating tablet Place 1 tablet onto the tongue every 8 hours as needed for Nausea. 90 tablet 1   • gabapentin (NEURONTIN) 300 MG capsule Take 1 capsule by mouth in the morning and 1 capsule at noon and 1 capsule in the evening. 90 capsule 0   • loperamide (IMODIUM) 2 MG capsule Take 1 capsule by mouth 4 times daily as needed for Diarrhea. 30 capsule 0   • acetaminophen (TYLENOL) 325 MG tablet Take 650 mg by mouth every 4 hours as needed.     • acyclovir (ZOVIRAX) 400 MG tablet Take 400 mg by mouth in the morning and 400 mg in the evening.     • bisacodyl (DULCOLAX) 10 MG suppository Place 10 mg rectally daily as needed.     • clobetasol (TEMOVATE) 0.05 % ointment Apply 0.05 Applications topically in the morning and 0.05 Applications in the evening.     • diclofenac (VOLTAREN) 1 % gel Apply 1 g topically in the morning and 1 g in the evening.     • potassium chloride (K-TAB) 20 MEQ ER tablet Take 20 mEq by mouth in the morning and 20 mEq in the evening.     • docusate sodium-sennosides (SENOKOT S) 50-8.6 MG per tablet Take 1 tablet by mouth in the morning and 1 tablet in the evening.     • tamsulosin (FLOMAX) 0.4 MG Cap Take 0.4 mg by mouth daily.     • methylPREDNISolone (MEDROL DOSEPAK) 4 MG tablet Take 1 tablet by mouth as directed. follow package directions 1 packet 0       Allergies:    ALLERGIES:  Methotrexate and Valsartan    Social History:   Denies tobacco use    Family History:  Noncontributory    Review of Symptoms:  As in HPI    Physical Examination:  Vitals:    11/12/23 2007 11/12/23 2008 11/12/23 2036   BP: (!) 145/131  (!) 150/74   Pulse: 72  68   Resp: 18  19   Temp:  98.4 °F (36.9 °C)    TempSrc:  Oral    SpO2: 96%  95%       No acute distress  Nonlabored respirations on room air  Regular rate and rhythm by peripheral pulses  Alert and oriented x4  Appropriate, cooperative    MSK:     RLE:  Skin is closed. No obvious deformity. Tender to palpation just proximal to the knee joint. Nontender in the groin, proximal thigh, nor in the lower leg, ankle, foot. Logroll deferred. SILT in L3-S1 distributions. Fires the TA, gastroc, EHL, and FHL. Palpable DP and PT pulses, toes warm and well perfused.    LLE and bilateral UE inspected, palpated, ranged and assessed for strength and sensation. No other injuries or deficits identified.     Imaging:  XR right hip, femur, tib-fib: right distal femur pathologic appearing fracture with posterior displacement and shortening     Labs:    Recent Labs   Lab 11/12/23 2129   WBC 8.9   RBC 4.47   HGB 12.9   HCT 40.3          Recent Labs   Lab 11/12/23 2010   SODIUM 140   POTASSIUM 3.6   CHLORIDE 109   CO2 26   BUN 15   CREATININE 1.13*   GLUCOSE 145*   CALCIUM 10.9*       Recent Labs   Lab 11/12/23 2010   PTT 24   PT 11.2   INR 1.0       Assessment and Plan:    71F with hx of multiple myeloma currently on chemotherapy p/w pathologic right distal femur fracture    - Admit to medical service, Ortho on consult  - Surgical fixation of her fracture is indicated. Plan for OR tentatively tomorrow 11/13/23  - Weight bearing status: non weight bearing RLE in knee immobilizer, please ensure immobilizer remains appropriately placed and does not slide down the patient's leg  - NPO midnight   - No need to hold chemical anticoagulation pre-operatively  - Pre-op  clearance -- medicine team requested to document statement on medical optimization/clearance in note   - Pre-op labs: CBC, BMP, coags, T+S -- completed, reviewed. Pre-op Hgb 12.4. Blood products on hold for OR: none  - Pre-op imaging: CT right knee with 3D reconstructions, ordered    Patient voiced understanding of treatment plan.  Will d/w attending and advise as plan changes.       Constantin Woodward MD  Orthopaedic Surgery PGY-4       none

## 2023-12-07 ENCOUNTER — APPOINTMENT (OUTPATIENT)
Dept: PULMONOLOGY | Facility: CLINIC | Age: 63
End: 2023-12-07

## 2023-12-11 ENCOUNTER — APPOINTMENT (OUTPATIENT)
Dept: ELECTROPHYSIOLOGY | Facility: CLINIC | Age: 63
End: 2023-12-11
Payer: MEDICARE

## 2023-12-11 ENCOUNTER — NON-APPOINTMENT (OUTPATIENT)
Age: 63
End: 2023-12-11

## 2023-12-11 PROCEDURE — 93298 REM INTERROG DEV EVAL SCRMS: CPT

## 2023-12-11 PROCEDURE — G2066: CPT

## 2023-12-13 NOTE — ED PROVIDER NOTE - NS ED MD TWO NIGHTS YN
[FreeTextEntry1] : This is a strong steroid. It can cause anxiety, hunger, irritability, trouble sleeping, rarely causes palpitations or chest pain but if present go to to ER and then let me know. 
Yes

## 2023-12-22 ENCOUNTER — APPOINTMENT (OUTPATIENT)
Dept: ELECTROPHYSIOLOGY | Facility: CLINIC | Age: 63
End: 2023-12-22

## 2024-01-26 ENCOUNTER — NON-APPOINTMENT (OUTPATIENT)
Age: 64
End: 2024-01-26

## 2024-01-26 ENCOUNTER — APPOINTMENT (OUTPATIENT)
Dept: ELECTROPHYSIOLOGY | Facility: CLINIC | Age: 64
End: 2024-01-26
Payer: MEDICARE

## 2024-01-26 VITALS
HEIGHT: 70 IN | HEART RATE: 89 BPM | SYSTOLIC BLOOD PRESSURE: 130 MMHG | BODY MASS INDEX: 28.49 KG/M2 | OXYGEN SATURATION: 90 % | WEIGHT: 199 LBS | DIASTOLIC BLOOD PRESSURE: 64 MMHG

## 2024-01-26 PROCEDURE — 93000 ELECTROCARDIOGRAM COMPLETE: CPT

## 2024-01-26 PROCEDURE — 99214 OFFICE O/P EST MOD 30 MIN: CPT

## 2024-01-26 RX ORDER — APIXABAN 5 MG/1
5 TABLET, FILM COATED ORAL
Qty: 180 | Refills: 3 | Status: ACTIVE | COMMUNITY
Start: 1900-01-01 | End: 1900-01-01

## 2024-01-26 NOTE — REVIEW OF SYSTEMS
[Feeling Fatigued] : not feeling fatigued [SOB] : no shortness of breath [Dyspnea on exertion] : not dyspnea during exertion [Chest Discomfort] : no chest discomfort [Lower Ext Edema] : no extremity edema [Palpitations] : no palpitations [Orthopnea] : no orthopnea [Syncope] : no syncope [Dizziness] : no dizziness [Easy Bleeding] : no tendency for easy bleeding

## 2024-01-26 NOTE — HISTORY OF PRESENT ILLNESS
[FreeTextEntry1] : 64 year old gentleman with history of PE 8/2020 & 5/2021, COPD, tobacco use (quit 6 mo ago), ?remote opioid use on Suboxone, atrial flutter dx 1/2021, prolonged hospital course (5/15/21-6/5/2021) for pneumonia, acute hypoxic respiratory failure requiring intubation and ultimately tracheostomy (5/25/21) s/p reversal, left pleural effusion/pulm edema s/p chest tube (5/16/21), right segmental PE and left peroneal DVT, and atrial flutter with RVR s/p urgent cardioversion in ER 5/15/21, due to hemodynamic instability. Ultimately stabilized and underwent atrial flutter ablation (CTI line) & ILR implant (9/2021).  Initially maintained SR on ILR. Recently brought to [facility] with AF w/ RVR and hypotension c/w shortness of breath and extreme fatigue. Had not been taking eliquis ~2 months. D-dimer is elevated. Was given IV BB and self converted to sinus rhythm. CT was negative for PE but showed LUE infiltrate c/w possible PNA. Restarted on Eliquis. RLE DVT showed. TTE showed normal LVEF 60-65%, mild pHTN.  Patient left AMA before PNA was identified. [facility] called later that day and started antibiotic outpatient therapy (azithromycin, patient unsure of second antibiotic).  Seen for post hospital f/up 8/11/22 and overall symptomatic improvement. Planned for continued monitoring given that first episode of rapid AF with RVR occurred in the setting of PNA.  During previous f/u one subsequent AF noted on remote monitoring that lasted 6 days in duration. He remained off rate control due to history of intolerance and hypotension. The potential for future rhythm control discussed however patient expressed struggling with opiod dependency on Subozone. Patient was encouraged to seek f/u with pain management and alternative PCP for management.   Since last follow up ILR reveals low burden PAF with longest episode 4 minutes in duration. Mr. Rolle reports he has been doing well except he did require hernia surgery over the summer. He denies bleeding, falls and is not currently taking Eliquis. He does not recall physician advising him to discontinue, is unsure why not taking.

## 2024-01-26 NOTE — DISCUSSION/SUMMARY
[EKG obtained to assist in diagnosis and management of assessed problem(s)] : EKG obtained to assist in diagnosis and management of assessed problem(s) [FreeTextEntry1] : 64 year old gentleman with history of PE 8/2020 & 5/2021, COPD, tobacco use (quit 6 mo ago), ?remote opioid use on Suboxone, atrial flutter dx 1/2021, prolonged hospital course (5/15/21-6/5/2021) for pneumonia, acute hypoxic respiratory failure requiring intubation and ultimately tracheostomy (5/25/21) s/p reversal, left pleural effusion/pulm edema s/p chest tube (5/16/21), right segmental PE and left peroneal DVT, and atrial flutter with RVR s/p urgent cardioversion in ER 5/15/21, due to hemodynamic instability. Ultimately stabilized and underwent atrial flutter ablation (CTI line) & ILR implant (9/2021).  Initially maintained SR on ILR. Recently brought to [facility] with AF w/ RVR and hypotension c/w shortness of breath and extreme fatigue. Had not been taking eliquis ~2 months. D-dimer is elevated. Was given IV BB and self converted to sinus rhythm. CT was negative for PE but showed LUE infiltrate c/w possible PNA. Restarted on Eliquis. RLE DVT showed. TTE showed normal LVEF 60-65%, mild pHTN.  Patient left AMA before PNA was identified. [facility] called later that day and started antibiotic outpatient therapy (azithromycin, patient unsure of second antibiotic).  Seen for post hospital f/up 8/11/22 and overall symptomatic improvement. Planned for continued monitoring given that first episode of rapid AF with RVR occurred in the setting of PNA.  During previous f/u one subsequent AF noted on remote monitoring that lasted 6 days in duration. He remained off rate control due to history of intolerance and hypotension. The potential for future rhythm control discussed however patient expressed struggling with opiod dependency on Subozone. Patient was encouraged to seek f/u with pain management and alternative PCP for management.   Since last follow up ILR reveals low burden PAF with longest episode 4 minutes in duration. Mr. Rolle reports he has been doing well except he did require hernia surgery over the summer. He denies bleeding, falls and is not currently taking Eliquis. He does not recall physician advising him to discontinue, is unsure why not taking.   Recommendation:  -Today, we reviewed the importance of Eliquis compliance for both stroke prophylaxis related to PAF and history of multiple PEs. Denies bleeding, falls. Agreeable to resume. Counseled to report any intolerance or if he is instructed to stop for any reason by another provider. He verbalized understanding. -PAF burden low <1% since last follow up. TO continue to monitor ILR remotely.  -EP follow up in 9 months or sooner PRN.  Tanisha Braun ANP-C

## 2024-02-21 NOTE — ED ADULT TRIAGE NOTE - CHIEF COMPLAINT QUOTE
pt awake and alert, BIBA from home s/p unwitnessed fall on Eliquis with +head trauma. code trauma B activated urinary retention

## 2024-02-29 ENCOUNTER — NON-APPOINTMENT (OUTPATIENT)
Age: 64
End: 2024-02-29

## 2024-03-01 ENCOUNTER — APPOINTMENT (OUTPATIENT)
Dept: ELECTROPHYSIOLOGY | Facility: CLINIC | Age: 64
End: 2024-03-01
Payer: MEDICARE

## 2024-03-01 PROCEDURE — 93298 REM INTERROG DEV EVAL SCRMS: CPT

## 2024-04-03 ENCOUNTER — NON-APPOINTMENT (OUTPATIENT)
Age: 64
End: 2024-04-03

## 2024-04-04 ENCOUNTER — APPOINTMENT (OUTPATIENT)
Dept: ELECTROPHYSIOLOGY | Facility: CLINIC | Age: 64
End: 2024-04-04
Payer: MEDICARE

## 2024-04-04 PROCEDURE — 93298 REM INTERROG DEV EVAL SCRMS: CPT

## 2024-05-08 ENCOUNTER — NON-APPOINTMENT (OUTPATIENT)
Age: 64
End: 2024-05-08

## 2024-05-09 ENCOUNTER — APPOINTMENT (OUTPATIENT)
Dept: ELECTROPHYSIOLOGY | Facility: CLINIC | Age: 64
End: 2024-05-09
Payer: MEDICARE

## 2024-05-09 PROCEDURE — 93298 REM INTERROG DEV EVAL SCRMS: CPT

## 2024-06-12 ENCOUNTER — NON-APPOINTMENT (OUTPATIENT)
Age: 64
End: 2024-06-12

## 2024-06-13 ENCOUNTER — APPOINTMENT (OUTPATIENT)
Dept: ELECTROPHYSIOLOGY | Facility: CLINIC | Age: 64
End: 2024-06-13
Payer: MEDICARE

## 2024-06-13 PROCEDURE — 93298 REM INTERROG DEV EVAL SCRMS: CPT

## 2024-07-16 ENCOUNTER — NON-APPOINTMENT (OUTPATIENT)
Age: 64
End: 2024-07-16

## 2024-07-17 ENCOUNTER — APPOINTMENT (OUTPATIENT)
Dept: ELECTROPHYSIOLOGY | Facility: CLINIC | Age: 64
End: 2024-07-17
Payer: MEDICARE

## 2024-07-17 PROCEDURE — 93298 REM INTERROG DEV EVAL SCRMS: CPT

## 2024-08-19 ENCOUNTER — NON-APPOINTMENT (OUTPATIENT)
Age: 64
End: 2024-08-19

## 2024-08-20 ENCOUNTER — APPOINTMENT (OUTPATIENT)
Dept: ELECTROPHYSIOLOGY | Facility: CLINIC | Age: 64
End: 2024-08-20
Payer: MEDICARE

## 2024-08-20 PROCEDURE — 93298 REM INTERROG DEV EVAL SCRMS: CPT

## 2024-09-23 ENCOUNTER — NON-APPOINTMENT (OUTPATIENT)
Age: 64
End: 2024-09-23

## 2024-09-24 ENCOUNTER — APPOINTMENT (OUTPATIENT)
Dept: ELECTROPHYSIOLOGY | Facility: CLINIC | Age: 64
End: 2024-09-24
Payer: MEDICARE

## 2024-09-24 PROCEDURE — 93298 REM INTERROG DEV EVAL SCRMS: CPT

## 2024-10-04 ENCOUNTER — APPOINTMENT (OUTPATIENT)
Dept: ELECTROPHYSIOLOGY | Facility: CLINIC | Age: 64
End: 2024-10-04

## 2024-11-04 ENCOUNTER — APPOINTMENT (OUTPATIENT)
Dept: ELECTROPHYSIOLOGY | Facility: CLINIC | Age: 64
End: 2024-11-04
Payer: MEDICARE

## 2024-11-04 ENCOUNTER — NON-APPOINTMENT (OUTPATIENT)
Age: 64
End: 2024-11-04

## 2024-11-04 PROCEDURE — 93298 REM INTERROG DEV EVAL SCRMS: CPT

## 2024-12-09 ENCOUNTER — NON-APPOINTMENT (OUTPATIENT)
Age: 64
End: 2024-12-09

## 2024-12-09 ENCOUNTER — APPOINTMENT (OUTPATIENT)
Dept: ELECTROPHYSIOLOGY | Facility: CLINIC | Age: 64
End: 2024-12-09
Payer: MEDICARE

## 2024-12-09 PROCEDURE — 93298 REM INTERROG DEV EVAL SCRMS: CPT

## 2025-01-13 ENCOUNTER — APPOINTMENT (OUTPATIENT)
Dept: ELECTROPHYSIOLOGY | Facility: CLINIC | Age: 65
End: 2025-01-13
Payer: MEDICARE

## 2025-01-13 ENCOUNTER — NON-APPOINTMENT (OUTPATIENT)
Age: 65
End: 2025-01-13

## 2025-01-13 PROCEDURE — 93298 REM INTERROG DEV EVAL SCRMS: CPT

## 2025-01-29 ENCOUNTER — NON-APPOINTMENT (OUTPATIENT)
Age: 65
End: 2025-01-29

## 2025-01-29 ENCOUNTER — APPOINTMENT (OUTPATIENT)
Dept: CARDIOLOGY | Facility: CLINIC | Age: 65
End: 2025-01-29
Payer: MEDICARE

## 2025-01-29 VITALS — HEIGHT: 61 IN | WEIGHT: 250 LBS | HEART RATE: 77 BPM | OXYGEN SATURATION: 95 % | BODY MASS INDEX: 47.2 KG/M2

## 2025-01-29 VITALS — SYSTOLIC BLOOD PRESSURE: 122 MMHG | DIASTOLIC BLOOD PRESSURE: 70 MMHG

## 2025-01-29 VITALS — DIASTOLIC BLOOD PRESSURE: 72 MMHG | SYSTOLIC BLOOD PRESSURE: 138 MMHG

## 2025-01-29 DIAGNOSIS — Z95.818 PRESENCE OF OTHER CARDIAC IMPLANTS AND GRAFTS: ICD-10-CM

## 2025-01-29 DIAGNOSIS — Z87.891 PERSONAL HISTORY OF NICOTINE DEPENDENCE: ICD-10-CM

## 2025-01-29 DIAGNOSIS — I25.10 ATHEROSCLEROTIC HEART DISEASE OF NATIVE CORONARY ARTERY W/OUT ANGINA PECTORIS: ICD-10-CM

## 2025-01-29 DIAGNOSIS — F11.20 OPIOID DEPENDENCE, UNCOMPLICATED: ICD-10-CM

## 2025-01-29 DIAGNOSIS — I26.99 OTHER PULMONARY EMBOLISM W/OUT ACUTE COR PULMONALE: ICD-10-CM

## 2025-01-29 DIAGNOSIS — R06.09 OTHER FORMS OF DYSPNEA: ICD-10-CM

## 2025-01-29 DIAGNOSIS — J44.9 CHRONIC OBSTRUCTIVE PULMONARY DISEASE, UNSPECIFIED: ICD-10-CM

## 2025-01-29 DIAGNOSIS — E66.01 MORBID (SEVERE) OBESITY DUE TO EXCESS CALORIES: ICD-10-CM

## 2025-01-29 DIAGNOSIS — I48.92 UNSPECIFIED ATRIAL FLUTTER: ICD-10-CM

## 2025-01-29 PROCEDURE — 99214 OFFICE O/P EST MOD 30 MIN: CPT

## 2025-01-29 PROCEDURE — G2211 COMPLEX E/M VISIT ADD ON: CPT

## 2025-01-29 PROCEDURE — 93000 ELECTROCARDIOGRAM COMPLETE: CPT

## 2025-01-29 RX ORDER — BUPRENORPHINE HYDROCHLORIDE, NALOXONE HYDROCHLORIDE 8; 2 MG/1; MG/1
8-2 FILM, SOLUBLE BUCCAL; SUBLINGUAL
Refills: 0 | Status: ACTIVE | COMMUNITY
Start: 2025-01-29

## 2025-01-29 RX ORDER — METOPROLOL TARTRATE 50 MG/1
50 TABLET ORAL
Qty: 2 | Refills: 0 | Status: ACTIVE | COMMUNITY
Start: 2025-01-29 | End: 1900-01-01

## 2025-02-14 ENCOUNTER — APPOINTMENT (OUTPATIENT)
Dept: ELECTROPHYSIOLOGY | Facility: CLINIC | Age: 65
End: 2025-02-14

## 2025-02-14 PROCEDURE — 93298 REM INTERROG DEV EVAL SCRMS: CPT

## 2025-02-19 NOTE — DISCHARGE NOTE PROVIDER - DISCHARGE DATE
Pt dispensed nebulizer from [employer] and instruction provided. All questions answered at this time. Signed copy of purchase agreement and nebulizer use reference sheet provided to pt.   
04-Jun-2021

## 2025-03-05 NOTE — ED PROVIDER NOTE - CPE EDP [facility] NORM
Please go to [facility] for further evaluation and treatment   Hospital address & phone  [address] [place] [address]  Phone: [phone]   
normal...

## 2025-03-21 ENCOUNTER — APPOINTMENT (OUTPATIENT)
Dept: ELECTROPHYSIOLOGY | Facility: CLINIC | Age: 65
End: 2025-03-21
Payer: MEDICARE

## 2025-03-21 ENCOUNTER — NON-APPOINTMENT (OUTPATIENT)
Age: 65
End: 2025-03-21

## 2025-03-21 PROCEDURE — 93298 REM INTERROG DEV EVAL SCRMS: CPT

## 2025-04-25 ENCOUNTER — NON-APPOINTMENT (OUTPATIENT)
Age: 65
End: 2025-04-25

## 2025-04-25 ENCOUNTER — APPOINTMENT (OUTPATIENT)
Dept: ELECTROPHYSIOLOGY | Facility: CLINIC | Age: 65
End: 2025-04-25
Payer: MEDICARE

## 2025-04-25 PROCEDURE — 93298 REM INTERROG DEV EVAL SCRMS: CPT

## 2025-05-09 NOTE — SWALLOW VFSS/MBS ASSESSMENT ADULT - H & P REVIEW
"Chief Complaint   Patient presents with    Shortness of Breath    Detox    Back Pain     Pt was [facility] from home.   Pt has c/o SOB since 0600 today. Pt also has c/o of back pain for 3 months that is correlated to a possible UTI. Pt also states they have been snorting fentanyl for about a year and just recently stopped on Tuesday. Per EMS pt's temp was 103f temporally, , /79, 95% on RA.   In route pt received 324 mg of ASA and 2 mg of versed    Pt arrives with a  GCS of 15. Airway patent. Even and unlabored respirations. Pt connected to cardiac monitoring. Sepsis protocol ordered. Pt's chart up for ERP eval.     Blood Pressure: (P) 121/68, Pulse: (!) (P) 129, Respiration: (!) (P) 22, Temperature: (!) (P) 38.9 °C (102.1 °F), Height: (P) 167.6 cm (5' 6\"), Weight: (P) 81.6 kg (180 lb), BMI (Calculated): (P) 29.05, BSA (Calculated): (P) 1.9, Pulse Oximetry: (P) 96 %, O2 (LPM): (P) 0, O2 Delivery Device: (P) None - Room Air    " yes

## 2025-05-30 ENCOUNTER — APPOINTMENT (OUTPATIENT)
Dept: ELECTROPHYSIOLOGY | Facility: CLINIC | Age: 65
End: 2025-05-30
Payer: MEDICARE

## 2025-05-30 ENCOUNTER — NON-APPOINTMENT (OUTPATIENT)
Age: 65
End: 2025-05-30

## 2025-05-30 PROCEDURE — 93298 REM INTERROG DEV EVAL SCRMS: CPT

## 2025-08-28 ENCOUNTER — APPOINTMENT (OUTPATIENT)
Dept: ELECTROPHYSIOLOGY | Facility: CLINIC | Age: 65
End: 2025-08-28
Payer: MEDICARE

## 2025-08-28 ENCOUNTER — NON-APPOINTMENT (OUTPATIENT)
Age: 65
End: 2025-08-28

## 2025-08-28 PROCEDURE — 93298 REM INTERROG DEV EVAL SCRMS: CPT

## (undated) DEVICE — ELCTR BOVIE BLADE 3/4" EXTENDED LENGTH 6"

## (undated) DEVICE — SYR CONTROL LUER LOK 10CC

## (undated) DEVICE — GLV 8 PROTEXIS (WHITE)

## (undated) DEVICE — PACK MAJOR ABDOMINAL WITH LAP

## (undated) DEVICE — VENODYNE/SCD SLEEVE CALF MEDIUM

## (undated) DEVICE — WARMING BLANKET UPPER ADULT

## (undated) DEVICE — DRAPE 1/2 SHEET 40X57"

## (undated) DEVICE — SUT VICRYL 0 36" CT-1 UNDYED

## (undated) DEVICE — SUT MONOCRYL 4-0 27" PS-2 UNDYED

## (undated) DEVICE — GLV 8 PROTEXIS (BLUE)